# Patient Record
Sex: MALE | Race: WHITE | NOT HISPANIC OR LATINO | Employment: OTHER | URBAN - METROPOLITAN AREA
[De-identification: names, ages, dates, MRNs, and addresses within clinical notes are randomized per-mention and may not be internally consistent; named-entity substitution may affect disease eponyms.]

---

## 2020-03-13 ENCOUNTER — HOSPITAL ENCOUNTER (OUTPATIENT)
Dept: CARDIOLOGY | Facility: CLINIC | Age: 75
Discharge: HOME | End: 2020-03-13
Payer: MEDICARE

## 2020-03-13 ENCOUNTER — OFFICE VISIT (OUTPATIENT)
Dept: CARDIOLOGY | Facility: CLINIC | Age: 75
End: 2020-03-13
Payer: MEDICARE

## 2020-03-13 VITALS
DIASTOLIC BLOOD PRESSURE: 73 MMHG | HEART RATE: 95 BPM | WEIGHT: 188.25 LBS | HEIGHT: 69 IN | SYSTOLIC BLOOD PRESSURE: 168 MMHG | BODY MASS INDEX: 27.88 KG/M2

## 2020-03-13 DIAGNOSIS — I35.1 NONRHEUMATIC AORTIC VALVE INSUFFICIENCY: ICD-10-CM

## 2020-03-13 DIAGNOSIS — I10 ESSENTIAL HYPERTENSION: ICD-10-CM

## 2020-03-13 PROCEDURE — 99204 OFFICE O/P NEW MOD 45 MIN: CPT | Mod: S$GLB,,, | Performed by: INTERNAL MEDICINE

## 2020-03-13 PROCEDURE — 1126F PR PAIN SEVERITY QUANTIFIED, NO PAIN PRESENT: ICD-10-PCS | Mod: S$GLB,,, | Performed by: INTERNAL MEDICINE

## 2020-03-13 PROCEDURE — 3077F SYST BP >= 140 MM HG: CPT | Mod: CPTII,S$GLB,, | Performed by: INTERNAL MEDICINE

## 2020-03-13 PROCEDURE — 99999 PR PBB SHADOW E&M-NEW PATIENT-LVL III: CPT | Mod: PBBFAC,,, | Performed by: INTERNAL MEDICINE

## 2020-03-13 PROCEDURE — 99204 PR OFFICE/OUTPT VISIT, NEW, LEVL IV, 45-59 MIN: ICD-10-PCS | Mod: S$GLB,,, | Performed by: INTERNAL MEDICINE

## 2020-03-13 PROCEDURE — 1126F AMNT PAIN NOTED NONE PRSNT: CPT | Mod: S$GLB,,, | Performed by: INTERNAL MEDICINE

## 2020-03-13 PROCEDURE — 1159F MED LIST DOCD IN RCRD: CPT | Mod: S$GLB,,, | Performed by: INTERNAL MEDICINE

## 2020-03-13 PROCEDURE — 99999 PR PBB SHADOW E&M-NEW PATIENT-LVL III: ICD-10-PCS | Mod: PBBFAC,,, | Performed by: INTERNAL MEDICINE

## 2020-03-13 PROCEDURE — 3077F PR MOST RECENT SYSTOLIC BLOOD PRESSURE >= 140 MM HG: ICD-10-PCS | Mod: CPTII,S$GLB,, | Performed by: INTERNAL MEDICINE

## 2020-03-13 PROCEDURE — 1159F PR MEDICATION LIST DOCUMENTED IN MEDICAL RECORD: ICD-10-PCS | Mod: S$GLB,,, | Performed by: INTERNAL MEDICINE

## 2020-03-13 PROCEDURE — 93000 ELECTROCARDIOGRAM COMPLETE: CPT | Mod: S$GLB,,, | Performed by: INTERNAL MEDICINE

## 2020-03-13 PROCEDURE — 3078F DIAST BP <80 MM HG: CPT | Mod: CPTII,S$GLB,, | Performed by: INTERNAL MEDICINE

## 2020-03-13 PROCEDURE — 3078F PR MOST RECENT DIASTOLIC BLOOD PRESSURE < 80 MM HG: ICD-10-PCS | Mod: CPTII,S$GLB,, | Performed by: INTERNAL MEDICINE

## 2020-03-13 PROCEDURE — 93000 EKG 12-LEAD: ICD-10-PCS | Mod: S$GLB,,, | Performed by: INTERNAL MEDICINE

## 2020-03-13 RX ORDER — ESOMEPRAZOLE MAGNESIUM 40 MG/1
40 CAPSULE, DELAYED RELEASE ORAL
Status: ON HOLD | COMMUNITY
Start: 2020-02-15 | End: 2020-05-26 | Stop reason: HOSPADM

## 2020-03-13 NOTE — PATIENT INSTRUCTIONS
Eating Heart-Healthy Food: Using the DASH Plan    Eating for your heart doesnt have to be hard or boring. You just need to know how to make healthier choices. The DASH eating plan has been developed to help you do just that. DASH stands for Dietary Approaches to Stop Hypertension. It is a plan that has been proven to be healthier for your heart and to lower your risk for high blood pressure. It can also help lower your risk for cancer, heart disease, osteoporosis, and diabetes.  Choosing from each food group  Choose foods from each of the food groups below each day. Try to get the recommended number of servings for each food group. The serving numbers are based on a diet of 2,000 calories a day. Talk to your doctor if youre unsure about your calorie needs. Along with getting the correct servings, the DASH plan also recommends a sodium intake less than 2,300 mg per day.        Grains  Servings: 6 to 8 a day  A serving is:  · 1 slice bread  · 1 ounce dry cereal  · Half a cup cooked rice, pasta or cereal  Best choices: Whole grains and any grains high in fiber. Vegetables  Servings: 4 to 5 a day  A serving is:  · 1 cup raw leafy vegetable  · Half a cup cut-up raw or cooked vegetable  · Half a cup vegetable juice  Best choices: Fresh or frozen vegetables prepared without added salt or fat.   Fruits  Servings: 4 to 5 a day  A serving is:  · 1 medium fruit  · One-quarter cup dried fruit  · Half a cup fresh, frozen, or canned fruit  · Half a cup of 100% fruit juices  Best choices: A variety of fresh fruits of different colors. Whole fruits are a better choice than fruit juices. Low-fat or fat-free dairy  Servings: 2 to 3 a day  A serving is:  · 1 cup milk  · 1 cup yogurt  · One and a half ounces cheese  Best choices: Skim or 1% milk, low-fat or fat-free yogurt or buttermilk, and low-fat cheeses.         Lean meats, poultry, fish  Servings: 6 or fewer a day  A serving is:  · 1 ounce cooked meats, poultry, or fish  · 1  egg  Best choices: Lean poultry and fish. Trim away visible fat. Broil, grill, roast, or boil instead of frying. Remove skin from poultry before eating. Limit how much red meat you eat.  Nuts, seeds, beans  Servings: 4 to 5 a week  A serving is:  · One-third cup nuts (one and a half ounces)  · 2 tablespoons nut butter or seeds  · Half a cup cooked dry beans or legumes  Best choices: Dry roasted nuts with no salt added, lentils, kidney beans, garbanzo beans, and whole julien beans.   Fats and oils  Servings: 2 to 3 a day  A serving is:  · 1 teaspoon vegetable oil  · 1 teaspoon soft margarine  · 1 tablespoon mayonnaise  · 2 tablespoons salad dressing  Best choices: Nut and vegetable oils (nontropical vegetable oils), such as olive and canola oil. Sweets  Servings: 5 a week or fewer  A serving is:  · 1 tablespoon sugar, maple syrup, or honey  · 1 tablespoon jam or jelly  · 1 half-ounce jelly beans (about 15)  · 1 cup lemonade  Best choices: Dried fruit can be a satisfying sweet. Choose low-fat sweets. And watch your serving sizes!      For more on the DASH eating plan, visit:  www.nhlbi.nih.gov/health/health-topics/topics/dash   Date Last Reviewed: 6/1/2016  © 6032-9955 [employer]. [address] [place] [address], [place] [address]. All rights reserved. This information is not intended as a substitute for professional medical care. Always follow your healthcare professional's instructions.      Following a heart health diet such as the Mediterranean Diet is recommended in addition to 150 minutes a week of moderate intensity exercise to lower cholesterol, maintain a healthy body weight, and improve overall cardiovascular health.    We discussed keeping a log of home blood pressures and notifying the office if it is consistently running above 130/80 mmHg. I have asked him to send me his readings via [facility] in 1-2 weeks.

## 2020-03-13 NOTE — PROGRESS NOTES
Subjective:   Patient ID:  Brad Kearns is a 74 y.o. male who presents for evaluation of Establish Care and Hypertension      HPI: He presents today to [facility]. He has no cardiac history other than treated HTN. He works as a  and is active all day. Brad Kearns denies any chest pain, shortness of breath, PND, orthopnea, palpitations, leg edema, or syncope. His exercise tolerance has been good. He takes Alleve daily.    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    Right knee arthroscopy         Social History     Socioeconomic History    Marital status:      Spouse name: Not on file    Number of children: Not on file    Years of education: Not on file    Highest education level: Not on file   Occupational History    Not on file   Social Needs    Financial resource strain: Not on file    Food insecurity:     Worry: Not on file     Inability: Not on file    Transportation needs:     Medical: Not on file     Non-medical: Not on file   Tobacco Use    Smoking status: Never Smoker    Smokeless tobacco: Never Used   Substance and Sexual Activity    Alcohol use: Yes     Comment: Rarely    Drug use: Not on file    Sexual activity: Not on file   Lifestyle    Physical activity:     Days per week: Not on file     Minutes per session: Not on file    Stress: Not on file   Relationships    Social connections:     Talks on phone: Not on file     Gets together: Not on file     Attends [employer] service: Not on file     Active member of club or organization: Not on file     Attends meetings of clubs or organizations: Not on file     Relationship status: Not on file   Other Topics Concern    Not on file   Social  History Narrative    Not on file       Family History   Problem Relation Age of Onset    Stroke Mother     Heart attack Father     Heart disease Brother     Heart attack Son     Heart disease Son     Heart disease Brother        Patient's Medications   New Prescriptions    No medications on file   Previous Medications    AMLODIPINE (NORVASC) 5 MG TABLET    Take 5 mg by mouth nightly.    ASCORBIC ACID (VITAMIN C) 1000 MG TABLET    Take 1,000 mg by mouth every morning.    ASPIRIN (ECOTRIN) 81 MG EC TABLET    Take 81 mg by mouth every morning.    ATENOLOL (TENORMIN) 50 MG TABLET    Take 50 mg by mouth nightly.    ESOMEPRAZOLE (NEXIUM) 40 MG CAPSULE    once daily.    FISH OIL-OMEGA-3 FATTY ACIDS 300-1,000 MG CAPSULE    Take 1 g by mouth every morning.    MULTIVITAMIN CAPSULE    Take 1 capsule by mouth once daily.    THIAMINE (VITAMIN B-1) 50 MG TABLET    Take 50 mg by mouth once daily.   Modified Medications    No medications on file   Discontinued Medications    GLUCOSAMINE HCL/CHONDR TAVERA A NA (OSTEO BI-FLEX ORAL)    Take by mouth every morning.    OMEPRAZOLE (PRILOSEC) 20 MG CAPSULE    Take 20 mg by mouth every morning.       Review of Systems   Constitution: Negative for malaise/fatigue and weight gain.   HENT: Negative for hearing loss.    Eyes: Negative for visual disturbance.   Cardiovascular: Negative for chest pain, claudication, dyspnea on exertion, leg swelling, near-syncope, orthopnea, palpitations, paroxysmal nocturnal dyspnea and syncope.   Respiratory: Negative for cough, shortness of breath, sleep disturbances due to breathing, snoring and wheezing.    Endocrine: Negative for cold intolerance, heat intolerance, polydipsia, polyphagia and polyuria.   Hematologic/Lymphatic: Negative for bleeding problem. Does not bruise/bleed easily.   Skin: Negative for rash and suspicious lesions.   Musculoskeletal: Negative for arthritis, falls, joint pain, muscle weakness and myalgias.   Gastrointestinal: Negative  "for abdominal pain, change in bowel habit, constipation, diarrhea, heartburn, hematochezia, melena and nausea.   Genitourinary: Negative for hematuria and nocturia.   Neurological: Negative for excessive daytime sleepiness, dizziness, headaches, light-headedness, loss of balance and weakness.   Psychiatric/Behavioral: Negative for depression. The patient is not nervous/anxious.    Allergic/Immunologic: Negative for environmental allergies.       BP (!) 168/73 (BP Location: Left arm, Patient Position: Sitting, BP Method: Large (Automatic))   Pulse 95   Ht 5' 9" (1.753 m)   Wt 85.4 kg (188 lb 4.4 oz)   BMI 27.80 kg/m²     Objective:   Physical Exam   Constitutional: He is oriented to person, place, and time. He appears well-developed and well-nourished.        HENT:   Head: Normocephalic and atraumatic.   Mouth/Throat: Oropharynx is clear and moist.   Eyes: Pupils are equal, round, and reactive to light. Conjunctivae and EOM are normal. No scleral icterus.   Neck: Normal range of motion. Neck supple. No hepatojugular reflux and no JVD present. No tracheal deviation present. No thyromegaly present.   Cardiovascular: Normal rate, regular rhythm and intact distal pulses. PMI is not displaced.   Murmur heard.  High-pitched blowing decrescendo early diastolic murmur is present with a grade of 3/6 at the upper right sternal border radiating to the apex. Quinke's pulsations present  Pulses:       Carotid pulses are 2+ on the right side, and 2+ on the left side.       Radial pulses are 2+ on the right side, and 2+ on the left side.        Dorsalis pedis pulses are 2+ on the right side, and 2+ on the left side.        Posterior tibial pulses are 2+ on the right side, and 2+ on the left side.   Pulmonary/Chest: Effort normal and breath sounds normal.   Abdominal: Soft. Bowel sounds are normal. He exhibits no distension and no mass. There is no hepatosplenomegaly. There is no tenderness.   Musculoskeletal: He exhibits no " edema or tenderness.   Lymphadenopathy:     He has no cervical adenopathy.   Neurological: He is alert and oriented to person, place, and time.   Skin: Skin is warm and dry. No rash noted. No cyanosis or erythema. Nails show no clubbing.   Psychiatric: He has a normal mood and affect. His speech is normal and behavior is normal.       No results found for: NA, K, CL, CO2, BUN, CREATININE, GLU, HGBA1C, MG, AST, ALT, ALBUMIN, PROT, BILITOT, WBC, HGB, HCT, MCV, PLT, INR, TSH, CHOL, HDL, LDLCALC, TRIG, BNP    Assessment:     1. Essential hypertension : His blood pressure was elevated today. He thinks he is taking one more BP med and will let me know when he gets home. We discussed keeping a log of home blood pressures and notifying the office if it is consistently running above 130/80 mmHg. I have asked him to send me his readings via [facility] in 1-2 weeks. Limit sodium intake to < 1500mg daily and follow the DASH diet plan. We discussed that 150 minutes a week of moderate intensity exercise is recommended to improve cardiovascular health.     2. Nonrheumatic aortic valve insufficiency : He has a significant sounding AI murmur on exam. No history of rheumatic fever as a child. No recent illness. Echo for further evaluation.       Plan:     Brad was seen today for establish care and hypertension.    Diagnoses and all orders for this visit:    Essential hypertension  -     Comprehensive metabolic panel; Future  -     Lipid panel; Future  -     CBC auto differential; Future  -     TSH; Future  -     EKG 12-lead  -     Echo Color Flow Doppler? Yes    Nonrheumatic aortic valve insufficiency  -     Echo Color Flow Doppler? Yes        Thank you for allowing me to participate in this patient's care. Please do not hesitate to contact me with any questions or concerns.

## 2020-03-20 ENCOUNTER — LAB VISIT (OUTPATIENT)
Dept: LAB | Facility: HOSPITAL | Age: 75
End: 2020-03-20
Attending: INTERNAL MEDICINE
Payer: MEDICARE

## 2020-03-20 ENCOUNTER — CLINICAL SUPPORT (OUTPATIENT)
Dept: CARDIOLOGY | Facility: CLINIC | Age: 75
End: 2020-03-20
Attending: INTERNAL MEDICINE
Payer: MEDICARE

## 2020-03-20 ENCOUNTER — TELEPHONE (OUTPATIENT)
Dept: CARDIOLOGY | Facility: CLINIC | Age: 75
End: 2020-03-20

## 2020-03-20 VITALS
HEIGHT: 69 IN | DIASTOLIC BLOOD PRESSURE: 54 MMHG | SYSTOLIC BLOOD PRESSURE: 150 MMHG | BODY MASS INDEX: 27.85 KG/M2 | WEIGHT: 188 LBS | HEART RATE: 73 BPM

## 2020-03-20 DIAGNOSIS — I35.1 NONRHEUMATIC AORTIC VALVE INSUFFICIENCY: Primary | ICD-10-CM

## 2020-03-20 DIAGNOSIS — I10 ESSENTIAL HYPERTENSION: ICD-10-CM

## 2020-03-20 LAB
ALBUMIN SERPL BCP-MCNC: 4.3 G/DL (ref 3.5–5.2)
ALP SERPL-CCNC: 58 U/L (ref 55–135)
ALT SERPL W/O P-5'-P-CCNC: 23 U/L (ref 10–44)
ANION GAP SERPL CALC-SCNC: 9 MMOL/L (ref 8–16)
ASCENDING AORTA: 3.85 CM
AST SERPL-CCNC: 24 U/L (ref 10–40)
AV INDEX (PROSTH): 0.83
AV MEAN GRADIENT: 12 MMHG
AV PEAK GRADIENT: 22 MMHG
AV VALVE AREA: 3.84 CM2
AV VELOCITY RATIO: 0.76
BASOPHILS # BLD AUTO: 0.08 K/UL (ref 0–0.2)
BASOPHILS NFR BLD: 1.1 % (ref 0–1.9)
BILIRUB SERPL-MCNC: 0.8 MG/DL (ref 0.1–1)
BSA FOR ECHO PROCEDURE: 2.04 M2
BUN SERPL-MCNC: 24 MG/DL (ref 8–23)
CALCIUM SERPL-MCNC: 10.1 MG/DL (ref 8.7–10.5)
CHLORIDE SERPL-SCNC: 106 MMOL/L (ref 95–110)
CHOLEST SERPL-MCNC: 194 MG/DL (ref 120–199)
CHOLEST/HDLC SERPL: 4.9 {RATIO} (ref 2–5)
CO2 SERPL-SCNC: 24 MMOL/L (ref 23–29)
CREAT SERPL-MCNC: 1.3 MG/DL (ref 0.5–1.4)
CV ECHO LV RWT: 0.27 CM
DIFFERENTIAL METHOD: ABNORMAL
DOP CALC AO PEAK VEL: 2.34 M/S
DOP CALC AO VTI: 49.3 CM
DOP CALC LVOT AREA: 4.6 CM2
DOP CALC LVOT DIAMETER: 2.43 CM
DOP CALC LVOT PEAK VEL: 1.78 M/S
DOP CALC LVOT STROKE VOLUME: 189.26 CM3
DOP CALCLVOT PEAK VEL VTI: 40.83 CM
E WAVE DECELERATION TIME: 295.37 MSEC
E/A RATIO: 0.59
E/E' RATIO: 12.83 M/S
ECHO LV POSTERIOR WALL: 0.81 CM (ref 0.6–1.1)
EOSINOPHIL # BLD AUTO: 0.2 K/UL (ref 0–0.5)
EOSINOPHIL NFR BLD: 2.8 % (ref 0–8)
ERYTHROCYTE [DISTWIDTH] IN BLOOD BY AUTOMATED COUNT: 14.1 % (ref 11.5–14.5)
EST. GFR  (AFRICAN AMERICAN): >60 ML/MIN/1.73 M^2
EST. GFR  (NON AFRICAN AMERICAN): 54.1 ML/MIN/1.73 M^2
FRACTIONAL SHORTENING: 40 % (ref 28–44)
GLUCOSE SERPL-MCNC: 104 MG/DL (ref 70–110)
HCT VFR BLD AUTO: 46 % (ref 40–54)
HDLC SERPL-MCNC: 40 MG/DL (ref 40–75)
HDLC SERPL: 20.6 % (ref 20–50)
HGB BLD-MCNC: 14.4 G/DL (ref 14–18)
IMM GRANULOCYTES # BLD AUTO: 0.02 K/UL (ref 0–0.04)
IMM GRANULOCYTES NFR BLD AUTO: 0.3 % (ref 0–0.5)
INTERVENTRICULAR SEPTUM: 0.99 CM (ref 0.6–1.1)
LA MAJOR: 5.35 CM
LA MINOR: 5.48 CM
LA WIDTH: 4.43 CM
LDLC SERPL CALC-MCNC: 138.2 MG/DL (ref 63–159)
LEFT ATRIUM SIZE: 3.57 CM
LEFT ATRIUM VOLUME INDEX: 36.2 ML/M2
LEFT ATRIUM VOLUME: 72.78 CM3
LEFT INTERNAL DIMENSION IN SYSTOLE: 3.6 CM (ref 2.1–4)
LEFT VENTRICLE DIASTOLIC VOLUME INDEX: 72.96 ML/M2
LEFT VENTRICLE DIASTOLIC VOLUME: 146.79 ML
LEFT VENTRICLE MASS INDEX: 107 G/M2
LEFT VENTRICLE SYSTOLIC VOLUME INDEX: 27.1 ML/M2
LEFT VENTRICLE SYSTOLIC VOLUME: 54.52 ML
LEFT VENTRICULAR INTERNAL DIMENSION IN DIASTOLE: 6 CM (ref 3.5–6)
LEFT VENTRICULAR MASS: 215.72 G
LV LATERAL E/E' RATIO: 15.4 M/S
LV SEPTAL E/E' RATIO: 11 M/S
LYMPHOCYTES # BLD AUTO: 2.9 K/UL (ref 1–4.8)
LYMPHOCYTES NFR BLD: 39.2 % (ref 18–48)
MCH RBC QN AUTO: 29.9 PG (ref 27–31)
MCHC RBC AUTO-ENTMCNC: 31.3 G/DL (ref 32–36)
MCV RBC AUTO: 95 FL (ref 82–98)
MONOCYTES # BLD AUTO: 0.6 K/UL (ref 0.3–1)
MONOCYTES NFR BLD: 8.3 % (ref 4–15)
MV PEAK A VEL: 1.31 M/S
MV PEAK E VEL: 0.77 M/S
NEUTROPHILS # BLD AUTO: 3.6 K/UL (ref 1.8–7.7)
NEUTROPHILS NFR BLD: 48.3 % (ref 38–73)
NONHDLC SERPL-MCNC: 154 MG/DL
NRBC BLD-RTO: 0 /100 WBC
PLATELET # BLD AUTO: 315 K/UL (ref 150–350)
PMV BLD AUTO: 11.3 FL (ref 9.2–12.9)
POTASSIUM SERPL-SCNC: 5 MMOL/L (ref 3.5–5.1)
PROT SERPL-MCNC: 7.7 G/DL (ref 6–8.4)
PULM VEIN S/D RATIO: 1.21
PV PEAK D VEL: 0.56 M/S
PV PEAK S VEL: 0.68 M/S
RA MAJOR: 5.23 CM
RA PRESSURE: 3 MMHG
RA WIDTH: 3.6 CM
RBC # BLD AUTO: 4.82 M/UL (ref 4.6–6.2)
RIGHT VENTRICULAR END-DIASTOLIC DIMENSION: 3.12 CM
SINUS: 4.75 CM
SODIUM SERPL-SCNC: 139 MMOL/L (ref 136–145)
STJ: 3.94 CM
TDI LATERAL: 0.05 M/S
TDI SEPTAL: 0.07 M/S
TDI: 0.06 M/S
TRICUSPID ANNULAR PLANE SYSTOLIC EXCURSION: 3.15 CM
TRIGL SERPL-MCNC: 79 MG/DL (ref 30–150)
TSH SERPL DL<=0.005 MIU/L-ACNC: 1.05 UIU/ML (ref 0.4–4)
WBC # BLD AUTO: 7.45 K/UL (ref 3.9–12.7)

## 2020-03-20 PROCEDURE — 80053 COMPREHEN METABOLIC PANEL: CPT

## 2020-03-20 PROCEDURE — 84443 ASSAY THYROID STIM HORMONE: CPT

## 2020-03-20 PROCEDURE — 36415 COLL VENOUS BLD VENIPUNCTURE: CPT | Mod: PO

## 2020-03-20 PROCEDURE — 99999 PR PBB SHADOW E&M-EST. PATIENT-LVL II: ICD-10-PCS | Mod: PBBFAC,,,

## 2020-03-20 PROCEDURE — 85025 COMPLETE CBC W/AUTO DIFF WBC: CPT

## 2020-03-20 PROCEDURE — 80061 LIPID PANEL: CPT

## 2020-03-20 PROCEDURE — 99999 PR PBB SHADOW E&M-EST. PATIENT-LVL II: CPT | Mod: PBBFAC,,,

## 2020-03-24 ENCOUNTER — PATIENT MESSAGE (OUTPATIENT)
Dept: CARDIOLOGY | Facility: CLINIC | Age: 75
End: 2020-03-24

## 2020-03-25 RX ORDER — ATORVASTATIN CALCIUM 40 MG/1
40 TABLET, FILM COATED ORAL DAILY
Qty: 90 TABLET | Refills: 3 | Status: SHIPPED | OUTPATIENT
Start: 2020-03-25 | End: 2020-04-21

## 2020-03-25 RX ORDER — ATORVASTATIN CALCIUM 40 MG/1
40 TABLET, FILM COATED ORAL DAILY
COMMUNITY
Start: 2020-03-24 | End: 2020-03-25 | Stop reason: SDUPTHER

## 2020-04-14 ENCOUNTER — TELEPHONE (OUTPATIENT)
Dept: CARDIOLOGY | Facility: CLINIC | Age: 75
End: 2020-04-14

## 2020-04-14 ENCOUNTER — PATIENT MESSAGE (OUTPATIENT)
Dept: CARDIOLOGY | Facility: CLINIC | Age: 75
End: 2020-04-14

## 2020-04-14 RX ORDER — VALSARTAN 160 MG/1
160 TABLET ORAL 2 TIMES DAILY
Qty: 180 TABLET | Refills: 3 | Status: ON HOLD | OUTPATIENT
Start: 2020-04-14 | End: 2020-05-26 | Stop reason: HOSPADM

## 2020-04-14 NOTE — TELEPHONE ENCOUNTER
Reports he is taking nifedipine 60 mg bid and lisinopril 20 mg bid. Reports a cough with lisinopril. Will change to valsartan 160 mg bid.

## 2020-04-20 ENCOUNTER — PATIENT MESSAGE (OUTPATIENT)
Dept: CARDIOLOGY | Facility: CLINIC | Age: 75
End: 2020-04-20

## 2020-04-21 ENCOUNTER — TELEPHONE (OUTPATIENT)
Dept: CARDIOLOGY | Facility: CLINIC | Age: 75
End: 2020-04-21

## 2020-04-21 RX ORDER — AMLODIPINE BESYLATE 5 MG/1
5 TABLET ORAL DAILY
Qty: 90 TABLET | Refills: 3 | Status: SHIPPED | OUTPATIENT
Start: 2020-04-21 | End: 2020-05-14

## 2020-04-21 RX ORDER — PRAVASTATIN SODIUM 40 MG/1
40 TABLET ORAL DAILY
Qty: 90 TABLET | Refills: 3 | Status: SHIPPED | OUTPATIENT
Start: 2020-04-21 | End: 2020-11-11

## 2020-04-21 NOTE — TELEPHONE ENCOUNTER
He dose not need to take the atenolol and valsartan at the same time. Actually I would like to get him off of the atenolol and use something else in hits place given his leaky valve. Lets have him cut the atenolol in half for a few days and then stop. In its place Im going to call in amlodipine 5 mg daily that he can take in at noon in between the valsartan doses.

## 2020-04-21 NOTE — TELEPHONE ENCOUNTER
Feeling off on atorvastatin. Requested to change to another medication. Will change to pravastatin 40 mg daily.

## 2020-05-14 ENCOUNTER — HOSPITAL ENCOUNTER (INPATIENT)
Facility: HOSPITAL | Age: 75
LOS: 12 days | Discharge: HOME HEALTH | DRG: 216 | End: 2020-05-26
Attending: EMERGENCY MEDICINE | Admitting: INTERNAL MEDICINE
Payer: MEDICARE

## 2020-05-14 ENCOUNTER — OFFICE VISIT (OUTPATIENT)
Dept: INTERNAL MEDICINE | Facility: CLINIC | Age: 75
End: 2020-05-14
Payer: MEDICARE

## 2020-05-14 VITALS
OXYGEN SATURATION: 96 % | DIASTOLIC BLOOD PRESSURE: 60 MMHG | HEART RATE: 68 BPM | WEIGHT: 181 LBS | BODY MASS INDEX: 26.81 KG/M2 | HEIGHT: 69 IN | SYSTOLIC BLOOD PRESSURE: 160 MMHG

## 2020-05-14 DIAGNOSIS — Z01.812 PRE-PROCEDURE LAB EXAM: ICD-10-CM

## 2020-05-14 DIAGNOSIS — R73.9 ACUTE HYPERGLYCEMIA: ICD-10-CM

## 2020-05-14 DIAGNOSIS — I21.4 NSTEMI (NON-ST ELEVATED MYOCARDIAL INFARCTION): ICD-10-CM

## 2020-05-14 DIAGNOSIS — I25.10 CORONARY ARTERY DISEASE, ANGINA PRESENCE UNSPECIFIED, UNSPECIFIED VESSEL OR LESION TYPE, UNSPECIFIED WHETHER NATIVE OR TRANSPLANTED HEART: ICD-10-CM

## 2020-05-14 DIAGNOSIS — J81.0 ACUTE PULMONARY EDEMA: ICD-10-CM

## 2020-05-14 DIAGNOSIS — R94.31 ABNORMAL EKG: Primary | ICD-10-CM

## 2020-05-14 DIAGNOSIS — I24.9 ACS (ACUTE CORONARY SYNDROME): ICD-10-CM

## 2020-05-14 DIAGNOSIS — D62 ACUTE BLOOD LOSS ANEMIA: ICD-10-CM

## 2020-05-14 DIAGNOSIS — Z99.11 ENCOUNTER FOR WEANING FROM VENTILATOR: ICD-10-CM

## 2020-05-14 DIAGNOSIS — Z95.2 STATUS POST AORTIC VALVE REPLACEMENT: ICD-10-CM

## 2020-05-14 DIAGNOSIS — I35.1 NONRHEUMATIC AORTIC VALVE INSUFFICIENCY: Primary | ICD-10-CM

## 2020-05-14 DIAGNOSIS — Z95.1 S/P CABG (CORONARY ARTERY BYPASS GRAFT): ICD-10-CM

## 2020-05-14 DIAGNOSIS — I34.0 MITRAL REGURGITATION: ICD-10-CM

## 2020-05-14 DIAGNOSIS — R07.9 CHEST PAIN: ICD-10-CM

## 2020-05-14 LAB
ALBUMIN SERPL BCP-MCNC: 4.6 G/DL (ref 3.5–5.2)
ALP SERPL-CCNC: 51 U/L (ref 55–135)
ALT SERPL W/O P-5'-P-CCNC: 29 U/L (ref 10–44)
ANION GAP SERPL CALC-SCNC: 11 MMOL/L (ref 8–16)
APTT BLDCRRT: 23.5 SEC (ref 21–32)
AST SERPL-CCNC: 40 U/L (ref 10–40)
BASOPHILS # BLD AUTO: 0.07 K/UL (ref 0–0.2)
BASOPHILS # BLD AUTO: 0.08 K/UL (ref 0–0.2)
BASOPHILS NFR BLD: 0.6 % (ref 0–1.9)
BASOPHILS NFR BLD: 0.9 % (ref 0–1.9)
BILIRUB SERPL-MCNC: 0.7 MG/DL (ref 0.1–1)
BNP SERPL-MCNC: 147 PG/ML (ref 0–99)
BUN SERPL-MCNC: 18 MG/DL (ref 8–23)
CALCIUM SERPL-MCNC: 9.7 MG/DL (ref 8.7–10.5)
CHLORIDE SERPL-SCNC: 109 MMOL/L (ref 95–110)
CO2 SERPL-SCNC: 22 MMOL/L (ref 23–29)
CREAT SERPL-MCNC: 1.3 MG/DL (ref 0.5–1.4)
DIFFERENTIAL METHOD: ABNORMAL
DIFFERENTIAL METHOD: ABNORMAL
EOSINOPHIL # BLD AUTO: 0.1 K/UL (ref 0–0.5)
EOSINOPHIL # BLD AUTO: 0.2 K/UL (ref 0–0.5)
EOSINOPHIL NFR BLD: 1.1 % (ref 0–8)
EOSINOPHIL NFR BLD: 1.8 % (ref 0–8)
ERYTHROCYTE [DISTWIDTH] IN BLOOD BY AUTOMATED COUNT: 14.5 % (ref 11.5–14.5)
ERYTHROCYTE [DISTWIDTH] IN BLOOD BY AUTOMATED COUNT: 14.6 % (ref 11.5–14.5)
EST. GFR  (AFRICAN AMERICAN): >60 ML/MIN/1.73 M^2
EST. GFR  (NON AFRICAN AMERICAN): 54.1 ML/MIN/1.73 M^2
ESTIMATED AVG GLUCOSE: 100 MG/DL (ref 68–131)
GLUCOSE SERPL-MCNC: 93 MG/DL (ref 70–110)
HBA1C MFR BLD HPLC: 5.1 % (ref 4–5.6)
HCT VFR BLD AUTO: 41.3 % (ref 40–54)
HCT VFR BLD AUTO: 45 % (ref 40–54)
HGB BLD-MCNC: 12.9 G/DL (ref 14–18)
HGB BLD-MCNC: 14.2 G/DL (ref 14–18)
IMM GRANULOCYTES # BLD AUTO: 0.04 K/UL (ref 0–0.04)
IMM GRANULOCYTES # BLD AUTO: 0.04 K/UL (ref 0–0.04)
IMM GRANULOCYTES NFR BLD AUTO: 0.4 % (ref 0–0.5)
IMM GRANULOCYTES NFR BLD AUTO: 0.4 % (ref 0–0.5)
INR PPP: 1 (ref 0.8–1.2)
LYMPHOCYTES # BLD AUTO: 2 K/UL (ref 1–4.8)
LYMPHOCYTES # BLD AUTO: 4.2 K/UL (ref 1–4.8)
LYMPHOCYTES NFR BLD: 21.7 % (ref 18–48)
LYMPHOCYTES NFR BLD: 38 % (ref 18–48)
MCH RBC QN AUTO: 30.1 PG (ref 27–31)
MCH RBC QN AUTO: 30.3 PG (ref 27–31)
MCHC RBC AUTO-ENTMCNC: 31.2 G/DL (ref 32–36)
MCHC RBC AUTO-ENTMCNC: 31.6 G/DL (ref 32–36)
MCV RBC AUTO: 96 FL (ref 82–98)
MCV RBC AUTO: 97 FL (ref 82–98)
MONOCYTES # BLD AUTO: 0.7 K/UL (ref 0.3–1)
MONOCYTES # BLD AUTO: 0.8 K/UL (ref 0.3–1)
MONOCYTES NFR BLD: 6.9 % (ref 4–15)
MONOCYTES NFR BLD: 7.2 % (ref 4–15)
NEUTROPHILS # BLD AUTO: 5.7 K/UL (ref 1.8–7.7)
NEUTROPHILS # BLD AUTO: 6.4 K/UL (ref 1.8–7.7)
NEUTROPHILS NFR BLD: 52.3 % (ref 38–73)
NEUTROPHILS NFR BLD: 68.7 % (ref 38–73)
NRBC BLD-RTO: 0 /100 WBC
NRBC BLD-RTO: 0 /100 WBC
PLATELET # BLD AUTO: 246 K/UL (ref 150–350)
PLATELET # BLD AUTO: 269 K/UL (ref 150–350)
PMV BLD AUTO: 10.1 FL (ref 9.2–12.9)
PMV BLD AUTO: 10.1 FL (ref 9.2–12.9)
POTASSIUM SERPL-SCNC: 5.3 MMOL/L (ref 3.5–5.1)
PROT SERPL-MCNC: 8 G/DL (ref 6–8.4)
PROTHROMBIN TIME: 10.6 SEC (ref 9–12.5)
RBC # BLD AUTO: 4.28 M/UL (ref 4.6–6.2)
RBC # BLD AUTO: 4.69 M/UL (ref 4.6–6.2)
SARS-COV-2 RDRP RESP QL NAA+PROBE: NEGATIVE
SODIUM SERPL-SCNC: 142 MMOL/L (ref 136–145)
TROPONIN I SERPL DL<=0.01 NG/ML-MCNC: 2.14 NG/ML (ref 0–0.03)
WBC # BLD AUTO: 10.96 K/UL (ref 3.9–12.7)
WBC # BLD AUTO: 9.36 K/UL (ref 3.9–12.7)

## 2020-05-14 PROCEDURE — 93010 ELECTROCARDIOGRAM REPORT: CPT | Mod: 76,S$GLB,, | Performed by: INTERNAL MEDICINE

## 2020-05-14 PROCEDURE — 99223 PR INITIAL HOSPITAL CARE,LEVL III: ICD-10-PCS | Mod: AI,GC,, | Performed by: INTERNAL MEDICINE

## 2020-05-14 PROCEDURE — 84484 ASSAY OF TROPONIN QUANT: CPT

## 2020-05-14 PROCEDURE — 99999 PR PBB SHADOW E&M-EST. PATIENT-LVL IV: CPT | Mod: PBBFAC,,, | Performed by: INTERNAL MEDICINE

## 2020-05-14 PROCEDURE — 93005 ELECTROCARDIOGRAM TRACING: CPT

## 2020-05-14 PROCEDURE — 25000003 PHARM REV CODE 250: Performed by: INTERNAL MEDICINE

## 2020-05-14 PROCEDURE — 83880 ASSAY OF NATRIURETIC PEPTIDE: CPT

## 2020-05-14 PROCEDURE — 93010 ELECTROCARDIOGRAM REPORT: CPT | Mod: ,,, | Performed by: INTERNAL MEDICINE

## 2020-05-14 PROCEDURE — 99291 PR CRITICAL CARE, E/M 30-74 MINUTES: ICD-10-PCS | Mod: ,,, | Performed by: EMERGENCY MEDICINE

## 2020-05-14 PROCEDURE — 85730 THROMBOPLASTIN TIME PARTIAL: CPT

## 2020-05-14 PROCEDURE — 85610 PROTHROMBIN TIME: CPT

## 2020-05-14 PROCEDURE — 3078F PR MOST RECENT DIASTOLIC BLOOD PRESSURE < 80 MM HG: ICD-10-PCS | Mod: CPTII,S$GLB,, | Performed by: INTERNAL MEDICINE

## 2020-05-14 PROCEDURE — 93010 ELECTROCARDIOGRAM REPORT: CPT | Mod: 76,,, | Performed by: INTERNAL MEDICINE

## 2020-05-14 PROCEDURE — 3077F SYST BP >= 140 MM HG: CPT | Mod: CPTII,S$GLB,, | Performed by: INTERNAL MEDICINE

## 2020-05-14 PROCEDURE — 93010 EKG 12-LEAD: ICD-10-PCS | Mod: 76,S$GLB,, | Performed by: INTERNAL MEDICINE

## 2020-05-14 PROCEDURE — 99203 PR OFFICE/OUTPT VISIT, NEW, LEVL III, 30-44 MIN: ICD-10-PCS | Mod: S$GLB,,, | Performed by: INTERNAL MEDICINE

## 2020-05-14 PROCEDURE — 93005 ELECTROCARDIOGRAM TRACING: CPT | Mod: S$GLB,,, | Performed by: INTERNAL MEDICINE

## 2020-05-14 PROCEDURE — 3078F DIAST BP <80 MM HG: CPT | Mod: CPTII,S$GLB,, | Performed by: INTERNAL MEDICINE

## 2020-05-14 PROCEDURE — 93010 EKG 12-LEAD: ICD-10-PCS | Mod: 76,,, | Performed by: INTERNAL MEDICINE

## 2020-05-14 PROCEDURE — 1101F PR PT FALLS ASSESS DOC 0-1 FALLS W/OUT INJ PAST YR: ICD-10-PCS | Mod: CPTII,S$GLB,, | Performed by: INTERNAL MEDICINE

## 2020-05-14 PROCEDURE — 20000000 HC ICU ROOM

## 2020-05-14 PROCEDURE — 93005 EKG 12-LEAD: ICD-10-PCS | Mod: S$GLB,,, | Performed by: INTERNAL MEDICINE

## 2020-05-14 PROCEDURE — 1159F PR MEDICATION LIST DOCUMENTED IN MEDICAL RECORD: ICD-10-PCS | Mod: S$GLB,,, | Performed by: INTERNAL MEDICINE

## 2020-05-14 PROCEDURE — 63600175 PHARM REV CODE 636 W HCPCS: Performed by: INTERNAL MEDICINE

## 2020-05-14 PROCEDURE — 80053 COMPREHEN METABOLIC PANEL: CPT

## 2020-05-14 PROCEDURE — 1126F AMNT PAIN NOTED NONE PRSNT: CPT | Mod: S$GLB,,, | Performed by: INTERNAL MEDICINE

## 2020-05-14 PROCEDURE — 1159F MED LIST DOCD IN RCRD: CPT | Mod: S$GLB,,, | Performed by: INTERNAL MEDICINE

## 2020-05-14 PROCEDURE — U0002 COVID-19 LAB TEST NON-CDC: HCPCS

## 2020-05-14 PROCEDURE — 25000003 PHARM REV CODE 250: Performed by: EMERGENCY MEDICINE

## 2020-05-14 PROCEDURE — 99285 EMERGENCY DEPT VISIT HI MDM: CPT | Mod: 25

## 2020-05-14 PROCEDURE — 83036 HEMOGLOBIN GLYCOSYLATED A1C: CPT

## 2020-05-14 PROCEDURE — 99291 CRITICAL CARE FIRST HOUR: CPT | Mod: ,,, | Performed by: EMERGENCY MEDICINE

## 2020-05-14 PROCEDURE — 99999 PR PBB SHADOW E&M-EST. PATIENT-LVL IV: ICD-10-PCS | Mod: PBBFAC,,, | Performed by: INTERNAL MEDICINE

## 2020-05-14 PROCEDURE — 99203 OFFICE O/P NEW LOW 30 MIN: CPT | Mod: S$GLB,,, | Performed by: INTERNAL MEDICINE

## 2020-05-14 PROCEDURE — 85025 COMPLETE CBC W/AUTO DIFF WBC: CPT | Mod: 91

## 2020-05-14 PROCEDURE — 3077F PR MOST RECENT SYSTOLIC BLOOD PRESSURE >= 140 MM HG: ICD-10-PCS | Mod: CPTII,S$GLB,, | Performed by: INTERNAL MEDICINE

## 2020-05-14 PROCEDURE — 99223 1ST HOSP IP/OBS HIGH 75: CPT | Mod: AI,GC,, | Performed by: INTERNAL MEDICINE

## 2020-05-14 PROCEDURE — 1101F PT FALLS ASSESS-DOCD LE1/YR: CPT | Mod: CPTII,S$GLB,, | Performed by: INTERNAL MEDICINE

## 2020-05-14 PROCEDURE — 1126F PR PAIN SEVERITY QUANTIFIED, NO PAIN PRESENT: ICD-10-PCS | Mod: S$GLB,,, | Performed by: INTERNAL MEDICINE

## 2020-05-14 RX ORDER — CLOPIDOGREL BISULFATE 75 MG/1
75 TABLET ORAL DAILY
Status: DISCONTINUED | OUTPATIENT
Start: 2020-05-15 | End: 2020-05-15

## 2020-05-14 RX ORDER — HEPARIN SODIUM,PORCINE/D5W 25000/250
12 INTRAVENOUS SOLUTION INTRAVENOUS CONTINUOUS
Status: DISCONTINUED | OUTPATIENT
Start: 2020-05-14 | End: 2020-05-19

## 2020-05-14 RX ORDER — METOPROLOL TARTRATE 25 MG/1
25 TABLET, FILM COATED ORAL 2 TIMES DAILY
Status: DISCONTINUED | OUTPATIENT
Start: 2020-05-14 | End: 2020-05-19

## 2020-05-14 RX ORDER — ASPIRIN 325 MG
325 TABLET ORAL
Status: COMPLETED | OUTPATIENT
Start: 2020-05-14 | End: 2020-05-14

## 2020-05-14 RX ORDER — VALSARTAN 80 MG/1
160 TABLET ORAL 2 TIMES DAILY
Status: DISCONTINUED | OUTPATIENT
Start: 2020-05-15 | End: 2020-05-18

## 2020-05-14 RX ORDER — SODIUM CHLORIDE 0.9 % (FLUSH) 0.9 %
10 SYRINGE (ML) INJECTION
Status: DISCONTINUED | OUTPATIENT
Start: 2020-05-14 | End: 2020-05-19

## 2020-05-14 RX ORDER — HYDROCHLOROTHIAZIDE 12.5 MG/1
12.5 CAPSULE ORAL DAILY
Qty: 30 CAPSULE | Refills: 6 | Status: CANCELLED | OUTPATIENT
Start: 2020-05-14 | End: 2020-06-13

## 2020-05-14 RX ORDER — ASPIRIN 81 MG/1
81 TABLET ORAL DAILY
Status: DISCONTINUED | OUTPATIENT
Start: 2020-05-15 | End: 2020-05-19

## 2020-05-14 RX ORDER — NITROGLYCERIN 20 MG/100ML
5 INJECTION INTRAVENOUS CONTINUOUS
Status: DISCONTINUED | OUTPATIENT
Start: 2020-05-14 | End: 2020-05-15

## 2020-05-14 RX ORDER — CLOPIDOGREL 300 MG/1
600 TABLET, FILM COATED ORAL ONCE
Status: COMPLETED | OUTPATIENT
Start: 2020-05-14 | End: 2020-05-14

## 2020-05-14 RX ORDER — ATORVASTATIN CALCIUM 20 MG/1
40 TABLET, FILM COATED ORAL NIGHTLY
Status: DISCONTINUED | OUTPATIENT
Start: 2020-05-14 | End: 2020-05-19

## 2020-05-14 RX ADMIN — METOPROLOL TARTRATE 25 MG: 25 TABLET, FILM COATED ORAL at 08:05

## 2020-05-14 RX ADMIN — NITROGLYCERIN 5 MCG/MIN: 20 INJECTION INTRAVENOUS at 08:05

## 2020-05-14 RX ADMIN — ATORVASTATIN CALCIUM 40 MG: 20 TABLET, FILM COATED ORAL at 08:05

## 2020-05-14 RX ADMIN — HEPARIN SODIUM 12 UNITS/KG/HR: 10000 INJECTION, SOLUTION INTRAVENOUS at 09:05

## 2020-05-14 RX ADMIN — CLOPIDOGREL BISULFATE 600 MG: 300 TABLET, FILM COATED ORAL at 09:05

## 2020-05-14 RX ADMIN — ASPIRIN 325 MG ORAL TABLET 325 MG: 325 PILL ORAL at 06:05

## 2020-05-14 NOTE — Clinical Note
300 ml injected throughout the case. 100 mL total wasted during the case. 400 mL total used in the case.

## 2020-05-14 NOTE — Clinical Note
aorta. Angiography performed of the aorta. Angiography performed via power injection with 30 mL contrast at 20 mL/sPower injection PSI was 1000..

## 2020-05-14 NOTE — ED PROVIDER NOTES
Seven Encounter Date: 5/14/2020       History     Chief Complaint   Patient presents with    Chest Pain     sent from clinic with ekg changes, chest pain yest and now having slight pain, denies cardiac hx     74-year-old male presents from clinic for a concerning EKG.  The EKG changes at the clinic revealed deep T-wave inversions in the anterior leads with associated 0.5 mm ST elevation in V1.  These are significant changes compared to an EKG done March 13th.  He is asymptomatic.  He he mows lawns for a living and does not get chest discomfort with exertion.  He currently has no chest discomfort, back discomfort, arm discomfort, shortness of breath, diaphoresis.  He has a strong family history of coronary disease including his son who had a stent at age 40.  He has a history of hypertension.  He never smoked.        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    Right knee arthroscopy       Family History   Problem Relation Age of Onset    Stroke Mother     Heart attack Father     Heart disease Brother     Rheum arthritis Brother     Heart attack Son     Heart disease Son     Heart disease Brother     Cancer Sister 80        colon cancer    Hyperlipidemia Sister     Cancer Brother 74        colon    Hyperlipidemia Sister     Cancer Brother      Social History     Tobacco Use    Smoking status: Never Smoker    Smokeless tobacco: Never Used   Substance Use Topics    Alcohol use: Yes     Frequency: Monthly or less     Drinks per session: Patient refused     Binge frequency: Never     Comment:  Rarely    Drug use: Not on file     Review of Systems   Constitutional: Negative for chills and fever.   HENT: Negative for nosebleeds.    Eyes: Negative for visual disturbance.   Respiratory: Negative for shortness of breath.    Cardiovascular: Negative for chest pain.   Gastrointestinal: Negative for vomiting.   Genitourinary: Negative for frequency.   Musculoskeletal: Negative for joint swelling.   Skin: Negative for rash.   Neurological: Negative for numbness.   Hematological: Does not bruise/bleed easily.   Psychiatric/Behavioral: Negative for confusion.       Physical Exam     Initial Vitals [05/14/20 1741]   BP Pulse Resp Temp SpO2   (!) 209/85 93 18 97.8 °F (36.6 °C) 98 %      MAP       --         Physical Exam    Constitutional: He appears well-developed and well-nourished. No distress.   HENT:   Head: Normocephalic and atraumatic.   Mouth/Throat: Oropharynx is clear and moist.   Eyes: Conjunctivae and EOM are normal. Pupils are equal, round, and reactive to light. Right eye exhibits no discharge. Left eye exhibits no discharge. No scleral icterus.   Neck: Normal range of motion. Neck supple. No JVD present.   Cardiovascular: Normal rate, regular rhythm, normal heart sounds and intact distal pulses. Exam reveals no friction rub.    No murmur heard.  Pulses:       Radial pulses are 2+ on the right side, and 2+ on the left side.   Pulmonary/Chest: Breath sounds normal. No stridor. No respiratory distress. He has no wheezes. He has no rhonchi. He has no rales.   Abdominal: Soft. Bowel sounds are normal. He exhibits no distension and no mass. There is no tenderness. There is no rebound and no guarding.   Musculoskeletal: Normal range of motion. He exhibits no edema or tenderness.   Lymphadenopathy:     He has no cervical adenopathy.   Neurological: He is alert. He has normal strength. No cranial nerve deficit or sensory deficit. GCS score is 15. GCS eye subscore is 4. GCS verbal subscore is 5. GCS motor  subscore is 6.   Skin: Skin is warm. Capillary refill takes less than 2 seconds. No rash noted.   Psychiatric: He has a normal mood and affect.         ED Course   Procedures  Labs Reviewed   CBC W/ AUTO DIFFERENTIAL - Abnormal; Notable for the following components:       Result Value    Mean Corpuscular Hemoglobin Conc 31.6 (*)     RDW 14.6 (*)     All other components within normal limits   COMPREHENSIVE METABOLIC PANEL - Abnormal; Notable for the following components:    Potassium 5.3 (*)     CO2 22 (*)     Alkaline Phosphatase 51 (*)     eGFR if non  54.1 (*)     All other components within normal limits   TROPONIN I - Abnormal; Notable for the following components:    Troponin I 2.136 (*)     All other components within normal limits   B-TYPE NATRIURETIC PEPTIDE - Abnormal; Notable for the following components:     (*)     All other components within normal limits   SARS-COV-2 RNA AMPLIFICATION, QUAL   TROPONIN I   APTT   PROTIME-INR   CBC W/ AUTO DIFFERENTIAL     EKG Readings: (Independently Interpreted)   EKG 1, 1708:  Normal sinus rhythm at 71 with 0.5 mm ST-elevation in V1 with deep T-wave inversions in anterior leads.    EKG 2.  1748:  Normal sinus rhythm at 76 with 0.5 mm ST-elevation in V1 through 3    EKG 3 1800:  Normal sinus rhythm 76 0.5 mm ST-elevation in V1 through V3 with biphasic T-waves in V2 through V6       Imaging Results          X-Ray Chest AP Portable (Final result)  Result time 05/14/20 18:58:47    Final result by Umair Mcbride MD (05/14/20 18:58:47)                 Impression:      1. No acute cardiopulmonary process.      Electronically signed by: Umair Mcbride MD  Date:    05/14/2020  Time:    18:58             Narrative:    EXAMINATION:  XR CHEST AP PORTABLE    CLINICAL HISTORY:  Chest Pain;    TECHNIQUE:  Single frontal view of the chest was performed.    COMPARISON:  None    FINDINGS:  The cardiomediastinal silhouette is not enlarged.  There is no  pleural effusion.  The trachea is midline.  The lungs are symmetrically expanded bilaterally with minimally coarse interstitial attenuation.  No large focal consolidation seen.  There is mild left basilar subsegmental atelectasis.  There is no pneumothorax.  The osseous structures are remarkable for degenerative change..                                 Medical Decision Making:   Initial Assessment:   74-year-old male with risk factors for coronary disease but fortunately is asymptomatic presenting with a concerning EKG.  First EKG looks like Wellens warning sign.  I consult discuss with Cardiology at 1807.  Aspirin and labs ordered.  Fortunately he is asymptomatic but I a.m. concerned about this EKG.  Clinical Tests:   Lab Tests: Ordered and Reviewed  Radiological Study: Ordered and Reviewed  Medical Tests: Ordered and Reviewed  ED Management:  Patient has elevated troponin.  Will place on ACS protocol and admit to the hospital.  Cardiology plans to do angiogram in the morning.  I spoke with the wife and she states that he has been having indigestion like symptoms on and off              Attending Attestation:         Attending Critical Care:   Critical Care Times:   Direct Patient Care (initial evaluation, reassessments, and time considering the case)................................................................22 minutes.   Additional History from reviewing old medical records or taking additional history from the family, EMS, PCP, etc.......................3 minutes.   Ordering, Reviewing, and Interpreting Diagnostic Studies...............................................................................................................3 minutes.   Documentation..................................................................................................................................................................................3 minutes.   Consultation with other Physicians.  .................................................................................................................................................3 minutes.   ==============================================================  · Total Critical Care Time - exclusive of procedural time: 34 minutes.  ==============================================================                            Clinical Impression:       ICD-10-CM ICD-9-CM   1. Chest pain R07.9 786.50   2. ACS (acute coronary syndrome) I24.9 411.1         Disposition:   Disposition: Admitted  Condition: Serious                        Gareth Choudhury MD  05/14/20 1912

## 2020-05-14 NOTE — Clinical Note
Catheter insertion attempt made into the ostium   left main. Angiography performed of the left coronary arteries.

## 2020-05-14 NOTE — PROGRESS NOTES
Subjective:       Patient ID: Brad Kearns is a 74 y.o. male.    Chief Complaint: Annual Exam    HPIMrNicole Kearns is here for a physical but he reports increased epigastric pain going into his chest off and on this week with the worst episode last night that lasted about 15 minutes.  No assoc symptoms occurred after he cut lawns yesterday - cut lawns today with no issues.  Had a piece of chicken stuck in his throat that kept him from swallowing a few months ago and then needed his esophagus dilated after that.  He denies chest pain now - no SOB, no nausea, diaphoresis or arm pain.  Review of Systems   Constitutional: Negative for activity change and unexpected weight change.   HENT: Negative for hearing loss, rhinorrhea and trouble swallowing.    Eyes: Negative for discharge and visual disturbance.   Respiratory: Negative for shortness of breath and wheezing.    Cardiovascular: Positive for chest pain. Negative for palpitations.   Gastrointestinal: Negative for abdominal pain, blood in stool, constipation, diarrhea, nausea and vomiting.   Endocrine: Negative for polydipsia and polyuria.   Genitourinary: Negative for difficulty urinating, dysuria, hematuria and urgency.   Musculoskeletal: Negative for arthralgias, joint swelling and neck pain.   Neurological: Negative for seizures, syncope, weakness and headaches.   Psychiatric/Behavioral: Negative for confusion and dysphoric mood.       Objective:      Physical Exam   Constitutional: He is oriented to person, place, and time. He appears well-developed and well-nourished. No distress.   HENT:   Mouth/Throat: Oropharynx is clear and moist.   Neck: Neck supple. No JVD present. No thyromegaly present.   Cardiovascular: Normal rate, regular rhythm, normal heart sounds and intact distal pulses. Exam reveals no gallop and no friction rub.   No murmur heard.  Pulmonary/Chest: Effort normal and breath sounds normal. He has no wheezes. He has no rales.   Abdominal: Soft.  Bowel sounds are normal. He exhibits no distension (mild epigastric) and no mass. There is tenderness. There is no rebound and no guarding.   Musculoskeletal: He exhibits no edema.   Lymphadenopathy:     He has no cervical adenopathy.   Neurological: He is alert and oriented to person, place, and time.   Skin: Skin is warm and dry.   Psychiatric: He has a normal mood and affect. His behavior is normal. Judgment and thought content normal.       Assessment:       No diagnosis found.    Plan:   Chest pain with new ischemic EKG changes  Pt with new EKG changes with some ST depression and flipped Ts V2-V6 that are new compared to EKG with Dr. Cuellar March 2020 - To ED for evaluation now

## 2020-05-14 NOTE — ED NOTES
Patient moved to ED room 11 via triage nurse, patient assisted onto stretcher and changed into a gown. Patient placed on cardiac monitor, continuous pulse oximetry and automatic blood pressure cuff. Bed placed in low locked position, side rails up x 2, call light is within reach of patient or family, orientation to room and explanation of wait provided to family and patient, alarms set and turned on for monitor and pulse ox, awaiting MD evaluation and orders, will continue to monitor.

## 2020-05-14 NOTE — PROVIDER PROGRESS NOTES - EMERGENCY DEPT.
Encounter Date: 5/14/2020    ED Physician Progress Notes         EKG - STEMI Decision  STEMI: possible STEMI.  Response: patient moved to monitored bed.    EKG brought to me in intake by tech reveals 0.5mm ST elevations in V2-V4 that appear new from previous. EKG in patient's hand that was obtained in clinic reveals deep T-wave inversions in the precordial leads.  EKG is highly suspicious for ischemia.  I immediately called the Cardiology fellow and discussed what I found but he does not feel this to be STEMI activation criteria.  Patient was immediately moved to a monitored bed and signed out to B pod physician.

## 2020-05-14 NOTE — Clinical Note
left ventricle. Angiography performed of the LV. Angiography performed via power injection with 30 mL contrast at 10 mL/sPower injection PSI was 1000..

## 2020-05-14 NOTE — Clinical Note
Catheter insertion attempt made into the ostium   left main. Angiography performed of the left coronary arteries in multiple views.

## 2020-05-15 ENCOUNTER — TELEPHONE (OUTPATIENT)
Dept: INTERNAL MEDICINE | Facility: CLINIC | Age: 75
End: 2020-05-15

## 2020-05-15 LAB
ABO + RH BLD: NORMAL
ANION GAP SERPL CALC-SCNC: 11 MMOL/L (ref 8–16)
APTT BLDCRRT: 29.3 SEC (ref 21–32)
APTT BLDCRRT: 38.9 SEC (ref 21–32)
APTT BLDCRRT: 63.9 SEC (ref 21–32)
APTT BLDCRRT: >150 SEC (ref 21–32)
ASCENDING AORTA: 3.5 CM
AV INDEX (PROSTH): 0.95
AV MEAN GRADIENT: 5 MMHG
AV PEAK GRADIENT: 8 MMHG
AV VALVE AREA: 3.64 CM2
AV VELOCITY RATIO: 0.94
BASOPHILS # BLD AUTO: 0.07 K/UL (ref 0–0.2)
BASOPHILS NFR BLD: 0.7 % (ref 0–1.9)
BLD GP AB SCN CELLS X3 SERPL QL: NORMAL
BSA FOR ECHO PROCEDURE: 1.99 M2
BUN SERPL-MCNC: 20 MG/DL (ref 8–23)
CALCIUM SERPL-MCNC: 8.7 MG/DL (ref 8.7–10.5)
CHLORIDE SERPL-SCNC: 104 MMOL/L (ref 95–110)
CO2 SERPL-SCNC: 22 MMOL/L (ref 23–29)
CREAT SERPL-MCNC: 1.3 MG/DL (ref 0.5–1.4)
CV ECHO LV RWT: 0.31 CM
DIFFERENTIAL METHOD: ABNORMAL
DOP CALC AO PEAK VEL: 1.45 M/S
DOP CALC AO VTI: 33.04 CM
DOP CALC LVOT AREA: 3.8 CM2
DOP CALC LVOT DIAMETER: 2.21 CM
DOP CALC LVOT PEAK VEL: 1.36 M/S
DOP CALC LVOT STROKE VOLUME: 120.27 CM3
DOP CALCLVOT PEAK VEL VTI: 31.37 CM
E WAVE DECELERATION TIME: 222.19 MSEC
E/A RATIO: 1.02
ECHO LV POSTERIOR WALL: 1 CM (ref 0.6–1.1)
EOSINOPHIL # BLD AUTO: 0.2 K/UL (ref 0–0.5)
EOSINOPHIL NFR BLD: 2.2 % (ref 0–8)
ERYTHROCYTE [DISTWIDTH] IN BLOOD BY AUTOMATED COUNT: 14.6 % (ref 11.5–14.5)
EST. GFR  (AFRICAN AMERICAN): >60 ML/MIN/1.73 M^2
EST. GFR  (NON AFRICAN AMERICAN): 54.1 ML/MIN/1.73 M^2
FRACTIONAL SHORTENING: 35 % (ref 28–44)
GLUCOSE SERPL-MCNC: 266 MG/DL (ref 70–110)
HCT VFR BLD AUTO: 39.8 % (ref 40–54)
HGB BLD-MCNC: 12.7 G/DL (ref 14–18)
IMM GRANULOCYTES # BLD AUTO: 0.06 K/UL (ref 0–0.04)
IMM GRANULOCYTES NFR BLD AUTO: 0.6 % (ref 0–0.5)
INTERVENTRICULAR SEPTUM: 1.07 CM (ref 0.6–1.1)
LA MAJOR: 5.43 CM
LA MINOR: 4.76 CM
LA WIDTH: 4.18 CM
LEFT ATRIUM SIZE: 3.4 CM
LEFT ATRIUM VOLUME INDEX: 31 ML/M2
LEFT ATRIUM VOLUME: 61.28 CM3
LEFT INTERNAL DIMENSION IN SYSTOLE: 4.16 CM (ref 2.1–4)
LEFT VENTRICLE DIASTOLIC VOLUME INDEX: 104.39 ML/M2
LEFT VENTRICLE DIASTOLIC VOLUME: 206.19 ML
LEFT VENTRICLE MASS INDEX: 145 G/M2
LEFT VENTRICLE SYSTOLIC VOLUME INDEX: 38.9 ML/M2
LEFT VENTRICLE SYSTOLIC VOLUME: 76.76 ML
LEFT VENTRICULAR INTERNAL DIMENSION IN DIASTOLE: 6.37 CM (ref 3.5–6)
LEFT VENTRICULAR MASS: 285.76 G
LYMPHOCYTES # BLD AUTO: 3.5 K/UL (ref 1–4.8)
LYMPHOCYTES NFR BLD: 36.7 % (ref 18–48)
MAGNESIUM SERPL-MCNC: 1.9 MG/DL (ref 1.6–2.6)
MCH RBC QN AUTO: 30.5 PG (ref 27–31)
MCHC RBC AUTO-ENTMCNC: 31.9 G/DL (ref 32–36)
MCV RBC AUTO: 96 FL (ref 82–98)
MONOCYTES # BLD AUTO: 0.7 K/UL (ref 0.3–1)
MONOCYTES NFR BLD: 7.5 % (ref 4–15)
MV PEAK A VEL: 0.92 M/S
MV PEAK E VEL: 0.94 M/S
NEUTROPHILS # BLD AUTO: 4.9 K/UL (ref 1.8–7.7)
NEUTROPHILS NFR BLD: 52.3 % (ref 38–73)
NRBC BLD-RTO: 0 /100 WBC
PLATELET # BLD AUTO: 241 K/UL (ref 150–350)
PMV BLD AUTO: 9.8 FL (ref 9.2–12.9)
POTASSIUM SERPL-SCNC: 3.9 MMOL/L (ref 3.5–5.1)
RA MAJOR: 4.22 CM
RA PRESSURE: 8 MMHG
RA WIDTH: 3.72 CM
RBC # BLD AUTO: 4.16 M/UL (ref 4.6–6.2)
RIGHT VENTRICULAR END-DIASTOLIC DIMENSION: 3.87 CM
SINUS: 3.51 CM
SODIUM SERPL-SCNC: 137 MMOL/L (ref 136–145)
STJ: 3.6 CM
TRICUSPID ANNULAR PLANE SYSTOLIC EXCURSION: 2.46 CM
TROPONIN I SERPL DL<=0.01 NG/ML-MCNC: 1.66 NG/ML (ref 0–0.03)
WBC # BLD AUTO: 9.43 K/UL (ref 3.9–12.7)

## 2020-05-15 PROCEDURE — 99153 MOD SED SAME PHYS/QHP EA: CPT | Performed by: INTERNAL MEDICINE

## 2020-05-15 PROCEDURE — 86901 BLOOD TYPING SEROLOGIC RH(D): CPT

## 2020-05-15 PROCEDURE — 85025 COMPLETE CBC W/AUTO DIFF WBC: CPT

## 2020-05-15 PROCEDURE — 99223 1ST HOSP IP/OBS HIGH 75: CPT | Mod: GC,,, | Performed by: INTERNAL MEDICINE

## 2020-05-15 PROCEDURE — 93458 L HRT ARTERY/VENTRICLE ANGIO: CPT | Performed by: INTERNAL MEDICINE

## 2020-05-15 PROCEDURE — 25000003 PHARM REV CODE 250: Performed by: STUDENT IN AN ORGANIZED HEALTH CARE EDUCATION/TRAINING PROGRAM

## 2020-05-15 PROCEDURE — 25000003 PHARM REV CODE 250: Performed by: INTERNAL MEDICINE

## 2020-05-15 PROCEDURE — 99152 PR MOD CONSCIOUS SEDATION, SAME PHYS, 5+ YRS, FIRST 15 MIN: ICD-10-PCS | Mod: ,,, | Performed by: INTERNAL MEDICINE

## 2020-05-15 PROCEDURE — 99223 PR INITIAL HOSPITAL CARE,LEVL III: ICD-10-PCS | Mod: GC,,, | Performed by: INTERNAL MEDICINE

## 2020-05-15 PROCEDURE — C1887 CATHETER, GUIDING: HCPCS | Performed by: INTERNAL MEDICINE

## 2020-05-15 PROCEDURE — C1769 GUIDE WIRE: HCPCS | Performed by: INTERNAL MEDICINE

## 2020-05-15 PROCEDURE — 80048 BASIC METABOLIC PNL TOTAL CA: CPT

## 2020-05-15 PROCEDURE — 93458 L HRT ARTERY/VENTRICLE ANGIO: CPT | Mod: 26,,, | Performed by: INTERNAL MEDICINE

## 2020-05-15 PROCEDURE — C1894 INTRO/SHEATH, NON-LASER: HCPCS | Performed by: INTERNAL MEDICINE

## 2020-05-15 PROCEDURE — 94761 N-INVAS EAR/PLS OXIMETRY MLT: CPT

## 2020-05-15 PROCEDURE — 63600175 PHARM REV CODE 636 W HCPCS: Performed by: INTERNAL MEDICINE

## 2020-05-15 PROCEDURE — 83735 ASSAY OF MAGNESIUM: CPT

## 2020-05-15 PROCEDURE — 85730 THROMBOPLASTIN TIME PARTIAL: CPT

## 2020-05-15 PROCEDURE — 93458 PR CATH PLACE/CORON ANGIO, IMG SUPER/INTERP,W LEFT HEART VENTRICULOGRAPHY: ICD-10-PCS | Mod: 26,,, | Performed by: INTERNAL MEDICINE

## 2020-05-15 PROCEDURE — 99152 MOD SED SAME PHYS/QHP 5/>YRS: CPT | Mod: ,,, | Performed by: INTERNAL MEDICINE

## 2020-05-15 PROCEDURE — 25500020 PHARM REV CODE 255: Performed by: INTERNAL MEDICINE

## 2020-05-15 PROCEDURE — 85730 THROMBOPLASTIN TIME PARTIAL: CPT | Mod: 91

## 2020-05-15 PROCEDURE — 20000000 HC ICU ROOM

## 2020-05-15 PROCEDURE — 84484 ASSAY OF TROPONIN QUANT: CPT

## 2020-05-15 PROCEDURE — 99152 MOD SED SAME PHYS/QHP 5/>YRS: CPT | Performed by: INTERNAL MEDICINE

## 2020-05-15 RX ORDER — POTASSIUM CHLORIDE 20 MEQ/15ML
40 SOLUTION ORAL
Status: DISCONTINUED | OUTPATIENT
Start: 2020-05-15 | End: 2020-05-18

## 2020-05-15 RX ORDER — SODIUM CHLORIDE 9 MG/ML
INJECTION, SOLUTION INTRAVENOUS ONCE
Status: DISCONTINUED | OUTPATIENT
Start: 2020-05-15 | End: 2020-05-15 | Stop reason: HOSPADM

## 2020-05-15 RX ORDER — MIDAZOLAM HYDROCHLORIDE 1 MG/ML
INJECTION, SOLUTION INTRAMUSCULAR; INTRAVENOUS
Status: DISCONTINUED | OUTPATIENT
Start: 2020-05-15 | End: 2020-05-15 | Stop reason: HOSPADM

## 2020-05-15 RX ORDER — HEPARIN SODIUM 200 [USP'U]/100ML
INJECTION, SOLUTION INTRAVENOUS
Status: DISCONTINUED | OUTPATIENT
Start: 2020-05-15 | End: 2020-05-19

## 2020-05-15 RX ORDER — FENTANYL CITRATE 50 UG/ML
INJECTION, SOLUTION INTRAMUSCULAR; INTRAVENOUS
Status: DISCONTINUED | OUTPATIENT
Start: 2020-05-15 | End: 2020-05-15 | Stop reason: HOSPADM

## 2020-05-15 RX ORDER — DIPHENHYDRAMINE HCL 50 MG
50 CAPSULE ORAL
Status: DISCONTINUED | OUTPATIENT
Start: 2020-05-15 | End: 2020-05-15 | Stop reason: HOSPADM

## 2020-05-15 RX ORDER — SODIUM,POTASSIUM PHOSPHATES 280-250MG
2 POWDER IN PACKET (EA) ORAL
Status: DISCONTINUED | OUTPATIENT
Start: 2020-05-15 | End: 2020-05-18

## 2020-05-15 RX ORDER — SODIUM CHLORIDE 0.9 % (FLUSH) 0.9 %
10 SYRINGE (ML) INJECTION
Status: DISCONTINUED | OUTPATIENT
Start: 2020-05-15 | End: 2020-05-19

## 2020-05-15 RX ORDER — HEPARIN SODIUM 1000 [USP'U]/ML
INJECTION, SOLUTION INTRAVENOUS; SUBCUTANEOUS
Status: DISCONTINUED | OUTPATIENT
Start: 2020-05-15 | End: 2020-05-15 | Stop reason: HOSPADM

## 2020-05-15 RX ORDER — LANOLIN ALCOHOL/MO/W.PET/CERES
800 CREAM (GRAM) TOPICAL
Status: DISCONTINUED | OUTPATIENT
Start: 2020-05-15 | End: 2020-05-18

## 2020-05-15 RX ORDER — ISOSORBIDE MONONITRATE 60 MG/1
60 TABLET, EXTENDED RELEASE ORAL DAILY
Status: DISCONTINUED | OUTPATIENT
Start: 2020-05-15 | End: 2020-05-15

## 2020-05-15 RX ORDER — NITROGLYCERIN 5 MG/ML
INJECTION, SOLUTION INTRAVENOUS
Status: DISCONTINUED | OUTPATIENT
Start: 2020-05-15 | End: 2020-05-15 | Stop reason: HOSPADM

## 2020-05-15 RX ORDER — ISOSORBIDE MONONITRATE 60 MG/1
120 TABLET, EXTENDED RELEASE ORAL DAILY
Status: DISCONTINUED | OUTPATIENT
Start: 2020-05-16 | End: 2020-05-18

## 2020-05-15 RX ORDER — POTASSIUM CHLORIDE 20 MEQ/15ML
60 SOLUTION ORAL
Status: DISCONTINUED | OUTPATIENT
Start: 2020-05-15 | End: 2020-05-18

## 2020-05-15 RX ORDER — ACETAMINOPHEN 325 MG/1
650 TABLET ORAL EVERY 6 HOURS PRN
Status: DISCONTINUED | OUTPATIENT
Start: 2020-05-15 | End: 2020-05-19

## 2020-05-15 RX ORDER — ISOSORBIDE MONONITRATE 60 MG/1
60 TABLET, EXTENDED RELEASE ORAL ONCE
Status: COMPLETED | OUTPATIENT
Start: 2020-05-15 | End: 2020-05-15

## 2020-05-15 RX ORDER — DIPHENHYDRAMINE HYDROCHLORIDE 50 MG/ML
INJECTION INTRAMUSCULAR; INTRAVENOUS
Status: DISCONTINUED | OUTPATIENT
Start: 2020-05-15 | End: 2020-05-15 | Stop reason: HOSPADM

## 2020-05-15 RX ORDER — ONDANSETRON 8 MG/1
8 TABLET, ORALLY DISINTEGRATING ORAL EVERY 8 HOURS PRN
Status: DISCONTINUED | OUTPATIENT
Start: 2020-05-15 | End: 2020-05-19

## 2020-05-15 RX ORDER — AMOXICILLIN 250 MG
1 CAPSULE ORAL DAILY PRN
Status: DISCONTINUED | OUTPATIENT
Start: 2020-05-15 | End: 2020-05-19

## 2020-05-15 RX ORDER — SODIUM CHLORIDE 9 MG/ML
INJECTION, SOLUTION INTRAVENOUS
Status: DISCONTINUED | OUTPATIENT
Start: 2020-05-15 | End: 2020-05-19

## 2020-05-15 RX ADMIN — ASPIRIN 81 MG: 81 TABLET, COATED ORAL at 08:05

## 2020-05-15 RX ADMIN — ISOSORBIDE MONONITRATE 60 MG: 60 TABLET, EXTENDED RELEASE ORAL at 01:05

## 2020-05-15 RX ADMIN — HEPARIN SODIUM 13 UNITS/KG/HR: 10000 INJECTION, SOLUTION INTRAVENOUS at 10:05

## 2020-05-15 RX ADMIN — CLOPIDOGREL BISULFATE 75 MG: 75 TABLET ORAL at 08:05

## 2020-05-15 RX ADMIN — VALSARTAN 160 MG: 80 TABLET, FILM COATED ORAL at 08:05

## 2020-05-15 RX ADMIN — VALSARTAN 160 MG: 80 TABLET, FILM COATED ORAL at 09:05

## 2020-05-15 RX ADMIN — ISOSORBIDE MONONITRATE 60 MG: 60 TABLET, EXTENDED RELEASE ORAL at 06:05

## 2020-05-15 RX ADMIN — ATORVASTATIN CALCIUM 40 MG: 20 TABLET, FILM COATED ORAL at 09:05

## 2020-05-15 RX ADMIN — SODIUM CHLORIDE 3 ML/KG/HR: 0.9 INJECTION, SOLUTION INTRAVENOUS at 10:05

## 2020-05-15 RX ADMIN — METOPROLOL TARTRATE 25 MG: 25 TABLET, FILM COATED ORAL at 08:05

## 2020-05-15 RX ADMIN — METOPROLOL TARTRATE 25 MG: 25 TABLET, FILM COATED ORAL at 09:05

## 2020-05-15 RX ADMIN — HEPARIN SODIUM 10 UNITS/KG/HR: 10000 INJECTION, SOLUTION INTRAVENOUS at 05:05

## 2020-05-15 NOTE — PLAN OF CARE
CMICU DAILY GOALS       A: Awake    RASS: Goal - RASS Goal: 0-->alert and calm  Actual - RASS (Bedoya Agitation-Sedation Scale): 0-->alert and calm   Restraint necessity:    B: Breath   SBT: Not intubated   C: Coordinate A & B, analgesics/sedatives   Pain: managed    SAT: Not intubated  D: Delirium   CAM-ICU: Overall CAM-ICU: Negative  E: Early Mobility   MOVE Screen: Pass   Activity: Activity Management: activity adjusted per tolerance, activity clustered for rest period  FAS: Feeding/Nutrition   Diet order: Diet/Nutrition Received: NPO,   Fluid restriction:    T: Thrombus   DVT prophylaxis: VTE Required Core Measure: Pharmacological prophylaxis initiated/maintained  H: HOB Elevation   Head of Bed (HOB): HOB at 30 degrees  U: Ulcer Prophylaxis   GI: no  G: Glucose control   managed    S: Skin   Bundle compliance: yes   Bathing/Skin Care: back care, bath, complete, bath, chlorhexidine, bedtime care, dressed/undressed, foot care Date: 05/15/15 am   B: Bowel Function   no issues   I: Indwelling Catheters   Thomas necessity:     CVC necessity: No   IPAD offered: No  D: De-escalation Antibx   N/a   Plan for the day   CTS consult.  Family/Goals of care/Code Status   Code Status: Full Code     No acute events throughout day, VS and assessment per flow sheet, patient progressing towards goals as tolerated, plan of care reviewed with Brad Kearns and family, all concerns addressed, will continue to monitor.

## 2020-05-15 NOTE — PROGRESS NOTES
[facility]  Cardiology  Progress Note    Patient Name: Brad Kearns  MRN: [id number]  Admission Date: 5/14/2020  Hospital Length of Stay: 1 days  Code Status: Full Code   Attending Physician: Scott Munoz MD   Primary Care Physician: Duke Bowden MD  Expected Discharge Date:   Principal Problem:NSTEMI (non-ST elevated myocardial infarction)    Subjective:     Hospital Course:   No notes on file    Interval History: left heart cath this AM showing triple vessel disease and severe Aortic insufficiency    Review of Systems   Constitution: Negative for chills, decreased appetite, diaphoresis, fever, malaise/fatigue, night sweats, weight gain and weight loss.   Eyes: Negative.    Cardiovascular: Negative for chest pain, irregular heartbeat, leg swelling, near-syncope, orthopnea, palpitations, paroxysmal nocturnal dyspnea and syncope.   Respiratory: Negative for cough, shortness of breath, sputum production and wheezing.    Endocrine: Negative.    Hematologic/Lymphatic: Negative for bleeding problem.   Skin: Negative for color change, flushing, rash and suspicious lesions.   Musculoskeletal: Negative.    Gastrointestinal: Negative for bloating, abdominal pain, change in bowel habit, constipation, diarrhea, heartburn, melena, nausea and vomiting.   Genitourinary: Negative for flank pain, frequency, hematuria, incomplete emptying and urgency.   Neurological: Negative for dizziness, focal weakness, headaches, light-headedness, numbness, paresthesias, seizures, sensory change and weakness.   Psychiatric/Behavioral: Negative for altered mental status. The patient is not nervous/anxious.      Objective:     Vital Signs (Most Recent):  Temp: 97.8 °F (36.6 °C) (05/15/20 0701)  Pulse: (!) 59 (05/15/20 1100)  Resp: 18 (05/15/20 1100)  BP: (!) 149/63 (05/15/20 1100)  SpO2: 100 % (05/15/20 1100) Vital Signs (24h Range):  Temp:  [97.7 °F (36.5 °C)-98.1 °F (36.7 °C)] 97.8 °F (36.6 °C)  Pulse:  [53-93]  59  Resp:  [11-33] 18  SpO2:  [95 %-100 %] 100 %  BP: (120-216)/() 149/63     Weight: 81.6 kg (180 lb)  Body mass index is 26.58 kg/m².     SpO2: 100 %  O2 Device (Oxygen Therapy): room air      Intake/Output Summary (Last 24 hours) at 5/15/2020 1343  Last data filed at 5/15/2020 1330  Gross per 24 hour   Intake 1206.21 ml   Output 150 ml   Net 1056.21 ml       Lines/Drains/Airways     Peripheral Intravenous Line                 Peripheral IV - Single Lumen 05/14/20 1755 18 G Left Antecubital less than 1 day         Peripheral IV - Single Lumen 05/14/20 1758 18 G Left Hand less than 1 day                Physical Exam   Constitutional: He is oriented to person, place, and time. He appears well-developed and well-nourished. No distress.   HENT:   Head: Normocephalic and atraumatic.   Mouth/Throat: Oropharynx is clear and moist.   Eyes: Pupils are equal, round, and reactive to light. EOM are normal.   Neck: Normal range of motion. Neck supple. No JVD present.   Cardiovascular: Normal rate and regular rhythm. Exam reveals no gallop and no friction rub.   No murmur heard.  Pulses:       Radial pulses are 2+ on the right side, and 2+ on the left side.        Femoral pulses are 2+ on the right side, and 2+ on the left side.       Dorsalis pedis pulses are 2+ on the right side, and 2+ on the left side.        Posterior tibial pulses are 2+ on the right side, and 2+ on the left side.   Pulmonary/Chest: Effort normal and breath sounds normal. No respiratory distress. He has no wheezes. He has no rales. He exhibits no tenderness.   Abdominal: Soft. Bowel sounds are normal. He exhibits no distension. There is no tenderness.   Musculoskeletal: Normal range of motion. He exhibits no edema.   Lymphadenopathy:     He has no cervical adenopathy.   Neurological: He is alert and oriented to person, place, and time.   Skin: Skin is warm and dry. No erythema.   Psychiatric: His behavior is normal. Judgment and thought content  normal. His mood appears anxious.       Significant Labs:   ABG: No results for input(s): PH, PCO2, HCO3, POCSATURATED, BE in the last 48 hours., Blood Culture: No results for input(s): LABBLOO in the last 48 hours., CMP   Recent Labs   Lab 05/14/20  1755 05/15/20  0800    137   K 5.3* 3.9    104   CO2 22* 22*   GLU 93 266*   BUN 18 20   CREATININE 1.3 1.3   CALCIUM 9.7 8.7   PROT 8.0  --    ALBUMIN 4.6  --    BILITOT 0.7  --    ALKPHOS 51*  --    AST 40  --    ALT 29  --    ANIONGAP 11 11   ESTGFRAFRICA >60.0 >60.0   EGFRNONAA 54.1* 54.1*   , CBC   Recent Labs   Lab 05/14/20  1755 05/14/20  1958 05/15/20  0358   WBC 10.96 9.36 9.43   HGB 14.2 12.9* 12.7*   HCT 45.0 41.3 39.8*    246 241   , Troponin   Recent Labs   Lab 05/14/20  1755 05/15/20  0800   TROPONINI 2.136* 1.657*    and All pertinent lab results from the last 24 hours have been reviewed.    Significant Imaging: Echo Color Flow Doppler? Yes  · Mild left ventricular enlargement (LVEDD 63mm)  · Preserved left ventricular systolic function with mild hypokinesis of   the LV apex. The estimated ejection fraction is 55%.  · Normal right ventricular systolic function.  · Normal LV diastolic function.  · Moderate-to-severe aortic regurgitation. The aortic valve's left and   right cusp leaflets may be partially fused as previously described.  · Intermediate central venous pressure (8 mmHg).     Cardiac catheterization  Preoperative Diagnosis: NSTEMI (non-ST elevated myocardial infarction)   [I21.4]      Indication     Brad Kearns is a 74 y.o. male  referred by Dr Munoz  for NSTEMI.     Patient understands the risks, benefits, and alternatives of angiography   and intervention and gives informed consent.    Operators  Surgeon(s) and Role:     * Ben Butler MD - Primary     * Tyrone Mac MD - Fellow     * Adelaida Roberts MD - Fellow      Description of procedure     The patient was brought to the cath lab in  the fasting state, he was   prepped in the usual sterile fashion. A timeout was performed. Access was   obtained using the Seldinger technique in the right radial artery with a 6   Fr sheath.     Diagnostic catheterization  The left coronary artery was cannulated with a 6 Fr JL4 diagnostic   catheter. Angiography was performed in multiple views. The right coronary   artery was cannulated with a 5 Fr Edwin diagnostic catheter. Angiography   was performed in multiple views. A pigtail catheter was used to cross the   aortic valve into the ventricle and measure hemodynamics.    A ventriculogram and an aortogram were performed via the pigtail catheter   using the power injector.    Complications: None  Estimated blood loss: <50 mL  Contrast used: 150 mL  Specimen removed: No     Findings:     1. Coronary dominance: Right  2. The left main coronary artery (LMCA) has a 60% distal stenosis. It   gives origin to the left anterior descending (LAD) and left circumflex   (LCx) arteries. There is no ramus intermedius. There is LORENA 3 flow.  3. The LAD is diffusely diseased, it has a 90% stenosis in its proximal   segment and a 90% stenosis in its mid segment. It gives origin to one   large diagonal branch(es). There is LORENA 3 flow.  4. The LCx has a proximal 90% stenosis. It gives origin to one large   obtuse marginal branch(es). There is LORENA 3 flow.  5. The RCA is dominant. It as a distal 50% stenosis. It gives origin to   the posterior descending artery and the posterolateral branch.    6. LVEDP 15 mmHg  7. LVEF 40%  8. Severe aortic insufficiency in bicuspid aortic valve (4+)       Summary:     1. Severe three vessel coronary artery disease  2. Severe aortic insufficiency  3. Ischemic cardiomyopathy with LVEF 40%     Recommendations:     1. Cardiothoracic surgery consultation    Ben Higuera MD [facility]  Interventional Cardiology  Structural/Valvular heart disease  [phone]    I certify that I was present for catheter  insertion, catheter   manipulation, angiography, and angiographic interpretation of this   patient.    Procedure Log documented by Documenter: RT Patrizia and verified   by Ben Higuera MD.    Date: 5/15/2020  Time: 11:17 AM       Assessment and Plan:       * NSTEMI (non-ST elevated myocardial infarction)  MICHAEL 108  LORENA 4    Currently HD stable but hypertensive with ECG changes. Trop 2.1.   - DAPT load (ASA and Plavix)  - Heparin gtt  - HI statin  - Beta blocker (no evidence of cardiac shock)  - Home ARB  - Nitro gtt -- titrate to CP free  - NPO at [facility] 5/15 showing three vessel disease with severe aortic insufficiency.   -CTS consulted for possible bypass and surgical valve repair. Tentatively plan for CABG x 2-3 and possible bio bentall on 5/19/2020    Nonrheumatic aortic valve insufficiency  ?bicuspid aortic valve with mild dilation of ascending aorta  - Euvolemic on exam. No symptoms of CHF  - Continue to monitor    Hypertension  Home valsartan 160 mg BID  - Starting nitro gtt  - Titrate as needed        VTE Risk Mitigation (From admission, onward)         Ordered     heparin infusion 1,000 units/500 ml in 0.9% NaCl (pressure line flush)  Intra-op continuous PRN      05/15/20 0906     heparin 25,000 units in dextrose 5% (100 units/ml) IV bolus from bag - ADDITIONAL PRN BOLUS - 60 units/kg (max bolus 4000 units)  As needed (PRN)     Question:  Heparin Infusion Adjustment (DO NOT MODIFY ANSWER)  Answer:  [email or web address]\epic\Images\Pharmacy\HeparinInfusions\heparin LOW INTENSITY nomogram for [facility] [id number].pdf    05/14/20 1903     heparin 25,000 units in dextrose 5% (100 units/ml) IV bolus from bag - ADDITIONAL PRN BOLUS - 30 units/kg (max bolus 4000 units)  As needed (PRN)     Question:  Heparin Infusion Adjustment (DO NOT MODIFY ANSWER)  Answer:  \\[email or web address]\epic\Images\Pharmacy\HeparinInfusions\heparin LOW INTENSITY nomogram for [facility] [id number].pdf    05/14/20 1903     heparin 25,000 units in dextrose 5%  250 mL (100 units/mL) infusion LOW INTENSITY nomogram - [facility]  Continuous     Question:  Heparin Infusion Adjustment (DO NOT MODIFY ANSWER)  Answer:  [email or web address]\epic\Images\Pharmacy\HeparinInfusions\heparin LOW INTENSITY nomogram for [facility] [id number].pdf    05/14/20 1903     IP VTE HIGH RISK PATIENT  Once      05/1946     Place sequential compression device  Until discontinued      05/1946                Krzysztof Pretty DO  Cardiology  [facility]

## 2020-05-15 NOTE — ASSESSMENT & PLAN NOTE
Brad Kearns is a 73 yo M with medical hx hypertension and mod-severe AI with mild aortic root dilation and possible bicuspid AV. Presented to the ED with chest pain and SOB. Went for LHC today which showed multivessel disease and severe AI (possible bicuspid on TTE). Was loaded with plavix 5/14/2020. CTA chest, carotid US, and PFTs ordered. Tentatively plan for CABG x 2-3 and possible bio bentall on 5/19/2020. Dr. Quezada to review and staff.

## 2020-05-15 NOTE — HPI
"Mr. Kearns is a 74y gentleman with pmhx of TN and mod-severe AI with mild aortic root dilation (normal LVESD and STJ 4 cm on TTE in March 2020) who presented to the ED from appointment where he was establishing care with PCP. Patient reports 2-3 days of subxiphoid "soreness" and "burning" that radiates down his stomach. Episodes last 5-10 minutes with resolution. No association with exertion. ECG in PCP office with deep anterior precordial TWI. Denies orthonpnea, PND, or lower extremity edema. Denies syncope or presyncope. Works as a  and sometimes uses a push mower without any issues. Risk factors for CAD: age, male, HTN, fhx (son with MI in mid 40s). Chol     On arrival, he was hypertensive with systolics >200 and pulse in 70s and sating well on RA. Labs sign for , normal CBC, cr 1.3, and trop 2.1. ECG on arrival with wellens pattern. Patient currently reporting some "soreness". Started on ACS protocol with DAPT loading.   "

## 2020-05-15 NOTE — SUBJECTIVE & OBJECTIVE
No current facility-administered medications on file prior to encounter.      Current Outpatient Medications on File Prior to Encounter   Medication Sig    ascorbic acid (VITAMIN C) 1000 MG tablet Take 1,000 mg by mouth every morning.    aspirin (ECOTRIN) 81 MG EC tablet Take 81 mg by mouth every morning.    esomeprazole (NEXIUM) 40 MG capsule once daily.    fish oil-omega-3 fatty acids 300-1,000 mg capsule Take 1 g by mouth every morning.    multivitamin capsule Take 1 capsule by mouth once daily.    pravastatin (PRAVACHOL) 40 MG tablet Take 1 tablet (40 mg total) by mouth once daily.    thiamine (VITAMIN B-1) 50 MG tablet Take 50 mg by mouth once daily.    valsartan (DIOVAN) 160 MG tablet Take 1 tablet (160 mg total) by mouth 2 (two) times daily.       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    Right knee arthroscopy       Family History     Problem Relation (Age of Onset)    Cancer Sister (80), Brother (74), Brother    Heart attack Father, Son    Heart disease Brother, Son, Brother    Hyperlipidemia Sister, Sister    Rheum arthritis Brother    Stroke Mother        Tobacco Use    Smoking status: Never Smoker    Smokeless tobacco: Never Used   Substance and Sexual Activity    Alcohol use: Yes     Frequency: Monthly or less     Drinks per session: Patient refused     Binge frequency: Never     Comment: Rarely    Drug use: Not on file    Sexual activity: Not on file     Review of Systems   Constitutional: Negative for fatigue.   HENT: Negative for nosebleeds.    Eyes: Negative for visual  disturbance.   Respiratory: Positive for shortness of breath.    Cardiovascular: Positive for chest pain.   Gastrointestinal: Negative for nausea.   Musculoskeletal: Negative for gait problem.   Skin: Negative for color change.   Neurological: Negative for seizures.   Hematological: Does not bruise/bleed easily.   Psychiatric/Behavioral: Negative for sleep disturbance.     Objective:     Vital Signs (Most Recent):  Temp: 97.8 °F (36.6 °C) (05/15/20 0701)  Pulse: 67 (05/15/20 0805)  Resp: 17 (05/15/20 0805)  BP: (!) 166/109 (05/15/20 0800)  SpO2: 97 % (05/15/20 0805) Vital Signs (24h Range):  Temp:  [97.7 °F (36.5 °C)-98.1 °F (36.7 °C)] 97.8 °F (36.6 °C)  Pulse:  [53-93] 67  Resp:  [11-33] 17  SpO2:  [95 %-100 %] 97 %  BP: (120-216)/() 166/109     Weight: 82 kg (180 lb 12.4 oz)  Body mass index is 26.7 kg/m².    SpO2: 97 %  O2 Device (Oxygen Therapy): nasal cannula     Intake/Output - Last 3 Shifts       05/13 0700 - 05/14 0659 05/14 0700 - 05/15 0659 05/15 0700 - 05/16 0659    I.V. (mL/kg)  91.9 (1.1) 24.9 (0.3)    IV Piggyback  62.6     Total Intake(mL/kg)  154.5 (1.9) 24.9 (0.3)    Urine (mL/kg/hr)  150     Total Output  150     Net  +4.5 +24.9           Stool Occurrence   1 x           Lines/Drains/Airways     Peripheral Intravenous Line                 Peripheral IV - Single Lumen 05/14/20 1755 18 G Left Antecubital less than 1 day         Peripheral IV - Single Lumen 05/14/20 1758 18 G Left Hand less than 1 day                 STS Risk Score: 1.8% (done without cath report)    Physical Exam   Constitutional: He is oriented to person, place, and time. He appears well-developed and well-nourished.   HENT:   Head: Normocephalic and atraumatic.   Eyes: Pupils are equal, round, and reactive to light. EOM are normal.   Neck: Normal range of motion.   Cardiovascular: Normal pulses.   No murmur heard.  Pulmonary/Chest: Effort normal and breath sounds normal. No respiratory distress.   Abdominal: Soft.    Musculoskeletal: He exhibits no edema.   Neurological: He is alert and oriented to person, place, and time.   Skin: Skin is warm, dry and intact. Capillary refill takes less than 2 seconds.   Psychiatric: He has a normal mood and affect. His speech is normal and behavior is normal. Judgment and thought content normal.   Vitals reviewed.      Significant Labs:  ABGs: No results for input(s): PH, PCO2, PO2, HCO3, POCSATURATED, BE in the last 48 hours.  Amylase: No results for input(s): AMYLASE in the last 48 hours.  BMP:   Recent Labs   Lab 05/15/20  0800   *      K 3.9      CO2 22*   BUN 20   CREATININE 1.3   CALCIUM 8.7   MG 1.9     Cardiac markers:   Recent Labs   Lab 05/15/20  0800   TROPONINI 1.657*     CBC:   Recent Labs   Lab 05/15/20  0358   WBC 9.43   RBC 4.16*   HGB 12.7*   HCT 39.8*      MCV 96   MCH 30.5   MCHC 31.9*     CMP:   Recent Labs   Lab 05/14/20  1755 05/15/20  0800   GLU 93 266*   CALCIUM 9.7 8.7   ALBUMIN 4.6  --    PROT 8.0  --     137   K 5.3* 3.9   CO2 22* 22*    104   BUN 18 20   CREATININE 1.3 1.3   ALKPHOS 51*  --    ALT 29  --    AST 40  --    BILITOT 0.7  --      Coagulation:   Recent Labs   Lab 05/14/20  1958 05/15/20  0358   INR 1.0  --    APTT 23.5 38.9*     Lactic Acid: No results for input(s): LACTATE in the last 48 hours.  LFTs:   Recent Labs   Lab 05/14/20  1755   ALT 29   AST 40   ALKPHOS 51*   BILITOT 0.7   PROT 8.0   ALBUMIN 4.6     Lipase: No results for input(s): LIPASE in the last 48 hours.    Significant Diagnostics: reviewed   TTE 3/20/2020  · Mild left ventricular enlargement.  · Normal left ventricular systolic function. The estimated ejection fraction is 60%.  · Normal right ventricular systolic function.  · Indeterminate left ventricular diastolic function.  · Mild left atrial enlargement.  · The ascending aorta is mildly dilated.  · Moderate-to-severe aortic regurgitation. The aortic valve may be bileaflet with possible partial  fusion of the left and right coronary cusp leaflets.  · Normal central venous pressure (3 mmHg).    [facility] 5/15/2020  Multivessel disease  Severe AI   LVEDP 15 mmHg, LVEF preserved   Full report pending     Repeat Echo ordered for today

## 2020-05-15 NOTE — HPI
Mr. Kearns is a pleasant 74 year old male nonsmoker with known aortic regurgitation with bicuspid aortic valve, heart failure preserved ejection fraction, and hypertension who presented with NSTEMI (peak troponin 2.1) and heart failure symptoms, underwent coronary angiogram showing left main disease.  Angiogram details include 60% distal left main disease, 90% early mid LAD + 90% early distal LAD with moderate mid and distal LAD targets, moderate sized first diagonal artery with 90% distal lesion, 90% mid LCx lesion with moderate sized left posterolateral ventricular artery, 60% mid RCA and 50% distal RCA lesions.  The transthoracic echo from May 2020 shows 55% ejection fraction, LVEDD 6.4cm, severe aortic regurgitation with bicuspid aortic valve, Sinus of Valsalva 3.5cm, ST junction 3.6cm, Ascending Aorta 3.5cm.  The transthoracic echo from March 2020 shows Sinus of Valsalva 4.8cm, ST junction 3.9cm, Ascending Aorta 3.9cm.   CTA chest showed 4.1cm Sinus of Valsalva, 3.5cm ST junction, and 3.5cm Ascending Aorta. On May 19, patient underwent an aortic valve replacement with 29mm Medtronic Mosaic porcine bioprosthesis  Double vessel coronary artery bypass grafting with left internal mammary artery (skeletonized) to the left anterior descending artery, saphenous vein graft to the first obtuse marginal artery (left posterolateral ventricular artery) and endoscopic saphenous vein harvest from the left lower extremity     Given the severity of disease and the symptoms, I recommend coronary artery bypass surgery x 2 ( skeletonized LIMA-mid or distal LAD, SVG-LPL), aortic valve replacement (bioprosthetic), possible aortic root replacement, possible ascending aorta replacement, possible karon arch replacement under hypothermic circulatory arrest. The risks and benefits were explained and informed consent was obtained.     On May 20, 2020, the patient is a 74 year old male who underwent aortic valve replacement, double vessel  coronary artery bypass surgery yesterday evening,  Overnight, chest tube output was low until around 5 hours postoperatively, the patient awoke and coughed vigorously.  Afterwards, chest tube output increased moderately.  An initial chest X ray showed minimal pleural effusion and blood products were given which he appeared to respond to well as the chest tube output decreased.  Repeat chest X ray this morning showed a hemothorax and a chest tube was placed.  Chest tube output afterwards had low to moderate output and a repeat chest X ray after chest tube insertion showed residual left hemothorax.  The decision was made to take him to the operating room for mediastinal exploration and drainage of left hemothorax.  The risks and benefits were explained to the patient's family and informed consent was obtained.

## 2020-05-15 NOTE — PLAN OF CARE
[facility] DAILY GOALS       A: Awake    RASS: Goal -  0 - calm and alert  Actual -  0 - calm and alert   Restraint necessity:  not required  B: Breath   SBT: NA   C: Coordinate A & B, analgesics/sedatives   Pain: managed    SAT: NA  D: Delirium   CAM-ICU: Overall CAM-ICU: Negative  E: Early Mobility   MOVE Screen: Fail   Activity: Activity Management: activity clustered for rest period, activity adjusted per tolerance  FAS: Feeding/Nutrition   Diet order: Diet/Nutrition Received: NPO,   Fluid restriction:    T: Thrombus   DVT prophylaxis: VTE Required Core Measure: Pharmacological prophylaxis initiated/maintained  H: HOB Elevation   Head of Bed (HOB): HOB at 30-45 degrees  U: Ulcer Prophylaxis   GI: no  G: Glucose control   No Blood glucose monitoring required.  S: Skin   Bundle compliance: yes   Bathing/Skin Care: back care, bath, complete, bath, chlorhexidine, bedtime care, dressed/undressed, foot care Date: 05/14/20 @ 2200  B: Bowel Function   Bowel movement x1  I: Indwelling Catheters   Thomas necessity:  no   CVC necessity: No   IPAD offered: No  D: De-escalation Antibx   No  Plan for the day   Possible cardiac investigation  Family/Goals of care/Code Status   Code Status: Full Code  VS and assessment per flow sheet, patient progressing towards goals as tolerated, plan of care reviewed with Brad Kearns, all concerns addressed, will continue to monitor.

## 2020-05-15 NOTE — SUBJECTIVE & OBJECTIVE
Interval History: left heart cath this AM showing triple vessel disease and severe Aortic insufficiency    Review of Systems   Constitution: Negative for chills, decreased appetite, diaphoresis, fever, malaise/fatigue, night sweats, weight gain and weight loss.   Eyes: Negative.    Cardiovascular: Negative for chest pain, irregular heartbeat, leg swelling, near-syncope, orthopnea, palpitations, paroxysmal nocturnal dyspnea and syncope.   Respiratory: Negative for cough, shortness of breath, sputum production and wheezing.    Endocrine: Negative.    Hematologic/Lymphatic: Negative for bleeding problem.   Skin: Negative for color change, flushing, rash and suspicious lesions.   Musculoskeletal: Negative.    Gastrointestinal: Negative for bloating, abdominal pain, change in bowel habit, constipation, diarrhea, heartburn, melena, nausea and vomiting.   Genitourinary: Negative for flank pain, frequency, hematuria, incomplete emptying and urgency.   Neurological: Negative for dizziness, focal weakness, headaches, light-headedness, numbness, paresthesias, seizures, sensory change and weakness.   Psychiatric/Behavioral: Negative for altered mental status. The patient is not nervous/anxious.      Objective:     Vital Signs (Most Recent):  Temp: 97.8 °F (36.6 °C) (05/15/20 0701)  Pulse: (!) 59 (05/15/20 1100)  Resp: 18 (05/15/20 1100)  BP: (!) 149/63 (05/15/20 1100)  SpO2: 100 % (05/15/20 1100) Vital Signs (24h Range):  Temp:  [97.7 °F (36.5 °C)-98.1 °F (36.7 °C)] 97.8 °F (36.6 °C)  Pulse:  [53-93] 59  Resp:  [11-33] 18  SpO2:  [95 %-100 %] 100 %  BP: (120-216)/() 149/63     Weight: 81.6 kg (180 lb)  Body mass index is 26.58 kg/m².     SpO2: 100 %  O2 Device (Oxygen Therapy): room air      Intake/Output Summary (Last 24 hours) at 5/15/2020 1343  Last data filed at 5/15/2020 1330  Gross per 24 hour   Intake 1206.21 ml   Output 150 ml   Net 1056.21 ml       Lines/Drains/Airways     Peripheral Intravenous Line                  Peripheral IV - Single Lumen 05/14/20 1755 18 G Left Antecubital less than 1 day         Peripheral IV - Single Lumen 05/14/20 1758 18 G Left Hand less than 1 day                Physical Exam   Constitutional: He is oriented to person, place, and time. He appears well-developed and well-nourished. No distress.   HENT:   Head: Normocephalic and atraumatic.   Mouth/Throat: Oropharynx is clear and moist.   Eyes: Pupils are equal, round, and reactive to light. EOM are normal.   Neck: Normal range of motion. Neck supple. No JVD present.   Cardiovascular: Normal rate and regular rhythm. Exam reveals no gallop and no friction rub.   No murmur heard.  Pulses:       Radial pulses are 2+ on the right side, and 2+ on the left side.        Femoral pulses are 2+ on the right side, and 2+ on the left side.       Dorsalis pedis pulses are 2+ on the right side, and 2+ on the left side.        Posterior tibial pulses are 2+ on the right side, and 2+ on the left side.   Pulmonary/Chest: Effort normal and breath sounds normal. No respiratory distress. He has no wheezes. He has no rales. He exhibits no tenderness.   Abdominal: Soft. Bowel sounds are normal. He exhibits no distension. There is no tenderness.   Musculoskeletal: Normal range of motion. He exhibits no edema.   Lymphadenopathy:     He has no cervical adenopathy.   Neurological: He is alert and oriented to person, place, and time.   Skin: Skin is warm and dry. No erythema.   Psychiatric: His behavior is normal. Judgment and thought content normal. His mood appears anxious.       Significant Labs:   ABG: No results for input(s): PH, PCO2, HCO3, POCSATURATED, BE in the last 48 hours., Blood Culture: No results for input(s): LABBLOO in the last 48 hours., CMP   Recent Labs   Lab 05/14/20  1755 05/15/20  0800    137   K 5.3* 3.9    104   CO2 22* 22*   GLU 93 266*   BUN 18 20   CREATININE 1.3 1.3   CALCIUM 9.7 8.7   PROT 8.0  --    ALBUMIN 4.6  --    BILITOT 0.7   --    ALKPHOS 51*  --    AST 40  --    ALT 29  --    ANIONGAP 11 11   ESTGFRAFRICA >60.0 >60.0   EGFRNONAA 54.1* 54.1*   , CBC   Recent Labs   Lab 05/14/20  1755 05/14/20  1958 05/15/20  0358   WBC 10.96 9.36 9.43   HGB 14.2 12.9* 12.7*   HCT 45.0 41.3 39.8*    246 241   , Troponin   Recent Labs   Lab 05/14/20  1755 05/15/20  0800   TROPONINI 2.136* 1.657*    and All pertinent lab results from the last 24 hours have been reviewed.    Significant Imaging: Echo Color Flow Doppler? Yes  · Mild left ventricular enlargement (LVEDD 63mm)  · Preserved left ventricular systolic function with mild hypokinesis of   the LV apex. The estimated ejection fraction is 55%.  · Normal right ventricular systolic function.  · Normal LV diastolic function.  · Moderate-to-severe aortic regurgitation. The aortic valve's left and   right cusp leaflets may be partially fused as previously described.  · Intermediate central venous pressure (8 mmHg).     Cardiac catheterization  Preoperative Diagnosis: NSTEMI (non-ST elevated myocardial infarction)   [I21.4]      Indication     Brad Kearns is a 74 y.o. male  referred by Dr Munoz  for NSTEMI.     Patient understands the risks, benefits, and alternatives of angiography   and intervention and gives informed consent.    Operators  Surgeon(s) and Role:     * Ben Butler MD - Primary     * Tyrone Mac MD - Fellow     * Adelaida Roberts MD - Fellow      Description of procedure     The patient was brought to the cath lab in the fasting state, he was   prepped in the usual sterile fashion. A timeout was performed. Access was   obtained using the Seldinger technique in the right radial artery with a 6   Fr sheath.     Diagnostic catheterization  The left coronary artery was cannulated with a 6 Fr JL4 diagnostic   catheter. Angiography was performed in multiple views. The right coronary   artery was cannulated with a 5 Fr Edwin diagnostic catheter. Angiography    was performed in multiple views. A pigtail catheter was used to cross the   aortic valve into the ventricle and measure hemodynamics.    A ventriculogram and an aortogram were performed via the pigtail catheter   using the power injector.    Complications: None  Estimated blood loss: <50 mL  Contrast used: 150 mL  Specimen removed: No     Findings:     1. Coronary dominance: Right  2. The left main coronary artery (LMCA) has a 60% distal stenosis. It   gives origin to the left anterior descending (LAD) and left circumflex   (LCx) arteries. There is no ramus intermedius. There is LORENA 3 flow.  3. The LAD is diffusely diseased, it has a 90% stenosis in its proximal   segment and a 90% stenosis in its mid segment. It gives origin to one   large diagonal branch(es). There is LORENA 3 flow.  4. The LCx has a proximal 90% stenosis. It gives origin to one large   obtuse marginal branch(es). There is LORENA 3 flow.  5. The RCA is dominant. It as a distal 50% stenosis. It gives origin to   the posterior descending artery and the posterolateral branch.    6. LVEDP 15 mmHg  7. LVEF 40%  8. Severe aortic insufficiency in bicuspid aortic valve (4+)       Summary:     1. Severe three vessel coronary artery disease  2. Severe aortic insufficiency  3. Ischemic cardiomyopathy with LVEF 40%     Recommendations:     1. Cardiothoracic surgery consultation    Ben Higuera MD [facility]  Interventional Cardiology  Structural/Valvular heart disease  [phone]    I certify that I was present for catheter insertion, catheter   manipulation, angiography, and angiographic interpretation of this   patient.    Procedure Log documented by Documenter: RT Patrizia and verified   by Ben Higuera MD.    Date: 5/15/2020  Time: 11:17 AM

## 2020-05-15 NOTE — ED NOTES
Pt resting in stretcher . On cardiac ,O2, and blood pressure monitor. Denies needs . Updated on plan of care and verbalizes understanding.

## 2020-05-15 NOTE — CONSULTS
"[facility]  Interventional Cardiology  Consult Note    Patient Name: Brad Kearns  MRN: [id number]  Admission Date: 5/14/2020  Hospital Length of Stay: 1 days  Code Status: Full Code   Attending Provider: Scott Munoz MD  Consulting Provider: Adelaida Roberts MD  Primary Care Physician: Duke Bowden MD  Principal Problem:NSTEMI (non-ST elevated myocardial infarction)    Patient information was obtained from patient and ER records.     Inpatient consult to Interventional Cardiology  Consult performed by: Adelaida Roberts MD  Consult ordered by: Gordon Lofton MD        Subjective:     Chief Complaint:  Chest pain, NSTEMI    HPI:  Brad Kearns is a 75 yo M with medical hx hypertension and mod-severe AI with mild aortic root dilation and possible bicuspid AV who presented to ED overnight from PCPs office with chest discomfort and hypertensive crisis. Patient describes 2-3 days of intermittent subxiphoid "soreness" and "burning" sensation with radiation to his stomach. Episodes lasting between 5-10 minutes, and not associated with exertion. During PCP visit an EKG was obtained that showed deep TWI in the anterior leads. He was sent to the ED where he was hypertensive on arrival with SBPs >200s and pulses in 70s. Labs notable for trop 2.1, . He was started on nitro gtt + ACS protocol (ASA/Plavix loaded) and admitted to CCU. EKG repeated overnight with inferior and anterolateral TWIs. Patient is a never smoker. FHx positive for CAD in his son who received stent at age 40.    VS this AM: -140s/60, HR 60s, 96% RA    Labs:  Hgb 12.7  Plt 241  INR 1.0    Cr 1.3    Trop 2.1    A1c 5.0        TTE 3/20/20:  · Mild left ventricular enlargement.  · Normal left ventricular systolic function. The estimated ejection fraction is 60%.  · Normal right ventricular systolic function.  · Indeterminate left ventricular diastolic function.  · Mild left atrial " enlargement.  · The ascending aorta is mildly dilated.  · Moderate-to-severe aortic regurgitation. The aortic valve may be bileaflet with possible partial fusion of the left and right coronary cusp leaflets.  · Normal central venous pressure (3 mmHg).           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    aspirin (ECOTRIN) 81 MG EC tablet Take 81 mg by mouth every morning.    esomeprazole (NEXIUM) 40 MG capsule once daily.    fish oil-omega-3 fatty acids 300-1,000 mg capsule Take 1 g by mouth every morning.    multivitamin capsule Take 1 capsule by mouth once daily.    pravastatin (PRAVACHOL) 40 MG tablet Take 1 tablet (40 mg total) by mouth once daily.    thiamine (VITAMIN B-1) 50 MG tablet Take 50 mg by mouth once daily.    valsartan (DIOVAN) 160 MG tablet Take 1 tablet (160 mg total) by mouth 2 (two) times daily.     Family History     Problem Relation (Age of Onset)    Cancer Sister (80), Brother (74), Brother    Heart attack Father, Son    Heart disease Brother, Son, Brother    Hyperlipidemia Sister, Sister    Rheum arthritis Brother    Stroke Mother        Tobacco Use    Smoking status: Never Smoker    Smokeless tobacco: Never Used   Substance and Sexual Activity    Alcohol use: Yes     Frequency: Monthly or less     Drinks per session: Patient refused     Binge frequency: Never     Comment: Rarely    Drug use: Not on file    Sexual  activity: Not on file     Review of Systems   Constitution: Negative for chills, decreased appetite, diaphoresis, fever, malaise/fatigue, night sweats, weight gain and weight loss.   Eyes: Negative.    Cardiovascular: Negative for chest pain, irregular heartbeat, leg swelling, near-syncope, orthopnea, palpitations, paroxysmal nocturnal dyspnea and syncope.   Respiratory: Negative for cough, shortness of breath, sputum production and wheezing.    Endocrine: Negative.    Hematologic/Lymphatic: Negative for bleeding problem.   Skin: Negative for color change, flushing, rash and suspicious lesions.   Musculoskeletal: Negative.    Gastrointestinal: Negative for bloating, abdominal pain, change in bowel habit, constipation, diarrhea, heartburn, melena, nausea and vomiting.   Genitourinary: Negative for flank pain, frequency, hematuria, incomplete emptying and urgency.   Neurological: Negative for dizziness, focal weakness, headaches, light-headedness, numbness, paresthesias, seizures, sensory change and weakness.   Psychiatric/Behavioral: Negative for altered mental status. The patient is not nervous/anxious.      Objective:     Vital Signs (Most Recent):  Temp: 97.7 °F (36.5 °C) (05/15/20 0300)  Pulse: 60 (05/15/20 0701)  Resp: 16 (05/15/20 0701)  BP: (!) 148/63 (05/15/20 0701)  SpO2: 96 % (05/15/20 0701) Vital Signs (24h Range):  Temp:  [97.7 °F (36.5 °C)-98.1 °F (36.7 °C)] 97.7 °F (36.5 °C)  Pulse:  [53-93] 60  Resp:  [11-33] 16  SpO2:  [95 %-100 %] 96 %  BP: (120-216)/(58-91) 148/63     Weight: 82 kg (180 lb 12.4 oz)  Body mass index is 26.7 kg/m².    SpO2: 96 %  O2 Device (Oxygen Therapy): room air      Intake/Output Summary (Last 24 hours) at 5/15/2020 0733  Last data filed at 5/15/2020 0701  Gross per 24 hour   Intake 166.69 ml   Output 150 ml   Net 16.69 ml       Lines/Drains/Airways     Peripheral Intravenous Line                 Peripheral IV - Single Lumen 05/14/20 1755 18 G Left Antecubital less than 1 day          Peripheral IV - Single Lumen 05/14/20 1758 18 G Left Hand less than 1 day                Physical Exam   Constitutional: He is oriented to person, place, and time. He appears well-developed and well-nourished. No distress.   HENT:   Head: Normocephalic and atraumatic.   Mouth/Throat: Oropharynx is clear and moist.   Eyes: Pupils are equal, round, and reactive to light. EOM are normal.   Neck: Normal range of motion. Neck supple. No JVD present.   Cardiovascular: Normal rate and regular rhythm. Exam reveals no gallop and no friction rub.   No murmur heard.  Pulses:       Radial pulses are 2+ on the right side, and 2+ on the left side.        Femoral pulses are 2+ on the right side, and 2+ on the left side.       Dorsalis pedis pulses are 2+ on the right side, and 2+ on the left side.        Posterior tibial pulses are 2+ on the right side, and 2+ on the left side.   Pulmonary/Chest: Effort normal and breath sounds normal. No respiratory distress. He has no wheezes. He has no rales. He exhibits no tenderness.   Abdominal: Soft. Bowel sounds are normal. He exhibits no distension. There is no tenderness.   Musculoskeletal: Normal range of motion. He exhibits no edema.   Lymphadenopathy:     He has no cervical adenopathy.   Neurological: He is alert and oriented to person, place, and time.   Skin: Skin is warm and dry. No erythema.   Psychiatric: He has a normal mood and affect. His behavior is normal. Judgment and thought content normal.       Significant Labs: All pertinent lab results from the last 24 hours have been reviewed.    Significant Imaging: Reviewed in EPIC.    Assessment and Plan:     * NSTEMI (non-ST elevated myocardial infarction)  - Proceed with [facility] +/- PCI  - Anti-platelet Therapy: ASA 81 mg daily and Clopidogrel 75 mg daily  - Access: R radial  - Creatinine/CrCl: 1.3  on 5/14/20  - Allergies: No shellfish/Iodine allergy  - Pre-Hydration: 3 cc/kg/hr IV, continuous, for 1 hour, Pre-Procedure  -  Pre-Op Med: Diphenhydramine (Benadryl) 50 mg, Oral, Once, Pre-Procedure   - All patient's questions were answered.  - The risks, benefits & alternatives of the procedure were explained to the patient.   - The risks of coronary angiography include but are not limited to: Bleeding, infection, heart rhythm abnormalities, allergic reactions, kidney injury requiring dilaysis, limb loss, stroke and death   - Should stenting be indicated, the patient has agreed to dual anti-platelet therapy for 1-consecutive year with a drug-eluting stent and a minimum of 1-month with the use of a bare metal stent  - Additionally, pt is aware that non-compliance is likely to result in stent clotting with heart attack, heart failure, and/or death  - The risks of moderate sedation include hypotension, respiratory depression, arrhythmias, bronchospasm, & death  - Informed consent was obtained & the patient is agreeable to proceed with the procedure              Thank you for your consult.    Adelaida Roberts MD  Interventional Cardiology   [facility]

## 2020-05-15 NOTE — ASSESSMENT & PLAN NOTE
MICAHEL 89  LORENA 4    Currently HD stable but hypertensive with ECG changes. Trop 2.1.   - DAPT load (ASA and Plavix); holding plavix for possible surgery.  - Heparin gtt  - HI statin  - Beta blocker (no evidence of cardiac shock)  - Home ARB  - NPO at [facility] 5/15 showing three vessel disease with severe aortic insufficiency.   -CTS consulted for possible bypass and surgical valve repair. Tentatively plan for CABG x 2-3 and possible bio bentall on 5/19/2020

## 2020-05-15 NOTE — PROCEDURES
"    Post Cath Note  Referring Physician: Scott Munoz MD  Procedure: Left heart cath (Left), Aortogram (N/A), Ventriculogram, Left    Findings:   Access: Right radial    Multivessel disease    Severe AI     LVEDP 15 mmHg, LVEF preserved       See full report for further details    Intervention:     Closure device: Radial band    Post Cath Exam:   BP (!) 166/109 (BP Location: Right arm, Patient Position: Lying)   Pulse 67   Temp 97.8 °F (36.6 °C) (Oral)   Resp 17   Ht 5' 9" (1.753 m)   Wt 82 kg (180 lb 12.4 oz)   SpO2 97%   BMI 26.70 kg/m²   No unusual pain, hematoma, thrill or bruit at vascular access site.  Distal pulse present without signs of ischemia.    Recommendations:   - Routine post-cath care  - IVF at 3 cc/kg/h for 4 hrs  - Continue medical management  - Risk factor reduction  - CTS consult for CABG + AVR  - Hold Plavix pending CTS recs  - Continue heparin gtt for min 48 hours per ACS protocol   - Primary team and patient's wife updated on findings/plan of care    Signed:  Adelaida Roberts MD  Interventional Cardiology   5/15/2020 9:18 AM      "

## 2020-05-15 NOTE — HPI
"Brad Kearns is a 75 yo M with medical hx hypertension and mod-severe AI with mild aortic root dilation and possible bicuspid AV who presented to ED overnight from PCPs office with chest discomfort and hypertensive crisis. Patient describes 2-3 days of intermittent subxiphoid "soreness" and "burning" sensation with radiation to his stomach. Episodes lasting between 5-10 minutes, and not associated with exertion. During PCP visit an EKG was obtained that showed deep TWI in the anterior leads. He was sent to the ED where he was hypertensive on arrival with SBPs >200s and pulses in 70s. Labs notable for trop 2.1, . He was started on nitro gtt + ACS protocol (ASA/Plavix loaded) and admitted to CCU. EKG repeated overnight with inferior and anterolateral TWIs. Patient is a never smoker. FHx positive for CAD in his son who received stent at age 40.    VS this AM: -140s/60, HR 60s, 96% RA    Labs:  Hgb 12.7  Plt 241  INR 1.0    Cr 1.3    Trop 2.1    A1c 5.0        TTE 3/20/20:  · Mild left ventricular enlargement.  · Normal left ventricular systolic function. The estimated ejection fraction is 60%.  · Normal right ventricular systolic function.  · Indeterminate left ventricular diastolic function.  · Mild left atrial enlargement.  · The ascending aorta is mildly dilated.  · Moderate-to-severe aortic regurgitation. The aortic valve may be bileaflet with possible partial fusion of the left and right coronary cusp leaflets.  · Normal central venous pressure (3 mmHg).         "

## 2020-05-15 NOTE — ASSESSMENT & PLAN NOTE
MICHAEL 89  LORENA 4    Currently HD stable but hypertensive with ECG changes. Trop 2.1.   - DAPT load (ASA and Plavix)  - Heparin gtt  - HI statin  - Beta blocker (no evidence of cardiac shock)  - Home ARB  - Nitro gtt -- titrate to CP free  - NPO at [facility]  - Interventional cardiology consult in AM  - TTE in AM

## 2020-05-15 NOTE — PLAN OF CARE
Duke Bowden MD  [address]  [address] / [place] [address]      [id number] [place] - [address] [place] [address]  Phone: [phone] Fax: [phone]      Extended Emergency Contact Information  Primary Emergency Contact: Loida Kearns   [place]  Mobile Phone: [phone]  Relation: Spouse    Payor: [employer] MANAGED MEDICARE / Plan: [employer] MEDICARE HMO / Product Type: Capitation /          05/15/20 1438   Discharge Assessment   Assessment Type Discharge Planning Assessment   Confirmed/corrected address and phone number on facesheet? Yes   Assessment information obtained from? Caregiver   Prior to hospitilization cognitive status: Alert/Oriented   Prior to hospitalization functional status: Independent   Current cognitive status: Alert/Oriented   Current Functional Status: Independent   Facility Arrived From: ED   Lives With spouse   Able to Return to Prior Arrangements other (see comments)  (TBD)   Is patient able to care for self after discharge? Unable to determine at this time (comments)   Who are your caregiver(s) and their phone number(s)? Loida Kearns (wife)   Patient currently being followed by outpatient case management? No   Patient currently receives any other outside agency services? No   Equipment Currently Used at Home none   Do you have any problems affording any of your prescribed medications? No   Is the patient taking medications as prescribed? yes   Does the patient have transportation home? Yes   Transportation Anticipated family or friend will provide   Discharge Plan A Home   Discharge Plan B Home with family   DME Needed Upon Discharge  other (see comments)  (TBD)       Unable to reach patient on bedside phone, called cp.  Wife Loida answered and completed admission assessment.    Prasanna Mraley RN MSN  Critical Care-   Ext. [phone]

## 2020-05-15 NOTE — ASSESSMENT & PLAN NOTE
?bicuspid aortic valve with mild dilation of ascending aorta  - Euvolemic on exam. No symptoms of CHF  - Continue to monitor

## 2020-05-15 NOTE — SUBJECTIVE & OBJECTIVE
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      Current Outpatient Medications on File Prior to Encounter   Medication Sig    ascorbic acid (VITAMIN C) 1000 MG tablet Take 1,000 mg by mouth every morning.    aspirin (ECOTRIN) 81 MG EC tablet Take 81 mg by mouth every morning.    esomeprazole (NEXIUM) 40 MG capsule once daily.    fish oil-omega-3 fatty acids 300-1,000 mg capsule Take 1 g by mouth every morning.    multivitamin capsule Take 1 capsule by mouth once daily.    pravastatin (PRAVACHOL) 40 MG tablet Take 1 tablet (40 mg total) by mouth once daily.    thiamine (VITAMIN B-1) 50 MG tablet Take 50 mg by mouth once daily.    valsartan (DIOVAN) 160 MG tablet Take 1 tablet (160 mg total) by mouth 2 (two) times daily.    [DISCONTINUED] amLODIPine (NORVASC) 5 MG tablet Take 1 tablet (5 mg total) by mouth once daily.     Family History     Problem Relation (Age of Onset)    Cancer Sister (80), Brother (74), Brother    Heart attack Father, Son    Heart disease Brother, Son, Brother    Hyperlipidemia Sister, Sister    Rheum arthritis Brother    Stroke Mother        Tobacco Use    Smoking status: Never Smoker    Smokeless tobacco: Never Used   Substance and Sexual Activity    Alcohol use: Yes     Frequency: Monthly or less     Drinks per session: Patient refused     Binge frequency: Never     Comment: Rarely    Drug use: Not on file    Sexual activity: Not on file     Review of Systems    Constitution: Negative for chills and fever.   Cardiovascular:        See HPI   Respiratory:        See HPI   Endocrine: Negative for cold intolerance and heat intolerance.   Musculoskeletal: Negative for joint pain and joint swelling.   Gastrointestinal: Negative for abdominal pain, nausea and vomiting.   Neurological: Negative for focal weakness and headaches.   Psychiatric/Behavioral: Negative for altered mental status.   All other systems reviewed and are negative.    Objective:     Vital Signs (Most Recent):  Temp: 97.8 °F (36.6 °C) (05/14/20 1741)  Pulse: 72 (05/14/20 1900)  Resp: 12 (05/14/20 1900)  BP: (!) 184/77 (05/14/20 1900)  SpO2: 98 % (05/14/20 1900) Vital Signs (24h Range):  Temp:  [97.8 °F (36.6 °C)] 97.8 °F (36.6 °C)  Pulse:  [68-93] 72  Resp:  [12-33] 12  SpO2:  [96 %-100 %] 98 %  BP: (160-216)/(60-91) 184/77     Weight: 82.1 kg (181 lb)  Body mass index is 26.73 kg/m².    SpO2: 98 %       No intake or output data in the 24 hours ending 05/14/20 1952    Lines/Drains/Airways     Peripheral Intravenous Line                 Peripheral IV - Single Lumen 05/14/20 1755 18 G Left Antecubital less than 1 day         Peripheral IV - Single Lumen 05/14/20 1758 18 G Left Hand less than 1 day                Physical Exam   Constitutional: He appears well-developed and well-nourished. No distress.   HENT:   Head: Normocephalic and atraumatic.   Eyes: EOM are normal. Right eye exhibits no discharge. Left eye exhibits no discharge. No scleral icterus.   Neck: Normal range of motion. Neck supple.   Cardiovascular: Normal rate, regular rhythm, S1 normal, S2 normal, intact distal pulses and normal pulses.  No extrasystoles are present. Exam reveals no gallop, no S3, no S4, no distant heart sounds, no friction rub and no opening snap.   Murmur heard.  Pulses:       Radial pulses are 2+ on the right side, and 2+ on the left side.        Dorsalis pedis pulses are 2+ on the right side, and 2+ on the left side.         Posterior tibial pulses are 2+ on the right side, and 2+ on the left side.   Grade III/VI diastolic murmur heard best at LSB   Pulmonary/Chest: Effort normal. No respiratory distress. He has no wheezes. He has no rales.   Abdominal: Soft. Bowel sounds are normal. He exhibits no distension. There is no tenderness.   Musculoskeletal: Normal range of motion. He exhibits no edema or deformity.   Neurological: He is alert.   Skin: Skin is warm and dry. No rash noted. He is not diaphoretic. No erythema.   Nursing note and vitals reviewed.      Significant Labs: All pertinent lab results from the last 24 hours have been reviewed.    Significant Imaging: Reviewed

## 2020-05-15 NOTE — H&P
"[facility]  Cardiology  History and Physical     Patient Name: Brad Kearns  MRN: [id number]  Admission Date: 5/14/2020  Code Status: Full Code   Attending Provider: Gareth Choudhury MD   Primary Care Physician: Duke Bowden MD  Principal Problem:NSTEMI (non-ST elevated myocardial infarction)    Patient information was obtained from patient and ER records.     Subjective:     Chief Complaint:  Chest pain     HPI:  Mr. eKarns is a 74y gentleman with pmhx of TN and mod-severe AI with mild aortic root dilation (normal LVESD and STJ 4 cm on TTE in March 2020) who presented to the ED from appointment where he was establishing care with PCP. Patient reports 2-3 days of subxiphoid "soreness" and "burning" that radiates down his stomach. Episodes last 5-10 minutes with resolution. No association with exertion. ECG in PCP office with deep anterior precordial TWI. Denies orthonpnea, PND, or lower extremity edema. Denies syncope or presyncope. Works as a  and sometimes uses a push mower without any issues. Risk factors for CAD: age, male, HTN, fhx (son with MI in mid 40s).     On arrival, he was hypertensive with systolics >200 and pulse in 70s and sating well on RA. Labs sign for , normal CBC, cr 1.3, and trop 2.1. ECG on arrival with wellens pattern. Patient currently reporting some "soreness". Started on ACS protocol with DAPT loading.     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      Current Outpatient Medications on File Prior to Encounter   Medication Sig    ascorbic acid (VITAMIN C) 1000 MG tablet Take 1,000 mg by mouth every morning.    aspirin (ECOTRIN) 81 MG EC tablet Take 81 mg by mouth every morning.    esomeprazole (NEXIUM) 40 MG capsule once daily.    fish oil-omega-3 fatty acids 300-1,000 mg capsule Take 1 g by mouth every morning.    multivitamin capsule Take 1 capsule by mouth once daily.    pravastatin (PRAVACHOL) 40 MG tablet Take 1 tablet (40 mg total) by mouth once daily.    thiamine (VITAMIN B-1) 50 MG tablet Take 50 mg by mouth once daily.    valsartan (DIOVAN) 160 MG tablet Take 1 tablet (160 mg total) by mouth 2 (two) times daily.    [DISCONTINUED] amLODIPine (NORVASC) 5 MG tablet Take 1 tablet (5 mg total) by mouth once daily.     Family History     Problem Relation (Age of Onset)    Cancer Sister (80), Brother (74), Brother    Heart attack Father, Son    Heart disease Brother, Son, Brother    Hyperlipidemia Sister, Sister    Rheum arthritis Brother    Stroke Mother        Tobacco Use    Smoking status: Never Smoker    Smokeless tobacco: Never Used   Substance and Sexual Activity    Alcohol use: Yes     Frequency: Monthly or less     Drinks per session: Patient refused     Binge frequency: Never     Comment: Rarely    Drug use: Not on file    Sexual activity: Not on file     Review of Systems   Constitution: Negative for chills and fever.   Cardiovascular:        See HPI   Respiratory:        See HPI   Endocrine: Negative for cold intolerance and heat intolerance.   Musculoskeletal: Negative for joint pain and joint swelling.   Gastrointestinal: Negative for abdominal pain, nausea and vomiting.   Neurological: Negative for focal weakness and headaches.   Psychiatric/Behavioral: Negative for altered mental status.   All other systems reviewed and are negative.    Objective:     Vital Signs (Most Recent):  Temp: 97.8 °F (36.6 °C)  (05/14/20 1741)  Pulse: 72 (05/14/20 1900)  Resp: 12 (05/14/20 1900)  BP: (!) 184/77 (05/14/20 1900)  SpO2: 98 % (05/14/20 1900) Vital Signs (24h Range):  Temp:  [97.8 °F (36.6 °C)] 97.8 °F (36.6 °C)  Pulse:  [68-93] 72  Resp:  [12-33] 12  SpO2:  [96 %-100 %] 98 %  BP: (160-216)/(60-91) 184/77     Weight: 82.1 kg (181 lb)  Body mass index is 26.73 kg/m².    SpO2: 98 %       No intake or output data in the 24 hours ending 05/14/20 1952    Lines/Drains/Airways     Peripheral Intravenous Line                 Peripheral IV - Single Lumen 05/14/20 1755 18 G Left Antecubital less than 1 day         Peripheral IV - Single Lumen 05/14/20 1758 18 G Left Hand less than 1 day                Physical Exam   Constitutional: He appears well-developed and well-nourished. No distress.   HENT:   Head: Normocephalic and atraumatic.   Eyes: EOM are normal. Right eye exhibits no discharge. Left eye exhibits no discharge. No scleral icterus.   Neck: Normal range of motion. Neck supple.   Cardiovascular: Normal rate, regular rhythm, S1 normal, S2 normal, intact distal pulses and normal pulses.  No extrasystoles are present. Exam reveals no gallop, no S3, no S4, no distant heart sounds, no friction rub and no opening snap.   Murmur heard.  Pulses:       Radial pulses are 2+ on the right side, and 2+ on the left side.        Dorsalis pedis pulses are 2+ on the right side, and 2+ on the left side.        Posterior tibial pulses are 2+ on the right side, and 2+ on the left side.   Grade III/VI diastolic murmur heard best at LSB   Pulmonary/Chest: Effort normal. No respiratory distress. He has no wheezes. He has no rales.   Abdominal: Soft. Bowel sounds are normal. He exhibits no distension. There is no tenderness.   Musculoskeletal: Normal range of motion. He exhibits no edema or deformity.   Neurological: He is alert.   Skin: Skin is warm and dry. No rash noted. He is not diaphoretic. No erythema.   Nursing note and vitals  reviewed.      Significant Labs: All pertinent lab results from the last 24 hours have been reviewed.    Significant Imaging: Reviewed    Assessment and Plan:     * NSTEMI (non-ST elevated myocardial infarction)  MICHAEL 108  LORENA 4    Currently HD stable but hypertensive with ECG changes. Trop 2.1.   - DAPT load (ASA and Plavix)  - Heparin gtt  - HI statin  - Beta blocker (no evidence of cardiac shock)  - Home ARB  - Nitro gtt -- titrate to CP free  - NPO at MN  - Interventional cardiology consult in AM  - TTE in AM    Nonrheumatic aortic valve insufficiency  ?bicuspid aortic valve with mild dilation of ascending aorta  - Euvolemic on exam. No symptoms of CHF  - Continue to monitor    Hypertension  Home valsartan 160 mg BID  - Starting nitro gtt  - Titrate as needed        VTE Risk Mitigation (From admission, onward)         Ordered     heparin 25,000 units in dextrose 5% (100 units/ml) IV bolus from bag - ADDITIONAL PRN BOLUS - 60 units/kg (max bolus 4000 units)  As needed (PRN)     Question:  Heparin Infusion Adjustment (DO NOT MODIFY ANSWER)  Answer:  \[email or web address]\epic\Images\Pharmacy\HeparinInfusions\heparin LOW INTENSITY nomogram for [facility] [id number].pdf    05/14/20 1903     heparin 25,000 units in dextrose 5% (100 units/ml) IV bolus from bag - ADDITIONAL PRN BOLUS - 30 units/kg (max bolus 4000 units)  As needed (PRN)     Question:  Heparin Infusion Adjustment (DO NOT MODIFY ANSWER)  Answer:  \[email or web address]\epic\Images\Pharmacy\HeparinInfusions\heparin LOW INTENSITY nomogram for [facility] [id number].pdf    05/14/20 1903     heparin 25,000 units in dextrose 5% 250 mL (100 units/mL) infusion LOW INTENSITY nomogram - [facility]  Continuous     Question:  Heparin Infusion Adjustment (DO NOT MODIFY ANSWER)  Answer:  \[email or web address]\epic\Images\Pharmacy\HeparinInfusions\heparin LOW INTENSITY nomogram for [facility] [id number].pdf    05/14/20 1903     heparin 25,000 units in dextrose 5% (100 units/ml) IV bolus from bag INITIAL BOLUS (max bolus 4000  units)  Once     Question:  Heparin Infusion Adjustment (DO NOT MODIFY ANSWER)  Answer:  \\[email or web address]\epic\Images\Pharmacy\HeparinInfusions\heparin LOW INTENSITY nomogram for [facility] [id number].pdf    05/14/20 1903     IP VTE HIGH RISK PATIENT  Once      05/1946     Place sequential compression device  Until discontinued      05/1946                Gordon Lofton MD  Cardiology   [facility]

## 2020-05-15 NOTE — TELEPHONE ENCOUNTER
----- Message from RT Teresa sent at 5/15/2020 10:35 AM CDT -----  Regarding: need EKG order placed  Please put the EKG order in for the EKG performed on  5/14        ,  then attach the order to the EKG appointment or the MD appointment for the date performed.        Thank you

## 2020-05-15 NOTE — CONSULTS
"[facility]  Cardiothoracic Surgery  Consult Note    Patient Name: Brad Kearns  MRN: [id number]  Admission Date: 5/14/2020  Attending Physician: Scott Munoz MD  Referring Provider: Self, Aaareferral    Patient information was obtained from past medical records and ER records.     Inpatient consult to Cardiothoracic Surgery  Consult performed by: Aleena Cobb PA-C  Consult ordered by: Krzysztof Pretty DO        Subjective:     Principal Problem: NSTEMI (non-ST elevated myocardial infarction)    History of Present Illness: Brad Kearns is a 75 yo M with medical hx hypertension and mod-severe AI with mild aortic root dilation and possible bicuspid AV who presented to ED overnight from PCPs office with chest discomfort and hypertensive crisis. Patient describes 2-3 days of intermittent subxiphoid "soreness" and "burning" sensation with radiation to his stomach. Episodes lasting between 5-10 minutes, and not associated with exertion. During PCP visit an EKG was obtained that showed deep TWI in the anterior leads. He was sent to the ED where he was hypertensive on arrival with SBPs >200s and pulses in 70s. Labs notable for trop 2.1, . He was started on nitro gtt + ACS protocol (ASA/Plavix loaded) and admitted to CCU. EKG repeated overnight with inferior and anterolateral TWIs. Patient is a never smoker. FHx positive for CAD in his son who received stent at age 40.    No current facility-administered medications on file prior to encounter.      Current Outpatient Medications on File Prior to Encounter   Medication Sig    ascorbic acid (VITAMIN C) 1000 MG tablet Take 1,000 mg by mouth every morning.    aspirin (ECOTRIN) 81 MG EC tablet Take 81 mg by mouth every morning.    esomeprazole (NEXIUM) 40 MG capsule once daily.    fish oil-omega-3 fatty acids 300-1,000 mg capsule Take 1 g by mouth every morning.    multivitamin capsule Take 1 capsule by mouth once daily.    " pravastatin (PRAVACHOL) 40 MG tablet Take 1 tablet (40 mg total) by mouth once daily.    thiamine (VITAMIN B-1) 50 MG tablet Take 50 mg by mouth once daily.    valsartan (DIOVAN) 160 MG tablet Take 1 tablet (160 mg total) by mouth 2 (two) times daily.       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    Right knee arthroscopy       Family History     Problem Relation (Age of Onset)    Cancer Sister (80), Brother (74), Brother    Heart attack Father, Son    Heart disease Brother, Son, Brother    Hyperlipidemia Sister, Sister    Rheum arthritis Brother    Stroke Mother        Tobacco Use    Smoking status: Never Smoker    Smokeless tobacco: Never Used   Substance and Sexual Activity    Alcohol use: Yes     Frequency: Monthly or less     Drinks per session: Patient refused     Binge frequency: Never     Comment: Rarely    Drug use: Not on file    Sexual activity: Not on file     Review of Systems   Constitutional: Negative for fatigue.   HENT: Negative for nosebleeds.    Eyes: Negative for visual disturbance.   Respiratory: Positive for shortness of breath.    Cardiovascular: Positive for chest pain.   Gastrointestinal: Negative for nausea.   Musculoskeletal: Negative for gait problem.   Skin: Negative for color change.   Neurological: Negative for seizures.   Hematological: Does not bruise/bleed easily.   Psychiatric/Behavioral: Negative for sleep disturbance.     Objective:     Vital Signs (Most Recent):  Temp: 97.8 °F (36.6 °C) (05/15/20 0701)  Pulse: 67 (05/15/20 0805)  Resp: 17 (05/15/20 0805)  BP:  (!) 166/109 (05/15/20 0800)  SpO2: 97 % (05/15/20 0805) Vital Signs (24h Range):  Temp:  [97.7 °F (36.5 °C)-98.1 °F (36.7 °C)] 97.8 °F (36.6 °C)  Pulse:  [53-93] 67  Resp:  [11-33] 17  SpO2:  [95 %-100 %] 97 %  BP: (120-216)/() 166/109     Weight: 82 kg (180 lb 12.4 oz)  Body mass index is 26.7 kg/m².    SpO2: 97 %  O2 Device (Oxygen Therapy): nasal cannula     Intake/Output - Last 3 Shifts       05/13 0700 - 05/14 0659 05/14 0700 - 05/15 0659 05/15 0700 - 05/16 0659    I.V. (mL/kg)  91.9 (1.1) 24.9 (0.3)    IV Piggyback  62.6     Total Intake(mL/kg)  154.5 (1.9) 24.9 (0.3)    Urine (mL/kg/hr)  150     Total Output  150     Net  +4.5 +24.9           Stool Occurrence   1 x           Lines/Drains/Airways     Peripheral Intravenous Line                 Peripheral IV - Single Lumen 05/14/20 1755 18 G Left Antecubital less than 1 day         Peripheral IV - Single Lumen 05/14/20 1758 18 G Left Hand less than 1 day                 STS Risk Score: 1.8% (done without cath report)    Physical Exam   Constitutional: He is oriented to person, place, and time. He appears well-developed and well-nourished.   HENT:   Head: Normocephalic and atraumatic.   Eyes: Pupils are equal, round, and reactive to light. EOM are normal.   Neck: Normal range of motion.   Cardiovascular: Normal pulses.   No murmur heard.  Pulmonary/Chest: Effort normal and breath sounds normal. No respiratory distress.   Abdominal: Soft.   Musculoskeletal: He exhibits no edema.   Neurological: He is alert and oriented to person, place, and time.   Skin: Skin is warm, dry and intact. Capillary refill takes less than 2 seconds.   Psychiatric: He has a normal mood and affect. His speech is normal and behavior is normal. Judgment and thought content normal.   Vitals reviewed.      Significant Labs:  ABGs: No results for input(s): PH, PCO2, PO2, HCO3, POCSATURATED, BE in the last 48 hours.  Amylase: No results for input(s): AMYLASE in the last 48 hours.  BMP:    Recent Labs   Lab 05/15/20  0800   *      K 3.9      CO2 22*   BUN 20   CREATININE 1.3   CALCIUM 8.7   MG 1.9     Cardiac markers:   Recent Labs   Lab 05/15/20  0800   TROPONINI 1.657*     CBC:   Recent Labs   Lab 05/15/20  0358   WBC 9.43   RBC 4.16*   HGB 12.7*   HCT 39.8*      MCV 96   MCH 30.5   MCHC 31.9*     CMP:   Recent Labs   Lab 05/14/20  1755 05/15/20  0800   GLU 93 266*   CALCIUM 9.7 8.7   ALBUMIN 4.6  --    PROT 8.0  --     137   K 5.3* 3.9   CO2 22* 22*    104   BUN 18 20   CREATININE 1.3 1.3   ALKPHOS 51*  --    ALT 29  --    AST 40  --    BILITOT 0.7  --      Coagulation:   Recent Labs   Lab 05/14/20  1958 05/15/20  0358   INR 1.0  --    APTT 23.5 38.9*     Lactic Acid: No results for input(s): LACTATE in the last 48 hours.  LFTs:   Recent Labs   Lab 05/14/20  1755   ALT 29   AST 40   ALKPHOS 51*   BILITOT 0.7   PROT 8.0   ALBUMIN 4.6     Lipase: No results for input(s): LIPASE in the last 48 hours.    Significant Diagnostics: reviewed   TTE 3/20/2020  · Mild left ventricular enlargement.  · Normal left ventricular systolic function. The estimated ejection fraction is 60%.  · Normal right ventricular systolic function.  · Indeterminate left ventricular diastolic function.  · Mild left atrial enlargement.  · The ascending aorta is mildly dilated.  · Moderate-to-severe aortic regurgitation. The aortic valve may be bileaflet with possible partial fusion of the left and right coronary cusp leaflets.  · Normal central venous pressure (3 mmHg).    [facility] 5/15/2020  Multivessel disease  Severe AI   LVEDP 15 mmHg, LVEF preserved   Full report pending     Repeat Echo ordered for today     Assessment/Plan:       * NSTEMI (non-ST elevated myocardial infarction)  Brad Kearns is a 75 yo M with medical hx hypertension and mod-severe AI with mild aortic root dilation and possible bicuspid AV. Presented to the ED with chest pain and SOB. Went for [facility] today which showed  multivessel disease and severe AI (possible bicuspid on TTE). Was loaded with plavix 5/14/2020. CTA chest, carotid US, and PFTs ordered. Tentatively plan for CABG x 2-3 and possible bio bentall on 5/19/2020. Dr. Quezada to review and staff.         Thank you for your consult. I will follow-up with patient. Please contact us if you have any additional questions.    Aleena Cobb PA-C  Cardiothoracic Surgery  [facility]    I have seen the patient and reviewed the physician assistant's note above. I have personally interviewed and examined the patient at bedside and agree with the findings.     Mr. Kearns is a pleasant 74 year old male nonsmoker with known aortic regurgitation with bicuspid aortic valve and hypertension who presented with NSTEMI (peak troponin 2.1), underwent coronary angiogram showing left main disease.  Angiogram details include 60% distal left main disease, 90% early mid LAD + 90% early distal LAD with moderate mid and distal LAD targets, moderate sized first diagonal artery with 90% distal lesion, 90% mid LCx lesion with moderate sized left posterolateral ventricular artery, 60% mid RCA and 50% distal RCA lesions.  The transthoracic echo from May 2020 shows 55% ejection fraction, LVEDD 6.4cm, severe aortic regurgitation with bicuspid aortic valve, Sinus of Valsalva 3.5cm, ST junction 3.6cm, Ascending Aorta 3.5cm.  The transthoracic echo from March 2020 shows Sinus of Valsalva 4.8cm, ST junction 3.9cm, Ascending Aorta 3.9cm.      Given the severity of disease and the symptoms, I recommend coronary artery bypass surgery x 2 ( skeletonized LIMA-mid or distal LAD, SVG-LPL), aortic valve replacement (bioprosthetic), possible aortic root replacement, possible ascending aorta replacement, possible karon arch replacement under hypothermic circulatory arrest.  I had a lengthy discussion with him about the risks vs. benefits of the surgery.  We discussed the risks including the  predicted chance of mortality as well as morbidity such as stroke, kidney injury, respiratory failure, limb ischemia, myocardial infarction, sternal wound infection, and bleeding.  Additionally, we discussed the likely length of stay in the ICU and in the hospital, as well as the overall recovery period.  Mr. Kearns is in agreement and we will proceed with surgery on Tuesday, May 19, 2020.    We will also obtain a CTA chest to assess his aortic aneurysm, carotid ultrasound, and pulmonary function tests prior to surgery.  Please hold plavix.      Guicho Quezada MD  Cardiothoracic Surgery  [facility]

## 2020-05-15 NOTE — ASSESSMENT & PLAN NOTE
- Proceed with LHC +/- PCI  - Anti-platelet Therapy: ASA 81 mg daily and Clopidogrel 75 mg daily  - Access: R radial  - Creatinine/CrCl: 1.3 on 5/14/20  - Allergies: No shellfish/Iodine allergy  - Pre-Hydration: 3 cc/kg/hr IV, continuous, for 1 hour, Pre-Procedure  - Pre-Op Med: Diphenhydramine (Benadryl) 50 mg, Oral, Once, Pre-Procedure   - All patient's questions were answered.  - The risks, benefits & alternatives of the procedure were explained to the patient.   - The risks of coronary angiography include but are not limited to: Bleeding, infection, heart rhythm abnormalities, allergic reactions, kidney injury requiring dilaysis, limb loss, stroke and death   - Should stenting be indicated, the patient has agreed to dual anti-platelet therapy for 1-consecutive year with a drug-eluting stent and a minimum of 1-month with the use of a bare metal stent  - Additionally, pt is aware that non-compliance is likely to result in stent clotting with heart attack, heart failure, and/or death  - The risks of moderate sedation include hypotension, respiratory depression, arrhythmias, bronchospasm, & death  - Informed consent was obtained & the patient is agreeable to proceed with the procedure

## 2020-05-16 PROBLEM — I35.1 AORTIC INSUFFICIENCY: Status: ACTIVE | Noted: 2020-05-16

## 2020-05-16 LAB
ANION GAP SERPL CALC-SCNC: 7 MMOL/L (ref 8–16)
APTT BLDCRRT: 52.7 SEC (ref 21–32)
APTT BLDCRRT: 60 SEC (ref 21–32)
BASOPHILS # BLD AUTO: 0.1 K/UL (ref 0–0.2)
BASOPHILS NFR BLD: 0.9 % (ref 0–1.9)
BUN SERPL-MCNC: 24 MG/DL (ref 8–23)
CALCIUM SERPL-MCNC: 8.8 MG/DL (ref 8.7–10.5)
CHLORIDE SERPL-SCNC: 107 MMOL/L (ref 95–110)
CO2 SERPL-SCNC: 25 MMOL/L (ref 23–29)
CREAT SERPL-MCNC: 1.3 MG/DL (ref 0.5–1.4)
DIFFERENTIAL METHOD: ABNORMAL
EOSINOPHIL # BLD AUTO: 0.3 K/UL (ref 0–0.5)
EOSINOPHIL NFR BLD: 2.4 % (ref 0–8)
ERYTHROCYTE [DISTWIDTH] IN BLOOD BY AUTOMATED COUNT: 14.8 % (ref 11.5–14.5)
EST. GFR  (AFRICAN AMERICAN): >60 ML/MIN/1.73 M^2
EST. GFR  (NON AFRICAN AMERICAN): 54.1 ML/MIN/1.73 M^2
GLUCOSE SERPL-MCNC: 85 MG/DL (ref 70–110)
HCT VFR BLD AUTO: 38.9 % (ref 40–54)
HGB BLD-MCNC: 12.3 G/DL (ref 14–18)
IMM GRANULOCYTES # BLD AUTO: 0.07 K/UL (ref 0–0.04)
IMM GRANULOCYTES NFR BLD AUTO: 0.6 % (ref 0–0.5)
LYMPHOCYTES # BLD AUTO: 3.1 K/UL (ref 1–4.8)
LYMPHOCYTES NFR BLD: 27.7 % (ref 18–48)
MCH RBC QN AUTO: 30.7 PG (ref 27–31)
MCHC RBC AUTO-ENTMCNC: 31.6 G/DL (ref 32–36)
MCV RBC AUTO: 97 FL (ref 82–98)
MONOCYTES # BLD AUTO: 1 K/UL (ref 0.3–1)
MONOCYTES NFR BLD: 8.8 % (ref 4–15)
NEUTROPHILS # BLD AUTO: 6.6 K/UL (ref 1.8–7.7)
NEUTROPHILS NFR BLD: 59.6 % (ref 38–73)
NRBC BLD-RTO: 0 /100 WBC
PLATELET # BLD AUTO: 221 K/UL (ref 150–350)
PMV BLD AUTO: 10.2 FL (ref 9.2–12.9)
POTASSIUM SERPL-SCNC: 4.2 MMOL/L (ref 3.5–5.1)
RBC # BLD AUTO: 4.01 M/UL (ref 4.6–6.2)
SODIUM SERPL-SCNC: 139 MMOL/L (ref 136–145)
WBC # BLD AUTO: 11.11 K/UL (ref 3.9–12.7)

## 2020-05-16 PROCEDURE — 85025 COMPLETE CBC W/AUTO DIFF WBC: CPT

## 2020-05-16 PROCEDURE — 80048 BASIC METABOLIC PNL TOTAL CA: CPT

## 2020-05-16 PROCEDURE — 85730 THROMBOPLASTIN TIME PARTIAL: CPT

## 2020-05-16 PROCEDURE — 94761 N-INVAS EAR/PLS OXIMETRY MLT: CPT

## 2020-05-16 PROCEDURE — 99232 PR SUBSEQUENT HOSPITAL CARE,LEVL II: ICD-10-PCS | Mod: GC,,, | Performed by: INTERNAL MEDICINE

## 2020-05-16 PROCEDURE — 25000003 PHARM REV CODE 250: Performed by: INTERNAL MEDICINE

## 2020-05-16 PROCEDURE — 63600175 PHARM REV CODE 636 W HCPCS: Performed by: INTERNAL MEDICINE

## 2020-05-16 PROCEDURE — 25000003 PHARM REV CODE 250: Performed by: STUDENT IN AN ORGANIZED HEALTH CARE EDUCATION/TRAINING PROGRAM

## 2020-05-16 PROCEDURE — 85730 THROMBOPLASTIN TIME PARTIAL: CPT | Mod: 91

## 2020-05-16 PROCEDURE — 25500020 PHARM REV CODE 255: Performed by: INTERNAL MEDICINE

## 2020-05-16 PROCEDURE — 20600001 HC STEP DOWN PRIVATE ROOM

## 2020-05-16 PROCEDURE — 99232 SBSQ HOSP IP/OBS MODERATE 35: CPT | Mod: GC,,, | Performed by: INTERNAL MEDICINE

## 2020-05-16 RX ORDER — AMLODIPINE BESYLATE 5 MG/1
5 TABLET ORAL DAILY
Status: DISCONTINUED | OUTPATIENT
Start: 2020-05-16 | End: 2020-05-19

## 2020-05-16 RX ADMIN — ASPIRIN 81 MG: 81 TABLET, COATED ORAL at 08:05

## 2020-05-16 RX ADMIN — IOHEXOL 100 ML: 350 INJECTION, SOLUTION INTRAVENOUS at 09:05

## 2020-05-16 RX ADMIN — VALSARTAN 160 MG: 80 TABLET, FILM COATED ORAL at 09:05

## 2020-05-16 RX ADMIN — VALSARTAN 160 MG: 80 TABLET, FILM COATED ORAL at 08:05

## 2020-05-16 RX ADMIN — METOPROLOL TARTRATE 25 MG: 25 TABLET, FILM COATED ORAL at 08:05

## 2020-05-16 RX ADMIN — AMLODIPINE BESYLATE 5 MG: 5 TABLET ORAL at 02:05

## 2020-05-16 RX ADMIN — HEPARIN SODIUM 13 UNITS/KG/HR: 10000 INJECTION, SOLUTION INTRAVENOUS at 07:05

## 2020-05-16 RX ADMIN — ATORVASTATIN CALCIUM 40 MG: 20 TABLET, FILM COATED ORAL at 09:05

## 2020-05-16 RX ADMIN — METOPROLOL TARTRATE 25 MG: 25 TABLET, FILM COATED ORAL at 09:05

## 2020-05-16 RX ADMIN — ISOSORBIDE MONONITRATE 120 MG: 60 TABLET, EXTENDED RELEASE ORAL at 08:05

## 2020-05-16 NOTE — NURSING
Spoke with CT r/t scheduled CTA. Patient NPO > 4 hours as requested. CT to call back when ready for patient.

## 2020-05-16 NOTE — NURSING
[facility] DAILY GOALS       A: Awake    RASS: Goal - RASS Goal: 0-->alert and calm  Actual - RASS (Bedoya Agitation-Sedation Scale): 0-->alert and calm   Restraint necessity:    B: Breath   SBT: NA   C: Coordinate A & B, analgesics/sedatives   Pain: managed    SAT: NA  D: Delirium   CAM-ICU: Overall CAM-ICU: Negative  E: Early Mobility   MOVE Screen: Pass   Activity: Activity Management: activity adjusted per tolerance  FAS: Feeding/Nutrition   Diet order: Diet/Nutrition Received: 2 gram sodium,   Fluid restriction:    T: Thrombus   DVT prophylaxis: VTE Required Core Measure: Pharmacological prophylaxis initiated/maintained  H: HOB Elevation   Head of Bed (HOB): HOB at 30-45 degrees  U: Ulcer Prophylaxis   GI: yes  G: Glucose control   managed    S: Skin   Bundle compliance: yes   Bathing/Skin Care: bath, chlorhexidine, bath, complete, dressed/undressed, incontinence care, linen changed Date: <No height available>-1  B: Bowel Function   no issues   I: Indwelling Catheters   Thomas necessity:     CVC necessity: Yes   IPAD offered: No  D: De-escalation Antibx   No Abx  Plan for the day   Patient remains stable in ICU without complaint of chest pain or sob pending CABG on Tuesday. Refused late night CTA and plan to obtain CTA today for planning.   Family/Goals of care/Code Status   Code Status: Full Code     No acute events throughout day, VS and assessment per flow sheet, patient progressing towards goals as tolerated, plan of care reviewed with Brad Kearns and family, all concerns addressed, will continue to monitor.

## 2020-05-16 NOTE — PLAN OF CARE
Plan of care discussed with patient. Patient is free of fall/trauma/injury, fall precautions in place. Heparin continuous infusion running. Denies CP, SOB, or pain/discomfort. All questions addressed. Will continue to monitor.

## 2020-05-16 NOTE — NURSING
Spoke with CTS  MD. Multiple Stat CT in queue ahead of patient. Patient does not wish to go to CT this late and refuses after midnight. ok to get CT in AM.

## 2020-05-16 NOTE — ASSESSMENT & PLAN NOTE
MICHAEL 89  LORENA 4    Currently HD stable but hypertensive with ECG changes. Trop 2.1.   - DAPT load (ASA and Plavix); holding plavix for possible surgery.  - Heparin gtt  - HI statin  - Beta blocker (no evidence of cardiac shock)  - Home ARB  - NPO at [facility] 5/15 showing three vessel disease with severe aortic insufficiency.   -CTS consulted for possible bypass and surgical valve repair. Tentatively plan for CABG x 2-3 and possible bio bentall on 5/19/2020  - Will discuss with CTS timing for transferring pt to their service

## 2020-05-16 NOTE — PLAN OF CARE
Discussed pt's case with Cardiothoracic surgery(CTS) team. CTS to take over as primary team tomorrow AM. CCU will remain primary overnight and alert CTS if pt's clinical status changes.

## 2020-05-16 NOTE — PROGRESS NOTES
[facility]  Cardiology  Progress Note    Patient Name: Brad Kearns  MRN: [id number]  Admission Date: 5/14/2020  Hospital Length of Stay: 2 days  Code Status: Full Code   Attending Physician: Scott Munoz MD   Primary Care Physician: Duke Bowden MD  Expected Discharge Date: 5/25/2020  Principal Problem:NSTEMI (non-ST elevated myocardial infarction)    Subjective:     Hospital Course:   No notes on file    Interval History: Pt evaluated by CTS, planning for CABG and aortic valve replacement 5/19. Titrating oral antihypertensive agents. Ambulating comfortably around his room.     Review of Systems   Constitution: Negative for chills, decreased appetite, diaphoresis, fever, malaise/fatigue, night sweats, weight gain and weight loss.   Eyes: Negative.    Cardiovascular: Negative for chest pain, irregular heartbeat, leg swelling, near-syncope, orthopnea, palpitations, paroxysmal nocturnal dyspnea and syncope.   Respiratory: Negative for cough, shortness of breath, sputum production and wheezing.    Endocrine: Negative.    Hematologic/Lymphatic: Negative for bleeding problem.   Skin: Negative for color change, flushing, rash and suspicious lesions.   Musculoskeletal: Negative.    Gastrointestinal: Negative for bloating, abdominal pain, change in bowel habit, constipation, diarrhea, heartburn, melena, nausea and vomiting.   Genitourinary: Negative for flank pain, frequency, hematuria, incomplete emptying and urgency.   Neurological: Negative for dizziness, focal weakness, headaches, light-headedness, numbness, paresthesias, seizures, sensory change and weakness.   Psychiatric/Behavioral: Negative for altered mental status. The patient is not nervous/anxious.      Objective:     Vital Signs (Most Recent):  Temp: 97.8 °F (36.6 °C) (05/16/20 0700)  Pulse: 89 (05/16/20 0800)  Resp: 18 (05/16/20 0800)  BP: (!) 148/66 (05/16/20 0800)  SpO2: 97 % (05/16/20 0800) Vital Signs (24h Range):  Temp:  [97.7  °F (36.5 °C)-98 °F (36.7 °C)] 97.8 °F (36.6 °C)  Pulse:  [55-89] 89  Resp:  [10-39] 18  SpO2:  [94 %-100 %] 97 %  BP: (103-162)/(52-69) 148/66     Weight: 81.6 kg (180 lb)  Body mass index is 26.58 kg/m².     SpO2: 97 %  O2 Device (Oxygen Therapy): room air      Intake/Output Summary (Last 24 hours) at 5/16/2020 0846  Last data filed at 5/16/2020 0100  Gross per 24 hour   Intake 1455.72 ml   Output --   Net 1455.72 ml       Lines/Drains/Airways     Peripheral Intravenous Line                 Peripheral IV - Single Lumen 05/14/20 1755 18 G Left Antecubital 1 day         Peripheral IV - Single Lumen 05/14/20 1758 18 G Left Hand 1 day                Physical Exam   Constitutional: He is oriented to person, place, and time. He appears well-developed and well-nourished. No distress.   HENT:   Head: Normocephalic and atraumatic.   Mouth/Throat: Oropharynx is clear and moist.   Eyes: Pupils are equal, round, and reactive to light. EOM are normal.   Neck: Normal range of motion. Neck supple. No JVD present.   Cardiovascular: Normal rate and regular rhythm. Exam reveals no gallop and no friction rub.   No murmur heard.  Pulses:       Radial pulses are 2+ on the right side, and 2+ on the left side.        Femoral pulses are 2+ on the right side, and 2+ on the left side.       Dorsalis pedis pulses are 2+ on the right side, and 2+ on the left side.        Posterior tibial pulses are 2+ on the right side, and 2+ on the left side.   Pulmonary/Chest: Effort normal and breath sounds normal. No respiratory distress. He has no wheezes. He has no rales. He exhibits no tenderness.   Abdominal: Soft. Bowel sounds are normal. He exhibits no distension. There is no tenderness.   Musculoskeletal: Normal range of motion. He exhibits no edema.   Lymphadenopathy:     He has no cervical adenopathy.   Neurological: He is alert and oriented to person, place, and time.   Skin: Skin is warm and dry. No erythema.   Psychiatric: He has a normal  mood and affect. His behavior is normal. Judgment and thought content normal.       Significant Labs:   ABG: No results for input(s): PH, PCO2, HCO3, POCSATURATED, BE in the last 48 hours., Blood Culture: No results for input(s): LABBLOO in the last 48 hours., CMP   Recent Labs   Lab 05/14/20  1755 05/15/20  0800 05/16/20  0438    137 139   K 5.3* 3.9 4.2    104 107   CO2 22* 22* 25   GLU 93 266* 85   BUN 18 20 24*   CREATININE 1.3 1.3 1.3   CALCIUM 9.7 8.7 8.8   PROT 8.0  --   --    ALBUMIN 4.6  --   --    BILITOT 0.7  --   --    ALKPHOS 51*  --   --    AST 40  --   --    ALT 29  --   --    ANIONGAP 11 11 7*   ESTGFRAFRICA >60.0 >60.0 >60.0   EGFRNONAA 54.1* 54.1* 54.1*   , CBC   Recent Labs   Lab 05/14/20  1958 05/15/20  0358 05/16/20  0438   WBC 9.36 9.43 11.11   HGB 12.9* 12.7* 12.3*   HCT 41.3 39.8* 38.9*    241 221   , Troponin   Recent Labs   Lab 05/14/20  1755 05/15/20  0800   TROPONINI 2.136* 1.657*    and All pertinent lab results from the last 24 hours have been reviewed.    Significant Imaging: US Carotid Bilateral  Narrative: EXAMINATION:  US CAROTID BILATERAL    CLINICAL HISTORY:  CAD;    TECHNIQUE:  Grayscale and color spectral Doppler ultrasound imaging of the carotid and vertebral artery systems bilaterally.    COMPARISON:  None.    FINDINGS:  Right:    Internal Carotid Artery (ICA) peak systolic velocity 120 cm/sec    Internal Carotid Artery (ICA) end-diastolic velocity 19 cm/sec    Common Carotid Artery (CCA) peak systolic velocity 106 cm/sec    Common Carotid Artery (CCA) end-diastolic velocity 0 cm/sec    ICA/CCA peak systolic velocity ratio: 1.1    ICA/CCA end-diastolic velocity ratio: 0    Plaque formation: Homogeneous    Vertebral artery: Antegrade flow and normal waveform.    Left:    Internal Carotid Artery (ICA)  peak systolic velocity 122 cm/sec    Internal Carotid Artery (ICA) end-diastolic velocity 19 cm/sec    Common Carotid Artery (CCA) peak systolic velocity 122  cm/sec    Common Carotid Artery (CCA) end-diastolic velocity 0 cm/sec    ICA/CCA peak systolic velocity ratio: 1    ICA/CCA end diastolic velocity ratio: 0    Plaque formation: Homogeneous    Vertebral artery: Antegrade flow and normal waveform.  Impression: 1 - 39% stenosis of the right internal carotid artery.    1 - 39% stenosis of the left internal carotid artery.    Electronically signed by resident: Laura Gusman  Date:    05/15/2020  Time:    16:45    Electronically signed by: Luz Villa MD  Date:    05/15/2020  Time:    17:00  Echo Color Flow Doppler? Yes  · Mild left ventricular enlargement (LVEDD 63mm)  · Preserved left ventricular systolic function with mild hypokinesis of   the LV apex. The estimated ejection fraction is 55%.  · Normal right ventricular systolic function.  · Normal LV diastolic function.  · Moderate-to-severe aortic regurgitation. The aortic valve's left and   right cusp leaflets may be partially fused as previously described.  · Intermediate central venous pressure (8 mmHg).     Cardiac catheterization  Preoperative Diagnosis: NSTEMI (non-ST elevated myocardial infarction)   [I21.4]      Indication     Brad Kearns is a 74 y.o. male  referred by Dr Munoz  for NSTEMI.     Patient understands the risks, benefits, and alternatives of angiography   and intervention and gives informed consent.    Operators  Surgeon(s) and Role:     * Ben Butler MD - Primary     * Tyrone Mac MD - Fellow     * Adelaida Roberts MD - Fellow      Description of procedure     The patient was brought to the cath lab in the fasting state, he was   prepped in the usual sterile fashion. A timeout was performed. Access was   obtained using the Seldinger technique in the right radial artery with a 6   Fr sheath.     Diagnostic catheterization  The left coronary artery was cannulated with a 6 Fr JL4 diagnostic   catheter. Angiography was performed in multiple views. The right  coronary   artery was cannulated with a 5 Fr Edwin diagnostic catheter. Angiography   was performed in multiple views. A pigtail catheter was used to cross the   aortic valve into the ventricle and measure hemodynamics.    A ventriculogram and an aortogram were performed via the pigtail catheter   using the power injector.    Complications: None  Estimated blood loss: <50 mL  Contrast used: 150 mL  Specimen removed: No     Findings:     1. Coronary dominance: Right  2. The left main coronary artery (LMCA) has a 60% distal stenosis. It   gives origin to the left anterior descending (LAD) and left circumflex   (LCx) arteries. There is no ramus intermedius. There is LORENA 3 flow.  3. The LAD is diffusely diseased, it has a 90% stenosis in its proximal   segment and a 90% stenosis in its mid segment. It gives origin to one   large diagonal branch(es). There is LORENA 3 flow.  4. The LCx has a proximal 90% stenosis. It gives origin to one large   obtuse marginal branch(es). There is LORENA 3 flow.  5. The RCA is dominant. It as a distal 50% stenosis. It gives origin to   the posterior descending artery and the posterolateral branch.    6. LVEDP 15 mmHg  7. LVEF 40%  8. Severe aortic insufficiency in bicuspid aortic valve (4+)       Summary:     1. Severe three vessel coronary artery disease  2. Severe aortic insufficiency  3. Ischemic cardiomyopathy with LVEF 40%     Recommendations:     1. Cardiothoracic surgery consultation    Ben Higuera MD [facility]  Interventional Cardiology  Structural/Valvular heart disease  [phone]    I certify that I was present for catheter insertion, catheter   manipulation, angiography, and angiographic interpretation of this   patient.    Procedure Log documented by Documenter: RT Patrizia and verified   by Ben Higuera MD.    Date: 5/15/2020  Time: 11:17 AM       Assessment and Plan:       * NSTEMI (non-ST elevated myocardial infarction)  MICHAEL 108  LORENA 4    Currently HD stable  but hypertensive with ECG changes. Trop 2.1.   - DAPT load (ASA and Plavix); holding plavix for possible surgery.  - Heparin gtt  - HI statin  - Beta blocker (no evidence of cardiac shock)  - Home ARB  - NPO at [facility] 5/15 showing three vessel disease with severe aortic insufficiency.   -CTS consulted for possible bypass and surgical valve repair. Tentatively plan for CABG x 2-3 and possible bio bentall on 5/19/2020  - Will discuss with CTS timing for transferring pt to their service    Nonrheumatic aortic valve insufficiency  ?bicuspid aortic valve with mild dilation of ascending aorta  - Euvolemic on exam. No symptoms of CHF  - Continue to monitor    Hypertension  Home valsartan 160 mg BID  - Imdur 120mg daily  - Titrate as needed        VTE Risk Mitigation (From admission, onward)         Ordered     heparin infusion 1,000 units/500 ml in 0.9% NaCl (pressure line flush)  Intra-op continuous PRN      05/15/20 0906     heparin 25,000 units in dextrose 5% (100 units/ml) IV bolus from bag - ADDITIONAL PRN BOLUS - 60 units/kg (max bolus 4000 units)  As needed (PRN)     Question:  Heparin Infusion Adjustment (DO NOT MODIFY ANSWER)  Answer:  \\[email or web address]\epic\Images\Pharmacy\HeparinInfusions\heparin LOW INTENSITY nomogram for [facility] [id number]    05/14/20 1903     heparin 25,000 units in dextrose 5% (100 units/ml) IV bolus from bag - ADDITIONAL PRN BOLUS - 30 units/kg (max bolus 4000 units)  As needed (PRN)     Question:  Heparin Infusion Adjustment (DO NOT MODIFY ANSWER)  Answer:  \\[facility]\epic\Images\Pharmacy\HeparinInfusions\heparin LOW INTENSITY nomogram for [facility] [id number]    05/14/20 1903     heparin 25,000 units in dextrose 5% 250 mL (100 units/mL) infusion LOW INTENSITY nomogram - [facility]  Continuous     Question:  Heparin Infusion Adjustment (DO NOT MODIFY ANSWER)  Answer:  \\[facility]\epic\Images\Pharmacy\HeparinInfusions\heparin LOW INTENSITY nomogram for [facility] [id number]    05/14/20 1903     IP VTE HIGH  RISK PATIENT  Once      05/1946     Place sequential compression device  Until discontinued      05/1946                Krzysztof Pretty, DO  Cardiology  [facility]

## 2020-05-16 NOTE — PLAN OF CARE
Problem: Fall Injury Risk  Goal: Absence of Fall and Fall-Related Injury  Outcome: Ongoing, Progressing     Problem: Adult Inpatient Plan of Care  Goal: Plan of Care Review  Outcome: Ongoing, Progressing  Goal: Patient-Specific Goal (Individualization) - Maintain BP normotensive and SPO2 >92%  Outcome: Ongoing, Progressing  Goal: Absence of Hospital-Acquired Illness or Injury  Outcome: Ongoing, Progressing

## 2020-05-16 NOTE — NURSING TRANSFER
Nursing Transfer Note      5/16/2020     Transfer To:  from [facility]ICU     Transfer via wheelchair    Transfer with cardiac monitoring    Transported by SUNNY Harris     Medicines sent: heparin gtt    Chart send with patient: Yes- handed to  Rebeca    Notified: spouse     Patient reassessed at: transfer    Upon arrival to floor: cardiac monitor applied, patient oriented to room, call bell in reach and bed in lowest position    * All personal belongings accounted for by patient upon transfer

## 2020-05-16 NOTE — PROGRESS NOTES
0700: patient refusing Heparin infusion and continuous monitoring when going to restroom, stated will remove himself if not disconnected. Education provided. DO emily Pretty

## 2020-05-16 NOTE — SUBJECTIVE & OBJECTIVE
Interval History: Pt evaluated by CTS, planning for CABG and aortic valve replacement 5/19. Titrating oral antihypertensive agents. Ambulating comfortably around his room.     Review of Systems   Constitution: Negative for chills, decreased appetite, diaphoresis, fever, malaise/fatigue, night sweats, weight gain and weight loss.   Eyes: Negative.    Cardiovascular: Negative for chest pain, irregular heartbeat, leg swelling, near-syncope, orthopnea, palpitations, paroxysmal nocturnal dyspnea and syncope.   Respiratory: Negative for cough, shortness of breath, sputum production and wheezing.    Endocrine: Negative.    Hematologic/Lymphatic: Negative for bleeding problem.   Skin: Negative for color change, flushing, rash and suspicious lesions.   Musculoskeletal: Negative.    Gastrointestinal: Negative for bloating, abdominal pain, change in bowel habit, constipation, diarrhea, heartburn, melena, nausea and vomiting.   Genitourinary: Negative for flank pain, frequency, hematuria, incomplete emptying and urgency.   Neurological: Negative for dizziness, focal weakness, headaches, light-headedness, numbness, paresthesias, seizures, sensory change and weakness.   Psychiatric/Behavioral: Negative for altered mental status. The patient is not nervous/anxious.      Objective:     Vital Signs (Most Recent):  Temp: 97.8 °F (36.6 °C) (05/16/20 0700)  Pulse: 89 (05/16/20 0800)  Resp: 18 (05/16/20 0800)  BP: (!) 148/66 (05/16/20 0800)  SpO2: 97 % (05/16/20 0800) Vital Signs (24h Range):  Temp:  [97.7 °F (36.5 °C)-98 °F (36.7 °C)] 97.8 °F (36.6 °C)  Pulse:  [55-89] 89  Resp:  [10-39] 18  SpO2:  [94 %-100 %] 97 %  BP: (103-162)/(52-69) 148/66     Weight: 81.6 kg (180 lb)  Body mass index is 26.58 kg/m².     SpO2: 97 %  O2 Device (Oxygen Therapy): room air      Intake/Output Summary (Last 24 hours) at 5/16/2020 0846  Last data filed at 5/16/2020 0100  Gross per 24 hour   Intake 1455.72 ml   Output --   Net 1455.72 ml        Lines/Drains/Airways     Peripheral Intravenous Line                 Peripheral IV - Single Lumen 05/14/20 1755 18 G Left Antecubital 1 day         Peripheral IV - Single Lumen 05/14/20 1758 18 G Left Hand 1 day                Physical Exam   Constitutional: He is oriented to person, place, and time. He appears well-developed and well-nourished. No distress.   HENT:   Head: Normocephalic and atraumatic.   Mouth/Throat: Oropharynx is clear and moist.   Eyes: Pupils are equal, round, and reactive to light. EOM are normal.   Neck: Normal range of motion. Neck supple. No JVD present.   Cardiovascular: Normal rate and regular rhythm. Exam reveals no gallop and no friction rub.   No murmur heard.  Pulses:       Radial pulses are 2+ on the right side, and 2+ on the left side.        Femoral pulses are 2+ on the right side, and 2+ on the left side.       Dorsalis pedis pulses are 2+ on the right side, and 2+ on the left side.        Posterior tibial pulses are 2+ on the right side, and 2+ on the left side.   Pulmonary/Chest: Effort normal and breath sounds normal. No respiratory distress. He has no wheezes. He has no rales. He exhibits no tenderness.   Abdominal: Soft. Bowel sounds are normal. He exhibits no distension. There is no tenderness.   Musculoskeletal: Normal range of motion. He exhibits no edema.   Lymphadenopathy:     He has no cervical adenopathy.   Neurological: He is alert and oriented to person, place, and time.   Skin: Skin is warm and dry. No erythema.   Psychiatric: He has a normal mood and affect. His behavior is normal. Judgment and thought content normal.       Significant Labs:   ABG: No results for input(s): PH, PCO2, HCO3, POCSATURATED, BE in the last 48 hours., Blood Culture: No results for input(s): LABBLOO in the last 48 hours., CMP   Recent Labs   Lab 05/14/20  1755 05/15/20  0800 05/16/20  0438    137 139   K 5.3* 3.9 4.2    104 107   CO2 22* 22* 25   GLU 93 266* 85   BUN 18  20 24*   CREATININE 1.3 1.3 1.3   CALCIUM 9.7 8.7 8.8   PROT 8.0  --   --    ALBUMIN 4.6  --   --    BILITOT 0.7  --   --    ALKPHOS 51*  --   --    AST 40  --   --    ALT 29  --   --    ANIONGAP 11 11 7*   ESTGFRAFRICA >60.0 >60.0 >60.0   EGFRNONAA 54.1* 54.1* 54.1*   , CBC   Recent Labs   Lab 05/14/20  1958 05/15/20  0358 05/16/20  0438   WBC 9.36 9.43 11.11   HGB 12.9* 12.7* 12.3*   HCT 41.3 39.8* 38.9*    241 221   , Troponin   Recent Labs   Lab 05/14/20  1755 05/15/20  0800   TROPONINI 2.136* 1.657*    and All pertinent lab results from the last 24 hours have been reviewed.    Significant Imaging: US Carotid Bilateral  Narrative: EXAMINATION:  US CAROTID BILATERAL    CLINICAL HISTORY:  CAD;    TECHNIQUE:  Grayscale and color spectral Doppler ultrasound imaging of the carotid and vertebral artery systems bilaterally.    COMPARISON:  None.    FINDINGS:  Right:    Internal Carotid Artery (ICA) peak systolic velocity 120 cm/sec    Internal Carotid Artery (ICA) end-diastolic velocity 19 cm/sec    Common Carotid Artery (CCA) peak systolic velocity 106 cm/sec    Common Carotid Artery (CCA) end-diastolic velocity 0 cm/sec    ICA/CCA peak systolic velocity ratio: 1.1    ICA/CCA end-diastolic velocity ratio: 0    Plaque formation: Homogeneous    Vertebral artery: Antegrade flow and normal waveform.    Left:    Internal Carotid Artery (ICA)  peak systolic velocity 122 cm/sec    Internal Carotid Artery (ICA) end-diastolic velocity 19 cm/sec    Common Carotid Artery (CCA) peak systolic velocity 122 cm/sec    Common Carotid Artery (CCA) end-diastolic velocity 0 cm/sec    ICA/CCA peak systolic velocity ratio: 1    ICA/CCA end diastolic velocity ratio: 0    Plaque formation: Homogeneous    Vertebral artery: Antegrade flow and normal waveform.  Impression: 1 - 39% stenosis of the right internal carotid artery.    1 - 39% stenosis of the left internal carotid artery.    Electronically signed by resident: Laura  Uzma  Date:    05/15/2020  Time:    16:45    Electronically signed by: Luz Villa MD  Date:    05/15/2020  Time:    17:00  Echo Color Flow Doppler? Yes  · Mild left ventricular enlargement (LVEDD 63mm)  · Preserved left ventricular systolic function with mild hypokinesis of   the LV apex. The estimated ejection fraction is 55%.  · Normal right ventricular systolic function.  · Normal LV diastolic function.  · Moderate-to-severe aortic regurgitation. The aortic valve's left and   right cusp leaflets may be partially fused as previously described.  · Intermediate central venous pressure (8 mmHg).     Cardiac catheterization  Preoperative Diagnosis: NSTEMI (non-ST elevated myocardial infarction)   [I21.4]      Indication     Brad Kearns is a 74 y.o. male  referred by Dr Munoz  for NSTEMI.     Patient understands the risks, benefits, and alternatives of angiography   and intervention and gives informed consent.    Operators  Surgeon(s) and Role:     * Ben Butler MD - Primary     * Tyrone Mac MD - Fellow     * Adelaida Roberts MD - Fellow      Description of procedure     The patient was brought to the cath lab in the fasting state, he was   prepped in the usual sterile fashion. A timeout was performed. Access was   obtained using the Seldinger technique in the right radial artery with a 6   Fr sheath.     Diagnostic catheterization  The left coronary artery was cannulated with a 6 Fr JL4 diagnostic   catheter. Angiography was performed in multiple views. The right coronary   artery was cannulated with a 5 Fr Edwin diagnostic catheter. Angiography   was performed in multiple views. A pigtail catheter was used to cross the   aortic valve into the ventricle and measure hemodynamics.    A ventriculogram and an aortogram were performed via the pigtail catheter   using the power injector.    Complications: None  Estimated blood loss: <50 mL  Contrast used: 150 mL  Specimen removed:  No     Findings:     1. Coronary dominance: Right  2. The left main coronary artery (LMCA) has a 60% distal stenosis. It   gives origin to the left anterior descending (LAD) and left circumflex   (LCx) arteries. There is no ramus intermedius. There is LORENA 3 flow.  3. The LAD is diffusely diseased, it has a 90% stenosis in its proximal   segment and a 90% stenosis in its mid segment. It gives origin to one   large diagonal branch(es). There is LORENA 3 flow.  4. The LCx has a proximal 90% stenosis. It gives origin to one large   obtuse marginal branch(es). There is LORENA 3 flow.  5. The RCA is dominant. It as a distal 50% stenosis. It gives origin to   the posterior descending artery and the posterolateral branch.    6. LVEDP 15 mmHg  7. LVEF 40%  8. Severe aortic insufficiency in bicuspid aortic valve (4+)       Summary:     1. Severe three vessel coronary artery disease  2. Severe aortic insufficiency  3. Ischemic cardiomyopathy with LVEF 40%     Recommendations:     1. Cardiothoracic surgery consultation    Ben Higuera MD [facility]  Interventional Cardiology  Structural/Valvular heart disease  [phone]    I certify that I was present for catheter insertion, catheter   manipulation, angiography, and angiographic interpretation of this   patient.    Procedure Log documented by Documenter: RT Patrizia and verified   by Ben Higuera MD.    Date: 5/15/2020  Time: 11:17 AM

## 2020-05-17 LAB
ANION GAP SERPL CALC-SCNC: 9 MMOL/L (ref 8–16)
APTT BLDCRRT: 30.6 SEC (ref 21–32)
APTT BLDCRRT: 51.6 SEC (ref 21–32)
APTT BLDCRRT: 76.7 SEC (ref 21–32)
BUN SERPL-MCNC: 22 MG/DL (ref 8–23)
CALCIUM SERPL-MCNC: 8.3 MG/DL (ref 8.7–10.5)
CHLORIDE SERPL-SCNC: 109 MMOL/L (ref 95–110)
CO2 SERPL-SCNC: 21 MMOL/L (ref 23–29)
CREAT SERPL-MCNC: 1.2 MG/DL (ref 0.5–1.4)
EST. GFR  (AFRICAN AMERICAN): >60 ML/MIN/1.73 M^2
EST. GFR  (NON AFRICAN AMERICAN): 59.6 ML/MIN/1.73 M^2
GLUCOSE SERPL-MCNC: 80 MG/DL (ref 70–110)
POTASSIUM SERPL-SCNC: 3.9 MMOL/L (ref 3.5–5.1)
SODIUM SERPL-SCNC: 139 MMOL/L (ref 136–145)

## 2020-05-17 PROCEDURE — 85730 THROMBOPLASTIN TIME PARTIAL: CPT

## 2020-05-17 PROCEDURE — 85730 THROMBOPLASTIN TIME PARTIAL: CPT | Mod: 91

## 2020-05-17 PROCEDURE — 63600175 PHARM REV CODE 636 W HCPCS: Performed by: INTERNAL MEDICINE

## 2020-05-17 PROCEDURE — 94761 N-INVAS EAR/PLS OXIMETRY MLT: CPT

## 2020-05-17 PROCEDURE — 25000003 PHARM REV CODE 250: Performed by: STUDENT IN AN ORGANIZED HEALTH CARE EDUCATION/TRAINING PROGRAM

## 2020-05-17 PROCEDURE — 25000003 PHARM REV CODE 250: Performed by: INTERNAL MEDICINE

## 2020-05-17 PROCEDURE — 20600001 HC STEP DOWN PRIVATE ROOM

## 2020-05-17 PROCEDURE — 36415 COLL VENOUS BLD VENIPUNCTURE: CPT

## 2020-05-17 PROCEDURE — 80048 BASIC METABOLIC PNL TOTAL CA: CPT

## 2020-05-17 RX ADMIN — HEPARIN SODIUM 16 UNITS/KG/HR: 10000 INJECTION, SOLUTION INTRAVENOUS at 08:05

## 2020-05-17 RX ADMIN — HEPARIN SODIUM 16 UNITS/KG/HR: 10000 INJECTION, SOLUTION INTRAVENOUS at 02:05

## 2020-05-17 RX ADMIN — METOPROLOL TARTRATE 25 MG: 25 TABLET, FILM COATED ORAL at 08:05

## 2020-05-17 RX ADMIN — AMLODIPINE BESYLATE 5 MG: 5 TABLET ORAL at 08:05

## 2020-05-17 RX ADMIN — ASPIRIN 81 MG: 81 TABLET, COATED ORAL at 08:05

## 2020-05-17 RX ADMIN — ATORVASTATIN CALCIUM 40 MG: 20 TABLET, FILM COATED ORAL at 08:05

## 2020-05-17 RX ADMIN — ISOSORBIDE MONONITRATE 120 MG: 60 TABLET, EXTENDED RELEASE ORAL at 08:05

## 2020-05-17 RX ADMIN — VALSARTAN 160 MG: 80 TABLET, FILM COATED ORAL at 08:05

## 2020-05-17 NOTE — PROGRESS NOTES
[facility]  Cardiothoracic Surgery  Progress Note    Patient Name: Brad Kearns  MRN: [id number]  Admission Date: 5/14/2020  Hospital Length of Stay: 3 days  Code Status: Full Code   Attending Physician: Guicho Quezada MD   Referring Provider: Self, Aaareferral  Principal Problem:NSTEMI (non-ST elevated myocardial infarction)      Subjective:     Post-Op Info:  Procedure(s) (LRB):  Left heart cath (Left)  Aortogram (N/A)  Ventriculogram, Left   2 Days Post-Op     Interval History: no SOB or chest pain    ROS  Medications:  Continuous Infusions:   sodium chloride 0.9% 50 mL/hr (05/15/20 0850)    heparin (porcine) in D5W 14 Units/kg/hr (05/17/20 1551)    heparin (porcine)       Scheduled Meds:   amLODIPine  5 mg Oral Daily    aspirin  81 mg Oral Daily    atorvastatin  40 mg Oral QHS    isosorbide mononitrate  120 mg Oral Daily    metoprolol tartrate  25 mg Oral BID    valsartan  160 mg Oral BID     PRN Meds:sodium chloride 0.9%, acetaminophen, heparin (PORCINE), heparin (PORCINE), heparin (porcine), magnesium oxide, magnesium oxide, ondansetron, potassium chloride 10%, potassium chloride 10%, potassium chloride 10%, potassium, sodium phosphates, potassium, sodium phosphates, potassium, sodium phosphates, senna-docusate 8.6-50 mg, sodium chloride 0.9%, sodium chloride 0.9%     Objective:     Vital Signs (Most Recent):  Temp: 98 °F (36.7 °C) (05/17/20 1555)  Pulse: 62 (05/17/20 1555)  Resp: 16 (05/17/20 1555)  BP: 129/63 (05/17/20 1555)  SpO2: (!) 94 % (05/17/20 1555) Vital Signs (24h Range):  Temp:  [97.4 °F (36.3 °C)-98.2 °F (36.8 °C)] 98 °F (36.7 °C)  Pulse:  [52-84] 62  Resp:  [14-18] 16  SpO2:  [92 %-97 %] 94 %  BP: (127-156)/(63-80) 129/63     Weight: 81.6 kg (180 lb)  Body mass index is 26.58 kg/m².    SpO2: (!) 94 %  O2 Device (Oxygen Therapy): room air    Intake/Output - Last 3 Shifts       05/15 0700 - 05/16 0659 05/16 0700 - 05/17 0659 05/17 0700 - 05/18 0659    P.O. 300 840  970    I.V. (mL/kg) 156.6 (1.9) 284.2 (3.5)     IV Piggyback 1024  40    Total Intake(mL/kg) 1480.6 (18.1) 1124.2 (13.8) 1010 (12.4)    Urine (mL/kg/hr)  450 (0.2)     Total Output  450     Net +1480.6 +674.2 +1010           Urine Occurrence 3 x 1 x 3 x    Stool Occurrence 2 x 1 x 1 x          Lines/Drains/Airways     Peripheral Intravenous Line                 Peripheral IV - Single Lumen 05/14/20 1755 18 G Left Antecubital 3 days         Peripheral IV - Single Lumen 05/14/20 1758 18 G Left Hand 3 days                Physical Exam  Reg rate; 93% on RA    Significant Labs:  BMP:   Recent Labs   Lab 05/17/20  0522   GLU 80      K 3.9      CO2 21*   BUN 22   CREATININE 1.2   CALCIUM 8.3*     Cardiac markers: No results for input(s): CKMB, CPKMB, TROPONINT, TROPONINI, MYOGLOBIN in the last 48 hours.  CBC:   Recent Labs   Lab 05/16/20  0438   WBC 11.11   RBC 4.01*   HGB 12.3*   HCT 38.9*      MCV 97   MCH 30.7   MCHC 31.6*       Significant Diagnostics:  Echo with AS    Assessment/Plan:     * NSTEMI (non-ST elevated myocardial infarction)  Brad Kearns is a 73 yo M with medical hx hypertension and mod-severe AI with mild aortic root dilation and possible bicuspid AV. Presented to the ED with chest pain and SOB. Went for LHC today which showed multivessel disease and severe AI (possible bicuspid on TTE). Was loaded with plavix 5/14/2020. CTA chest, carotid US, and PFTs ordered. Tentatively plan for CABG x 2-3 and possible bio bentall on 5/19/2020.  --transferred to CT service  --plan for OR on 5/19; pre-op on 5/18  --continue medical management of recent nstemi with beta blocker, heparin gtt, BP control        Ernesto Blackwood MD  Cardiothoracic Surgery  [facility]

## 2020-05-17 NOTE — ASSESSMENT & PLAN NOTE
Brad Kearns is a 75 yo M with medical hx hypertension and mod-severe AI with mild aortic root dilation and possible bicuspid AV. Presented to the ED with chest pain and SOB. Went for LHC today which showed multivessel disease and severe AI (possible bicuspid on TTE). Was loaded with plavix 5/14/2020. CTA chest, carotid US, and PFTs ordered. Tentatively plan for CABG x 2-3 and possible bio bentall on 5/19/2020.  --transferred to CT service  --plan for OR on 5/19; pre-op on 5/18  --continue medical management of recent nstemi with beta blocker, heparin gtt, BP control

## 2020-05-17 NOTE — PLAN OF CARE
POC reviewed with patient. VSS. Patient free of injuries and falls. Patient has no c/o pain or discomfort. Telemetry monitor showing sinus soniya-normal sinus rhythm. Patient on heparin gtt; infusing per MAR. Patient awaiting CABG and possible bentall procedure on 5/19. All questions were addressed. WCTM.

## 2020-05-17 NOTE — SUBJECTIVE & OBJECTIVE
Interval History: no SOB or chest pain    ROS  Medications:  Continuous Infusions:   sodium chloride 0.9% 50 mL/hr (05/15/20 0850)    heparin (porcine) in D5W 14 Units/kg/hr (05/17/20 1551)    heparin (porcine)       Scheduled Meds:   amLODIPine  5 mg Oral Daily    aspirin  81 mg Oral Daily    atorvastatin  40 mg Oral QHS    isosorbide mononitrate  120 mg Oral Daily    metoprolol tartrate  25 mg Oral BID    valsartan  160 mg Oral BID     PRN Meds:sodium chloride 0.9%, acetaminophen, heparin (PORCINE), heparin (PORCINE), heparin (porcine), magnesium oxide, magnesium oxide, ondansetron, potassium chloride 10%, potassium chloride 10%, potassium chloride 10%, potassium, sodium phosphates, potassium, sodium phosphates, potassium, sodium phosphates, senna-docusate 8.6-50 mg, sodium chloride 0.9%, sodium chloride 0.9%     Objective:     Vital Signs (Most Recent):  Temp: 98 °F (36.7 °C) (05/17/20 1555)  Pulse: 62 (05/17/20 1555)  Resp: 16 (05/17/20 1555)  BP: 129/63 (05/17/20 1555)  SpO2: (!) 94 % (05/17/20 1555) Vital Signs (24h Range):  Temp:  [97.4 °F (36.3 °C)-98.2 °F (36.8 °C)] 98 °F (36.7 °C)  Pulse:  [52-84] 62  Resp:  [14-18] 16  SpO2:  [92 %-97 %] 94 %  BP: (127-156)/(63-80) 129/63     Weight: 81.6 kg (180 lb)  Body mass index is 26.58 kg/m².    SpO2: (!) 94 %  O2 Device (Oxygen Therapy): room air    Intake/Output - Last 3 Shifts       05/15 0700 - 05/16 0659 05/16 0700 - 05/17 0659 05/17 0700 - 05/18 0659    P.O. 300 840 970    I.V. (mL/kg) 156.6 (1.9) 284.2 (3.5)     IV Piggyback 1024  40    Total Intake(mL/kg) 1480.6 (18.1) 1124.2 (13.8) 1010 (12.4)    Urine (mL/kg/hr)  450 (0.2)     Total Output  450     Net +1480.6 +674.2 +1010           Urine Occurrence 3 x 1 x 3 x    Stool Occurrence 2 x 1 x 1 x          Lines/Drains/Airways     Peripheral Intravenous Line                 Peripheral IV - Single Lumen 05/14/20 1755 18 G Left Antecubital 3 days         Peripheral IV - Single Lumen 05/14/20 1758 18 G  Left Hand 3 days                Physical Exam  Reg rate; 93% on RA    Significant Labs:  BMP:   Recent Labs   Lab 05/17/20  0522   GLU 80      K 3.9      CO2 21*   BUN 22   CREATININE 1.2   CALCIUM 8.3*     Cardiac markers: No results for input(s): CKMB, CPKMB, TROPONINT, TROPONINI, MYOGLOBIN in the last 48 hours.  CBC:   Recent Labs   Lab 05/16/20  0438   WBC 11.11   RBC 4.01*   HGB 12.3*   HCT 38.9*      MCV 97   MCH 30.7   MCHC 31.6*       Significant Diagnostics:  Echo with AS

## 2020-05-17 NOTE — PLAN OF CARE
Pt maintained free from falls/ trauma/ injuries and skin breakdown. Pt denied pain or discomfort. On Heparin drip Aptt 76.2, due to be recheck at 10pm tonight. Pt may get a CABG done on 5/19. Plan of care reviewed. Pt verbalized understanding. All questions and concerns addressed. Will continue to monitor.

## 2020-05-18 ENCOUNTER — ANESTHESIA EVENT (OUTPATIENT)
Dept: SURGERY | Facility: HOSPITAL | Age: 75
DRG: 216 | End: 2020-05-18
Payer: MEDICARE

## 2020-05-18 DIAGNOSIS — Z95.1 S/P CABG X 3: Primary | ICD-10-CM

## 2020-05-18 LAB
ABO + RH BLD: NORMAL
ALBUMIN SERPL BCP-MCNC: 3.5 G/DL (ref 3.5–5.2)
ALP SERPL-CCNC: 55 U/L (ref 55–135)
ALT SERPL W/O P-5'-P-CCNC: 38 U/L (ref 10–44)
ANION GAP SERPL CALC-SCNC: 8 MMOL/L (ref 8–16)
APTT BLDCRRT: 48.4 SEC (ref 21–32)
AST SERPL-CCNC: 32 U/L (ref 10–40)
BASOPHILS # BLD AUTO: 0.04 K/UL (ref 0–0.2)
BASOPHILS NFR BLD: 0.5 % (ref 0–1.9)
BILIRUB DIRECT SERPL-MCNC: 0.4 MG/DL (ref 0.1–0.3)
BILIRUB SERPL-MCNC: 0.7 MG/DL (ref 0.1–1)
BLD GP AB SCN CELLS X3 SERPL QL: NORMAL
BUN SERPL-MCNC: 21 MG/DL (ref 8–23)
CALCIUM SERPL-MCNC: 8.6 MG/DL (ref 8.7–10.5)
CHLORIDE SERPL-SCNC: 110 MMOL/L (ref 95–110)
CO2 SERPL-SCNC: 21 MMOL/L (ref 23–29)
CREAT SERPL-MCNC: 1.1 MG/DL (ref 0.5–1.4)
DIFFERENTIAL METHOD: ABNORMAL
EOSINOPHIL # BLD AUTO: 0.2 K/UL (ref 0–0.5)
EOSINOPHIL NFR BLD: 2.8 % (ref 0–8)
ERYTHROCYTE [DISTWIDTH] IN BLOOD BY AUTOMATED COUNT: 14.4 % (ref 11.5–14.5)
EST. GFR  (AFRICAN AMERICAN): >60 ML/MIN/1.73 M^2
EST. GFR  (NON AFRICAN AMERICAN): >60 ML/MIN/1.73 M^2
GLUCOSE SERPL-MCNC: 92 MG/DL (ref 70–110)
HCT VFR BLD AUTO: 37.9 % (ref 40–54)
HGB BLD-MCNC: 12.1 G/DL (ref 14–18)
IMM GRANULOCYTES # BLD AUTO: 0.03 K/UL (ref 0–0.04)
IMM GRANULOCYTES NFR BLD AUTO: 0.4 % (ref 0–0.5)
LYMPHOCYTES # BLD AUTO: 2.7 K/UL (ref 1–4.8)
LYMPHOCYTES NFR BLD: 32.7 % (ref 18–48)
MCH RBC QN AUTO: 30.6 PG (ref 27–31)
MCHC RBC AUTO-ENTMCNC: 31.9 G/DL (ref 32–36)
MCV RBC AUTO: 96 FL (ref 82–98)
MONOCYTES # BLD AUTO: 0.7 K/UL (ref 0.3–1)
MONOCYTES NFR BLD: 8.8 % (ref 4–15)
NEUTROPHILS # BLD AUTO: 4.6 K/UL (ref 1.8–7.7)
NEUTROPHILS NFR BLD: 54.8 % (ref 38–73)
NRBC BLD-RTO: 0 /100 WBC
PLATELET # BLD AUTO: 195 K/UL (ref 150–350)
PMV BLD AUTO: 10.3 FL (ref 9.2–12.9)
POTASSIUM SERPL-SCNC: 4.1 MMOL/L (ref 3.5–5.1)
PROT SERPL-MCNC: 6.6 G/DL (ref 6–8.4)
RBC # BLD AUTO: 3.95 M/UL (ref 4.6–6.2)
SODIUM SERPL-SCNC: 139 MMOL/L (ref 136–145)
WBC # BLD AUTO: 8.32 K/UL (ref 3.9–12.7)

## 2020-05-18 PROCEDURE — 20600001 HC STEP DOWN PRIVATE ROOM

## 2020-05-18 PROCEDURE — 25000003 PHARM REV CODE 250: Performed by: INTERNAL MEDICINE

## 2020-05-18 PROCEDURE — 94761 N-INVAS EAR/PLS OXIMETRY MLT: CPT

## 2020-05-18 PROCEDURE — 85730 THROMBOPLASTIN TIME PARTIAL: CPT

## 2020-05-18 PROCEDURE — 80076 HEPATIC FUNCTION PANEL: CPT

## 2020-05-18 PROCEDURE — 80048 BASIC METABOLIC PNL TOTAL CA: CPT

## 2020-05-18 PROCEDURE — 86920 COMPATIBILITY TEST SPIN: CPT

## 2020-05-18 PROCEDURE — 86850 RBC ANTIBODY SCREEN: CPT

## 2020-05-18 PROCEDURE — 85025 COMPLETE CBC W/AUTO DIFF WBC: CPT

## 2020-05-18 PROCEDURE — 25000003 PHARM REV CODE 250: Performed by: STUDENT IN AN ORGANIZED HEALTH CARE EDUCATION/TRAINING PROGRAM

## 2020-05-18 PROCEDURE — 36415 COLL VENOUS BLD VENIPUNCTURE: CPT

## 2020-05-18 PROCEDURE — U0003 INFECTIOUS AGENT DETECTION BY NUCLEIC ACID (DNA OR RNA); SEVERE ACUTE RESPIRATORY SYNDROME CORONAVIRUS 2 (SARS-COV-2) (CORONAVIRUS DISEASE [COVID-19]), AMPLIFIED PROBE TECHNIQUE, MAKING USE OF HIGH THROUGHPUT TECHNOLOGIES AS DESCRIBED BY CMS-2020-01-R: HCPCS

## 2020-05-18 RX ORDER — CEFAZOLIN SODIUM 1 G/3ML
2 INJECTION, POWDER, FOR SOLUTION INTRAMUSCULAR; INTRAVENOUS
Status: DISCONTINUED | OUTPATIENT
Start: 2020-05-18 | End: 2020-05-19 | Stop reason: HOSPADM

## 2020-05-18 RX ORDER — TALC
6 POWDER (GRAM) TOPICAL ONCE
Status: COMPLETED | OUTPATIENT
Start: 2020-05-18 | End: 2020-05-18

## 2020-05-18 RX ORDER — MUPIROCIN 20 MG/G
OINTMENT TOPICAL
Status: DISCONTINUED | OUTPATIENT
Start: 2020-05-18 | End: 2020-05-19 | Stop reason: HOSPADM

## 2020-05-18 RX ORDER — CALCIUM CARBONATE 200(500)MG
500 TABLET,CHEWABLE ORAL ONCE
Status: COMPLETED | OUTPATIENT
Start: 2020-05-18 | End: 2020-05-18

## 2020-05-18 RX ADMIN — Medication 6 MG: at 08:05

## 2020-05-18 RX ADMIN — AMLODIPINE BESYLATE 5 MG: 5 TABLET ORAL at 08:05

## 2020-05-18 RX ADMIN — ATORVASTATIN CALCIUM 40 MG: 20 TABLET, FILM COATED ORAL at 08:05

## 2020-05-18 RX ADMIN — METOPROLOL TARTRATE 25 MG: 25 TABLET, FILM COATED ORAL at 08:05

## 2020-05-18 RX ADMIN — ASPIRIN 81 MG: 81 TABLET, COATED ORAL at 08:05

## 2020-05-18 RX ADMIN — CALCIUM CARBONATE (ANTACID) CHEW TAB 500 MG 500 MG: 500 CHEW TAB at 08:05

## 2020-05-18 NOTE — SUBJECTIVE & OBJECTIVE
Interval History: Patient remains free of chest pain or shortness of breath. Heparin drip infusing.  Patient able to ambulate around room without difficulty.  All questions answered.    Review of Systems   Constitution: Negative for malaise/fatigue.   Cardiovascular: Negative for chest pain, claudication, dyspnea on exertion, leg swelling and palpitations.   Respiratory: Negative for cough and shortness of breath.    Hematologic/Lymphatic: Negative for bleeding problem.   Gastrointestinal: Negative for abdominal pain.   Genitourinary: Negative for dysuria.   Neurological: Negative for headaches and weakness.     Medications:  Continuous Infusions:   sodium chloride 0.9% 50 mL/hr (05/15/20 0850)    heparin (porcine) in D5W 14 Units/kg/hr (05/17/20 1551)    heparin (porcine)       Scheduled Meds:   amLODIPine  5 mg Oral Daily    aspirin  81 mg Oral Daily    atorvastatin  40 mg Oral QHS    metoprolol tartrate  25 mg Oral BID     PRN Meds:sodium chloride 0.9%, acetaminophen, heparin (PORCINE), heparin (PORCINE), heparin (porcine), magnesium oxide, magnesium oxide, ondansetron, potassium chloride 10%, potassium chloride 10%, potassium chloride 10%, potassium, sodium phosphates, potassium, sodium phosphates, potassium, sodium phosphates, senna-docusate 8.6-50 mg, sodium chloride 0.9%, sodium chloride 0.9%     Objective:     Vital Signs (Most Recent):  Temp: 97.6 °F (36.4 °C) (05/18/20 0900)  Pulse: 72 (05/18/20 0653)  Resp: 18 (05/18/20 0900)  BP: (!) 167/69 (05/18/20 0900)  SpO2: 99 % (05/18/20 0900) Vital Signs (24h Range):  Temp:  [97.4 °F (36.3 °C)-98 °F (36.7 °C)] 97.6 °F (36.4 °C)  Pulse:  [48-88] 72  Resp:  [14-18] 18  SpO2:  [92 %-99 %] 99 %  BP: (127-167)/(63-72) 167/69     Weight: 80.3 kg (177 lb 0.5 oz)  Body mass index is 26.14 kg/m².    SpO2: 99 %  O2 Device (Oxygen Therapy): room air    Intake/Output - Last 3 Shifts       05/16 0700 - 05/17 0659 05/17 0700 - 05/18 0659 05/18 0700 - 05/19 0659    P.O.  840 1150     I.V. (mL/kg) 284.2 (3.5)      IV Piggyback  40     Total Intake(mL/kg) 1124.2 (13.8) 1190 (14.6)     Urine (mL/kg/hr) 450 (0.2) 850 (0.4)     Total Output 450 850     Net +674.2 +340            Urine Occurrence 1 x 3 x     Stool Occurrence 1 x 1 x           Lines/Drains/Airways     Peripheral Intravenous Line                 Peripheral IV - Single Lumen 05/14/20 1755 18 G Left Antecubital 3 days         Peripheral IV - Single Lumen 05/14/20 1758 18 G Left Hand 3 days                Physical Exam   Constitutional: He is oriented to person, place, and time. He appears well-developed and well-nourished.   Cardiovascular: Normal rate, regular rhythm and normal heart sounds.   Pulmonary/Chest: Effort normal and breath sounds normal.   Abdominal: Soft.   Musculoskeletal: Normal range of motion.   Neurological: He is alert and oriented to person, place, and time.   Skin: Skin is warm, dry and intact.   Psychiatric: He has a normal mood and affect.       Significant Labs:  BMP:   Recent Labs   Lab 05/18/20  0651   GLU 92      K 4.1      CO2 21*   BUN 21   CREATININE 1.1   CALCIUM 8.6*     CBC:   Recent Labs   Lab 05/18/20  0651   WBC 8.32   RBC 3.95*   HGB 12.1*   HCT 37.9*      MCV 96   MCH 30.6   MCHC 31.9*     Coagulation:   Recent Labs   Lab 05/18/20  0651   APTT 48.4*       Significant Diagnostics:  I have reviewed all pertinent imaging results/findings within the past 24 hours.

## 2020-05-18 NOTE — ANESTHESIA PREPROCEDURE EVALUATION
[facility]  Anesthesia Pre-Operative Evaluation         Patient Name: Brad Kearns  YOB: 1945  MRN: [id number]    SUBJECTIVE:     Pre-operative evaluation for Procedure(s) (LRB):  CORONARY ARTERY BYPASS GRAFT (CABG) (N/A)  BENTALL PROCEDURE (N/A)     05/18/2020    Brad Kearns is a 74 y.o. male w/ a significant PMHx of moderate to severe aortic regurgitation, HTN, GERD. Presented to ED from clinic for subxyphoid pain not related to exertion and was found to have NSTEMI. Troponin peak 2.14. He was started on ACS protocol with DAPT. LHC showed multivessel disease.    Patient now presents for the above procedure(s).      LDA:        Peripheral IV - Single Lumen 05/14/20 1755 18 G Left Antecubital (Active)        Peripheral IV - Single Lumen 05/14/20 1758 18 G Left Hand (Active)     Prev airway: LMA without complications    Drips: None documented.      Patient Active Problem List   Diagnosis    Hypertension    Nonrheumatic aortic valve insufficiency    NSTEMI (non-ST elevated myocardial infarction)    Aortic insufficiency       Review of patient's allergies indicates:   Allergen Reactions    Demerol [meperidine] Hives     [id number]    Lisinopril      cough       Current Inpatient Medications:      Current Facility-Administered Medications on File Prior to Encounter   Medication Dose Route Frequency Provider Last Rate Last Dose    0.9%  NaCl infusion    Continuous PRN Ben Butler MD 50 mL/hr at 05/15/20 0850 50 mL/hr at 05/15/20 0850    acetaminophen tablet 650 mg  650 mg Oral Q6H PRN Kapil Singh MD        amLODIPine tablet 5 mg  5 mg Oral Daily Krzysztof Pretty DO   5 mg at 05/18/20 0852    aspirin EC tablet 81 mg  81 mg Oral Daily Gordon Lofton MD   81 mg at 05/18/20 0852    atorvastatin tablet 40 mg  40 mg Oral QHS Gordon Lofton MD   40 mg at 05/17/20 2020    heparin 25,000 units in dextrose 5% (100 units/ml) IV bolus from bag -  ADDITIONAL PRN BOLUS - 30 units/kg (max bolus 4000 units)  30 Units/kg (Adjusted) Intravenous PRN Gordon Lofton MD   2,260 Units at 05/15/20 0539    heparin 25,000 units in dextrose 5% (100 units/ml) IV bolus from bag - ADDITIONAL PRN BOLUS - 60 units/kg (max bolus 4000 units)  53.1 Units/kg (Adjusted) Intravenous PRN Gordon Lofton MD   4,000 Units at 05/17/20 0827    heparin 25,000 units in dextrose 5% 250 mL (100 units/mL) infusion LOW INTENSITY nomogram - [facility]  12 Units/kg/hr (Adjusted) Intravenous Continuous Manda Goodson NP 10.5 mL/hr at 05/17/20 1551 14 Units/kg/hr at 05/17/20 1551    heparin infusion 1,000 units/500 ml in 0.9% NaCl (pressure line flush)    Continuous PRN Ben Butler MD   1,500 mL at 05/15/20 0835    magnesium oxide tablet 800 mg  800 mg Oral PRN Kapil Singh MD        magnesium oxide tablet 800 mg  800 mg Oral PRN Kapil Singh MD        metoprolol tartrate (LOPRESSOR) tablet 25 mg  25 mg Oral BID Gordon Lofton MD   25 mg at 05/18/20 0852    ondansetron disintegrating tablet 8 mg  8 mg Oral Q8H PRN Kapil Singh MD        potassium chloride 10% oral solution 40 mEq  40 mEq Oral PRN Kapil Singh MD        potassium chloride 10% oral solution 40 mEq  40 mEq Oral PRN Kapil Singh MD        potassium chloride 10% oral solution 60 mEq  60 mEq Oral PRN Kapil LNicole Singh MD        potassium, sodium phosphates 280-160-250 mg packet 2 packet  2 packet Oral PRN Kapil Singh MD        potassium, sodium phosphates 280-160-250 mg packet 2 packet  2 packet Oral PRN Kapil Singh MD        potassium, sodium phosphates 280-160-250 mg packet 2 packet  2 packet Oral PRN Kapil Singh MD        senna-docusate 8.6-50 mg per tablet 1 tablet  1 tablet Oral Daily PRN Kapil Singh MD        sodium chloride 0.9% flush 10 mL  10 mL Intravenous PRN Gordon Lofton MD        sodium chloride 0.9% flush 10 mL  10 mL Intravenous PRN Adelaida Rosario  MD Alejandra         Current Outpatient Medications on File Prior to Encounter   Medication Sig Dispense Refill    ascorbic acid (VITAMIN C) 1000 MG tablet Take 1,000 mg by mouth every morning.      aspirin (ECOTRIN) 81 MG EC tablet Take 81 mg by mouth every morning.      esomeprazole (NEXIUM) 40 MG capsule once daily.      fish oil-omega-3 fatty acids 300-1,000 mg capsule Take 1 g by mouth every morning.      multivitamin capsule Take 1 capsule by mouth once daily.      pravastatin (PRAVACHOL) 40 MG tablet Take 1 tablet (40 mg total) by mouth once daily. 90 tablet 3    thiamine (VITAMIN B-1) 50 MG tablet Take 50 mg by mouth once daily.      valsartan (DIOVAN) 160 MG tablet Take 1 tablet (160 mg total) by mouth 2 (two) times daily. 180 tablet 3       Past Surgical History:   Procedure Laterality Date    AORTOGRAPHY N/A 5/15/2020    Procedure: Aortogram;  Surgeon: Ben Butler MD;  Location: [facility] CATH LAB;  Service: Cardiology;  Laterality: N/A;    APPENDECTOMY      CORONARY ANGIOGRAPHY N/A 5/15/2020    Procedure: ANGIOGRAM, CORONARY ARTERY;  Surgeon: Ben Butler MD;  Location: [facility] CATH LAB;  Service: Cardiology;  Laterality: N/A;    INGUINAL HERNIA REPAIR      LEFT HEART CATHETERIZATION Left 5/15/2020    Procedure: Left heart cath;  Surgeon: Ben Butler MD;  Location: [facility] CATH LAB;  Service: Cardiology;  Laterality: Left;    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    Right knee arthroscopy         Social History     Socioeconomic History    Marital status:      Spouse name: Not on file    Number of children: Not on file    Years of education: Not on file    Highest education level: Not on file   Occupational History    Not on file   Social Needs    Financial resource strain: Not hard at all    Food insecurity:     Worry: Never true     Inability: Never true    Transportation needs:     Medical: No     Non-medical: No   Tobacco Use    Smoking  status: Never Smoker    Smokeless tobacco: Never Used   Substance and Sexual Activity    Alcohol use: Yes     Frequency: Monthly or less     Drinks per session: Patient refused     Binge frequency: Never     Comment: Rarely    Drug use: Not on file    Sexual activity: Not on file   Lifestyle    Physical activity:     Days per week: 0 days     Minutes per session: 0 min    Stress: Not at all   Relationships    Social connections:     Talks on phone: More than three times a week     Gets together: Once a week     Attends Voodoo service: Not on file     Active member of club or organization: No     Attends meetings of clubs or organizations: Never     Relationship status:    Other Topics Concern    Not on file   Social History Narrative    Not on file       OBJECTIVE:     Vital Signs Range (Last 24H):  Temp:  [36.3 °C (97.4 °F)-36.7 °C (98 °F)]   Pulse:  [48-88]   Resp:  [14-18]   BP: (127-167)/(63-72)   SpO2:  [92 %-99 %]       Significant Labs:  Lab Results   Component Value Date    WBC 8.32 05/18/2020    HGB 12.1 (L) 05/18/2020    HCT 37.9 (L) 05/18/2020     05/18/2020    CHOL 194 03/20/2020    TRIG 79 03/20/2020    HDL 40 03/20/2020    ALT 38 05/18/2020    AST 32 05/18/2020     05/18/2020    K 4.1 05/18/2020     05/18/2020    CREATININE 1.1 05/18/2020    BUN 21 05/18/2020    CO2 21 (L) 05/18/2020    TSH 1.054 03/20/2020    INR 1.0 05/14/2020    HGBA1C 5.1 05/14/2020       Diagnostic Studies: No relevant studies.    EKG:   Results for orders placed or performed during the hospital encounter of 05/14/20   EKG 12-lead    Collection Time: 05/15/20 10:42 AM    Narrative    Test Reason : (Not Selected)    Vent. Rate : 058 BPM     Atrial Rate : 058 BPM     P-R Int : 220 ms          QRS Dur : 102 ms      QT Int : 534 ms       P-R-T Axes : 045 045 237 degrees     QTc Int : 524 ms      Sinus bradycardia with 1st degree A-V block  ST and/or T wave abnormalities suggesting myocardial  ischemia  Prolonged QT  Abnormal ECG  When compared with ECG of 14-MAY-2020 23:12,  No significant change was found  Confirmed by CESAR ROCHE MD ([id number]) on 5/15/2020 10:23:36 PM    Referred By: [email or web address]   SELF           Confirmed By:CESAR ROCHE MD       ECHOCARDIOGRAM:  TTE:  Results for orders placed or performed during the hospital encounter of 05/14/20   Echo Color Flow Doppler? Yes    Narrative    · Mild left ventricular enlargement (LVEDD 63mm)  · Preserved left ventricular systolic function with mild hypokinesis of   the LV apex. The estimated ejection fraction is 55%.  · Normal right ventricular systolic function.  · Normal LV diastolic function.  · Moderate-to-severe aortic regurgitation. The aortic valve's left and   right cusp leaflets may be partially fused as previously described.  · Intermediate central venous pressure (8 mmHg).              ASSESSMENT/PLAN:         Anesthesia Evaluation    I have reviewed the Patient Summary Reports.    I have reviewed the Nursing Notes.   I have reviewed the Medications.     Review of Systems  Anesthesia Hx:  No problems with previous Anesthesia  History of prior surgery of interest to airway management or planning: Denies Family Hx of Anesthesia complications.   Denies Personal Hx of Anesthesia complications.   Social:  Non-Smoker    Cardiovascular:   Hypertension Valvular problems/Murmurs, AI Past MI     Pulmonary:  Pulmonary Normal    Hepatic/GI:   GERD    Neurological:  Neurology Normal        Physical Exam  General:  Well nourished    Airway/Jaw/Neck:  Airway Findings: Mouth Opening: Normal Tongue: Normal  General Airway Assessment: Adult  Mallampati: II  TM Distance: Normal, at least 6 cm      Dental:  Dental Findings: In tact        Mental Status:  Mental Status Findings:  Alert and Oriented, Cooperative         Anesthesia Plan  Type of Anesthesia, risks & benefits discussed:  Anesthesia Type:  general  Patient's Preference:   Intra-op Monitoring Plan: arterial  line, central line and standard ASA monitors  Intra-op Monitoring Plan Comments:   Post Op Pain Control Plan: multimodal analgesia  Post Op Pain Control Plan Comments:   Induction:   IV  Beta Blocker:  Patient is on a Beta-Blocker and has received one dose within the past 24 hours (No further documentation required).       Informed Consent: Patient understands risks and agrees with Anesthesia plan.  Questions answered. Anesthesia consent signed with patient.  ASA Score: 4     Day of Surgery Review of History & Physical:    H&P update referred to the surgeon.         Ready For Surgery From Anesthesia Perspective.

## 2020-05-18 NOTE — PROGRESS NOTES
[facility]  Cardiothoracic Surgery  Progress Note    Patient Name: Brad Kearns  MRN: [id number]  Admission Date: 5/14/2020  Hospital Length of Stay: 4 days  Code Status: Full Code   Attending Physician: Guicho Quezada MD   Referring Provider: Self, Aaareferral  Principal Problem:NSTEMI (non-ST elevated myocardial infarction)            Subjective:     Post-Op Info:  Procedure(s) (LRB):  Left heart cath (Left)  Aortogram (N/A)  Ventriculogram, Left  ANGIOGRAM, CORONARY ARTERY (N/A)   3 Days Post-Op     Interval History: Patient remains free of chest pain or shortness of breath. Heparin drip infusing.  Patient able to ambulate around room without difficulty.  All questions answered.    Review of Systems   Constitution: Negative for malaise/fatigue.   Cardiovascular: Negative for chest pain, claudication, dyspnea on exertion, leg swelling and palpitations.   Respiratory: Negative for cough and shortness of breath.    Hematologic/Lymphatic: Negative for bleeding problem.   Gastrointestinal: Negative for abdominal pain.   Genitourinary: Negative for dysuria.   Neurological: Negative for headaches and weakness.     Medications:  Continuous Infusions:   sodium chloride 0.9% 50 mL/hr (05/15/20 0850)    heparin (porcine) in D5W 14 Units/kg/hr (05/17/20 1551)    heparin (porcine)       Scheduled Meds:   amLODIPine  5 mg Oral Daily    aspirin  81 mg Oral Daily    atorvastatin  40 mg Oral QHS    metoprolol tartrate  25 mg Oral BID     PRN Meds:sodium chloride 0.9%, acetaminophen, heparin (PORCINE), heparin (PORCINE), heparin (porcine), magnesium oxide, magnesium oxide, ondansetron, potassium chloride 10%, potassium chloride 10%, potassium chloride 10%, potassium, sodium phosphates, potassium, sodium phosphates, potassium, sodium phosphates, senna-docusate 8.6-50 mg, sodium chloride 0.9%, sodium chloride 0.9%     Objective:     Vital Signs (Most Recent):  Temp: 97.6 °F (36.4 °C) (05/18/20  0900)  Pulse: 72 (05/18/20 0653)  Resp: 18 (05/18/20 0900)  BP: (!) 167/69 (05/18/20 0900)  SpO2: 99 % (05/18/20 0900) Vital Signs (24h Range):  Temp:  [97.4 °F (36.3 °C)-98 °F (36.7 °C)] 97.6 °F (36.4 °C)  Pulse:  [48-88] 72  Resp:  [14-18] 18  SpO2:  [92 %-99 %] 99 %  BP: (127-167)/(63-72) 167/69     Weight: 80.3 kg (177 lb 0.5 oz)  Body mass index is 26.14 kg/m².    SpO2: 99 %  O2 Device (Oxygen Therapy): room air    Intake/Output - Last 3 Shifts       05/16 0700 - 05/17 0659 05/17 0700 - 05/18 0659 05/18 0700 - 05/19 0659    P.O. 840 1150     I.V. (mL/kg) 284.2 (3.5)      IV Piggyback  40     Total Intake(mL/kg) 1124.2 (13.8) 1190 (14.6)     Urine (mL/kg/hr) 450 (0.2) 850 (0.4)     Total Output 450 850     Net +674.2 +340            Urine Occurrence 1 x 3 x     Stool Occurrence 1 x 1 x           Lines/Drains/Airways     Peripheral Intravenous Line                 Peripheral IV - Single Lumen 05/14/20 1755 18 G Left Antecubital 3 days         Peripheral IV - Single Lumen 05/14/20 1758 18 G Left Hand 3 days                Physical Exam   Constitutional: He is oriented to person, place, and time. He appears well-developed and well-nourished.   Cardiovascular: Normal rate, regular rhythm and normal heart sounds.   Pulmonary/Chest: Effort normal and breath sounds normal.   Abdominal: Soft.   Musculoskeletal: Normal range of motion.   Neurological: He is alert and oriented to person, place, and time.   Skin: Skin is warm, dry and intact.   Psychiatric: He has a normal mood and affect.       Significant Labs:  BMP:   Recent Labs   Lab 05/18/20  0651   GLU 92      K 4.1      CO2 21*   BUN 21   CREATININE 1.1   CALCIUM 8.6*     CBC:   Recent Labs   Lab 05/18/20  0651   WBC 8.32   RBC 3.95*   HGB 12.1*   HCT 37.9*      MCV 96   MCH 30.6   MCHC 31.9*     Coagulation:   Recent Labs   Lab 05/18/20  0651   APTT 48.4*       Significant Diagnostics:  I have reviewed all pertinent imaging results/findings  within the past 24 hours.    Assessment/Plan:     * NSTEMI (non-ST elevated myocardial infarction)  Brad Kearns is a 75 yo M with medical hx hypertension and mod-severe AI with mild aortic root dilation and possible bicuspid AV. Presented to the ED with chest pain and SOB. Went for LHC today which showed multivessel disease and severe AI (possible bicuspid on TTE). Was loaded with plavix 5/14/2020. CTA chest, carotid US, and PFTs ordered. Tentatively plan for CABG x 2-3 and possible bio bentall on 5/19/2020.  -Patient remains on heparin gtt, will discontinue to midnight tonight  -Plan for OR on 5/19; pre-op today  -Continue Meto  -Continue ASA  -Continue statin  -Continue Norvasc  -Stop ARBs (Valsartan)  -Stop Imdur      Aortic insufficiency  -See NSTEMI        Manda Goodson NP  Cardiothoracic Surgery  [facility]

## 2020-05-18 NOTE — ASSESSMENT & PLAN NOTE
Brad Kearns is a 75 yo M with medical hx hypertension and mod-severe AI with mild aortic root dilation and possible bicuspid AV. Presented to the ED with chest pain and SOB. Went for LHC today which showed multivessel disease and severe AI (possible bicuspid on TTE). Was loaded with plavix 5/14/2020. CTA chest, carotid US, and PFTs ordered. Tentatively plan for CABG x 2-3 and possible bio bentall on 5/19/2020.  -Patient remains on heparin gtt, will discontinue to midnight tonight  -Plan for OR on 5/19; pre-op today  -Continue Meto  -Continue ASA  -Continue statin  -Continue Norvasc  -Stop ARBs (Valsartan)  -Stop Imdur

## 2020-05-18 NOTE — PROGRESS NOTES
Plan of care discussed with patient. Patient scheduled for CABAG tomorrow morning 5/19. Heparin gtt continues at 14 units 10.5 ml/hr. Heparin gtt d/c at MN. Pt NPO at MN.  Patient is free of fall/trauma/injury. Denies CP, SOB, or pain/discomfort. All questions addressed. Will continue to monitor

## 2020-05-19 ENCOUNTER — ANESTHESIA (OUTPATIENT)
Dept: SURGERY | Facility: HOSPITAL | Age: 75
DRG: 216 | End: 2020-05-19
Payer: MEDICARE

## 2020-05-19 PROBLEM — Z95.2 STATUS POST AORTIC VALVE REPLACEMENT: Status: ACTIVE | Noted: 2020-05-19

## 2020-05-19 PROBLEM — I35.1 AORTIC INSUFFICIENCY: Status: RESOLVED | Noted: 2020-05-16 | Resolved: 2020-05-19

## 2020-05-19 PROBLEM — I35.1 NONRHEUMATIC AORTIC VALVE INSUFFICIENCY: Status: RESOLVED | Noted: 2020-03-13 | Resolved: 2020-05-19

## 2020-05-19 PROBLEM — I25.10 CAD (CORONARY ARTERY DISEASE): Status: ACTIVE | Noted: 2020-05-19

## 2020-05-19 PROBLEM — I21.4 NSTEMI (NON-ST ELEVATED MYOCARDIAL INFARCTION): Status: RESOLVED | Noted: 2020-05-14 | Resolved: 2020-05-19

## 2020-05-19 LAB
ALBUMIN SERPL BCP-MCNC: 2.2 G/DL (ref 3.5–5.2)
ALBUMIN SERPL BCP-MCNC: 3.2 G/DL (ref 3.5–5.2)
ALBUMIN SERPL BCP-MCNC: 3.7 G/DL (ref 3.5–5.2)
ALLENS TEST: ABNORMAL
ALP SERPL-CCNC: 35 U/L (ref 55–135)
ALP SERPL-CCNC: 41 U/L (ref 55–135)
ALP SERPL-CCNC: 66 U/L (ref 55–135)
ALT SERPL W/O P-5'-P-CCNC: 31 U/L (ref 10–44)
ALT SERPL W/O P-5'-P-CCNC: 36 U/L (ref 10–44)
ALT SERPL W/O P-5'-P-CCNC: 52 U/L (ref 10–44)
ANION GAP SERPL CALC-SCNC: 11 MMOL/L (ref 8–16)
ANION GAP SERPL CALC-SCNC: 12 MMOL/L (ref 8–16)
ANION GAP SERPL CALC-SCNC: 12 MMOL/L (ref 8–16)
APTT BLDCRRT: 25.6 SEC (ref 21–32)
APTT BLDCRRT: 27.5 SEC (ref 21–32)
APTT BLDCRRT: 52.1 SEC (ref 21–32)
AST SERPL-CCNC: 45 U/L (ref 10–40)
AST SERPL-CCNC: 60 U/L (ref 10–40)
AST SERPL-CCNC: 61 U/L (ref 10–40)
BASOPHILS # BLD AUTO: 0.01 K/UL (ref 0–0.2)
BASOPHILS # BLD AUTO: 0.03 K/UL (ref 0–0.2)
BASOPHILS # BLD AUTO: 0.04 K/UL (ref 0–0.2)
BASOPHILS NFR BLD: 0.1 % (ref 0–1.9)
BASOPHILS NFR BLD: 0.2 % (ref 0–1.9)
BASOPHILS NFR BLD: 0.5 % (ref 0–1.9)
BILIRUB DIRECT SERPL-MCNC: 0.3 MG/DL (ref 0.1–0.3)
BILIRUB SERPL-MCNC: 0.5 MG/DL (ref 0.1–1)
BILIRUB SERPL-MCNC: 0.7 MG/DL (ref 0.1–1)
BILIRUB SERPL-MCNC: 0.9 MG/DL (ref 0.1–1)
BLD PROD TYP BPU: NORMAL
BLOOD UNIT EXPIRATION DATE: NORMAL
BLOOD UNIT TYPE CODE: 600
BLOOD UNIT TYPE CODE: 6200
BLOOD UNIT TYPE CODE: 6200
BLOOD UNIT TYPE: NORMAL
BUN SERPL-MCNC: 20 MG/DL (ref 8–23)
BUN SERPL-MCNC: 20 MG/DL (ref 8–23)
BUN SERPL-MCNC: 23 MG/DL (ref 8–23)
CALCIUM SERPL-MCNC: 6.8 MG/DL (ref 8.7–10.5)
CALCIUM SERPL-MCNC: 7.3 MG/DL (ref 8.7–10.5)
CALCIUM SERPL-MCNC: 9.5 MG/DL (ref 8.7–10.5)
CHLORIDE SERPL-SCNC: 108 MMOL/L (ref 95–110)
CHLORIDE SERPL-SCNC: 112 MMOL/L (ref 95–110)
CHLORIDE SERPL-SCNC: 114 MMOL/L (ref 95–110)
CO2 SERPL-SCNC: 16 MMOL/L (ref 23–29)
CO2 SERPL-SCNC: 18 MMOL/L (ref 23–29)
CO2 SERPL-SCNC: 19 MMOL/L (ref 23–29)
CODING SYSTEM: NORMAL
CREAT SERPL-MCNC: 0.9 MG/DL (ref 0.5–1.4)
CREAT SERPL-MCNC: 1 MG/DL (ref 0.5–1.4)
CREAT SERPL-MCNC: 1.2 MG/DL (ref 0.5–1.4)
DELSYS: ABNORMAL
DIFFERENTIAL METHOD: ABNORMAL
DISPENSE STATUS: NORMAL
EOSINOPHIL # BLD AUTO: 0 K/UL (ref 0–0.5)
EOSINOPHIL # BLD AUTO: 0 K/UL (ref 0–0.5)
EOSINOPHIL # BLD AUTO: 0.2 K/UL (ref 0–0.5)
EOSINOPHIL NFR BLD: 0 % (ref 0–8)
EOSINOPHIL NFR BLD: 0.1 % (ref 0–8)
EOSINOPHIL NFR BLD: 2 % (ref 0–8)
ERYTHROCYTE [DISTWIDTH] IN BLOOD BY AUTOMATED COUNT: 14.2 % (ref 11.5–14.5)
ERYTHROCYTE [DISTWIDTH] IN BLOOD BY AUTOMATED COUNT: 14.3 % (ref 11.5–14.5)
ERYTHROCYTE [DISTWIDTH] IN BLOOD BY AUTOMATED COUNT: 14.6 % (ref 11.5–14.5)
ERYTHROCYTE [SEDIMENTATION RATE] IN BLOOD BY WESTERGREN METHOD: 14 MM/H
EST. GFR  (AFRICAN AMERICAN): >60 ML/MIN/1.73 M^2
EST. GFR  (NON AFRICAN AMERICAN): 59.6 ML/MIN/1.73 M^2
EST. GFR  (NON AFRICAN AMERICAN): >60 ML/MIN/1.73 M^2
EST. GFR  (NON AFRICAN AMERICAN): >60 ML/MIN/1.73 M^2
FIBRINOGEN PPP-MCNC: 222 MG/DL (ref 182–366)
FIO2: 40
FIO2: 6
FIO2: 60
FIO2: 70
GLUCOSE SERPL-MCNC: 108 MG/DL (ref 70–110)
GLUCOSE SERPL-MCNC: 118 MG/DL (ref 70–110)
GLUCOSE SERPL-MCNC: 121 MG/DL (ref 70–110)
GLUCOSE SERPL-MCNC: 129 MG/DL (ref 70–110)
GLUCOSE SERPL-MCNC: 130 MG/DL (ref 70–110)
GLUCOSE SERPL-MCNC: 134 MG/DL (ref 70–110)
GLUCOSE SERPL-MCNC: 135 MG/DL (ref 70–110)
GLUCOSE SERPL-MCNC: 135 MG/DL (ref 70–110)
GLUCOSE SERPL-MCNC: 78 MG/DL (ref 70–110)
GLUCOSE SERPL-MCNC: 80 MG/DL (ref 70–110)
GLUCOSE SERPL-MCNC: 87 MG/DL (ref 70–110)
HCO3 UR-SCNC: 17.1 MMOL/L (ref 24–28)
HCO3 UR-SCNC: 17.1 MMOL/L (ref 24–28)
HCO3 UR-SCNC: 17.4 MMOL/L (ref 24–28)
HCO3 UR-SCNC: 17.4 MMOL/L (ref 24–28)
HCO3 UR-SCNC: 17.6 MMOL/L (ref 24–28)
HCO3 UR-SCNC: 17.9 MMOL/L (ref 24–28)
HCO3 UR-SCNC: 20.7 MMOL/L (ref 24–28)
HCO3 UR-SCNC: 21.6 MMOL/L (ref 24–28)
HCO3 UR-SCNC: 22.6 MMOL/L (ref 24–28)
HCO3 UR-SCNC: 23.9 MMOL/L (ref 24–28)
HCO3 UR-SCNC: 23.9 MMOL/L (ref 24–28)
HCT VFR BLD AUTO: 26.7 % (ref 40–54)
HCT VFR BLD AUTO: 30.8 % (ref 40–54)
HCT VFR BLD AUTO: 40.7 % (ref 40–54)
HCT VFR BLD CALC: 21 %PCV (ref 36–54)
HCT VFR BLD CALC: 25 %PCV (ref 36–54)
HCT VFR BLD CALC: 26 %PCV (ref 36–54)
HCT VFR BLD CALC: 27 %PCV (ref 36–54)
HCT VFR BLD CALC: 29 %PCV (ref 36–54)
HCT VFR BLD CALC: 35 %PCV (ref 36–54)
HGB BLD-MCNC: 10 G/DL (ref 14–18)
HGB BLD-MCNC: 12.9 G/DL (ref 14–18)
HGB BLD-MCNC: 8.5 G/DL (ref 14–18)
IMM GRANULOCYTES # BLD AUTO: 0.05 K/UL (ref 0–0.04)
IMM GRANULOCYTES # BLD AUTO: 0.14 K/UL (ref 0–0.04)
IMM GRANULOCYTES # BLD AUTO: 0.14 K/UL (ref 0–0.04)
IMM GRANULOCYTES NFR BLD AUTO: 0.6 % (ref 0–0.5)
IMM GRANULOCYTES NFR BLD AUTO: 0.8 % (ref 0–0.5)
IMM GRANULOCYTES NFR BLD AUTO: 0.8 % (ref 0–0.5)
INR PPP: 1.2 (ref 0.8–1.2)
INR PPP: 1.2 (ref 0.8–1.2)
LDH SERPL L TO P-CCNC: 1.59 MMOL/L (ref 0.36–1.25)
LDH SERPL L TO P-CCNC: 1.64 MMOL/L (ref 0.36–1.25)
LDH SERPL L TO P-CCNC: 1.96 MMOL/L (ref 0.36–1.25)
LDH SERPL L TO P-CCNC: 2.11 MMOL/L (ref 0.36–1.25)
LYMPHOCYTES # BLD AUTO: 1 K/UL (ref 1–4.8)
LYMPHOCYTES # BLD AUTO: 1.1 K/UL (ref 1–4.8)
LYMPHOCYTES # BLD AUTO: 1.9 K/UL (ref 1–4.8)
LYMPHOCYTES NFR BLD: 21.6 % (ref 18–48)
LYMPHOCYTES NFR BLD: 5.7 % (ref 18–48)
LYMPHOCYTES NFR BLD: 6.5 % (ref 18–48)
MAGNESIUM SERPL-MCNC: 2.6 MG/DL (ref 1.6–2.6)
MAGNESIUM SERPL-MCNC: 2.6 MG/DL (ref 1.6–2.6)
MCH RBC QN AUTO: 30.2 PG (ref 27–31)
MCH RBC QN AUTO: 31 PG (ref 27–31)
MCH RBC QN AUTO: 31.1 PG (ref 27–31)
MCHC RBC AUTO-ENTMCNC: 31.7 G/DL (ref 32–36)
MCHC RBC AUTO-ENTMCNC: 31.8 G/DL (ref 32–36)
MCHC RBC AUTO-ENTMCNC: 32.5 G/DL (ref 32–36)
MCV RBC AUTO: 95 FL (ref 82–98)
MCV RBC AUTO: 96 FL (ref 82–98)
MCV RBC AUTO: 97 FL (ref 82–98)
MODE: ABNORMAL
MONOCYTES # BLD AUTO: 0.8 K/UL (ref 0.3–1)
MONOCYTES # BLD AUTO: 1.1 K/UL (ref 0.3–1)
MONOCYTES # BLD AUTO: 1.2 K/UL (ref 0.3–1)
MONOCYTES NFR BLD: 6.1 % (ref 4–15)
MONOCYTES NFR BLD: 7 % (ref 4–15)
MONOCYTES NFR BLD: 9.1 % (ref 4–15)
NEUTROPHILS # BLD AUTO: 14.6 K/UL (ref 1.8–7.7)
NEUTROPHILS # BLD AUTO: 16.1 K/UL (ref 1.8–7.7)
NEUTROPHILS # BLD AUTO: 5.8 K/UL (ref 1.8–7.7)
NEUTROPHILS NFR BLD: 66.2 % (ref 38–73)
NEUTROPHILS NFR BLD: 85.6 % (ref 38–73)
NEUTROPHILS NFR BLD: 87.1 % (ref 38–73)
NRBC BLD-RTO: 0 /100 WBC
PCO2 BLDA: 29 MMHG (ref 35–45)
PCO2 BLDA: 31.8 MMHG (ref 35–45)
PCO2 BLDA: 32.8 MMHG (ref 35–45)
PCO2 BLDA: 33.2 MMHG (ref 35–45)
PCO2 BLDA: 34.6 MMHG (ref 35–45)
PCO2 BLDA: 36.8 MMHG (ref 35–45)
PCO2 BLDA: 37.2 MMHG (ref 35–45)
PCO2 BLDA: 39.2 MMHG (ref 35–45)
PCO2 BLDA: 40.3 MMHG (ref 35–45)
PCO2 BLDA: 40.4 MMHG (ref 35–45)
PCO2 BLDA: 41.1 MMHG (ref 35–45)
PEEP: 5
PH SMN: 7.29 [PH] (ref 7.35–7.45)
PH SMN: 7.31 [PH] (ref 7.35–7.45)
PH SMN: 7.32 [PH] (ref 7.35–7.45)
PH SMN: 7.32 [PH] (ref 7.35–7.45)
PH SMN: 7.33 [PH] (ref 7.35–7.45)
PH SMN: 7.33 [PH] (ref 7.35–7.45)
PH SMN: 7.34 [PH] (ref 7.35–7.45)
PH SMN: 7.38 [PH] (ref 7.35–7.45)
PH SMN: 7.39 [PH] (ref 7.35–7.45)
PHOSPHATE SERPL-MCNC: 3.4 MG/DL (ref 2.7–4.5)
PHOSPHATE SERPL-MCNC: 4.2 MG/DL (ref 2.7–4.5)
PLATELET # BLD AUTO: 119 K/UL (ref 150–350)
PLATELET # BLD AUTO: 135 K/UL (ref 150–350)
PLATELET # BLD AUTO: 229 K/UL (ref 150–350)
PMV BLD AUTO: 10.4 FL (ref 9.2–12.9)
PMV BLD AUTO: 10.5 FL (ref 9.2–12.9)
PMV BLD AUTO: 10.8 FL (ref 9.2–12.9)
PO2 BLDA: 108 MMHG (ref 80–100)
PO2 BLDA: 110 MMHG (ref 80–100)
PO2 BLDA: 118 MMHG (ref 80–100)
PO2 BLDA: 129 MMHG (ref 80–100)
PO2 BLDA: 139 MMHG (ref 80–100)
PO2 BLDA: 170 MMHG (ref 80–100)
PO2 BLDA: 296 MMHG (ref 80–100)
PO2 BLDA: 303 MMHG (ref 80–100)
PO2 BLDA: 305 MMHG (ref 80–100)
PO2 BLDA: 369 MMHG (ref 80–100)
PO2 BLDA: 53 MMHG (ref 40–60)
POC BE: -1 MMOL/L
POC BE: -1 MMOL/L
POC BE: -2 MMOL/L
POC BE: -4 MMOL/L
POC BE: -5 MMOL/L
POC BE: -8 MMOL/L
POC BE: -8 MMOL/L
POC BE: -9 MMOL/L
POC IONIZED CALCIUM: 0.96 MMOL/L (ref 1.06–1.42)
POC IONIZED CALCIUM: 0.97 MMOL/L (ref 1.06–1.42)
POC IONIZED CALCIUM: 0.98 MMOL/L (ref 1.06–1.42)
POC IONIZED CALCIUM: 0.99 MMOL/L (ref 1.06–1.42)
POC IONIZED CALCIUM: 1.02 MMOL/L (ref 1.06–1.42)
POC IONIZED CALCIUM: 1.06 MMOL/L (ref 1.06–1.42)
POC IONIZED CALCIUM: 1.07 MMOL/L (ref 1.06–1.42)
POC IONIZED CALCIUM: 1.14 MMOL/L (ref 1.06–1.42)
POC IONIZED CALCIUM: 1.17 MMOL/L (ref 1.06–1.42)
POC IONIZED CALCIUM: 1.27 MMOL/L (ref 1.06–1.42)
POC PCO2 TEMP: 37.2 MMHG
POC PCO2 TEMP: 39.2 MMHG
POC PCO2 TEMP: 40.3 MMHG
POC PCO2 TEMP: 41.1 MMHG
POC PH TEMP: 7.33
POC PH TEMP: 7.38
POC PH TEMP: 7.39
POC PH TEMP: 7.39
POC PO2 TEMP: 296 MMHG
POC PO2 TEMP: 303 MMHG
POC PO2 TEMP: 305 MMHG
POC PO2 TEMP: 369 MMHG
POC SATURATED O2: 100 % (ref 95–100)
POC SATURATED O2: 84 % (ref 95–100)
POC SATURATED O2: 98 % (ref 95–100)
POC SATURATED O2: 99 % (ref 95–100)
POC TCO2: 18 MMOL/L (ref 23–27)
POC TCO2: 19 MMOL/L (ref 23–27)
POC TCO2: 19 MMOL/L (ref 23–27)
POC TCO2: 22 MMOL/L (ref 24–29)
POC TCO2: 23 MMOL/L (ref 23–27)
POC TCO2: 24 MMOL/L (ref 23–27)
POC TCO2: 25 MMOL/L (ref 23–27)
POC TCO2: 25 MMOL/L (ref 23–27)
POC TEMPERATURE: ABNORMAL
POCT GLUCOSE: 114 MG/DL (ref 70–110)
POCT GLUCOSE: 114 MG/DL (ref 70–110)
POCT GLUCOSE: 117 MG/DL (ref 70–110)
POCT GLUCOSE: 125 MG/DL (ref 70–110)
POTASSIUM BLD-SCNC: 3.7 MMOL/L (ref 3.5–5.1)
POTASSIUM BLD-SCNC: 3.7 MMOL/L (ref 3.5–5.1)
POTASSIUM BLD-SCNC: 3.9 MMOL/L (ref 3.5–5.1)
POTASSIUM BLD-SCNC: 4.2 MMOL/L (ref 3.5–5.1)
POTASSIUM BLD-SCNC: 4.4 MMOL/L (ref 3.5–5.1)
POTASSIUM BLD-SCNC: 5.6 MMOL/L (ref 3.5–5.1)
POTASSIUM BLD-SCNC: 6.1 MMOL/L (ref 3.5–5.1)
POTASSIUM BLD-SCNC: 6.1 MMOL/L (ref 3.5–5.1)
POTASSIUM BLD-SCNC: 6.2 MMOL/L (ref 3.5–5.1)
POTASSIUM BLD-SCNC: 6.7 MMOL/L (ref 3.5–5.1)
POTASSIUM SERPL-SCNC: 3.9 MMOL/L (ref 3.5–5.1)
POTASSIUM SERPL-SCNC: 4.7 MMOL/L (ref 3.5–5.1)
POTASSIUM SERPL-SCNC: 4.7 MMOL/L (ref 3.5–5.1)
PROT SERPL-MCNC: 4 G/DL (ref 6–8.4)
PROT SERPL-MCNC: 4.6 G/DL (ref 6–8.4)
PROT SERPL-MCNC: 7.1 G/DL (ref 6–8.4)
PROTHROMBIN TIME: 12.1 SEC (ref 9–12.5)
PROTHROMBIN TIME: 12.5 SEC (ref 9–12.5)
RBC # BLD AUTO: 2.74 M/UL (ref 4.6–6.2)
RBC # BLD AUTO: 3.22 M/UL (ref 4.6–6.2)
RBC # BLD AUTO: 4.27 M/UL (ref 4.6–6.2)
SAMPLE: ABNORMAL
SARS-COV-2 RNA RESP QL NAA+PROBE: NOT DETECTED
SITE: ABNORMAL
SODIUM BLD-SCNC: 136 MMOL/L (ref 136–145)
SODIUM BLD-SCNC: 137 MMOL/L (ref 136–145)
SODIUM BLD-SCNC: 139 MMOL/L (ref 136–145)
SODIUM BLD-SCNC: 140 MMOL/L (ref 136–145)
SODIUM BLD-SCNC: 140 MMOL/L (ref 136–145)
SODIUM BLD-SCNC: 142 MMOL/L (ref 136–145)
SODIUM BLD-SCNC: 143 MMOL/L (ref 136–145)
SODIUM BLD-SCNC: 143 MMOL/L (ref 136–145)
SODIUM SERPL-SCNC: 139 MMOL/L (ref 136–145)
SODIUM SERPL-SCNC: 141 MMOL/L (ref 136–145)
SODIUM SERPL-SCNC: 142 MMOL/L (ref 136–145)
TRANS PLATPHERESIS VOL PATIENT: NORMAL ML
TRANS PLATPHERESIS VOL PATIENT: NORMAL ML
UNIT NUMBER: NORMAL
VT: 450
WBC # BLD AUTO: 17.08 K/UL (ref 3.9–12.7)
WBC # BLD AUTO: 18.39 K/UL (ref 3.9–12.7)
WBC # BLD AUTO: 8.71 K/UL (ref 3.9–12.7)

## 2020-05-19 PROCEDURE — 85520 HEPARIN ASSAY: CPT

## 2020-05-19 PROCEDURE — 63600175 PHARM REV CODE 636 W HCPCS: Performed by: STUDENT IN AN ORGANIZED HEALTH CARE EDUCATION/TRAINING PROGRAM

## 2020-05-19 PROCEDURE — 88305 TISSUE EXAM BY PATHOLOGIST: ICD-10-PCS | Mod: 26,,, | Performed by: PATHOLOGY

## 2020-05-19 PROCEDURE — 93312 TEE: ICD-10-PCS | Mod: 26,59,, | Performed by: ANESTHESIOLOGY

## 2020-05-19 PROCEDURE — 80076 HEPATIC FUNCTION PANEL: CPT

## 2020-05-19 PROCEDURE — 93010 ELECTROCARDIOGRAM REPORT: CPT | Mod: ,,, | Performed by: INTERNAL MEDICINE

## 2020-05-19 PROCEDURE — P9045 ALBUMIN (HUMAN), 5%, 250 ML: HCPCS | Mod: JG | Performed by: STUDENT IN AN ORGANIZED HEALTH CARE EDUCATION/TRAINING PROGRAM

## 2020-05-19 PROCEDURE — 85025 COMPLETE CBC W/AUTO DIFF WBC: CPT | Mod: 91

## 2020-05-19 PROCEDURE — 33508 PR ENDOSCOPY W/VIDEO-ASST VEIN HARVEST,CABG: ICD-10-PCS | Mod: ,,, | Performed by: THORACIC SURGERY (CARDIOTHORACIC VASCULAR SURGERY)

## 2020-05-19 PROCEDURE — 85610 PROTHROMBIN TIME: CPT

## 2020-05-19 PROCEDURE — 33533 CABG ARTERIAL SINGLE: CPT | Mod: 51,,, | Performed by: THORACIC SURGERY (CARDIOTHORACIC VASCULAR SURGERY)

## 2020-05-19 PROCEDURE — 88305 TISSUE EXAM BY PATHOLOGIST: CPT | Mod: 26,,, | Performed by: PATHOLOGY

## 2020-05-19 PROCEDURE — 37799 UNLISTED PX VASCULAR SURGERY: CPT

## 2020-05-19 PROCEDURE — 93312 ECHO TRANSESOPHAGEAL: CPT | Mod: 26,59,, | Performed by: ANESTHESIOLOGY

## 2020-05-19 PROCEDURE — 33517 PR CABG, ARTERY-VEIN, SINGLE: ICD-10-PCS | Mod: ,,, | Performed by: THORACIC SURGERY (CARDIOTHORACIC VASCULAR SURGERY)

## 2020-05-19 PROCEDURE — 27800595 HC HEART VALVES

## 2020-05-19 PROCEDURE — 25000003 PHARM REV CODE 250: Performed by: ANESTHESIOLOGY

## 2020-05-19 PROCEDURE — 27000221 HC OXYGEN, UP TO 24 HOURS

## 2020-05-19 PROCEDURE — 88305 TISSUE EXAM BY PATHOLOGIST: CPT | Performed by: PATHOLOGY

## 2020-05-19 PROCEDURE — 36415 COLL VENOUS BLD VENIPUNCTURE: CPT

## 2020-05-19 PROCEDURE — S0028 INJECTION, FAMOTIDINE, 20 MG: HCPCS | Performed by: ANESTHESIOLOGY

## 2020-05-19 PROCEDURE — 99900035 HC TECH TIME PER 15 MIN (STAT)

## 2020-05-19 PROCEDURE — 63600175 PHARM REV CODE 636 W HCPCS: Mod: JG | Performed by: STUDENT IN AN ORGANIZED HEALTH CARE EDUCATION/TRAINING PROGRAM

## 2020-05-19 PROCEDURE — P9016 RBC LEUKOCYTES REDUCED: HCPCS

## 2020-05-19 PROCEDURE — 27000175 HC ADULT BYPASS PUMP

## 2020-05-19 PROCEDURE — 80053 COMPREHEN METABOLIC PANEL: CPT

## 2020-05-19 PROCEDURE — 82803 BLOOD GASES ANY COMBINATION: CPT

## 2020-05-19 PROCEDURE — 27201423 OPTIME MED/SURG SUP & DEVICES STERILE SUPPLY: Performed by: THORACIC SURGERY (CARDIOTHORACIC VASCULAR SURGERY)

## 2020-05-19 PROCEDURE — 25000003 PHARM REV CODE 250: Performed by: STUDENT IN AN ORGANIZED HEALTH CARE EDUCATION/TRAINING PROGRAM

## 2020-05-19 PROCEDURE — 36556 INSERT NON-TUNNEL CV CATH: CPT | Mod: 59,,, | Performed by: ANESTHESIOLOGY

## 2020-05-19 PROCEDURE — 37000009 HC ANESTHESIA EA ADD 15 MINS: Performed by: THORACIC SURGERY (CARDIOTHORACIC VASCULAR SURGERY)

## 2020-05-19 PROCEDURE — 27100088 HC CELL SAVER

## 2020-05-19 PROCEDURE — C1729 CATH, DRAINAGE: HCPCS | Performed by: THORACIC SURGERY (CARDIOTHORACIC VASCULAR SURGERY)

## 2020-05-19 PROCEDURE — P9012 CRYOPRECIPITATE EACH UNIT: HCPCS

## 2020-05-19 PROCEDURE — P9045 ALBUMIN (HUMAN), 5%, 250 ML: HCPCS | Mod: JG

## 2020-05-19 PROCEDURE — 85347 COAGULATION TIME ACTIVATED: CPT

## 2020-05-19 PROCEDURE — 20000000 HC ICU ROOM

## 2020-05-19 PROCEDURE — 25000003 PHARM REV CODE 250: Performed by: INTERNAL MEDICINE

## 2020-05-19 PROCEDURE — 63600175 PHARM REV CODE 636 W HCPCS: Mod: JG

## 2020-05-19 PROCEDURE — 36620 INSERTION CATHETER ARTERY: CPT | Mod: 59,,, | Performed by: ANESTHESIOLOGY

## 2020-05-19 PROCEDURE — D9220A PRA ANESTHESIA: Mod: ,,, | Performed by: ANESTHESIOLOGY

## 2020-05-19 PROCEDURE — 27000191 HC C-V MONITORING

## 2020-05-19 PROCEDURE — P9035 PLATELET PHERES LEUKOREDUCED: HCPCS

## 2020-05-19 PROCEDURE — 94761 N-INVAS EAR/PLS OXIMETRY MLT: CPT

## 2020-05-19 PROCEDURE — 83605 ASSAY OF LACTIC ACID: CPT

## 2020-05-19 PROCEDURE — 85730 THROMBOPLASTIN TIME PARTIAL: CPT

## 2020-05-19 PROCEDURE — 27000445 HC TEMPORARY PACEMAKER LEADS

## 2020-05-19 PROCEDURE — 63600175 PHARM REV CODE 636 W HCPCS: Performed by: THORACIC SURGERY (CARDIOTHORACIC VASCULAR SURGERY)

## 2020-05-19 PROCEDURE — 85384 FIBRINOGEN ACTIVITY: CPT

## 2020-05-19 PROCEDURE — 85730 THROMBOPLASTIN TIME PARTIAL: CPT | Mod: 91

## 2020-05-19 PROCEDURE — 33508 ENDOSCOPIC VEIN HARVEST: CPT | Mod: ,,, | Performed by: THORACIC SURGERY (CARDIOTHORACIC VASCULAR SURGERY)

## 2020-05-19 PROCEDURE — 93010 EKG 12-LEAD: ICD-10-PCS | Mod: ,,, | Performed by: INTERNAL MEDICINE

## 2020-05-19 PROCEDURE — 37000008 HC ANESTHESIA 1ST 15 MINUTES: Performed by: THORACIC SURGERY (CARDIOTHORACIC VASCULAR SURGERY)

## 2020-05-19 PROCEDURE — 36592 COLLECT BLOOD FROM PICC: CPT

## 2020-05-19 PROCEDURE — 80048 BASIC METABOLIC PNL TOTAL CA: CPT

## 2020-05-19 PROCEDURE — 33533 PR CABG, ARTERIAL, SINGLE: ICD-10-PCS | Mod: 51,,, | Performed by: THORACIC SURGERY (CARDIOTHORACIC VASCULAR SURGERY)

## 2020-05-19 PROCEDURE — 36556 CENTRAL LINE: ICD-10-PCS | Mod: 59,,, | Performed by: ANESTHESIOLOGY

## 2020-05-19 PROCEDURE — 86965 POOLING BLOOD PLATELETS: CPT

## 2020-05-19 PROCEDURE — 27200953 HC CARDIOPLEGIA SYSTEM

## 2020-05-19 PROCEDURE — 84100 ASSAY OF PHOSPHORUS: CPT | Mod: 91

## 2020-05-19 PROCEDURE — 33517 CABG ARTERY-VEIN SINGLE: CPT | Mod: ,,, | Performed by: THORACIC SURGERY (CARDIOTHORACIC VASCULAR SURGERY)

## 2020-05-19 PROCEDURE — 33405 PR REPLACE AORT VALV,PROSTH VALV: ICD-10-PCS | Mod: ,,, | Performed by: THORACIC SURGERY (CARDIOTHORACIC VASCULAR SURGERY)

## 2020-05-19 PROCEDURE — 25000003 PHARM REV CODE 250: Performed by: THORACIC SURGERY (CARDIOTHORACIC VASCULAR SURGERY)

## 2020-05-19 PROCEDURE — 94002 VENT MGMT INPAT INIT DAY: CPT

## 2020-05-19 PROCEDURE — 36620 ARTERIAL: ICD-10-PCS | Mod: 59,,, | Performed by: ANESTHESIOLOGY

## 2020-05-19 PROCEDURE — 33405 REPLACEMENT AORTIC VALVE OPN: CPT | Mod: ,,, | Performed by: THORACIC SURGERY (CARDIOTHORACIC VASCULAR SURGERY)

## 2020-05-19 PROCEDURE — D9220A PRA ANESTHESIA: ICD-10-PCS | Mod: ,,, | Performed by: ANESTHESIOLOGY

## 2020-05-19 PROCEDURE — 36000712 HC OR TIME LEV V 1ST 15 MIN: Performed by: THORACIC SURGERY (CARDIOTHORACIC VASCULAR SURGERY)

## 2020-05-19 PROCEDURE — 27201037 HC PRESSURE MONITORING SET UP

## 2020-05-19 PROCEDURE — 36000713 HC OR TIME LEV V EA ADD 15 MIN: Performed by: THORACIC SURGERY (CARDIOTHORACIC VASCULAR SURGERY)

## 2020-05-19 PROCEDURE — 93005 ELECTROCARDIOGRAM TRACING: CPT

## 2020-05-19 PROCEDURE — 83735 ASSAY OF MAGNESIUM: CPT

## 2020-05-19 DEVICE — VALVE MOSAIC AOR ULTRA 29MM: Type: IMPLANTABLE DEVICE | Site: CHEST | Status: FUNCTIONAL

## 2020-05-19 RX ORDER — VASOPRESSIN 20 [USP'U]/ML
INJECTION, SOLUTION INTRAMUSCULAR; SUBCUTANEOUS
Status: DISCONTINUED | OUTPATIENT
Start: 2020-05-19 | End: 2020-05-19

## 2020-05-19 RX ORDER — ACETAMINOPHEN 325 MG/1
650 TABLET ORAL EVERY 6 HOURS PRN
Status: DISCONTINUED | OUTPATIENT
Start: 2020-05-19 | End: 2020-05-20

## 2020-05-19 RX ORDER — PRAVASTATIN SODIUM 40 MG/1
40 TABLET ORAL DAILY
Status: DISCONTINUED | OUTPATIENT
Start: 2020-05-20 | End: 2020-05-26 | Stop reason: HOSPADM

## 2020-05-19 RX ORDER — HEPARIN SODIUM 1000 [USP'U]/ML
INJECTION, SOLUTION INTRAVENOUS; SUBCUTANEOUS
Status: DISCONTINUED | OUTPATIENT
Start: 2020-05-19 | End: 2020-05-19

## 2020-05-19 RX ORDER — KETAMINE HCL IN 0.9 % NACL 50 MG/5 ML
SYRINGE (ML) INTRAVENOUS
Status: DISCONTINUED | OUTPATIENT
Start: 2020-05-19 | End: 2020-05-19

## 2020-05-19 RX ORDER — POTASSIUM CHLORIDE 14.9 MG/ML
60 INJECTION INTRAVENOUS
Status: DISCONTINUED | OUTPATIENT
Start: 2020-05-19 | End: 2020-05-24

## 2020-05-19 RX ORDER — ONDANSETRON 8 MG/1
8 TABLET, ORALLY DISINTEGRATING ORAL EVERY 8 HOURS PRN
Status: DISCONTINUED | OUTPATIENT
Start: 2020-05-19 | End: 2020-05-26 | Stop reason: HOSPADM

## 2020-05-19 RX ORDER — ALBUMIN HUMAN 50 G/1000ML
SOLUTION INTRAVENOUS
Status: COMPLETED
Start: 2020-05-19 | End: 2020-05-19

## 2020-05-19 RX ORDER — MUPIROCIN 20 MG/G
1 OINTMENT TOPICAL 2 TIMES DAILY
Status: COMPLETED | OUTPATIENT
Start: 2020-05-19 | End: 2020-05-24

## 2020-05-19 RX ORDER — INDOMETHACIN 25 MG/1
CAPSULE ORAL
Status: DISCONTINUED | OUTPATIENT
Start: 2020-05-19 | End: 2020-05-19

## 2020-05-19 RX ORDER — BISACODYL 10 MG
10 SUPPOSITORY, RECTAL RECTAL EVERY 12 HOURS PRN
Status: DISCONTINUED | OUTPATIENT
Start: 2020-05-19 | End: 2020-05-26 | Stop reason: HOSPADM

## 2020-05-19 RX ORDER — HEPARIN SODIUM 1000 [USP'U]/ML
INJECTION, SOLUTION INTRAVENOUS; SUBCUTANEOUS
Status: DISCONTINUED | OUTPATIENT
Start: 2020-05-19 | End: 2020-05-19 | Stop reason: HOSPADM

## 2020-05-19 RX ORDER — ALBUMIN HUMAN 50 G/1000ML
25 SOLUTION INTRAVENOUS ONCE
Status: COMPLETED | OUTPATIENT
Start: 2020-05-19 | End: 2020-05-19

## 2020-05-19 RX ORDER — PROPOFOL 10 MG/ML
VIAL (ML) INTRAVENOUS
Status: DISCONTINUED | OUTPATIENT
Start: 2020-05-19 | End: 2020-05-19

## 2020-05-19 RX ORDER — PAPAVERINE HYDROCHLORIDE 30 MG/ML
INJECTION INTRAMUSCULAR; INTRAVENOUS
Status: DISCONTINUED | OUTPATIENT
Start: 2020-05-19 | End: 2020-05-19 | Stop reason: HOSPADM

## 2020-05-19 RX ORDER — LIDOCAINE HCL/PF 100 MG/5ML
SYRINGE (ML) INTRAVENOUS
Status: DISCONTINUED | OUTPATIENT
Start: 2020-05-19 | End: 2020-05-19

## 2020-05-19 RX ORDER — NOREPINEPHRINE BITARTRATE/D5W 4MG/250ML
0.02 PLASTIC BAG, INJECTION (ML) INTRAVENOUS CONTINUOUS
Status: DISCONTINUED | OUTPATIENT
Start: 2020-05-19 | End: 2020-05-21

## 2020-05-19 RX ORDER — HYDROCODONE BITARTRATE AND ACETAMINOPHEN 500; 5 MG/1; MG/1
TABLET ORAL
Status: DISCONTINUED | OUTPATIENT
Start: 2020-05-20 | End: 2020-05-20

## 2020-05-19 RX ORDER — CALCIUM CARBONATE 200(500)MG
500 TABLET,CHEWABLE ORAL DAILY PRN
Status: DISCONTINUED | OUTPATIENT
Start: 2020-05-19 | End: 2020-05-19

## 2020-05-19 RX ORDER — FAMOTIDINE 10 MG/ML
20 INJECTION INTRAVENOUS ONCE
Status: COMPLETED | OUTPATIENT
Start: 2020-05-19 | End: 2020-05-19

## 2020-05-19 RX ORDER — TRANEXAMIC ACID 100 MG/ML
INJECTION, SOLUTION INTRAVENOUS
Status: DISCONTINUED | OUTPATIENT
Start: 2020-05-19 | End: 2020-05-19

## 2020-05-19 RX ORDER — PROTAMINE SULFATE 10 MG/ML
INJECTION, SOLUTION INTRAVENOUS
Status: DISCONTINUED | OUTPATIENT
Start: 2020-05-19 | End: 2020-05-19

## 2020-05-19 RX ORDER — MAGNESIUM SULFATE HEPTAHYDRATE 40 MG/ML
2 INJECTION, SOLUTION INTRAVENOUS
Status: DISCONTINUED | OUTPATIENT
Start: 2020-05-19 | End: 2020-05-26 | Stop reason: HOSPADM

## 2020-05-19 RX ORDER — TRANEXAMIC ACID 100 MG/ML
INJECTION, SOLUTION INTRAVENOUS CONTINUOUS PRN
Status: DISCONTINUED | OUTPATIENT
Start: 2020-05-19 | End: 2020-05-19

## 2020-05-19 RX ORDER — ASPIRIN 325 MG
325 TABLET ORAL ONCE
Status: DISCONTINUED | OUTPATIENT
Start: 2020-05-20 | End: 2020-05-20

## 2020-05-19 RX ORDER — PANTOPRAZOLE SODIUM 40 MG/10ML
40 INJECTION, POWDER, LYOPHILIZED, FOR SOLUTION INTRAVENOUS DAILY
Status: DISCONTINUED | OUTPATIENT
Start: 2020-05-20 | End: 2020-05-24

## 2020-05-19 RX ORDER — HYDROMORPHONE HYDROCHLORIDE 1 MG/ML
1 INJECTION, SOLUTION INTRAMUSCULAR; INTRAVENOUS; SUBCUTANEOUS
Status: DISCONTINUED | OUTPATIENT
Start: 2020-05-19 | End: 2020-05-26 | Stop reason: HOSPADM

## 2020-05-19 RX ORDER — ROCURONIUM BROMIDE 10 MG/ML
INJECTION, SOLUTION INTRAVENOUS
Status: DISCONTINUED | OUTPATIENT
Start: 2020-05-19 | End: 2020-05-19

## 2020-05-19 RX ORDER — POLYETHYLENE GLYCOL 3350 17 G/17G
17 POWDER, FOR SOLUTION ORAL DAILY
Status: DISCONTINUED | OUTPATIENT
Start: 2020-05-20 | End: 2020-05-26 | Stop reason: HOSPADM

## 2020-05-19 RX ORDER — HEPARIN SODIUM 5000 [USP'U]/ML
5000 INJECTION, SOLUTION INTRAVENOUS; SUBCUTANEOUS EVERY 8 HOURS
Status: DISCONTINUED | OUTPATIENT
Start: 2020-05-20 | End: 2020-05-20

## 2020-05-19 RX ORDER — ASPIRIN 325 MG
325 TABLET ORAL DAILY
Status: DISCONTINUED | OUTPATIENT
Start: 2020-05-20 | End: 2020-05-20

## 2020-05-19 RX ORDER — ACETAMINOPHEN 325 MG/1
650 TABLET ORAL EVERY 4 HOURS PRN
Status: DISCONTINUED | OUTPATIENT
Start: 2020-05-19 | End: 2020-05-22

## 2020-05-19 RX ORDER — POTASSIUM CHLORIDE 14.9 MG/ML
20 INJECTION INTRAVENOUS
Status: DISCONTINUED | OUTPATIENT
Start: 2020-05-19 | End: 2020-05-24

## 2020-05-19 RX ORDER — PROPOFOL 10 MG/ML
VIAL (ML) INTRAVENOUS CONTINUOUS PRN
Status: DISCONTINUED | OUTPATIENT
Start: 2020-05-19 | End: 2020-05-19

## 2020-05-19 RX ORDER — ONDANSETRON 2 MG/ML
4 INJECTION INTRAMUSCULAR; INTRAVENOUS EVERY 12 HOURS PRN
Status: DISCONTINUED | OUTPATIENT
Start: 2020-05-19 | End: 2020-05-26 | Stop reason: HOSPADM

## 2020-05-19 RX ORDER — MAGNESIUM SULFATE HEPTAHYDRATE 40 MG/ML
4 INJECTION, SOLUTION INTRAVENOUS
Status: DISCONTINUED | OUTPATIENT
Start: 2020-05-19 | End: 2020-05-26 | Stop reason: HOSPADM

## 2020-05-19 RX ORDER — OXYCODONE HYDROCHLORIDE 5 MG/1
5 TABLET ORAL EVERY 4 HOURS PRN
Status: DISCONTINUED | OUTPATIENT
Start: 2020-05-19 | End: 2020-05-26 | Stop reason: HOSPADM

## 2020-05-19 RX ORDER — NICARDIPINE HYDROCHLORIDE 0.2 MG/ML
5 INJECTION INTRAVENOUS CONTINUOUS
Status: DISCONTINUED | OUTPATIENT
Start: 2020-05-19 | End: 2020-05-24

## 2020-05-19 RX ORDER — MIDAZOLAM HYDROCHLORIDE 1 MG/ML
INJECTION, SOLUTION INTRAMUSCULAR; INTRAVENOUS
Status: DISCONTINUED | OUTPATIENT
Start: 2020-05-19 | End: 2020-05-19

## 2020-05-19 RX ORDER — NICARDIPINE HYDROCHLORIDE 0.2 MG/ML
INJECTION INTRAVENOUS
Status: DISPENSED
Start: 2020-05-19 | End: 2020-05-20

## 2020-05-19 RX ORDER — ONDANSETRON 2 MG/ML
INJECTION INTRAMUSCULAR; INTRAVENOUS
Status: DISCONTINUED | OUTPATIENT
Start: 2020-05-19 | End: 2020-05-19

## 2020-05-19 RX ORDER — POTASSIUM CHLORIDE 29.8 MG/ML
40 INJECTION INTRAVENOUS
Status: DISCONTINUED | OUTPATIENT
Start: 2020-05-19 | End: 2020-05-24

## 2020-05-19 RX ORDER — DEXMEDETOMIDINE HYDROCHLORIDE 4 UG/ML
0.2 INJECTION, SOLUTION INTRAVENOUS CONTINUOUS
Status: DISCONTINUED | OUTPATIENT
Start: 2020-05-19 | End: 2020-05-21

## 2020-05-19 RX ORDER — DEXTROSE MONOHYDRATE, SODIUM CHLORIDE, AND POTASSIUM CHLORIDE 50; 1.49; 9 G/1000ML; G/1000ML; G/1000ML
5 INJECTION, SOLUTION INTRAVENOUS CONTINUOUS
Status: DISCONTINUED | OUTPATIENT
Start: 2020-05-19 | End: 2020-05-23

## 2020-05-19 RX ORDER — PROPOFOL 10 MG/ML
5 INJECTION, EMULSION INTRAVENOUS CONTINUOUS
Status: DISCONTINUED | OUTPATIENT
Start: 2020-05-19 | End: 2020-05-21

## 2020-05-19 RX ORDER — CEFAZOLIN SODIUM 1 G/3ML
INJECTION, POWDER, FOR SOLUTION INTRAMUSCULAR; INTRAVENOUS
Status: DISCONTINUED | OUTPATIENT
Start: 2020-05-19 | End: 2020-05-19

## 2020-05-19 RX ORDER — NICARDIPINE HYDROCHLORIDE 0.2 MG/ML
1 INJECTION INTRAVENOUS CONTINUOUS
Status: DISCONTINUED | OUTPATIENT
Start: 2020-05-19 | End: 2020-05-19

## 2020-05-19 RX ORDER — INDOMETHACIN 25 MG/1
50 CAPSULE ORAL ONCE
Status: COMPLETED | OUTPATIENT
Start: 2020-05-20 | End: 2020-05-20

## 2020-05-19 RX ORDER — PROPOFOL 10 MG/ML
5 INJECTION, EMULSION INTRAVENOUS CONTINUOUS
Status: DISCONTINUED | OUTPATIENT
Start: 2020-05-19 | End: 2020-05-19

## 2020-05-19 RX ORDER — OXYCODONE HYDROCHLORIDE 5 MG/1
10 TABLET ORAL EVERY 4 HOURS PRN
Status: DISCONTINUED | OUTPATIENT
Start: 2020-05-19 | End: 2020-05-26 | Stop reason: HOSPADM

## 2020-05-19 RX ORDER — CEFAZOLIN SODIUM 1 G/3ML
2 INJECTION, POWDER, FOR SOLUTION INTRAMUSCULAR; INTRAVENOUS
Status: COMPLETED | OUTPATIENT
Start: 2020-05-20 | End: 2020-05-21

## 2020-05-19 RX ORDER — NITROGLYCERIN 5 MG/ML
INJECTION, SOLUTION INTRAVENOUS
Status: DISCONTINUED | OUTPATIENT
Start: 2020-05-19 | End: 2020-05-19

## 2020-05-19 RX ORDER — ALBUMIN HUMAN 50 G/1000ML
12.5 SOLUTION INTRAVENOUS ONCE
Status: COMPLETED | OUTPATIENT
Start: 2020-05-19 | End: 2020-05-19

## 2020-05-19 RX ORDER — FENTANYL CITRATE 50 UG/ML
INJECTION, SOLUTION INTRAMUSCULAR; INTRAVENOUS
Status: DISCONTINUED | OUTPATIENT
Start: 2020-05-19 | End: 2020-05-19

## 2020-05-19 RX ADMIN — Medication 20 MG: at 07:05

## 2020-05-19 RX ADMIN — ROCURONIUM BROMIDE 20 MG: 10 INJECTION, SOLUTION INTRAVENOUS at 05:05

## 2020-05-19 RX ADMIN — DEXTROSE MONOHYDRATE, SODIUM CHLORIDE, AND POTASSIUM CHLORIDE 5 ML/HR: 50; 9; 1.49 INJECTION, SOLUTION INTRAVENOUS at 09:05

## 2020-05-19 RX ADMIN — FENTANYL CITRATE 200 MCG: 50 INJECTION, SOLUTION INTRAMUSCULAR; INTRAVENOUS at 12:05

## 2020-05-19 RX ADMIN — HEPARIN SODIUM 2500 UNITS: 1000 INJECTION, SOLUTION INTRAVENOUS; SUBCUTANEOUS at 02:05

## 2020-05-19 RX ADMIN — CALCIUM CHLORIDE 1 G: 100 INJECTION, SOLUTION INTRAVENOUS at 06:05

## 2020-05-19 RX ADMIN — FENTANYL CITRATE 50 MCG: 50 INJECTION, SOLUTION INTRAMUSCULAR; INTRAVENOUS at 07:05

## 2020-05-19 RX ADMIN — MIDAZOLAM HYDROCHLORIDE 1 MG: 1 INJECTION, SOLUTION INTRAMUSCULAR; INTRAVENOUS at 12:05

## 2020-05-19 RX ADMIN — NITROGLYCERIN 100 MCG: 5 INJECTION, SOLUTION INTRAVENOUS at 06:05

## 2020-05-19 RX ADMIN — VASOPRESSIN 2 UNITS: 20 INJECTION INTRAVENOUS at 07:05

## 2020-05-19 RX ADMIN — EPINEPHRINE 0.04 MCG/KG/MIN: 1 INJECTION INTRAMUSCULAR; INTRAVENOUS; SUBCUTANEOUS at 05:05

## 2020-05-19 RX ADMIN — SODIUM BICARBONATE 50 MEQ: 84 INJECTION, SOLUTION INTRAVENOUS at 07:05

## 2020-05-19 RX ADMIN — CALCIUM CARBONATE (ANTACID) CHEW TAB 500 MG 500 MG: 500 CHEW TAB at 08:05

## 2020-05-19 RX ADMIN — SODIUM CHLORIDE, SODIUM GLUCONATE, SODIUM ACETATE, POTASSIUM CHLORIDE, MAGNESIUM CHLORIDE, SODIUM PHOSPHATE, DIBASIC, AND POTASSIUM PHOSPHATE: .53; .5; .37; .037; .03; .012; .00082 INJECTION, SOLUTION INTRAVENOUS at 07:05

## 2020-05-19 RX ADMIN — MUPIROCIN 1 G: 20 OINTMENT TOPICAL at 09:05

## 2020-05-19 RX ADMIN — TRANEXAMIC ACID 1 MG/KG/HR: 100 INJECTION, SOLUTION INTRAVENOUS at 01:05

## 2020-05-19 RX ADMIN — HEPARIN SODIUM 31000 UNITS: 1000 INJECTION, SOLUTION INTRAVENOUS; SUBCUTANEOUS at 02:05

## 2020-05-19 RX ADMIN — ROCURONIUM BROMIDE 100 MG: 10 INJECTION, SOLUTION INTRAVENOUS at 12:05

## 2020-05-19 RX ADMIN — VASOPRESSIN 2 UNITS: 20 INJECTION INTRAVENOUS at 06:05

## 2020-05-19 RX ADMIN — PROPOFOL 25 MCG/KG/MIN: 10 INJECTION, EMULSION INTRAVENOUS at 08:05

## 2020-05-19 RX ADMIN — PROTAMINE SULFATE 220 MG: 10 INJECTION, SOLUTION INTRAVENOUS at 06:05

## 2020-05-19 RX ADMIN — SODIUM CHLORIDE, SODIUM GLUCONATE, SODIUM ACETATE, POTASSIUM CHLORIDE, MAGNESIUM CHLORIDE, SODIUM PHOSPHATE, DIBASIC, AND POTASSIUM PHOSPHATE: .53; .5; .37; .037; .03; .012; .00082 INJECTION, SOLUTION INTRAVENOUS at 01:05

## 2020-05-19 RX ADMIN — SUGAMMADEX 200 MG: 100 INJECTION, SOLUTION INTRAVENOUS at 08:05

## 2020-05-19 RX ADMIN — DEXMEDETOMIDINE HYDROCHLORIDE 0.2 MCG/KG/HR: 4 INJECTION, SOLUTION INTRAVENOUS at 10:05

## 2020-05-19 RX ADMIN — CALCIUM GLUCONATE 1000 MG: 98 INJECTION, SOLUTION INTRAVENOUS at 09:05

## 2020-05-19 RX ADMIN — NOREPINEPHRINE BITARTRATE 0.04 MCG/KG/MIN: 1 INJECTION, SOLUTION, CONCENTRATE INTRAVENOUS at 01:05

## 2020-05-19 RX ADMIN — SODIUM CHLORIDE, SODIUM GLUCONATE, SODIUM ACETATE, POTASSIUM CHLORIDE, MAGNESIUM CHLORIDE, SODIUM PHOSPHATE, DIBASIC, AND POTASSIUM PHOSPHATE: .53; .5; .37; .037; .03; .012; .00082 INJECTION, SOLUTION INTRAVENOUS at 02:05

## 2020-05-19 RX ADMIN — ROCURONIUM BROMIDE 20 MG: 10 INJECTION, SOLUTION INTRAVENOUS at 03:05

## 2020-05-19 RX ADMIN — PROPOFOL 20 MG: 10 INJECTION, EMULSION INTRAVENOUS at 07:05

## 2020-05-19 RX ADMIN — ALBUMIN (HUMAN) 12.5 G: 12.5 SOLUTION INTRAVENOUS at 08:05

## 2020-05-19 RX ADMIN — PROPOFOL 100 MG: 10 INJECTION, EMULSION INTRAVENOUS at 12:05

## 2020-05-19 RX ADMIN — PROPOFOL 20 MCG/KG/MIN: 10 INJECTION, EMULSION INTRAVENOUS at 07:05

## 2020-05-19 RX ADMIN — TRANEXAMIC ACID 800 MG: 100 INJECTION, SOLUTION INTRAVENOUS at 01:05

## 2020-05-19 RX ADMIN — CEFAZOLIN 2 G: 330 INJECTION, POWDER, FOR SOLUTION INTRAMUSCULAR; INTRAVENOUS at 02:05

## 2020-05-19 RX ADMIN — LIDOCAINE HYDROCHLORIDE 50 MG: 20 INJECTION, SOLUTION INTRAVENOUS at 12:05

## 2020-05-19 RX ADMIN — Medication 30 MG: at 12:05

## 2020-05-19 RX ADMIN — HYDROMORPHONE HYDROCHLORIDE 1 MG: 1 INJECTION, SOLUTION INTRAMUSCULAR; INTRAVENOUS; SUBCUTANEOUS at 10:05

## 2020-05-19 RX ADMIN — FAMOTIDINE 20 MG: 10 INJECTION INTRAVENOUS at 10:05

## 2020-05-19 RX ADMIN — METOPROLOL TARTRATE 25 MG: 25 TABLET, FILM COATED ORAL at 08:05

## 2020-05-19 RX ADMIN — CEFAZOLIN 2 G: 330 INJECTION, POWDER, FOR SOLUTION INTRAMUSCULAR; INTRAVENOUS at 05:05

## 2020-05-19 RX ADMIN — ROCURONIUM BROMIDE 10 MG: 10 INJECTION, SOLUTION INTRAVENOUS at 07:05

## 2020-05-19 RX ADMIN — ALBUMIN HUMAN 12.5 G: 50 SOLUTION INTRAVENOUS at 08:05

## 2020-05-19 RX ADMIN — SODIUM CHLORIDE 1 UNITS/HR: 9 INJECTION, SOLUTION INTRAVENOUS at 08:05

## 2020-05-19 RX ADMIN — SODIUM CHLORIDE, SODIUM GLUCONATE, SODIUM ACETATE, POTASSIUM CHLORIDE, MAGNESIUM CHLORIDE, SODIUM PHOSPHATE, DIBASIC, AND POTASSIUM PHOSPHATE: .53; .5; .37; .037; .03; .012; .00082 INJECTION, SOLUTION INTRAVENOUS at 03:05

## 2020-05-19 RX ADMIN — FENTANYL CITRATE 100 MCG: 50 INJECTION, SOLUTION INTRAMUSCULAR; INTRAVENOUS at 07:05

## 2020-05-19 RX ADMIN — ALBUMIN (HUMAN) 25 G: 12.5 SOLUTION INTRAVENOUS at 09:05

## 2020-05-19 RX ADMIN — FENTANYL CITRATE 50 MCG: 50 INJECTION, SOLUTION INTRAMUSCULAR; INTRAVENOUS at 12:05

## 2020-05-19 NOTE — PROGRESS NOTES
Pt. Resting comfortably and is much calmer. Pt reports that he is feeling relaxed and his chest burning has subsided. Will continue to monitor.

## 2020-05-19 NOTE — SUBJECTIVE & OBJECTIVE
Interval History: No acute events over night.  Patient heparin drip stopped at midnight in anticipation of cardiac surgery today.  Patient will be transferred from OR to ICU. All questions answered preoperatively.    Review of Systems   Constitution: Negative for malaise/fatigue.   Cardiovascular: Negative for chest pain, claudication, dyspnea on exertion, irregular heartbeat, leg swelling and palpitations.   Respiratory: Negative for cough and shortness of breath.    Hematologic/Lymphatic: Negative for bleeding problem.   Gastrointestinal: Negative for abdominal pain.   Genitourinary: Negative for dysuria.   Neurological: Negative for headaches and weakness.     Medications:  Continuous Infusions:   sodium chloride 0.9% 50 mL/hr (05/15/20 0850)    heparin (porcine)       Scheduled Meds:   famotidine (PF)  20 mg Intravenous Once    metoprolol tartrate  25 mg Oral BID    microplegia arrest solution (KCL 80mEq/40 mL)  80 mEq Other Once    microplegia protection soln (LIDOCAINE 100mg/MAGNESIUM 4g/qs NS 40mL)   Other Once     PRN Meds:sodium chloride 0.9%, acetaminophen, AMINO ACID ENRICHED CARDIOPLEGIA SOLN (CARDIA REPERFUSATE) Dextrose-70% 17.3ml,Sterile water-102.7ml,CP2D soln-112.5ml,Tromethamine 0.3M-112.5ml,MSA/MSG 0.46M-125ml, KCl 2meq/ml-7.5ml Total volume (477.5ml), calcium carbonate, ceFAZolin (ANCEF) IVPB, heparin (PORCINE), heparin (PORCINE), heparin (porcine), microplegia arrest solution (KCL 120mEq/60 mL) refill syringe, microplegia arrest solution (KCL 80mEq/40 mL), microplegia protection soln (LIDOCAINE 100mg/MAGNESIUM 4g/qs NS 40mL), mupirocin, ondansetron, senna-docusate 8.6-50 mg, sodium chloride 0.9%, sodium chloride 0.9%     Objective:     Vital Signs (Most Recent):  Temp: 97.8 °F (36.6 °C) (05/19/20 0938)  Pulse: 64 (05/19/20 0938)  Resp: 17 (05/19/20 0938)  BP: (!) 163/72 (05/19/20 0938)  SpO2: 98 % (05/19/20 0938) Vital Signs (24h Range):  Temp:  [97.3 °F (36.3 °C)-97.8 °F (36.6 °C)] 97.8  °F (36.6 °C)  Pulse:  [50-98] 64  Resp:  [16-18] 17  SpO2:  [92 %-99 %] 98 %  BP: (152-195)/(67-92) 163/72     Weight: 78.2 kg (172 lb 6.4 oz)  Body mass index is 25.46 kg/m².    SpO2: 98 %  O2 Device (Oxygen Therapy): room air    Intake/Output - Last 3 Shifts       05/17 0700 - 05/18 0659 05/18 0700 - 05/19 0659 05/19 0700 - 05/20 0659    P.O. 1150 784     I.V. (mL/kg)       IV Piggyback 40      Total Intake(mL/kg) 1190 (14.6) 784 (9.8)     Urine (mL/kg/hr) 850 (0.4) 550 (0.3)     Stool  0     Total Output 850 550     Net +340 +234            Urine Occurrence 3 x      Stool Occurrence 1 x 0 x           Lines/Drains/Airways     Peripheral Intravenous Line                 Peripheral IV - Single Lumen 05/14/20 1755 18 G Left Antecubital 4 days         Peripheral IV - Single Lumen 05/19/20 0623 20 G Right Forearm less than 1 day                Physical Exam   Constitutional: He is oriented to person, place, and time. He appears well-developed and well-nourished.   Cardiovascular: Normal rate, regular rhythm and normal heart sounds.   Pulmonary/Chest: Effort normal and breath sounds normal.   Abdominal: Soft.   Musculoskeletal: Normal range of motion.   Neurological: He is alert and oriented to person, place, and time.   Skin: Skin is warm, dry and intact.   Psychiatric: He has a normal mood and affect.       Significant Labs:  BMP:   Recent Labs   Lab 05/19/20  0327   GLU 78      K 3.9      CO2 19*   BUN 23   CREATININE 1.2   CALCIUM 9.5     CBC:   Recent Labs   Lab 05/19/20 0327   WBC 8.71   RBC 4.27*   HGB 12.9*   HCT 40.7      MCV 95   MCH 30.2   MCHC 31.7*     Coagulation:   Recent Labs   Lab 05/19/20 0327   APTT 25.6       Significant Diagnostics:  I have reviewed all pertinent imaging results/findings within the past 24 hours.

## 2020-05-19 NOTE — ANESTHESIA PROCEDURE NOTES
Central Line    Diagnosis: CAD  Patient location during procedure: done in OR  Procedure start time: 5/19/2020 1:03 AM  Timeout: 5/19/2020 1:03 AM  Procedure end time: 5/19/2020 1:18 AM    Staffing  Authorizing Provider: Maverick Sam MD  Performing Provider: Maverick Sam MD    Staffing  Anesthesiologist: Joshua Mckay MD  Resident/CRNA: Maria L Herbert DO  Performed: resident/CRNA   Anesthesiologist was present at the time of the procedure.  Preanesthetic Checklist  Completed: patient identified, site marked, surgical consent, pre-op evaluation, timeout performed, IV checked, risks and benefits discussed, monitors and equipment checked and anesthesia consent given  Indication   Indication: hemodynamic monitoring, vascular access, med administration     Anesthesia   general anesthesia    Central Line   Skin Prep: skin prepped with ChloraPrep, skin prep agent completely dried prior to procedure  maximum sterile barriers used during central venous catheter insertion  hand hygiene performed prior to central venous catheter insertion  Location: right, internal jugular.   Catheter type: quad lumen  Catheter Size: 12 Fr  Ultrasound: vascular probe with ultrasound  Vessel Caliber: large, patent  Vascular Doppler:  not done, compressibility normal  Needle advanced into vessel with real time Ultrasound guidance.  Guidewire confirmed in vessel.   Manometry: Venous cannualation confirmed by visual estimation of blood vessel pressure using manometry.  Insertion Attempts: 1   Securement:line sutured, chlorhexidine patch, sterile dressing applied and blood return through all ports    Post-Procedure   X-Ray: successful placement  Adverse Events:none    Guidewire Guidewire removed intact.

## 2020-05-19 NOTE — PROGRESS NOTES
"Pt reporting chest "burning" that "feels like a burp." Sensation is midsternal and does not radiate. VS obtained. /77. Pt feeling anxious, requesting medicine to help him relax and sleep. Dr. Dukes notified, and stated to notify her if chest burning becomes pressure and radiates. Ordered calcium carbonate and melatonin. Dr. Shane also notified that "Emergency CABG" consent is in the chart, to make sure that this was sufficient. Dr. Shane stated that they will check in AM and do the proper consents if any additional consents are needed. Will continue to monitor.      "

## 2020-05-19 NOTE — PROGRESS NOTES
Pts wife would like to be able to see him before the procedure tomorrow AM. Spoke with charge nurse Екатерина, advised that day shift house supervisor will be the person to make this call, as it is on a case-by-case basis. Wife notified by RN and is expecting information tomorrow AM w regards to the conditions and where/when she is able to come. Will notify day shift RN at shift change.

## 2020-05-19 NOTE — ANESTHESIA PROCEDURE NOTES
SANDRA    Diagnosis: Dx: ACS (acute coronary syndrome) (I24.9 (ICD-10-CM))  Patient location during procedure: OR  Procedure start time: 5/19/2020 4:45 PM  Timeout: 5/19/2020 4:45 PM  Procedure end time: 5/19/2020 4:45 PM  Surgery related to: AVR CABG   Exam type: Baseline  Staffing  Anesthesiologist: Joshua Mckay MD  Performed: anesthesiologist and fellow   Preanesthetic Checklist  Completed: patient identified, surgical consent, pre-op evaluation, timeout performed, risks and benefits discussed, monitors and equipment checked, anesthesia consent given, oxygen available, suction available, hand hygiene performed and patient being monitored  Setup & Induction  Patient preparation: bite block inserted  Probe Insertion: easy  Exam: complete      Findings  Impression  Other Findings  Pre CPB    Normal biventricular systolic function, EF 55% GLS -14.8 (lowest segment mid inferior septal -9)  Severe eccentric AI, Trileaflet AV, with what appears to be prolapse of the RCC, P1/2t 350, Intermediate flow reversal in descending thoracic aorta   Jet area/Lvot area 30%   The sinus of valsalva is dilated and measures 4.55cm   No IAS defect, No PFO   No effusions   Aorta intact, no dissection     Post AVR CABG   Probe Removal      Exam     Left Heart  Left Atruim: normal  Left appendage velocity:61    Left Ventricle: 6.3 cm  LV Wall Thickness (posterior wall): cm, normal (men 0.6-1.0 cm; women 0.6-0.9 cm)    LVAD:no  Estimated Ejection Fraction: > 55% normal  Regional Wall Abnormalities: no RWMA            Right Heart  Right Ventricle Function: normal  Right Atria: cm and normal    Intra Atrial Septum  PFO: no shunt by color flow doppler  no IAS aneurysm  no lipomatous hypertrophy  no Atrial Septal Defect (Asd)    Right Ventricle, Free Wall Thickness: normal    Aortic Valve:  Stenosis: none  Morphology: trileaflet  Regurgitation: moderate to severe aortic regurgitation     Mitral Valve:  Morphology:normal    Tricuspid  Valve:  Morphology: normal    Pulmonic Valve:  Morphology:normal    Great Vessels  Ascending Aorta Atherosclerosis: 2=mild dz (<3mm)  Aortic Arch Atherosclerosis: 2=mild dz (<3mm)  IABP: no  Descending Aorta Atherosclerosis: 2=mild dz (<3mm)  Aorta    Descending aorta IABP: no    Effusions  Effusions: none    Summary  Findings discussed with surgeon.    Other Findings   Pre CPB    Normal biventricular systolic function, EF 55% GLS -14.8 (lowest segment mid inferior septal -9)  Severe eccentric AI, Trileaflet AV, with what appears to be prolapse of the RCC, P1/2t 350, Intermediate flow reversal in descending thoracic aorta   Jet area/Lvot area 30%   The sinus of valsalva is dilated and measures 4.55cm   No IAS defect, No PFO   No effusions   Aorta intact, no dissection     Post AVR CABG

## 2020-05-19 NOTE — ANESTHESIA PROCEDURE NOTES
Arterial    Diagnosis: CAD and AS    Patient location during procedure: done in OR  Procedure start time: 5/19/2020 12:52 PM  Timeout: 5/19/2020 12:52 PM  Procedure end time: 5/19/2020 12:59 PM    Staffing  Authorizing Provider: Maverick Sam MD  Performing Provider: Maverick Sam MD    Anesthesiologist was present at the time of the procedure.    Preanesthetic Checklist  Completed: patient identified, site marked, surgical consent, pre-op evaluation, timeout performed, IV checked, risks and benefits discussed, monitors and equipment checked and anesthesia consent givenArterial  Skin Prep: chlorhexidine gluconate  Local Infiltration: lidocaine  Orientation: left  Location: radial  Catheter Size: 20 G  Catheter placement by Ultrasound guidance. Heme positive aspiration all ports.  Vessel Caliber: medium, patent, compressibility normal  Needle advanced into vessel with real time Ultrasound guidance.  Sterile sheath used.Insertion Attempts: 3  Assessment  Dressing: secured with tape and tegaderm  Patient: Tolerated well

## 2020-05-19 NOTE — PROGRESS NOTES
[facility]  Cardiothoracic Surgery  Progress Note    Patient Name: Brad Kearns  MRN: [id number]  Admission Date: 5/14/2020  Hospital Length of Stay: 5 days  Code Status: Full Code   Attending Physician: Guicho Quezada MD   Referring Provider: Self, Aaareferral  Principal Problem:NSTEMI (non-ST elevated myocardial infarction)            Subjective:     Post-Op Info:  Procedure(s) (LRB):  CORONARY ARTERY BYPASS GRAFT (CABG) (N/A)  BENTALL PROCEDURE (N/A)   Day of Surgery     Interval History: No acute events over night.  Patient heparin drip stopped at midnight in anticipation of cardiac surgery today.  Patient will be transferred from OR to ICU. All questions answered preoperatively.    Review of Systems   Constitution: Negative for malaise/fatigue.   Cardiovascular: Negative for chest pain, claudication, dyspnea on exertion, irregular heartbeat, leg swelling and palpitations.   Respiratory: Negative for cough and shortness of breath.    Hematologic/Lymphatic: Negative for bleeding problem.   Gastrointestinal: Negative for abdominal pain.   Genitourinary: Negative for dysuria.   Neurological: Negative for headaches and weakness.     Medications:  Continuous Infusions:   sodium chloride 0.9% 50 mL/hr (05/15/20 0850)    heparin (porcine)       Scheduled Meds:   famotidine (PF)  20 mg Intravenous Once    metoprolol tartrate  25 mg Oral BID    microplegia arrest solution (KCL 80mEq/40 mL)  80 mEq Other Once    microplegia protection soln (LIDOCAINE 100mg/MAGNESIUM 4g/qs NS 40mL)   Other Once     PRN Meds:sodium chloride 0.9%, acetaminophen, AMINO ACID ENRICHED CARDIOPLEGIA SOLN (CARDIA REPERFUSATE) Dextrose-70% 17.3ml,Sterile water-102.7ml,CP2D soln-112.5ml,Tromethamine 0.3M-112.5ml,MSA/MSG 0.46M-125ml, KCl 2meq/ml-7.5ml Total volume (477.5ml), calcium carbonate, ceFAZolin (ANCEF) IVPB, heparin (PORCINE), heparin (PORCINE), heparin (porcine), microplegia arrest solution (KCL 120mEq/60 mL)  refill syringe, microplegia arrest solution (KCL 80mEq/40 mL), microplegia protection soln (LIDOCAINE 100mg/MAGNESIUM 4g/qs NS 40mL), mupirocin, ondansetron, senna-docusate 8.6-50 mg, sodium chloride 0.9%, sodium chloride 0.9%     Objective:     Vital Signs (Most Recent):  Temp: 97.8 °F (36.6 °C) (05/19/20 0938)  Pulse: 64 (05/19/20 0938)  Resp: 17 (05/19/20 0938)  BP: (!) 163/72 (05/19/20 0938)  SpO2: 98 % (05/19/20 0938) Vital Signs (24h Range):  Temp:  [97.3 °F (36.3 °C)-97.8 °F (36.6 °C)] 97.8 °F (36.6 °C)  Pulse:  [50-98] 64  Resp:  [16-18] 17  SpO2:  [92 %-99 %] 98 %  BP: (152-195)/(67-92) 163/72     Weight: 78.2 kg (172 lb 6.4 oz)  Body mass index is 25.46 kg/m².    SpO2: 98 %  O2 Device (Oxygen Therapy): room air    Intake/Output - Last 3 Shifts       05/17 0700 - 05/18 0659 05/18 0700 - 05/19 0659 05/19 0700 - 05/20 0659    P.O. 1150 784     I.V. (mL/kg)       IV Piggyback 40      Total Intake(mL/kg) 1190 (14.6) 784 (9.8)     Urine (mL/kg/hr) 850 (0.4) 550 (0.3)     Stool  0     Total Output 850 550     Net +340 +234            Urine Occurrence 3 x      Stool Occurrence 1 x 0 x           Lines/Drains/Airways     Peripheral Intravenous Line                 Peripheral IV - Single Lumen 05/14/20 1755 18 G Left Antecubital 4 days         Peripheral IV - Single Lumen 05/19/20 0623 20 G Right Forearm less than 1 day                Physical Exam   Constitutional: He is oriented to person, place, and time. He appears well-developed and well-nourished.   Cardiovascular: Normal rate, regular rhythm and normal heart sounds.   Pulmonary/Chest: Effort normal and breath sounds normal.   Abdominal: Soft.   Musculoskeletal: Normal range of motion.   Neurological: He is alert and oriented to person, place, and time.   Skin: Skin is warm, dry and intact.   Psychiatric: He has a normal mood and affect.       Significant Labs:  BMP:   Recent Labs   Lab 05/19/20  0327   GLU 78      K 3.9      CO2 19*   BUN 23    CREATININE 1.2   CALCIUM 9.5     CBC:   Recent Labs   Lab 05/19/20  0327   WBC 8.71   RBC 4.27*   HGB 12.9*   HCT 40.7      MCV 95   MCH 30.2   MCHC 31.7*     Coagulation:   Recent Labs   Lab 05/19/20  0327   APTT 25.6       Significant Diagnostics:  I have reviewed all pertinent imaging results/findings within the past 24 hours.    Assessment/Plan:     * NSTEMI (non-ST elevated myocardial infarction)  Brad Kearns is a 75 yo M with medical hx hypertension and mod-severe AI with mild aortic root dilation and possible bicuspid AV. Presented to the ED with chest pain and SOB. Went for LHC today which showed multivessel disease and severe AI (possible bicuspid on TTE). Was loaded with plavix 5/14/2020. CTA chest, carotid US, and PFTs ordered. Tentatively plan for CABG x 2-3 and possible bio bentall on 5/19/2020.  -Patient remains on heparin gtt, will discontinue to midnight tonight  -Plan for OR on 5/19; pre-op today  -Continue Meto  -Continue ASA  -Continue statin  -Stop ARBs (Valsartan)  -Stop Imdur  -Prepare for OR today    Aortic insufficiency  -See NSTEMI    Hypertension  Stop Norvasc this morning        Manda Goodson, CAMERON  Cardiothoracic Surgery  [facility]

## 2020-05-19 NOTE — PLAN OF CARE
05/19/20 0759   Discharge Reassessment   Assessment Type Discharge Planning Reassessment   Do you have any problems affording any of your prescribed medications? No   Discharge Plan A Home with family   Admit Dx MV CAD. CABG scheduled for today. No DC needs identified at this time.

## 2020-05-19 NOTE — ANESTHESIA PROCEDURE NOTES
Intubation  Performed by: Maverick Sam MD  Authorized by: Maverick Sam MD     Intubation:     Induction:  Intravenous    Intubated:  Postinduction    Mask Ventilation:  Easy with oral airway    Attempts:  1    Attempted By:  Resident anesthesiologist    Method of Intubation:  Direct    Blade:  Mercado 2    Laryngeal View Grade: Grade IIA - cords partially seen      Difficult Airway Encountered?: No      Complications:  None    Airway Device:  Oral endotracheal tube    Airway Device Size:  7.5    Style/Cuff Inflation:  Cuffed    Inflation Amount (mL):  8    Tube secured:  25    Secured at:  The lips    Placement Verified By:  Capnometry    Complicating Factors:  Anterior larynx and large prominent central incisors    Findings Post-Intubation:  BS equal bilateral and atraumatic/condition of teeth unchanged

## 2020-05-19 NOTE — ASSESSMENT & PLAN NOTE
Brad Kearns is a 75 yo M with medical hx hypertension and mod-severe AI with mild aortic root dilation and possible bicuspid AV. Presented to the ED with chest pain and SOB. Went for LHC today which showed multivessel disease and severe AI (possible bicuspid on TTE). Was loaded with plavix 5/14/2020. CTA chest, carotid US, and PFTs ordered. Tentatively plan for CABG x 2-3 and possible bio bentall on 5/19/2020.  -Patient remains on heparin gtt, will discontinue to midnight tonight  -Plan for OR on 5/19; pre-op today  -Continue Meto  -Continue ASA  -Continue statin  -Stop ARBs (Valsartan)  -Stop Imdur  -Prepare for OR today

## 2020-05-19 NOTE — PLAN OF CARE
Pt free of falls and injury. Fall precautions remain in place. Pt remains on room air. NSR on telemetry. Educated pt on importance of accurate I/Os. Plan of care reviewed with pt. Pt remains NPO for CABG today. Pt resting comfortably with no complaints of pain. Pt VSS, no distress, will continue to monitor.

## 2020-05-19 NOTE — NURSING
Pt clipped chin to ankles, some irritation to skin/nicks present on both sides of abdomen.     Pt first chlorhexidine bath complete.    New PIV placed, 20G to R forearm.

## 2020-05-20 ENCOUNTER — ANESTHESIA EVENT (OUTPATIENT)
Dept: SURGERY | Facility: HOSPITAL | Age: 75
DRG: 216 | End: 2020-05-20
Payer: MEDICARE

## 2020-05-20 ENCOUNTER — ANESTHESIA (OUTPATIENT)
Dept: SURGERY | Facility: HOSPITAL | Age: 75
DRG: 216 | End: 2020-05-20
Payer: MEDICARE

## 2020-05-20 PROBLEM — Z95.1 S/P CABG (CORONARY ARTERY BYPASS GRAFT): Status: ACTIVE | Noted: 2020-05-20

## 2020-05-20 PROBLEM — R73.9 ACUTE HYPERGLYCEMIA: Status: ACTIVE | Noted: 2020-05-20

## 2020-05-20 LAB
ALBUMIN SERPL BCP-MCNC: 3.2 G/DL (ref 3.5–5.2)
ALBUMIN SERPL BCP-MCNC: 3.3 G/DL (ref 3.5–5.2)
ALLENS TEST: ABNORMAL
ALP SERPL-CCNC: 29 U/L (ref 55–135)
ALP SERPL-CCNC: 30 U/L (ref 55–135)
ALT SERPL W/O P-5'-P-CCNC: 22 U/L (ref 10–44)
ALT SERPL W/O P-5'-P-CCNC: 22 U/L (ref 10–44)
ANION GAP SERPL CALC-SCNC: 12 MMOL/L (ref 8–16)
ANION GAP SERPL CALC-SCNC: 15 MMOL/L (ref 8–16)
ANION GAP SERPL CALC-SCNC: 21 MMOL/L (ref 8–16)
ANISOCYTOSIS BLD QL SMEAR: SLIGHT
APTT BLDCRRT: 24.9 SEC (ref 21–32)
APTT BLDCRRT: 29.3 SEC (ref 21–32)
APTT BLDCRRT: 34 SEC (ref 21–32)
AST SERPL-CCNC: 49 U/L (ref 10–40)
AST SERPL-CCNC: 57 U/L (ref 10–40)
BASOPHILS # BLD AUTO: 0.01 K/UL (ref 0–0.2)
BASOPHILS # BLD AUTO: 0.01 K/UL (ref 0–0.2)
BASOPHILS # BLD AUTO: 0.02 K/UL (ref 0–0.2)
BASOPHILS NFR BLD: 0.1 % (ref 0–1.9)
BILIRUB SERPL-MCNC: 1 MG/DL (ref 0.1–1)
BILIRUB SERPL-MCNC: 1.6 MG/DL (ref 0.1–1)
BLD PROD TYP BPU: NORMAL
BLOOD UNIT EXPIRATION DATE: NORMAL
BLOOD UNIT TYPE CODE: 600
BLOOD UNIT TYPE CODE: 6200
BLOOD UNIT TYPE CODE: 7300
BLOOD UNIT TYPE CODE: 8400
BLOOD UNIT TYPE: NORMAL
BUN SERPL-MCNC: 20 MG/DL (ref 8–23)
BUN SERPL-MCNC: 21 MG/DL (ref 8–23)
BUN SERPL-MCNC: 21 MG/DL (ref 8–23)
BURR CELLS BLD QL SMEAR: ABNORMAL
CALCIUM SERPL-MCNC: 7.5 MG/DL (ref 8.7–10.5)
CALCIUM SERPL-MCNC: 7.8 MG/DL (ref 8.7–10.5)
CALCIUM SERPL-MCNC: 7.9 MG/DL (ref 8.7–10.5)
CHLORIDE SERPL-SCNC: 112 MMOL/L (ref 95–110)
CHLORIDE SERPL-SCNC: 113 MMOL/L (ref 95–110)
CHLORIDE SERPL-SCNC: 114 MMOL/L (ref 95–110)
CO2 SERPL-SCNC: 13 MMOL/L (ref 23–29)
CO2 SERPL-SCNC: 18 MMOL/L (ref 23–29)
CO2 SERPL-SCNC: 23 MMOL/L (ref 23–29)
CODING SYSTEM: NORMAL
CREAT SERPL-MCNC: 1.1 MG/DL (ref 0.5–1.4)
CREAT SERPL-MCNC: 1.2 MG/DL (ref 0.5–1.4)
CREAT SERPL-MCNC: 1.3 MG/DL (ref 0.5–1.4)
DELSYS: ABNORMAL
DIFFERENTIAL METHOD: ABNORMAL
DISPENSE STATUS: NORMAL
EOSINOPHIL # BLD AUTO: 0 K/UL (ref 0–0.5)
EOSINOPHIL NFR BLD: 0 % (ref 0–8)
ERYTHROCYTE [DISTWIDTH] IN BLOOD BY AUTOMATED COUNT: 14.2 % (ref 11.5–14.5)
ERYTHROCYTE [DISTWIDTH] IN BLOOD BY AUTOMATED COUNT: 15.6 % (ref 11.5–14.5)
ERYTHROCYTE [DISTWIDTH] IN BLOOD BY AUTOMATED COUNT: 16.1 % (ref 11.5–14.5)
ERYTHROCYTE [SEDIMENTATION RATE] IN BLOOD BY WESTERGREN METHOD: 14 MM/H
EST. GFR  (AFRICAN AMERICAN): >60 ML/MIN/1.73 M^2
EST. GFR  (NON AFRICAN AMERICAN): 54.1 ML/MIN/1.73 M^2
EST. GFR  (NON AFRICAN AMERICAN): 59.6 ML/MIN/1.73 M^2
EST. GFR  (NON AFRICAN AMERICAN): >60 ML/MIN/1.73 M^2
FIBRINOGEN PPP-MCNC: 279 MG/DL (ref 182–366)
FIO2: 100
FIO2: 100
FIO2: 40
FIO2: 60
GLUCOSE SERPL-MCNC: 152 MG/DL (ref 70–110)
GLUCOSE SERPL-MCNC: 167 MG/DL (ref 70–110)
GLUCOSE SERPL-MCNC: 179 MG/DL (ref 70–110)
GLUCOSE SERPL-MCNC: 192 MG/DL (ref 70–110)
GLUCOSE SERPL-MCNC: 209 MG/DL (ref 70–110)
HCO3 UR-SCNC: 14.8 MMOL/L (ref 24–28)
HCO3 UR-SCNC: 16 MMOL/L (ref 24–28)
HCO3 UR-SCNC: 16.2 MMOL/L (ref 24–28)
HCO3 UR-SCNC: 17.8 MMOL/L (ref 24–28)
HCO3 UR-SCNC: 18.6 MMOL/L (ref 24–28)
HCO3 UR-SCNC: 18.9 MMOL/L (ref 24–28)
HCO3 UR-SCNC: 19.1 MMOL/L (ref 24–28)
HCO3 UR-SCNC: 19.7 MMOL/L (ref 24–28)
HCO3 UR-SCNC: 20.1 MMOL/L (ref 24–28)
HCO3 UR-SCNC: 20.8 MMOL/L (ref 24–28)
HCO3 UR-SCNC: 21.3 MMOL/L (ref 24–28)
HCO3 UR-SCNC: 23.5 MMOL/L (ref 24–28)
HCO3 UR-SCNC: 23.7 MMOL/L (ref 24–28)
HCO3 UR-SCNC: 23.8 MMOL/L (ref 24–28)
HCO3 UR-SCNC: 25 MMOL/L (ref 24–28)
HCT VFR BLD AUTO: 18.1 % (ref 40–54)
HCT VFR BLD AUTO: 30.3 % (ref 40–54)
HCT VFR BLD AUTO: 30.4 % (ref 40–54)
HCT VFR BLD CALC: 15 %PCV (ref 36–54)
HCT VFR BLD CALC: 18 %PCV (ref 36–54)
HCT VFR BLD CALC: 20 %PCV (ref 36–54)
HCT VFR BLD CALC: 22 %PCV (ref 36–54)
HCT VFR BLD CALC: 27 %PCV (ref 36–54)
HCT VFR BLD CALC: 28 %PCV (ref 36–54)
HCT VFR BLD CALC: 29 %PCV (ref 36–54)
HCT VFR BLD CALC: 29 %PCV (ref 36–54)
HGB BLD-MCNC: 10.1 G/DL (ref 14–18)
HGB BLD-MCNC: 5.9 G/DL (ref 14–18)
HGB BLD-MCNC: 9.7 G/DL (ref 14–18)
HYPOCHROMIA BLD QL SMEAR: ABNORMAL
IMM GRANULOCYTES # BLD AUTO: 0.04 K/UL (ref 0–0.04)
IMM GRANULOCYTES # BLD AUTO: 0.04 K/UL (ref 0–0.04)
IMM GRANULOCYTES # BLD AUTO: 0.1 K/UL (ref 0–0.04)
IMM GRANULOCYTES NFR BLD AUTO: 0.3 % (ref 0–0.5)
IMM GRANULOCYTES NFR BLD AUTO: 0.5 % (ref 0–0.5)
IMM GRANULOCYTES NFR BLD AUTO: 0.7 % (ref 0–0.5)
INR PPP: 1.1 (ref 0.8–1.2)
INR PPP: 1.2 (ref 0.8–1.2)
IP: 5
IP: 5
IT: 0.8
IT: 0.8
LDH SERPL L TO P-CCNC: 0.84 MMOL/L (ref 0.36–1.25)
LDH SERPL L TO P-CCNC: 2.41 MMOL/L (ref 0.36–1.25)
LDH SERPL L TO P-CCNC: 2.8 MMOL/L (ref 0.36–1.25)
LDH SERPL L TO P-CCNC: 3.09 MMOL/L (ref 0.36–1.25)
LDH SERPL L TO P-CCNC: 5.17 MMOL/L (ref 0.36–1.25)
LDH SERPL L TO P-CCNC: 5.39 MMOL/L (ref 0.36–1.25)
LDH SERPL L TO P-CCNC: 7.45 MMOL/L (ref 0.36–1.25)
LDH SERPL L TO P-CCNC: 7.92 MMOL/L (ref 0.36–1.25)
LDH SERPL L TO P-CCNC: 8.22 MMOL/L (ref 0.36–1.25)
LDH SERPL L TO P-CCNC: 8.41 MMOL/L (ref 0.36–1.25)
LDH SERPL L TO P-CCNC: 9.56 MMOL/L (ref 0.36–1.25)
LDH SERPL L TO P-CCNC: 9.6 MMOL/L (ref 0.36–1.25)
LYMPHOCYTES # BLD AUTO: 0.6 K/UL (ref 1–4.8)
LYMPHOCYTES # BLD AUTO: 0.6 K/UL (ref 1–4.8)
LYMPHOCYTES # BLD AUTO: 1.1 K/UL (ref 1–4.8)
LYMPHOCYTES NFR BLD: 3.8 % (ref 18–48)
LYMPHOCYTES NFR BLD: 7.4 % (ref 18–48)
LYMPHOCYTES NFR BLD: 7.5 % (ref 18–48)
MAGNESIUM SERPL-MCNC: 2.3 MG/DL (ref 1.6–2.6)
MCH RBC QN AUTO: 29.3 PG (ref 27–31)
MCH RBC QN AUTO: 29.5 PG (ref 27–31)
MCH RBC QN AUTO: 31.6 PG (ref 27–31)
MCHC RBC AUTO-ENTMCNC: 31.9 G/DL (ref 32–36)
MCHC RBC AUTO-ENTMCNC: 32.6 G/DL (ref 32–36)
MCHC RBC AUTO-ENTMCNC: 33.3 G/DL (ref 32–36)
MCV RBC AUTO: 89 FL (ref 82–98)
MCV RBC AUTO: 92 FL (ref 82–98)
MCV RBC AUTO: 97 FL (ref 82–98)
MODE: ABNORMAL
MONOCYTES # BLD AUTO: 0.6 K/UL (ref 0.3–1)
MONOCYTES # BLD AUTO: 0.9 K/UL (ref 0.3–1)
MONOCYTES # BLD AUTO: 1.2 K/UL (ref 0.3–1)
MONOCYTES NFR BLD: 6.1 % (ref 4–15)
MONOCYTES NFR BLD: 7.5 % (ref 4–15)
MONOCYTES NFR BLD: 8.1 % (ref 4–15)
NEUTROPHILS # BLD AUTO: 11.9 K/UL (ref 1.8–7.7)
NEUTROPHILS # BLD AUTO: 13.6 K/UL (ref 1.8–7.7)
NEUTROPHILS # BLD AUTO: 7.2 K/UL (ref 1.8–7.7)
NEUTROPHILS NFR BLD: 83.6 % (ref 38–73)
NEUTROPHILS NFR BLD: 84.5 % (ref 38–73)
NEUTROPHILS NFR BLD: 89.7 % (ref 38–73)
NRBC BLD-RTO: 0 /100 WBC
NUM UNITS TRANS FFP: NORMAL
NUM UNITS TRANS FFP: NORMAL
NUM UNITS TRANS PACKED RBC: NORMAL
OVALOCYTES BLD QL SMEAR: ABNORMAL
PCO2 BLDA: 27.9 MMHG (ref 35–45)
PCO2 BLDA: 32.6 MMHG (ref 35–45)
PCO2 BLDA: 32.9 MMHG (ref 35–45)
PCO2 BLDA: 34.2 MMHG (ref 35–45)
PCO2 BLDA: 34.3 MMHG (ref 35–45)
PCO2 BLDA: 34.8 MMHG (ref 35–45)
PCO2 BLDA: 35.5 MMHG (ref 35–45)
PCO2 BLDA: 35.9 MMHG (ref 35–45)
PCO2 BLDA: 35.9 MMHG (ref 35–45)
PCO2 BLDA: 36 MMHG (ref 35–45)
PCO2 BLDA: 36.9 MMHG (ref 35–45)
PCO2 BLDA: 37.2 MMHG (ref 35–45)
PCO2 BLDA: 38.2 MMHG (ref 35–45)
PCO2 BLDA: 38.6 MMHG (ref 35–45)
PCO2 BLDA: 38.9 MMHG (ref 35–45)
PEEP: 5
PH SMN: 7.28 [PH] (ref 7.35–7.45)
PH SMN: 7.29 [PH] (ref 7.35–7.45)
PH SMN: 7.29 [PH] (ref 7.35–7.45)
PH SMN: 7.3 [PH] (ref 7.35–7.45)
PH SMN: 7.3 [PH] (ref 7.35–7.45)
PH SMN: 7.33 [PH] (ref 7.35–7.45)
PH SMN: 7.34 [PH] (ref 7.35–7.45)
PH SMN: 7.35 [PH] (ref 7.35–7.45)
PH SMN: 7.37 [PH] (ref 7.35–7.45)
PH SMN: 7.37 [PH] (ref 7.35–7.45)
PH SMN: 7.38 [PH] (ref 7.35–7.45)
PH SMN: 7.42 [PH] (ref 7.35–7.45)
PH SMN: 7.43 [PH] (ref 7.35–7.45)
PH SMN: 7.44 [PH] (ref 7.35–7.45)
PH SMN: 7.45 [PH] (ref 7.35–7.45)
PHOSPHATE SERPL-MCNC: 2.4 MG/DL (ref 2.7–4.5)
PHOSPHATE SERPL-MCNC: 2.9 MG/DL (ref 2.7–4.5)
PHOSPHATE SERPL-MCNC: 4.3 MG/DL (ref 2.7–4.5)
PLATELET # BLD AUTO: 134 K/UL (ref 150–350)
PLATELET # BLD AUTO: 192 K/UL (ref 150–350)
PLATELET # BLD AUTO: 231 K/UL (ref 150–350)
PLATELET BLD QL SMEAR: ABNORMAL
PMV BLD AUTO: 10 FL (ref 9.2–12.9)
PMV BLD AUTO: 10.3 FL (ref 9.2–12.9)
PMV BLD AUTO: 10.5 FL (ref 9.2–12.9)
PO2 BLDA: 118 MMHG (ref 80–100)
PO2 BLDA: 127 MMHG (ref 80–100)
PO2 BLDA: 213 MMHG (ref 80–100)
PO2 BLDA: 53 MMHG (ref 80–100)
PO2 BLDA: 57 MMHG (ref 80–100)
PO2 BLDA: 60 MMHG (ref 80–100)
PO2 BLDA: 63 MMHG (ref 80–100)
PO2 BLDA: 66 MMHG (ref 80–100)
PO2 BLDA: 68 MMHG (ref 80–100)
PO2 BLDA: 72 MMHG (ref 80–100)
PO2 BLDA: 74 MMHG (ref 80–100)
PO2 BLDA: 75 MMHG (ref 80–100)
PO2 BLDA: 83 MMHG (ref 80–100)
PO2 BLDA: 88 MMHG (ref 80–100)
PO2 BLDA: 91 MMHG (ref 80–100)
POC BE: -1 MMOL/L
POC BE: -10 MMOL/L
POC BE: -11 MMOL/L
POC BE: -11 MMOL/L
POC BE: -4 MMOL/L
POC BE: -5 MMOL/L
POC BE: -5 MMOL/L
POC BE: -6 MMOL/L
POC BE: -6 MMOL/L
POC BE: -8 MMOL/L
POC BE: -8 MMOL/L
POC BE: -9 MMOL/L
POC BE: 0 MMOL/L
POC BE: 0 MMOL/L
POC BE: 1 MMOL/L
POC IONIZED CALCIUM: 1.01 MMOL/L (ref 1.06–1.42)
POC IONIZED CALCIUM: 1.04 MMOL/L (ref 1.06–1.42)
POC IONIZED CALCIUM: 1.05 MMOL/L (ref 1.06–1.42)
POC IONIZED CALCIUM: 1.06 MMOL/L (ref 1.06–1.42)
POC IONIZED CALCIUM: 1.06 MMOL/L (ref 1.06–1.42)
POC IONIZED CALCIUM: 1.07 MMOL/L (ref 1.06–1.42)
POC IONIZED CALCIUM: 1.08 MMOL/L (ref 1.06–1.42)
POC IONIZED CALCIUM: 1.09 MMOL/L (ref 1.06–1.42)
POC IONIZED CALCIUM: 1.09 MMOL/L (ref 1.06–1.42)
POC IONIZED CALCIUM: 1.11 MMOL/L (ref 1.06–1.42)
POC IONIZED CALCIUM: 1.21 MMOL/L (ref 1.06–1.42)
POC SATURATED O2: 100 % (ref 95–100)
POC SATURATED O2: 88 % (ref 95–100)
POC SATURATED O2: 89 % (ref 95–100)
POC SATURATED O2: 90 % (ref 95–100)
POC SATURATED O2: 91 % (ref 95–100)
POC SATURATED O2: 91 % (ref 95–100)
POC SATURATED O2: 93 % (ref 95–100)
POC SATURATED O2: 95 % (ref 95–100)
POC SATURATED O2: 95 % (ref 95–100)
POC SATURATED O2: 97 % (ref 95–100)
POC SATURATED O2: 97 % (ref 95–100)
POC SATURATED O2: 98 % (ref 95–100)
POC SATURATED O2: 99 % (ref 95–100)
POC TCO2: 16 MMOL/L (ref 23–27)
POC TCO2: 17 MMOL/L (ref 23–27)
POC TCO2: 17 MMOL/L (ref 23–27)
POC TCO2: 19 MMOL/L (ref 23–27)
POC TCO2: 20 MMOL/L (ref 23–27)
POC TCO2: 21 MMOL/L (ref 23–27)
POC TCO2: 21 MMOL/L (ref 23–27)
POC TCO2: 22 MMOL/L (ref 23–27)
POC TCO2: 22 MMOL/L (ref 23–27)
POC TCO2: 25 MMOL/L (ref 23–27)
POC TCO2: 26 MMOL/L (ref 23–27)
POCT GLUCOSE: 115 MG/DL (ref 70–110)
POCT GLUCOSE: 117 MG/DL (ref 70–110)
POCT GLUCOSE: 118 MG/DL (ref 70–110)
POCT GLUCOSE: 118 MG/DL (ref 70–110)
POCT GLUCOSE: 121 MG/DL (ref 70–110)
POCT GLUCOSE: 121 MG/DL (ref 70–110)
POCT GLUCOSE: 135 MG/DL (ref 70–110)
POCT GLUCOSE: 144 MG/DL (ref 70–110)
POCT GLUCOSE: 176 MG/DL (ref 70–110)
POCT GLUCOSE: 188 MG/DL (ref 70–110)
POCT GLUCOSE: 195 MG/DL (ref 70–110)
POCT GLUCOSE: 196 MG/DL (ref 70–110)
POCT GLUCOSE: 197 MG/DL (ref 70–110)
POCT GLUCOSE: 203 MG/DL (ref 70–110)
POCT GLUCOSE: 93 MG/DL (ref 70–110)
POCT GLUCOSE: 94 MG/DL (ref 70–110)
POIKILOCYTOSIS BLD QL SMEAR: SLIGHT
POLYCHROMASIA BLD QL SMEAR: ABNORMAL
POTASSIUM BLD-SCNC: 3.1 MMOL/L (ref 3.5–5.1)
POTASSIUM BLD-SCNC: 3.2 MMOL/L (ref 3.5–5.1)
POTASSIUM BLD-SCNC: 3.3 MMOL/L (ref 3.5–5.1)
POTASSIUM BLD-SCNC: 3.6 MMOL/L (ref 3.5–5.1)
POTASSIUM BLD-SCNC: 3.7 MMOL/L (ref 3.5–5.1)
POTASSIUM BLD-SCNC: 3.7 MMOL/L (ref 3.5–5.1)
POTASSIUM BLD-SCNC: 3.8 MMOL/L (ref 3.5–5.1)
POTASSIUM BLD-SCNC: 3.8 MMOL/L (ref 3.5–5.1)
POTASSIUM BLD-SCNC: 3.9 MMOL/L (ref 3.5–5.1)
POTASSIUM BLD-SCNC: 4.1 MMOL/L (ref 3.5–5.1)
POTASSIUM BLD-SCNC: 4.2 MMOL/L (ref 3.5–5.1)
POTASSIUM BLD-SCNC: 4.2 MMOL/L (ref 3.5–5.1)
POTASSIUM BLD-SCNC: 4.6 MMOL/L (ref 3.5–5.1)
POTASSIUM SERPL-SCNC: 3.5 MMOL/L (ref 3.5–5.1)
POTASSIUM SERPL-SCNC: 3.8 MMOL/L (ref 3.5–5.1)
POTASSIUM SERPL-SCNC: 4.3 MMOL/L (ref 3.5–5.1)
PROT SERPL-MCNC: 4.7 G/DL (ref 6–8.4)
PROT SERPL-MCNC: 4.7 G/DL (ref 6–8.4)
PROTHROMBIN TIME: 11.7 SEC (ref 9–12.5)
PROTHROMBIN TIME: 12 SEC (ref 9–12.5)
RBC # BLD AUTO: 1.87 M/UL (ref 4.6–6.2)
RBC # BLD AUTO: 3.31 M/UL (ref 4.6–6.2)
RBC # BLD AUTO: 3.42 M/UL (ref 4.6–6.2)
SAMPLE: ABNORMAL
SITE: ABNORMAL
SMUDGE CELLS BLD QL SMEAR: PRESENT
SODIUM BLD-SCNC: 142 MMOL/L (ref 136–145)
SODIUM BLD-SCNC: 146 MMOL/L (ref 136–145)
SODIUM BLD-SCNC: 147 MMOL/L (ref 136–145)
SODIUM BLD-SCNC: 147 MMOL/L (ref 136–145)
SODIUM BLD-SCNC: 148 MMOL/L (ref 136–145)
SODIUM BLD-SCNC: 149 MMOL/L (ref 136–145)
SODIUM BLD-SCNC: 150 MMOL/L (ref 136–145)
SODIUM BLD-SCNC: 150 MMOL/L (ref 136–145)
SODIUM SERPL-SCNC: 146 MMOL/L (ref 136–145)
SODIUM SERPL-SCNC: 146 MMOL/L (ref 136–145)
SODIUM SERPL-SCNC: 149 MMOL/L (ref 136–145)
SP02: 100
SP02: 60
SP02: 60
SP02: 80
SP02: 80
SPHEROCYTES BLD QL SMEAR: ABNORMAL
TRANS ERYTHROCYTES VOL PATIENT: NORMAL ML
TRANS PLATPHERESIS VOL PATIENT: NORMAL ML
TRANS PLATPHERESIS VOL PATIENT: NORMAL ML
UNIT NUMBER: NORMAL
VT: 450
WBC # BLD AUTO: 14.26 K/UL (ref 3.9–12.7)
WBC # BLD AUTO: 15.18 K/UL (ref 3.9–12.7)
WBC # BLD AUTO: 8.51 K/UL (ref 3.9–12.7)

## 2020-05-20 PROCEDURE — 82803 BLOOD GASES ANY COMBINATION: CPT

## 2020-05-20 PROCEDURE — 63600175 PHARM REV CODE 636 W HCPCS: Performed by: STUDENT IN AN ORGANIZED HEALTH CARE EDUCATION/TRAINING PROGRAM

## 2020-05-20 PROCEDURE — 27000221 HC OXYGEN, UP TO 24 HOURS

## 2020-05-20 PROCEDURE — 83735 ASSAY OF MAGNESIUM: CPT | Mod: 91

## 2020-05-20 PROCEDURE — 86965 POOLING BLOOD PLATELETS: CPT

## 2020-05-20 PROCEDURE — 82330 ASSAY OF CALCIUM: CPT

## 2020-05-20 PROCEDURE — 37000008 HC ANESTHESIA 1ST 15 MINUTES: Performed by: THORACIC SURGERY (CARDIOTHORACIC VASCULAR SURGERY)

## 2020-05-20 PROCEDURE — 37799 UNLISTED PX VASCULAR SURGERY: CPT

## 2020-05-20 PROCEDURE — 85610 PROTHROMBIN TIME: CPT | Mod: 91

## 2020-05-20 PROCEDURE — P9016 RBC LEUKOCYTES REDUCED: HCPCS

## 2020-05-20 PROCEDURE — P9021 RED BLOOD CELLS UNIT: HCPCS

## 2020-05-20 PROCEDURE — C9113 INJ PANTOPRAZOLE SODIUM, VIA: HCPCS | Performed by: STUDENT IN AN ORGANIZED HEALTH CARE EDUCATION/TRAINING PROGRAM

## 2020-05-20 PROCEDURE — 25000003 PHARM REV CODE 250: Performed by: STUDENT IN AN ORGANIZED HEALTH CARE EDUCATION/TRAINING PROGRAM

## 2020-05-20 PROCEDURE — 99900026 HC AIRWAY MAINTENANCE (STAT)

## 2020-05-20 PROCEDURE — P9012 CRYOPRECIPITATE EACH UNIT: HCPCS

## 2020-05-20 PROCEDURE — 99223 1ST HOSP IP/OBS HIGH 75: CPT | Mod: ,,, | Performed by: NURSE PRACTITIONER

## 2020-05-20 PROCEDURE — P9045 ALBUMIN (HUMAN), 5%, 250 ML: HCPCS | Mod: JG | Performed by: STUDENT IN AN ORGANIZED HEALTH CARE EDUCATION/TRAINING PROGRAM

## 2020-05-20 PROCEDURE — P9017 PLASMA 1 DONOR FRZ W/IN 8 HR: HCPCS

## 2020-05-20 PROCEDURE — 36430 TRANSFUSION BLD/BLD COMPNT: CPT

## 2020-05-20 PROCEDURE — 35820 PR EXPLOR POSTOP BLEED,INFEC,CLOT-CHST: ICD-10-PCS | Mod: 78,,, | Performed by: THORACIC SURGERY (CARDIOTHORACIC VASCULAR SURGERY)

## 2020-05-20 PROCEDURE — 35820 EXPLORE CHEST VESSELS: CPT | Mod: 78,,, | Performed by: THORACIC SURGERY (CARDIOTHORACIC VASCULAR SURGERY)

## 2020-05-20 PROCEDURE — 85610 PROTHROMBIN TIME: CPT

## 2020-05-20 PROCEDURE — 84295 ASSAY OF SERUM SODIUM: CPT

## 2020-05-20 PROCEDURE — 27000239 HC STAND-BY BYPASS PUMP

## 2020-05-20 PROCEDURE — 80048 BASIC METABOLIC PNL TOTAL CA: CPT

## 2020-05-20 PROCEDURE — 85730 THROMBOPLASTIN TIME PARTIAL: CPT | Mod: 91

## 2020-05-20 PROCEDURE — 85520 HEPARIN ASSAY: CPT

## 2020-05-20 PROCEDURE — 85384 FIBRINOGEN ACTIVITY: CPT

## 2020-05-20 PROCEDURE — 27201037 HC PRESSURE MONITORING SET UP

## 2020-05-20 PROCEDURE — 84100 ASSAY OF PHOSPHORUS: CPT

## 2020-05-20 PROCEDURE — C1729 CATH, DRAINAGE: HCPCS | Performed by: THORACIC SURGERY (CARDIOTHORACIC VASCULAR SURGERY)

## 2020-05-20 PROCEDURE — 36000707: Performed by: THORACIC SURGERY (CARDIOTHORACIC VASCULAR SURGERY)

## 2020-05-20 PROCEDURE — 37000009 HC ANESTHESIA EA ADD 15 MINS: Performed by: THORACIC SURGERY (CARDIOTHORACIC VASCULAR SURGERY)

## 2020-05-20 PROCEDURE — 63600175 PHARM REV CODE 636 W HCPCS: Mod: JG | Performed by: STUDENT IN AN ORGANIZED HEALTH CARE EDUCATION/TRAINING PROGRAM

## 2020-05-20 PROCEDURE — 80053 COMPREHEN METABOLIC PANEL: CPT

## 2020-05-20 PROCEDURE — D9220A PRA ANESTHESIA: Mod: ,,, | Performed by: ANESTHESIOLOGY

## 2020-05-20 PROCEDURE — 99223 PR INITIAL HOSPITAL CARE,LEVL III: ICD-10-PCS | Mod: ,,, | Performed by: NURSE PRACTITIONER

## 2020-05-20 PROCEDURE — 84132 ASSAY OF SERUM POTASSIUM: CPT

## 2020-05-20 PROCEDURE — 99291 PR CRITICAL CARE, E/M 30-74 MINUTES: ICD-10-PCS | Mod: GC,,, | Performed by: SURGERY

## 2020-05-20 PROCEDURE — D9220A PRA ANESTHESIA: ICD-10-PCS | Mod: ,,, | Performed by: ANESTHESIOLOGY

## 2020-05-20 PROCEDURE — 83605 ASSAY OF LACTIC ACID: CPT

## 2020-05-20 PROCEDURE — 63600175 PHARM REV CODE 636 W HCPCS: Performed by: THORACIC SURGERY (CARDIOTHORACIC VASCULAR SURGERY)

## 2020-05-20 PROCEDURE — 63700000 PHARM REV CODE 250 ALT 637 W/O HCPCS: Performed by: STUDENT IN AN ORGANIZED HEALTH CARE EDUCATION/TRAINING PROGRAM

## 2020-05-20 PROCEDURE — 94761 N-INVAS EAR/PLS OXIMETRY MLT: CPT

## 2020-05-20 PROCEDURE — 85025 COMPLETE CBC W/AUTO DIFF WBC: CPT | Mod: 91

## 2020-05-20 PROCEDURE — 25000003 PHARM REV CODE 250

## 2020-05-20 PROCEDURE — 99291 CRITICAL CARE FIRST HOUR: CPT | Mod: GC,,, | Performed by: SURGERY

## 2020-05-20 PROCEDURE — 85730 THROMBOPLASTIN TIME PARTIAL: CPT

## 2020-05-20 PROCEDURE — 20000000 HC ICU ROOM

## 2020-05-20 PROCEDURE — 27200966 HC CLOSED SUCTION SYSTEM

## 2020-05-20 PROCEDURE — 80053 COMPREHEN METABOLIC PANEL: CPT | Mod: 91

## 2020-05-20 PROCEDURE — 85347 COAGULATION TIME ACTIVATED: CPT

## 2020-05-20 PROCEDURE — 94003 VENT MGMT INPAT SUBQ DAY: CPT

## 2020-05-20 PROCEDURE — 99900035 HC TECH TIME PER 15 MIN (STAT)

## 2020-05-20 PROCEDURE — P9035 PLATELET PHERES LEUKOREDUCED: HCPCS

## 2020-05-20 PROCEDURE — 84100 ASSAY OF PHOSPHORUS: CPT | Mod: 91

## 2020-05-20 PROCEDURE — 36000706: Performed by: THORACIC SURGERY (CARDIOTHORACIC VASCULAR SURGERY)

## 2020-05-20 PROCEDURE — 85014 HEMATOCRIT: CPT

## 2020-05-20 RX ORDER — ALBUMIN HUMAN 50 G/1000ML
25 SOLUTION INTRAVENOUS ONCE
Status: COMPLETED | OUTPATIENT
Start: 2020-05-20 | End: 2020-05-20

## 2020-05-20 RX ORDER — INDOMETHACIN 25 MG/1
100 CAPSULE ORAL ONCE
Status: COMPLETED | OUTPATIENT
Start: 2020-05-20 | End: 2020-05-20

## 2020-05-20 RX ORDER — FENTANYL CITRATE 50 UG/ML
INJECTION, SOLUTION INTRAMUSCULAR; INTRAVENOUS
Status: DISCONTINUED | OUTPATIENT
Start: 2020-05-20 | End: 2020-05-20

## 2020-05-20 RX ORDER — INDOMETHACIN 25 MG/1
50 CAPSULE ORAL ONCE
Status: COMPLETED | OUTPATIENT
Start: 2020-05-20 | End: 2020-05-20

## 2020-05-20 RX ORDER — HYDROCODONE BITARTRATE AND ACETAMINOPHEN 500; 5 MG/1; MG/1
TABLET ORAL
Status: DISCONTINUED | OUTPATIENT
Start: 2020-05-20 | End: 2020-05-20

## 2020-05-20 RX ORDER — ROCURONIUM BROMIDE 10 MG/ML
INJECTION, SOLUTION INTRAVENOUS
Status: DISCONTINUED | OUTPATIENT
Start: 2020-05-20 | End: 2020-05-20

## 2020-05-20 RX ORDER — INDOMETHACIN 25 MG/1
CAPSULE ORAL
Status: COMPLETED
Start: 2020-05-20 | End: 2020-05-20

## 2020-05-20 RX ORDER — LIDOCAINE HYDROCHLORIDE 10 MG/ML
INJECTION, SOLUTION EPIDURAL; INFILTRATION; INTRACAUDAL; PERINEURAL
Status: COMPLETED
Start: 2020-05-20 | End: 2020-05-20

## 2020-05-20 RX ORDER — POTASSIUM CHLORIDE 20 MEQ/15ML
40 SOLUTION ORAL ONCE
Status: COMPLETED | OUTPATIENT
Start: 2020-05-20 | End: 2020-05-20

## 2020-05-20 RX ORDER — ACETAMINOPHEN 325 MG/1
650 TABLET ORAL EVERY 6 HOURS PRN
Status: DISCONTINUED | OUTPATIENT
Start: 2020-05-20 | End: 2020-05-22

## 2020-05-20 RX ORDER — MIDAZOLAM HYDROCHLORIDE 1 MG/ML
INJECTION, SOLUTION INTRAMUSCULAR; INTRAVENOUS
Status: DISCONTINUED | OUTPATIENT
Start: 2020-05-20 | End: 2020-05-20

## 2020-05-20 RX ORDER — ASPIRIN 325 MG
325 TABLET ORAL DAILY
Status: DISCONTINUED | OUTPATIENT
Start: 2020-05-21 | End: 2020-05-24

## 2020-05-20 RX ORDER — GLUCAGON 1 MG
1 KIT INJECTION
Status: DISCONTINUED | OUTPATIENT
Start: 2020-05-20 | End: 2020-05-21

## 2020-05-20 RX ORDER — FUROSEMIDE 10 MG/ML
20 INJECTION INTRAMUSCULAR; INTRAVENOUS ONCE
Status: COMPLETED | OUTPATIENT
Start: 2020-05-20 | End: 2020-05-20

## 2020-05-20 RX ORDER — CALCIUM CHLORIDE INJECTION 100 MG/ML
1 INJECTION, SOLUTION INTRAVENOUS ONCE
Status: COMPLETED | OUTPATIENT
Start: 2020-05-20 | End: 2020-05-20

## 2020-05-20 RX ORDER — POTASSIUM CHLORIDE 20 MEQ/1
40 TABLET, EXTENDED RELEASE ORAL ONCE
Status: DISCONTINUED | OUTPATIENT
Start: 2020-05-20 | End: 2020-05-20

## 2020-05-20 RX ORDER — NIFEDIPINE 60 MG/1
60 TABLET, EXTENDED RELEASE ORAL 2 TIMES DAILY
Status: ON HOLD | COMMUNITY
End: 2020-05-26 | Stop reason: HOSPADM

## 2020-05-20 RX ORDER — INSULIN ASPART 100 [IU]/ML
0-5 INJECTION, SOLUTION INTRAVENOUS; SUBCUTANEOUS EVERY 6 HOURS PRN
Status: DISCONTINUED | OUTPATIENT
Start: 2020-05-20 | End: 2020-05-21

## 2020-05-20 RX ORDER — ASPIRIN 325 MG
325 TABLET ORAL DAILY
Status: DISCONTINUED | OUTPATIENT
Start: 2020-05-21 | End: 2020-05-20

## 2020-05-20 RX ADMIN — ROCURONIUM BROMIDE 50 MG: 10 INJECTION, SOLUTION INTRAVENOUS at 06:05

## 2020-05-20 RX ADMIN — INDOMETHACIN 100 MEQ: 25 CAPSULE ORAL at 02:05

## 2020-05-20 RX ADMIN — PROPOFOL 5 MCG/KG/MIN: 10 INJECTION, EMULSION INTRAVENOUS at 12:05

## 2020-05-20 RX ADMIN — SODIUM BICARBONATE 100 MEQ: 84 INJECTION, SOLUTION INTRAVENOUS at 04:05

## 2020-05-20 RX ADMIN — HYDROMORPHONE HYDROCHLORIDE 1 MG: 1 INJECTION, SOLUTION INTRAMUSCULAR; INTRAVENOUS; SUBCUTANEOUS at 11:05

## 2020-05-20 RX ADMIN — ALBUMIN (HUMAN) 25 G: 12.5 SOLUTION INTRAVENOUS at 09:05

## 2020-05-20 RX ADMIN — FENTANYL CITRATE 50 MCG: 50 INJECTION, SOLUTION INTRAMUSCULAR; INTRAVENOUS at 06:05

## 2020-05-20 RX ADMIN — SODIUM BICARBONATE 50 MEQ: 84 INJECTION, SOLUTION INTRAVENOUS at 12:05

## 2020-05-20 RX ADMIN — MUPIROCIN 1 G: 20 OINTMENT TOPICAL at 10:05

## 2020-05-20 RX ADMIN — SUGAMMADEX 200 MG: 100 INJECTION, SOLUTION INTRAVENOUS at 09:05

## 2020-05-20 RX ADMIN — CEFAZOLIN 2 G: 330 INJECTION, POWDER, FOR SOLUTION INTRAMUSCULAR; INTRAVENOUS at 07:05

## 2020-05-20 RX ADMIN — NICARDIPINE HYDROCHLORIDE 7.5 MG/HR: 0.2 INJECTION, SOLUTION INTRAVENOUS at 07:05

## 2020-05-20 RX ADMIN — HYDROMORPHONE HYDROCHLORIDE 1 MG: 1 INJECTION, SOLUTION INTRAMUSCULAR; INTRAVENOUS; SUBCUTANEOUS at 02:05

## 2020-05-20 RX ADMIN — PROPOFOL 50 MCG/KG/MIN: 10 INJECTION, EMULSION INTRAVENOUS at 11:05

## 2020-05-20 RX ADMIN — FENTANYL CITRATE 50 MCG: 50 INJECTION, SOLUTION INTRAMUSCULAR; INTRAVENOUS at 07:05

## 2020-05-20 RX ADMIN — SODIUM CHLORIDE, SODIUM GLUCONATE, SODIUM ACETATE, POTASSIUM CHLORIDE, MAGNESIUM CHLORIDE, SODIUM PHOSPHATE, DIBASIC, AND POTASSIUM PHOSPHATE: .53; .5; .37; .037; .03; .012; .00082 INJECTION, SOLUTION INTRAVENOUS at 06:05

## 2020-05-20 RX ADMIN — HYDROMORPHONE HYDROCHLORIDE 1 MG: 1 INJECTION, SOLUTION INTRAMUSCULAR; INTRAVENOUS; SUBCUTANEOUS at 10:05

## 2020-05-20 RX ADMIN — ALBUMIN (HUMAN) 25 G: 12.5 SOLUTION INTRAVENOUS at 02:05

## 2020-05-20 RX ADMIN — NICARDIPINE HYDROCHLORIDE 2.5 MG/HR: 0.2 INJECTION, SOLUTION INTRAVENOUS at 07:05

## 2020-05-20 RX ADMIN — FENTANYL CITRATE 50 MCG: 50 INJECTION, SOLUTION INTRAMUSCULAR; INTRAVENOUS at 08:05

## 2020-05-20 RX ADMIN — FUROSEMIDE 20 MG: 10 INJECTION, SOLUTION INTRAMUSCULAR; INTRAVENOUS at 06:05

## 2020-05-20 RX ADMIN — CEFAZOLIN 2 G: 330 INJECTION, POWDER, FOR SOLUTION INTRAMUSCULAR; INTRAVENOUS at 02:05

## 2020-05-20 RX ADMIN — INDOMETHACIN 50 MEQ: 25 CAPSULE ORAL at 12:05

## 2020-05-20 RX ADMIN — CEFAZOLIN 2 G: 330 INJECTION, POWDER, FOR SOLUTION INTRAMUSCULAR; INTRAVENOUS at 11:05

## 2020-05-20 RX ADMIN — PROPOFOL 40 MCG/KG/MIN: 10 INJECTION, EMULSION INTRAVENOUS at 03:05

## 2020-05-20 RX ADMIN — POTASSIUM CHLORIDE 40 MEQ: 400 INJECTION, SOLUTION INTRAVENOUS at 12:05

## 2020-05-20 RX ADMIN — LIDOCAINE HYDROCHLORIDE 50 MG: 10 INJECTION, SOLUTION EPIDURAL; INFILTRATION; INTRACAUDAL; PERINEURAL at 02:05

## 2020-05-20 RX ADMIN — POTASSIUM CHLORIDE 20 MEQ: 200 INJECTION, SOLUTION INTRAVENOUS at 02:05

## 2020-05-20 RX ADMIN — ROCURONIUM BROMIDE 50 MG: 10 INJECTION, SOLUTION INTRAVENOUS at 07:05

## 2020-05-20 RX ADMIN — ALBUMIN (HUMAN) 25 G: 12.5 SOLUTION INTRAVENOUS at 12:05

## 2020-05-20 RX ADMIN — CALCIUM CHLORIDE 1 G: 100 INJECTION INTRAVENOUS; INTRAVENTRICULAR at 12:05

## 2020-05-20 RX ADMIN — PANTOPRAZOLE SODIUM 40 MG: 40 INJECTION, POWDER, LYOPHILIZED, FOR SOLUTION INTRAVENOUS at 04:05

## 2020-05-20 RX ADMIN — DESMOPRESSIN ACETATE 24.6 MCG: 4 SOLUTION INTRAVENOUS at 01:05

## 2020-05-20 RX ADMIN — MUPIROCIN 1 G: 20 OINTMENT TOPICAL at 08:05

## 2020-05-20 RX ADMIN — POTASSIUM CHLORIDE 40 MEQ: 20 SOLUTION ORAL at 02:05

## 2020-05-20 RX ADMIN — MIDAZOLAM HYDROCHLORIDE 1 MG: 1 INJECTION, SOLUTION INTRAMUSCULAR; INTRAVENOUS at 06:05

## 2020-05-20 RX ADMIN — NICARDIPINE HYDROCHLORIDE 10 MG/HR: 0.2 INJECTION, SOLUTION INTRAVENOUS at 03:05

## 2020-05-20 RX ADMIN — PRAVASTATIN SODIUM 40 MG: 40 TABLET ORAL at 12:05

## 2020-05-20 RX ADMIN — POTASSIUM CHLORIDE 40 MEQ: 400 INJECTION, SOLUTION INTRAVENOUS at 04:05

## 2020-05-20 RX ADMIN — SODIUM BICARBONATE 100 MEQ: 84 INJECTION, SOLUTION INTRAVENOUS at 02:05

## 2020-05-20 NOTE — H&P
[facility]  General Surgery  History & Physical     Patient Name: Brad Kearns  MRN: [id number]  Admission Date: 5/14/2020  Attending Physician: Guicho Quezada MD   Primary Care Provider: Duke Bowden MD     Patient information was obtained from patient and past medical records.      Subjective:      Chief Complaint/Reason for Admission: s/p CABGx2 and AVR     History of Present Illness: Mr. Kearns is a pleasant 74 year old male nonsmoker with known aortic regurgitation with bicuspid aortic valve, heart failure preserved ejection fraction, and hypertension who presented with NSTEMI (peak troponin 2.1) and heart failure symptoms, underwent coronary angiogram showing left main disease.     He underwent:  - Aortic valve replacement with 29mm Medtronic Mosaic porcine bioprosthesis  - Double vessel coronary artery bypass grafting  ? Left internal mammary artery (skeletonized) to the left anterior descending artery  ? Saphenous vein graft to the first obtuse marginal artery (left posterolateral ventricular artery)  - Endoscopic saphenous vein harvest from the left lower extremity      Only cell saver in the OR. Surgery went well.     No current facility-administered medications on file prior to encounter.            Current Outpatient Medications on File Prior to Encounter   Medication Sig    ascorbic acid (VITAMIN C) 1000 MG tablet Take 1,000 mg by mouth every morning.    aspirin (ECOTRIN) 81 MG EC tablet Take 81 mg by mouth every morning.    esomeprazole (NEXIUM) 40 MG capsule once daily.    fish oil-omega-3 fatty acids 300-1,000 mg capsule Take 1 g by mouth every morning.    multivitamin capsule Take 1 capsule by mouth once daily.    pravastatin (PRAVACHOL) 40 MG tablet Take 1 tablet (40 mg total) by mouth once daily.    thiamine (VITAMIN B-1) 50 MG tablet Take 50 mg by mouth once daily.    valsartan (DIOVAN) 160 MG tablet Take 1 tablet (160 mg total) by mouth 2 (two) times  daily.               Review of patient's allergies indicates:   Allergen Reactions    Demerol [meperidine] Hives       [id number]    Lisinopril         cough              Past Medical History:   Diagnosis Date    GERD (gastroesophageal reflux disease)      Hypertension      Nonrheumatic aortic valve insufficiency 3/13/2020            Past Surgical History:   Procedure Laterality Date    AORTOGRAPHY N/A 5/15/2020     Procedure: Aortogram;  Surgeon: Ben Butler MD;  Location: [facility] CATH LAB;  Service: Cardiology;  Laterality: N/A;    APPENDECTOMY        CORONARY ANGIOGRAPHY N/A 5/15/2020     Procedure: ANGIOGRAM, CORONARY ARTERY;  Surgeon: Ben Butler MD;  Location: [facility] CATH LAB;  Service: Cardiology;  Laterality: N/A;    INGUINAL HERNIA REPAIR        LEFT HEART CATHETERIZATION Left 5/15/2020     Procedure: Left heart cath;  Surgeon: Ben Butler MD;  Location: [facility] CATH LAB;  Service: Cardiology;  Laterality: Left;    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    Right knee arthroscopy               Family History      Problem Relation (Age of Onset)     Cancer Sister (80), Brother (74), Brother     Heart attack Father, Son     Heart disease Brother, Son, Brother     Hyperlipidemia Sister, Sister     Rheum arthritis Brother     Stroke Mother                Tobacco Use    Smoking status: Never Smoker    Smokeless tobacco: Never Used   Substance and Sexual Activity    Alcohol use: Yes       Frequency: Monthly or less       Drinks per session: Patient refused       Binge frequency: Never       Comment: Rarely    Drug use: Not on file    Sexual activity: Not on file      Review of Systems   Unable to perform ROS: Intubated      Objective:      Vital Signs (Most Recent):  Temp: (!) 93 °F (33.9 °C) (05/19/20 2015)  Pulse: 87 (05/19/20 2015)  Resp: (!) 1 (05/19/20 2015)  BP: 116/60 (05/19/20 2015)  SpO2: 98 % (05/19/20 2015) Vital Signs (24h Range):  Temp:  [93 °F  (33.9 °C)-97.8 °F (36.6 °C)] 93 °F (33.9 °C)  Pulse:  [50-92] 87  Resp:  [1-18] 1  SpO2:  [92 %-100 %] 98 %  BP: (116-185)/(60-74) 116/60  Arterial Line BP: (105)/(70) 105/70      Weight: 86.5 kg (190 lb 11.2 oz)  Body mass index is 28.16 kg/m².     Physical Exam   Constitutional: He appears well-developed and well-nourished. No distress.   HENT:   Head: Atraumatic.   intubated   Cardiovascular: Normal rate, regular rhythm and intact distal pulses.   Chest tubes with sanguinous output   Pulmonary/Chest:   Intubated on minimal settings  Reversed on arrival to SICU  Equal chest rise   Abdominal: Soft. He exhibits no distension.   Musculoskeletal: He exhibits no edema.   Neurological:   sedated   Skin: Skin is warm and dry. He is not diaphoretic.   Nursing note and vitals reviewed.        Significant Labs:  CBC:        Recent Labs   Lab 05/19/20 2016 05/19/20 2115   WBC 18.39*  --    RBC 3.22*  --    HGB 10.0*  --    HCT 30.8* 27*   *  --    MCV 96  --    MCH 31.1*  --    MCHC 32.5  --       CMP:       Recent Labs   Lab 05/19/20 2016   *   CALCIUM 6.8*   ALBUMIN 2.2*   PROT 4.0*      K 4.7   CO2 18*   *   BUN 20   CREATININE 0.9   ALKPHOS 41*   ALT 36   AST 61*   BILITOT 0.5      ABGs:       Recent Labs   Lab 05/19/20 2115   PH 7.332*   PCO2 33.2*   PO2 110*   HCO3 17.6*   POCSATURATED 98   BE -8         Significant Diagnostics:  I have reviewed all pertinent imaging results/findings within the past 24 hours.     Assessment/Plan:      Status post aortic valve replacement  Mr. Kearns is a 74-year-old man status post aortic valve replacement and CABG x2 on 5/19 for aortic regurgitation and NSTEMI.     Neuro:  Sedated on propofol and prn fentanyl.  Has oxycodone and Dilaudid ordered for when he can tolerate p.o. Moves all extremities.     CV:  Status post aortic valve repair with bioprosthetic valve and CABG x2 on 05/19.  He is somewhat vaso plegic, requiring Levophed as well as albumin  resuscitation in the immediate postoperative period.  Blood pressure goal is minimum map of 65 with max systolic of 110.  Cardene p.r.n..     Pulm:  He is intubated on minimal settings.  Will put him on spontaneous and obtain parameters in 1 hour for possible extubation.     Renal:  Adequate urine output in the postop period.  Continue volume resuscitation as needed to support blood pressure and urine output.  Postop creatinine is 0.9.     GI: NPO for now. Advanced diet once extubated and passes bedside swallow.     Id:  No antibiotics.     Heme:  He received no blood products in the operating room.  Only Cell Saver.  Will trend ABGs for hematocrit.     Endo:  Glucose is within acceptable limits.     Dispo: Extubate.  ICU care.                  VTE Risk Mitigation (From admission, onward)                  Ordered        heparin (porcine) injection 5,000 Units  Every 8 hours      05/19/20 2041        IP VTE HIGH RISK PATIENT  Once      05/19/20 2041        Place sequential compression device  Until discontinued      05/19/20 2041                     CHAPINCITO Rodriguez MD  General Surgery  [facility]

## 2020-05-20 NOTE — TRANSFER OF CARE
"Anesthesia Transfer of Care Note    Patient: Brad Kearns    Procedure(s) Performed: Procedure(s) (LRB):  EXPLORATION, WOUND- Chest washout (N/A)    Patient location: ICU    Anesthesia Type: general    Transport from OR: Transported from OR intubated on 100% O2 by AMBU with assisted ventilation. Upon arrival to PACU/ICU, patient attached to ventilator and auscultated to confirm bilateral breath sounds and adequate TV. Continuous ECG monitoring in transport. Continuous SpO2 monitoring in transport. Continuos invasive BP monitoring in transport. Continuous CVP monitoring in transport    Post pain: adequate analgesia    Post assessment: no apparent anesthetic complications    Post vital signs: stable    Level of consciousness: sedated    Nausea/Vomiting: no nausea/vomiting    Complications: none    Transfer of care protocol was followed      Last vitals:   Visit Vitals  /68   Pulse 86   Temp 37.2 °C (99 °F) (Oral)   Resp (!) 22   Ht 5' 9" (1.753 m)   Wt 86.5 kg (190 lb 11.2 oz)   SpO2 99%   BMI 28.16 kg/m²     "

## 2020-05-20 NOTE — ANESTHESIA POSTPROCEDURE EVALUATION
Anesthesia Post Evaluation    Patient: Brad Kearns    Procedure(s) Performed: Procedure(s) (LRB):  EXPLORATION, WOUND- Chest washout (N/A)    Final Anesthesia Type: general    Patient location during evaluation: ICU  Patient participation: No - Unable to Participate, Intubation  Level of consciousness: sedated  Post-procedure vital signs: reviewed and stable  Pain management: adequate  Airway patency: patent  BARBARA mitigation strategies: Multimodal analgesia  PONV status at discharge: No PONV  Anesthetic complications: no      Cardiovascular status: hemodynamically stable  Respiratory status: ETT and ventilator  Hydration status: euvolemic  Follow-up not needed.          Vitals Value Taken Time   /62 5/20/2020  5:02 PM   Temp 37 °C (98.6 °F) 5/20/2020  3:00 PM   Pulse 84 5/20/2020  5:30 PM   Resp 19 5/20/2020 11:03 AM   SpO2 94 % 5/20/2020  5:30 PM   Vitals shown include unvalidated device data.      No case tracking events are documented in the log.      Pain/Kathrin Score: Pain Rating Prior to Med Admin: 4 (5/20/2020 11:50 AM)

## 2020-05-20 NOTE — HPI
Mr. Kearns is a pleasant 74 year old male nonsmoker with known aortic regurgitation with bicuspid aortic valve, heart failure preserved ejection fraction, and hypertension who presented with NSTEMI (peak troponin 2.1) and heart failure symptoms, underwent coronary angiogram showing left main disease.    He underwent:  - Aortic valve replacement with 29mm Medtronic Mosaic porcine bioprosthesis  - Double vessel coronary artery bypass grafting  ? Left internal mammary artery (skeletonized) to the left anterior descending artery  ? Saphenous vein graft to the first obtuse marginal artery (left posterolateral ventricular artery)  - Endoscopic saphenous vein harvest from the left lower extremity

## 2020-05-20 NOTE — H&P
[facility]  General Surgery  History & Physical    Patient Name: Brad Kearns  MRN: [id number]  Admission Date: 5/14/2020  Attending Physician: Guicho Quezada MD   Primary Care Provider: Duke Bowden MD    Patient information was obtained from patient and past medical records.     Subjective:     Chief Complaint/Reason for Admission: s/p CABGx2 and AVR    History of Present Illness: Mr. Kearns is a pleasant 74 year old male nonsmoker with known aortic regurgitation with bicuspid aortic valve, heart failure preserved ejection fraction, and hypertension who presented with NSTEMI (peak troponin 2.1) and heart failure symptoms, underwent coronary angiogram showing left main disease.    He underwent:  - Aortic valve replacement with 29mm Medtronic Mosaic porcine bioprosthesis  - Double vessel coronary artery bypass grafting  ? Left internal mammary artery (skeletonized) to the left anterior descending artery  ? Saphenous vein graft to the first obtuse marginal artery (left posterolateral ventricular artery)  - Endoscopic saphenous vein harvest from the left lower extremity        No current facility-administered medications on file prior to encounter.      Current Outpatient Medications on File Prior to Encounter   Medication Sig    ascorbic acid (VITAMIN C) 1000 MG tablet Take 1,000 mg by mouth every morning.    aspirin (ECOTRIN) 81 MG EC tablet Take 81 mg by mouth every morning.    esomeprazole (NEXIUM) 40 MG capsule once daily.    fish oil-omega-3 fatty acids 300-1,000 mg capsule Take 1 g by mouth every morning.    multivitamin capsule Take 1 capsule by mouth once daily.    pravastatin (PRAVACHOL) 40 MG tablet Take 1 tablet (40 mg total) by mouth once daily.    thiamine (VITAMIN B-1) 50 MG tablet Take 50 mg by mouth once daily.    valsartan (DIOVAN) 160 MG tablet Take 1 tablet (160 mg total) by mouth 2 (two) times daily.       Review of patient's allergies indicates:   Allergen  Reactions    Demerol [meperidine] Hives     [id number]    Lisinopril      cough       Past Medical History:   Diagnosis Date    GERD (gastroesophageal reflux disease)     Hypertension     Nonrheumatic aortic valve insufficiency 3/13/2020     Past Surgical History:   Procedure Laterality Date    AORTOGRAPHY N/A 5/15/2020    Procedure: Aortogram;  Surgeon: Ben Butler MD;  Location: [facility] CATH LAB;  Service: Cardiology;  Laterality: N/A;    APPENDECTOMY      CORONARY ANGIOGRAPHY N/A 5/15/2020    Procedure: ANGIOGRAM, CORONARY ARTERY;  Surgeon: Ben Butler MD;  Location: [facility] CATH LAB;  Service: Cardiology;  Laterality: N/A;    INGUINAL HERNIA REPAIR      LEFT HEART CATHETERIZATION Left 5/15/2020    Procedure: Left heart cath;  Surgeon: Ben Butler MD;  Location: [facility] CATH LAB;  Service: Cardiology;  Laterality: Left;    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    Right knee arthroscopy       Family History     Problem Relation (Age of Onset)    Cancer Sister (80), Brother (74), Brother    Heart attack Father, Son    Heart disease Brother, Son, Brother    Hyperlipidemia Sister, Sister    Rheum arthritis Brother    Stroke Mother        Tobacco Use    Smoking status: Never Smoker    Smokeless tobacco: Never Used   Substance and Sexual Activity    Alcohol use: Yes     Frequency: Monthly or less     Drinks per session: Patient refused     Binge frequency: Never     Comment: Rarely    Drug use: Not on file    Sexual activity: Not on file     Review of Systems   Unable to perform ROS: Intubated     Objective:     Vital Signs (Most Recent):  Temp: (!) 93 °F (33.9 °C) (05/19/20 2015)  Pulse: 87 (05/19/20 2015)  Resp: (!) 1 (05/19/20 2015)  BP: 116/60 (05/19/20 2015)  SpO2: 98 % (05/19/20 2015) Vital Signs (24h Range):  Temp:  [93 °F (33.9 °C)-97.8 °F (36.6 °C)] 93 °F (33.9 °C)  Pulse:  [50-92] 87  Resp:  [1-18] 1  SpO2:  [92 %-100 %] 98 %  BP: (116-185)/(60-74)  116/60  Arterial Line BP: (105)/(70) 105/70     Weight: 86.5 kg (190 lb 11.2 oz)  Body mass index is 28.16 kg/m².    Physical Exam   Constitutional: He appears well-developed and well-nourished. No distress.   HENT:   Head: Atraumatic.   intubated   Cardiovascular: Normal rate, regular rhythm and intact distal pulses.   Chest tubes with sanguinous output   Pulmonary/Chest:   Intubated on minimal settings  Reversed on arrival to SICU  Equal chest rise   Abdominal: Soft. He exhibits no distension.   Musculoskeletal: He exhibits no edema.   Neurological:   sedated   Skin: Skin is warm and dry. He is not diaphoretic.   Nursing note and vitals reviewed.      Significant Labs:  CBC:   Recent Labs   Lab 05/19/20 2016 05/19/20 2115   WBC 18.39*  --    RBC 3.22*  --    HGB 10.0*  --    HCT 30.8* 27*   *  --    MCV 96  --    MCH 31.1*  --    MCHC 32.5  --      CMP:   Recent Labs   Lab 05/19/20 2016   *   CALCIUM 6.8*   ALBUMIN 2.2*   PROT 4.0*      K 4.7   CO2 18*   *   BUN 20   CREATININE 0.9   ALKPHOS 41*   ALT 36   AST 61*   BILITOT 0.5     ABGs:   Recent Labs   Lab 05/19/20 2115   PH 7.332*   PCO2 33.2*   PO2 110*   HCO3 17.6*   POCSATURATED 98   BE -8       Significant Diagnostics:  I have reviewed all pertinent imaging results/findings within the past 24 hours.    Assessment/Plan:     Status post aortic valve replacement  Mr. Kearns is a 74-year-old man status post aortic valve replacement and CABG x2 on 5/19 for aortic regurgitation and NSTEMI.    Neuro:  Sedated on propofol and prn fentanyl.  Has oxycodone and Dilaudid ordered for when he can tolerate p.o. Moves all extremities.    CV:  Status post aortic valve repair with bioprosthetic valve and CABG x2 on 05/19.  He is somewhat vaso plegic, requiring Levophed as well as albumin resuscitation in the immediate postoperative period.  Blood pressure goal is minimum map of 65 with max systolic of 110.  Cardene p.r.n..    Pulm:  He is  intubated on minimal settings.  Will put him on spontaneous and obtain parameters in 1 hour for possible extubation.    Renal:  Adequate urine output in the postop period.  Continue volume resuscitation as needed to support blood pressure and urine output.  Postop creatinine is 0.9.    GI: NPO for now. Advanced diet once extubated and passes bedside swallow.    Id:  No antibiotics.    Heme:  He received no blood products in the operating room.  Only Cell Saver.  Will trend ABGs for hematocrit.    Endo:  Glucose is within acceptable limits.    Dispo: Extubate.  ICU care.          VTE Risk Mitigation (From admission, onward)         Ordered     heparin (porcine) injection 5,000 Units  Every 8 hours      05/19/20 2041     IP VTE HIGH RISK PATIENT  Once      05/19/20 2041     Place sequential compression device  Until discontinued      05/19/20 2041                CHAPINCITO Rodriguez MD  General Surgery  [facility]

## 2020-05-20 NOTE — OP NOTE
[facility]  Cardiothoracic Surgery Operative Report    Patient Name:  Brad Kearns; [id number]    Preoperative Diagnosis: Pericardial effusion, left hemothorax    Postoperative Diagnosis:  Same    Date of Operation:  05/20/2020     Operation:  mediastinal exploration with evacuation of hemopericardium and left hemothorax     Surgeon:  Guicho Quezada MD     Fellow:  Dodie Starkey MD     Anesthesiologist:  Gene Sullivan MD     ---------------------------------------------------------------------------------------------------------------------        Indications for surgery: The patient is a 74 year old male who underwent aortic valve replacement, double vessel coronary artery bypass surgery yesterday evening,  Overnight, chest tube output was low until around 5 hours postoperatively, the patient awoke and coughed vigorously.  Afterwards, chest tube output increased moderately.  An initial chest X ray showed minimal pleural effusion and blood products were given which he appeared to respond to well as the chest tube output decreased.  Repeat chest X ray this morning showed a hemothorax and a chest tube was placed.  Chest tube output afterwards had low to moderate output and a repeat chest X ray after chest tube insertion showed residual left hemothorax.  The decision was made to take him to the operating room for mediastinal exploration and drainage of left hemothorax.  The risks and benefits were explained to the patient's family and informed consent was obtained.      Gross findings: After initial skin entry, a bleeding vessel was noted at the superior aspect of the manubrium which was cauterized.  Large amount of clot found in the pericardial space and left hemothorax.  This was evacuated.  No other sites of bleeding noted.        Procedure:  The patient was brought to the operating room and he was placed supine on the operating room table and prepped and draped in the usual sterile fashion. A  surgical time out was performed.      The sternum was re-entered at the previous site.  Immediately upon re-entry, a hematoma and a bleeding vessel were noted at the superior aspect of the manubrium which was cauterized.  This vessel was likely the culprit.  Wires were all removed.  A retractor was placed and the pericardium was noted to be full of clotted blood, about 300cc.  This was slowly and carefully excavated.  Next, the left hemothorax was excavated with care to avoid injury to the mammary artery.  All possible other bleeding sites were thoroughly inspected, including the aortotomy, proximal and distal anastomoses, pacing wire sites, right atriotomy, right superior pulmonary vein site, aortic cannulation site, and retrograde cannulation site.  No bleeding was noted and hemostasis was excellent.  The chest was copiously irrigated with warm saline and again no bleeding was seen.  The chest wall was inspected including the mammary bed.  No bleeding was noted.  The chest tubes were cleared with saline and placed behind the sternum and in the left pleural space.     The chest was closed with steel wires and the soft tissue was closed with absorbable suture.  A sterile dressing was applied and the patient was brought to the ICU in stable condition.       I was present in the operating room for the entire operation and immediately available thereafter.

## 2020-05-20 NOTE — PLAN OF CARE
Phone call received from radiologist Dr. Constance Ulloa there are no retained instruments detected.   MD notified.

## 2020-05-20 NOTE — PLAN OF CARE
05/20/20 0938   Discharge Reassessment   Assessment Type Discharge Planning Reassessment   Provided patient/caregiver education on the expected discharge date and the discharge plan Yes   Do you have any problems affording any of your prescribed medications? No   Discharge Plan A Home Health   Discharge Plan B Skilled Nursing Facility   DME Needed Upon Discharge  other (see comments)  (TBD)   Post-Acute Status   Discharge Delays None known at this time       Charlotte Sullivan MPH, RN, CM  Ext. [phone]

## 2020-05-20 NOTE — TRANSFER OF CARE
"Anesthesia Transfer of Care Note    Patient: Brad Kearns    Procedure(s) Performed: Procedure(s) (LRB):  CORONARY ARTERY BYPASS GRAFT (CABG) x2 (N/A)  SURGICAL PROCUREMENT, VEIN, ENDOSCOPIC (Left)  REPLACEMENT, AORTIC VALVE (N/A)    Patient location: ICU    Anesthesia Type: general    Transport from OR: Transported from OR intubated on 100% O2 by AMBU with adequate controlled ventilation    Post pain: adequate analgesia    Post assessment: no apparent anesthetic complications    Post vital signs: stable    Level of consciousness: sedated    Nausea/Vomiting: no nausea/vomiting    Complications: none    Transfer of care protocol was followed      Last vitals:   Visit Vitals  BP (!) 163/72 (Patient Position: Lying)   Pulse 87   Temp 36.6 °C (97.8 °F)   Resp 14   Ht 5' 9" (1.753 m)   Wt 78.2 kg (172 lb 6.4 oz)   SpO2 100%   BMI 25.46 kg/m²     "

## 2020-05-20 NOTE — PROCEDURES
CHEST TUBE  Procedure Note    Indication: Mr. Kearns is a 74 year old male who is POD0 s/p AVR with bioprosthetic valve and CABGx2. He has had high chest tube output requiring multiple rounds of blood products, hemodynamic instability requiring vasopressor and inotropic support and evidence of retained left hemothorax on CXR. We elected to emergently place a 32F chest tube to drain residual hemothorax on the left. Consent by two physician consent given emergent nature.    Procedure:  The patient prepped and draped in standard fashion.  Time-out performed.  All in agreement.  Consent assumed due to emergent nature of procedure.  10 cc of 1% lidocaine were injected along the site of the expected incision at the mid axillary inframammary fold.  A 3 cm incision made with 11 blade.  An additional 10 cc of 1% lidocaine were injected into the periosteum.  Dissection performed with Tiny clamp down to the level of the bone.  Access gained to the left hemithorax with immediate return of blood with a Tiny clamp.  The left hemithorax was carefully suctioned with the Yankauer. The incision was extened by 1cm to ease placement of the chest tube.  Then, a 32 Salvadorean chest tube was placed into the left hemithorax such that it coursed laterally and superiorly.  A total of 450 cc of blood was returned.  The chest tube was connected to the atrium and placed to -20 suction.  The chest tube was secured in place with 1 0 Ethibond.  The incision was closed with interrupted Ethibond.  Dressing was applied.  Patient tolerated procedure well.    BARRETT Rodriguez MD   General Surgery, PGY-2  Pager: [phone]

## 2020-05-20 NOTE — HPI
Reason for Consult: Management of Hyperglycemia     Surgical Procedure and Date: CORONARY ARTERY BYPASS GRAFT (CABG) (N/A)  BENTALL PROCEDURE (N/A) 5/19/20; mediastinal exploration with evacuation of hemopericardium and left hemothorax 5/20/20       HPI:   Patient is a 74 y.o. male with a diagnosis of  aortic regurgitation with bicuspid aortic valve, heart failure, and hypertension who presented with NSTEMI  and heart failure symptoms. Patient is s/p above surgeries. No history of DM per chart review. Endocrinology consulted for BG management.       Lab Results   Component Value Date    HGBA1C 5.1 05/14/2020

## 2020-05-20 NOTE — OP NOTE
[facility]  Cardiothoracic Surgery Operative Report    Patient Name:  Brad Kearns; [id number]    Preoperative Diagnosis: Coronary Artery Disease, NSTEMI (121.4), acute on chronic diastolic heart failure (150.33), aortic regurgitation    Postoperative Diagnosis:  Same    Date of Operation:  05/19/2020     Operation:  Aortic valve replacement with 29mm Medtronic Mosaic porcine bioprosthesis  Double vessel coronary artery bypass grafting  · Left internal mammary artery (skeletonized) to the left anterior descending artery  · Saphenous vein graft to the first obtuse marginal artery (left posterolateral ventricular artery)  Endoscopic saphenous vein harvest from the left lower extremity    Surgeon:  Guicho Quezada MD    Assistant Surgeon:  none    Fellow:  Dodie Starkey MD    Anesthesiologist:  Donovan Albert MD; Joshua Mckay MD    ---------------------------------------------------------------------------------------------------------------------      Indications for surgery: Mr. Kearns is a pleasant 74 year old male nonsmoker with known aortic regurgitation with bicuspid aortic valve, heart failure preserved ejection fraction, and hypertension who presented with NSTEMI (peak troponin 2.1) and heart failure symptoms, underwent coronary angiogram showing left main disease.  Angiogram details include 60% distal left main disease, 90% early mid LAD + 90% early distal LAD with moderate mid and distal LAD targets, moderate sized first diagonal artery with 90% distal lesion, 90% mid LCx lesion with moderate sized left posterolateral ventricular artery, 60% mid RCA and 50% distal RCA lesions.  The transthoracic echo from May 2020 shows 55% ejection fraction, LVEDD 6.4cm, severe aortic regurgitation with bicuspid aortic valve, Sinus of Valsalva 3.5cm, ST junction 3.6cm, Ascending Aorta 3.5cm.  The transthoracic echo from March 2020 shows Sinus of Valsalva 4.8cm, ST junction 3.9cm, Ascending Aorta 3.9cm.    CTA chest showed 4.1cm Sinus of Valsalva, 3.5cm ST junction, and 3.5cm Ascending Aorta.     Given the severity of disease and the symptoms, I recommend coronary artery bypass surgery x 2 ( skeletonized LIMA-mid or distal LAD, SVG-LPL), aortic valve replacement (bioprosthetic), possible aortic root replacement, possible ascending aorta replacement, possible karon arch replacement under hypothermic circulatory arrest. The risks and benefits were explained and informed consent was obtained.     Gross findings: No pericardial adhesions. The aortic valve was tricuspid with severe diffuse fenestrations in all leaflets.  The aortic root was mildly dilated with the right coronary cusp being more dilated than the left and noncoronary cusps.  The dilation of the right coronary cusp was mostly inferiorly near the annulus.  Large bites taken with the pledgetted mattress sutures around the annulus and the dilated portion of the right coronary cusp to plicate the mild aneurysmal portion.  Moderate sized distal LAD target with extensive calcifications, moderate sized LPL target.  Good biventricular function pre and post bypass.      Procedure:  The patient was brought to the holding area and the indications for surgery were reviewed.  He was placed supine on the operating room table and prepped and draped in the usual sterile fashion. A surgical time out was performed.    A midline incision was followed with division of the sternum. The left internal mammary artery was harvested in a skeletonized fashion. Simultaneously, one piece of saphenous vein was harvested endoscopically from the leg. ACT guided heparinization was administered and the ascending aorta and right atrium were cannulated.  Cardiopulmonary bypass was commenced.  The ascending aorta was cannulated for administration of cardioplegia and a retrograde cardioplegia catheter was placed in the coronary sinus.  A left ventricular vent was placed through the right superior  pulmonary vein.  A cross-clamp was applied and 300cc of cold blood cardioplegia was administered antegrade (severe aortic regurgitation), followed by 1500cc of retrograde cardioplegia. Subsequent doses of retrograde cardioplegia were administered every 20 minutes.       Attention was first turned to the obtuse marginal artery (left posterolateral ventricular artery). The heart was positioned and the vessel was exposed. The artery was opened and the arteriotomy elongated with scissors. A segment of vein was prepared for use.  An end to side anastomosis was constructed with continuous 7-0 prolene. The vein was infused with 100cc of cardioplegia to confirm patency and hemostasis.     Attention was turned to the distal left anterior descending artery. The heart was repositioned and the LAD was identified. The vessel was opened and arteriotomy extended with scissors.  The LIMA was brought to the field and prepared for use.  The LIMA was sewn end to side to the LAD with continuous 7-0 prolene. The vessel was briefly unclamped to assess for hemostasis.     Attention was turned to the aortic valve.  The aorta was opened and retraction sutures were placed. The valve was examined and found to be tricuspid with severe diffuse fenestrations in all leaflets.  It was carefully resected. A rongeur was used to remove any additional calcium taking care to eliminate any loose fragments.  The LVOT was sized with a Medtronic Mosaic valve sizer and found to be 29mm.  The aortic root was noted to be mildly dilated with the right coronary cusp being more dilated than the left and noncoronary cusps.  The dilation of the right coronary cusp was mostly inferiorly near the annulus.  Large bites were taken with the pledgetted mattress sutures around the annulus and the dilated portion of the right coronary cusp to plicate the mild aneurysmal portion.  The remaining pledgetted mattress sutures were placed circumferentially through the LVOT and  "again through the sewing cuff of the valve. The valve was tied in place. The aortotomy was closed with two layers of continuous 4-0 prolene suture.    An aortotomy was made and the proximal anastomosis of the SVG-LPL graft was constructed with 6-0 prolene.  A dose of warm "hot shot" cardioplegia was given retrograde.  Air was evacuated and the cross-clamp was removed. All anastomoses were checked for hemostasis and the patient was weaned from bypass on minimal inotropic support.  The total cardiopulmonary bypass time was 187 minutes and cross clamp time was 162 minutes.  The cannulae were removed and heparin was reversed.  Temporary ventricular pacing wires were placed on the heart.  Drainage tubes were placed behind the sternum and in the left pleural space. The chest was closed with steel wires and the soft tissue was closed with absorbable suture.  A sterile dressing was applied and the patient was brought to the ICU in stable condition.     I was present in the operating room for the entire operation and immediately available thereafter.      "

## 2020-05-20 NOTE — CONSULTS
"  [facility]  Adult Nutrition  Consult Note    SUMMARY     Recommendations  1. When able to extubate, ADAT to Cardiac with texture per SLP.   2. If unable to extubate, recommend initiating trickle TF of Peptamen Intense VHP.   3. RD to monitor.    Goals: Patient to receive nutrition by RD follow-up  Nutrition Goal Status: new  Communication of RD Recs: reviewed with RN    Reason for Assessment  Reason For Assessment: consult  Diagnosis: surgery/postoperative complications(s/p CABG & AVR 5/19)  Relevant Medical History: HTN  Interdisciplinary Rounds: did not attend  General Information Comments: RD working remotely. Patient is POD#1 s/p CABG and AVR. Remains intubated, sedated. Per chart review, no weight loss PTA and good PO intake prior to surgery. Patient does not meet criteria for malnutrition at this time.  Nutrition Discharge Planning: Unable to determine at this time.    Nutrition Risk Screen  Nutrition Risk Screen: no indicators present    Nutrition/Diet History  Spiritual, Cultural Beliefs, Caodaism Practices, Values that Affect Care: no  Food Allergies: NKFA  Factors Affecting Nutritional Intake: NPO, on mechanical ventilation    Anthropometrics  Temp: 99 °F (37.2 °C)  Height Method: Stated  Height: 5' 9" (175.3 cm)  Height (inches): 69 in  Weight Method: Standard Scale  Weight: 86.5 kg (190 lb 11.2 oz)  Weight (lb): 190.7 lb  Ideal Body Weight (IBW), Male: 160 lb  % Ideal Body Weight, Male (lb): 119.19 %  BMI (Calculated): 28.1  BMI Grade: 25 - 29.9 - overweight    Lab/Procedures/Meds  Pertinent Labs Reviewed: reviewed  Pertinent Labs Comments: Na 146, Cl 112, Glu 209, Alb 3.2  Pertinent Medications Reviewed: reviewed  Pertinent Medications Comments: famotidine, pantoprazole, precedex, epinephrine, insulin drip, cardene, levophed, propofol, vasopressin    Estimated/Assessed Needs  Weight Used For Calorie Calculations: 82 kg (180 lb 12.4 oz)(dosing weight)  Energy Calorie Requirements " (kcal): 1820 kcal/day  Energy Need Method: [facility]  Protein Requirements:  g/day(1.2-1.4 g/kg)  Weight Used For Protein Calculations: 82 kg (180 lb 12.4 oz)(dosing weight)  Fluid Requirements (mL): 1 mL/kcal or per MD  Estimated Fluid Requirement Method: RDA Method  RDA Method (mL): 1820    Nutrition Prescription Ordered  Current Diet Order: NPO    Evaluation of Received Nutrient/Fluid Intake  Other Calories (kcal): 195(propofol)  I/O: +9.3L x 24hrs, +12L since admit  Comments: LBM 5/17  % Intake of Estimated Energy Needs: 0 - 25 %  % Meal Intake: NPO    Nutrition Risk  Level of Risk/Frequency of Follow-up: high(2x/week)     Assessment and Plan  Nutrition Problem  Increased nutrient needs    Related to (etiology):   Physiological causes related to healing    Signs and Symptoms (as evidenced by):   S/p CABG & AVR 5/19     Interventions (treatment strategy):  Collaboration of nutrition care with other providers    Nutrition Diagnosis Status:   New    Monitor and Evaluation  Food and Nutrient Intake: energy intake  Food and Nutrient Adminstration: diet order  Anthropometric Measurements: weight, weight change  Biochemical Data, Medical Tests and Procedures: electrolyte and renal panel, gastrointestinal profile, inflammatory profile  Nutrition-Focused Physical Findings: overall appearance     Nutrition Follow-Up  RD Follow-up?: Yes

## 2020-05-20 NOTE — NURSING
Skin note: all skin intact with no breakdown noted. Sacral mepilex intact, heels elevated with boots. Turned q2, waffle matress inflated

## 2020-05-20 NOTE — CONSULTS
[facility]  Endocrinology  Diabetes Consult Note    Consult Requested by: Guicho Quezada MD   Reason for admit: CAD (coronary artery disease)    HISTORY OF PRESENT ILLNESS:  Reason for Consult: Management of Hyperglycemia     Surgical Procedure and Date: CORONARY ARTERY BYPASS GRAFT (CABG) (N/A)  BENTALL PROCEDURE (N/A) 5/19/20; mediastinal exploration with evacuation of hemopericardium and left hemothorax 5/20/20       HPI:   Patient is a 74 y.o. male with a diagnosis of  aortic regurgitation with bicuspid aortic valve, heart failure, and hypertension who presented with NSTEMI  and heart failure symptoms. Patient is s/p above surgeries. No history of DM per chart review. Endocrinology consulted for BG management.       Lab Results   Component Value Date    HGBA1C 5.1 05/14/2020               Interval HPI:   Overnight events: Received in ICU. Remains intubated. Pressor support. BG at or above goal on IV insulin infusion rate 1-4 u/hr.   Eating:   NPO  Nausea: No  Hypoglycemia and intervention: No  Fever: No  TPN and/or TF: No    PMH, PSH, FH, SH reviewed       Review of Systems  Unable to obtain due to: Sedation,Intubation,Altered mental status,Critical illness,Reviewed ROS from note dated 5/14/20 per Dr. Lofton.     Current Medications and/or Treatments Impacting Glycemic Control  Immunotherapy:    Immunosuppressants     None        Steroids:   Hormones (From admission, onward)    Start     Stop Route Frequency Ordered    05/19/20 2145  vasopressin (PITRESSIN) 0.2 Units/mL in dextrose 5 % 100 mL infusion      -- IV Continuous 05/19/20 2041        Pressors:    Autonomic Drugs (From admission, onward)    Start     Stop Route Frequency Ordered    05/19/20 2145  EPINEPHrine (ADRENALIN) 5 mg in sodium chloride 0.9% 250 mL infusion     Question Answer Comment   Titrate by: (in mcg/kg/min) 0.01    Titrate interval: (in minutes) 5    Titrate to maintain: (SBP or MAP or Cardiac Index) SBP    Maximum  dose: (in mcg/kg/min) 2        -- IV Continuous 05/19/20 2041 05/19/20 2145  norepinephrine 4 mg in dextrose 5% 250 mL infusion (premix) (titrating)     Question Answer Comment   Titrate by: (in mcg/kg/min) 0.05    Titrate interval: (in minutes) 5    Titrate to maintain: (MAP or SBP) MAP    Greater than: (in mmHg) 65    Maximum dose: (in mcg/kg/min) 3        -- IV Continuous 05/19/20 2041        Hyperglycemia/Diabetes Medications:   Antihyperglycemics (From admission, onward)    Start     Stop Route Frequency Ordered    05/19/20 2145  insulin regular 100 Units in sodium chloride 0.9% 100 mL infusion     Question:  Insulin rate changes (DO NOT MODIFY ANSWER)  Answer:  \\[employer]\epic\Images\Pharmacy\InsulinInfusions\CTS INSULIN [id number].pdf    -- IV Continuous 05/19/20 2041             PHYSICAL EXAMINATION:  Vitals:    05/20/20 0600   BP: 109/68   Pulse: 108   Resp: (!) 22   Temp:      Body mass index is 28.16 kg/m².    Physical Exam   Physical exam deferred due to COVID-19 restrictions.            Labs Reviewed and Include   Recent Labs   Lab 05/20/20  0053 05/20/20  0401   * 209*   CALCIUM 7.9* 7.8*   ALBUMIN 3.2*  --    PROT 4.7*  --    * 146*   K 3.5 3.8   CO2 18* 13*   * 112*   BUN 20 21   CREATININE 1.1 1.3   ALKPHOS 29*  --    ALT 22  --    AST 49*  --    BILITOT 1.0  --      Lab Results   Component Value Date    WBC 8.51 05/20/2020    HGB 10.1 (L) 05/20/2020    HCT 28 (L) 05/20/2020    MCV 89 05/20/2020     (L) 05/20/2020     No results for input(s): TSH, FREET4 in the last 168 hours.  Lab Results   Component Value Date    HGBA1C 5.1 05/14/2020       Nutritional status:   Body mass index is 28.16 kg/m².  Lab Results   Component Value Date    ALBUMIN 3.2 (L) 05/20/2020    ALBUMIN 3.2 (L) 05/19/2020    ALBUMIN 2.2 (L) 05/19/2020     No results found for: PREALBUMIN    Estimated Creatinine Clearance: 55.1 mL/min (based on SCr of 1.3 mg/dL).    Accu-Checks  Recent Labs      05/19/20  2307 05/20/20  0023 05/20/20  0059 05/20/20  0211 05/20/20  0310 05/20/20  0406 05/20/20  0509 05/20/20  0605 05/20/20  0922 05/20/20  1016   POCTGLUCOSE 125* 135* 176* 196* 197* 195* 203* 188* 144* 118*        ASSESSMENT and PLAN    * CAD (coronary artery disease)  Optimize glucose control and  avoid hypoglycemia        Acute hyperglycemia  Change BG goal to 140-180 given age.       Continue IV insulin infusion protocol  Requires intensive BG monitoring while on protocol         S/P CABG (coronary artery bypass graft)  Optimize BG for surgical wound healing.         Status post aortic valve replacement  Optimize BG for surgical wound healing.             Plan discussed with SUNNY.    Janneth Anderson NP  Endocrinology  [facility]

## 2020-05-20 NOTE — ASSESSMENT & PLAN NOTE
Mr. Kearns is a 74-year-old man status post aortic valve replacement and CABG x2 on 5/19 for aortic regurgitation and NSTEMI.    Neuro:  Sedated on propofol and prn fentanyl.  Has oxycodone and Dilaudid ordered for when he can tolerate p.o. Moves all extremities.    CV:  Status post aortic valve repair with bioprosthetic valve and CABG x2 on 05/19.  He is somewhat vaso plegic, requiring Levophed as well as albumin resuscitation in the immediate postoperative period.  Blood pressure goal is minimum map of 65 with max systolic of 110.  Cardene p.r.n..    Pulm:  He is intubated on minimal settings.  Will put him on spontaneous and obtain parameters in 1 hour for possible extubation.    Renal:  Adequate urine output in the postop period.  Continue volume resuscitation as needed to support blood pressure and urine output.  Postop creatinine is 0.9.    GI: NPO for now. Advanced diet once extubated and passes bedside swallow.    Id:  No antibiotics.    Heme:  He received no blood products in the operating room.  Only Cell Saver.  Will trend ABGs for hematocrit.    Endo:  Glucose is within acceptable limits.    Dispo: Extubate.  ICU care.

## 2020-05-20 NOTE — PROGRESS NOTES
Patient on 60% and 5 of PEEP, Epi currently at 0.06 mcg/kg/min, levo at 0.06 mcg/kg/min, propofol at 50 mcg/kg/min, insulin at 3.7 u/hr. Patient MAPs > 65 with interventions, see previous note for products given and flow sheet for titration. Patient labs sent and replacements given per MD orders. Thomas output in flow sheet, greater than 60 cc/hr, chest tube output in flow sheet. Patient consents done by MDs and patient transported with 3x MD at 0615 to OR, see previous notes for details. All questions answered by RN, will continue to monitor.

## 2020-05-20 NOTE — ASSESSMENT & PLAN NOTE
Change BG goal to 140-180 given age.       Continue IV insulin infusion protocol  Requires intensive BG monitoring while on protocol

## 2020-05-20 NOTE — SUBJECTIVE & OBJECTIVE
No current facility-administered medications on file prior to encounter.      Current Outpatient Medications on File Prior to Encounter   Medication Sig    ascorbic acid (VITAMIN C) 1000 MG tablet Take 1,000 mg by mouth every morning.    aspirin (ECOTRIN) 81 MG EC tablet Take 81 mg by mouth every morning.    esomeprazole (NEXIUM) 40 MG capsule once daily.    fish oil-omega-3 fatty acids 300-1,000 mg capsule Take 1 g by mouth every morning.    multivitamin capsule Take 1 capsule by mouth once daily.    pravastatin (PRAVACHOL) 40 MG tablet Take 1 tablet (40 mg total) by mouth once daily.    thiamine (VITAMIN B-1) 50 MG tablet Take 50 mg by mouth once daily.    valsartan (DIOVAN) 160 MG tablet Take 1 tablet (160 mg total) by mouth 2 (two) times daily.       Review of patient's allergies indicates:   Allergen Reactions    Demerol [meperidine] Hives     [id number]    Lisinopril      cough       Past Medical History:   Diagnosis Date    GERD (gastroesophageal reflux disease)     Hypertension     Nonrheumatic aortic valve insufficiency 3/13/2020     Past Surgical History:   Procedure Laterality Date    AORTOGRAPHY N/A 5/15/2020    Procedure: Aortogram;  Surgeon: Ben Butler MD;  Location: [facility] CATH LAB;  Service: Cardiology;  Laterality: N/A;    APPENDECTOMY      CORONARY ANGIOGRAPHY N/A 5/15/2020    Procedure: ANGIOGRAM, CORONARY ARTERY;  Surgeon: Ben Butler MD;  Location: [facility] CATH LAB;  Service: Cardiology;  Laterality: N/A;    INGUINAL HERNIA REPAIR      LEFT HEART CATHETERIZATION Left 5/15/2020    Procedure: Left heart cath;  Surgeon: Ben Butler MD;  Location: [facility] CATH LAB;  Service: Cardiology;  Laterality: Left;    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    Right knee arthroscopy       Family History     Problem Relation (Age of Onset)    Cancer Sister (80), Brother (74), Brother    Heart attack Father, Son    Heart disease Brother, Son, Brother     Hyperlipidemia Sister, Sister    Rheum arthritis Brother    Stroke Mother        Tobacco Use    Smoking status: Never Smoker    Smokeless tobacco: Never Used   Substance and Sexual Activity    Alcohol use: Yes     Frequency: Monthly or less     Drinks per session: Patient refused     Binge frequency: Never     Comment: Rarely    Drug use: Not on file    Sexual activity: Not on file     Review of Systems   Unable to perform ROS: Intubated     Objective:     Vital Signs (Most Recent):  Temp: (!) 93 °F (33.9 °C) (05/19/20 2015)  Pulse: 87 (05/19/20 2015)  Resp: (!) 1 (05/19/20 2015)  BP: 116/60 (05/19/20 2015)  SpO2: 98 % (05/19/20 2015) Vital Signs (24h Range):  Temp:  [93 °F (33.9 °C)-97.8 °F (36.6 °C)] 93 °F (33.9 °C)  Pulse:  [50-92] 87  Resp:  [1-18] 1  SpO2:  [92 %-100 %] 98 %  BP: (116-185)/(60-74) 116/60  Arterial Line BP: (105)/(70) 105/70     Weight: 86.5 kg (190 lb 11.2 oz)  Body mass index is 28.16 kg/m².    Physical Exam   Constitutional: He appears well-developed and well-nourished. No distress.   HENT:   Head: Atraumatic.   intubated   Cardiovascular: Normal rate, regular rhythm and intact distal pulses.   Chest tubes with sanguinous output   Pulmonary/Chest:   Intubated on minimal settings  Reversed on arrival to SICU  Equal chest rise   Abdominal: Soft. He exhibits no distension.   Musculoskeletal: He exhibits no edema.   Neurological:   sedated   Skin: Skin is warm and dry. He is not diaphoretic.   Nursing note and vitals reviewed.      Significant Labs:  CBC:   Recent Labs   Lab 05/19/20 2016 05/19/20 2115   WBC 18.39*  --    RBC 3.22*  --    HGB 10.0*  --    HCT 30.8* 27*   *  --    MCV 96  --    MCH 31.1*  --    MCHC 32.5  --      CMP:   Recent Labs   Lab 05/19/20 2016   *   CALCIUM 6.8*   ALBUMIN 2.2*   PROT 4.0*      K 4.7   CO2 18*   *   BUN 20   CREATININE 0.9   ALKPHOS 41*   ALT 36   AST 61*   BILITOT 0.5     ABGs:   Recent Labs   Lab 05/19/20  2115   PH  7.332*   PCO2 33.2*   PO2 110*   HCO3 17.6*   POCSATURATED 98   BE -8       Significant Diagnostics:  I have reviewed all pertinent imaging results/findings within the past 24 hours.

## 2020-05-20 NOTE — ASSESSMENT & PLAN NOTE
Mr. Kearns is a 74-year-old man status post aortic valve replacement and CABG x2 on 5/19 for aortic regurgitation and NSTEMI.     Neuro:  Sedated on propofol and prn fentanyl.  Has oxycodone and Dilaudid ordered for when he can tolerate p.o. Moves all extremities.     CV:  Status post aortic valve repair with bioprosthetic valve and CABG x2 on 05/19.    Had a bleed and went back on 5/20.  One small manubrial bleeder found but otherwise none.     Pulm:  Intubated, high Fi, will work on getting this down.  Good acid/base status     Renal:  Good UOP  Cr near baseline     GI: NPO for now. Advanced diet once extubated and passes bedside swallow.     Id:  No antibiotics.     Heme:  He received no blood products in the operating room.  Only Cell Saver.  Will trend ABGs for hematocrit.     Endo:  Glucose is within acceptable limits.     Dispo: Extubate.  ICU care.

## 2020-05-20 NOTE — NURSING
Pt returned to [id number] from OR per anesthesia. CTS aware of pts arrival to SICU. All vitals stable. Received order for 500cc albumin, See chart for all orders received

## 2020-05-20 NOTE — PT/OT/SLP PROGRESS
Occupational Therapy      Patient Name:  Brad Kearns   MRN:  [id number]    Patient not seen today secondary to pt intubated and medically inappropriate for therapy. RN hold 2* requiring increased vent setting. Will follow-up when medically appropriate.    Magali Degroot OT  5/20/2020

## 2020-05-20 NOTE — PT/OT/SLP PROGRESS
Physical Therapy      Patient Name:  Brad Kearns   MRN:  [id number]    Patient not seen today. Pt remained intubated and medically inappropriate for therapy on this date. RN reported that pt is requiring increased vent settings at this time.  Will follow-up when appropriate.    Lachelle Ponce, PT, DPT  5/20/2020  [phone]

## 2020-05-20 NOTE — RESPIRATORY THERAPY
Received patient from OR intubated and placed on SICU ventilator at documented settings on flow sheet. ABG performed with results shown to MD. Mirza bag and mask at bedside. Awaiting any new orders and will continue to monitor patient.

## 2020-05-20 NOTE — PROGRESS NOTES
[facility]  Critical Care - Surgery  Progress Note    Patient Name: Brad Kearns  MRN: [id number]  Admission Date: 5/14/2020  Hospital Length of Stay: 6 days  Code Status: Full Code  Attending Provider: Guicho Quezada MD  Primary Care Provider: Duke Bowden MD   Principal Problem: CAD (coronary artery disease)    Subjective:     Hospital/ICU Course:  No notes on file    Interval History/Significant Events:   Overnight had bleeding.  Or this AM.  Came back off gtts.    Follow-up For: Procedure(s) (LRB):  EXPLORATION, WOUND- Chest washout (N/A)    Post-Operative Day: Day of Surgery    Objective:     Vital Signs (Most Recent):  Temp: 99 °F (37.2 °C) (05/20/20 0925)  Pulse: 89 (05/20/20 1030)  Resp: (!) 21 (05/20/20 1030)  BP: 109/68 (05/20/20 0600)  SpO2: 96 % (05/20/20 1030) Vital Signs (24h Range):  Temp:  [93 °F (33.9 °C)-99 °F (37.2 °C)] 99 °F (37.2 °C)  Pulse:  [] 89  Resp:  [1-37] 21  SpO2:  [86 %-100 %] 96 %  BP: ()/(44-75) 109/68  Arterial Line BP: ()/(22-70) 115/65     Weight: 86.5 kg (190 lb 11.2 oz)  Body mass index is 28.16 kg/m².      Intake/Output Summary (Last 24 hours) at 5/20/2020 1053  Last data filed at 5/20/2020 1000  Gross per 24 hour   Intake 99962 ml   Output 3710 ml   Net 54605 ml       Physical Exam   Vitals reviewed.  Physical Exam   Constitutional: He appears well-developed and well-nourished.   HENT:   Head: Normocephalic.   Neck: Normal range of motion. No thyromegaly present.   Cardiovascular: Normal rate and regular rhythm.   Pulmonary/Chest:   Intubated, sedated.  Sternotomy incisions c/d/i.  Chest tubes x 3 in place.  Pacer wires in place.  RIJ in place.   Abdominal: Soft. He exhibits no distension.   Musculoskeletal: Normal range of motion.   Neurological:   sedated   Skin: Skin is warm. Capillary refill takes less than 2 seconds.   Vitals reviewed.      Vents:  Vent Mode: A/C (05/20/20 0937)  Ventilator Initiated: Yes(chart correction)  (05/19/20 2010)  Set Rate: 14 BPM (05/20/20 0937)  Vt Set: 450 mL (05/20/20 0937)  Pressure Support: 0 cmH20 (05/20/20 0937)  PEEP/CPAP: 5 cmH20 (05/20/20 0937)  Oxygen Concentration (%): 60 (05/20/20 1030)  Peak Airway Pressure: 24 cmH2O (05/20/20 0937)  Plateau Pressure: 0 cmH20 (05/20/20 0937)  Total Ve: 8.03 mL (05/20/20 0937)  F/VT Ratio<105 (RSBI): (!) 39.65 (05/20/20 0937)    Lines/Drains/Airways     Central Venous Catheter Line                 Percutaneous Central Line Insertion/Assessment - Quad lumen  05/19/20 0103 1 day    Trialysis (Dialysis) Catheter 05/19/20 1215 right internal jugular less than 1 day          Drain                 Chest Tube 05/19/20 1848 1 Left Pleural 19 Fr. less than 1 day         Chest Tube 05/20/20 0300 1 Left Pleural 32 Fr. less than 1 day         NG/OG Tube 05/19/20 1900 orogastric Center mouth less than 1 day         Urethral Catheter 05/19/20 1307 Non-latex;Straight-tip;Temperature probe 14 Fr. less than 1 day         Y Chest Tube 1 and 2 05/19/20 1847 1 Anterior Mediastinal 19 Fr. 2 Anterior Mediastinal 19 Fr. less than 1 day          Airway                 Airway - Non-Surgical 05/19/20 1310 less than 1 day          Arterial Line                 Arterial Line 05/19/20 1252 Left Radial less than 1 day         Arterial Line 05/20/20 0008 Right Femoral less than 1 day          Peripheral Intravenous Line                 Peripheral IV - Single Lumen 05/19/20 0623 20 G Right Forearm 1 day         Peripheral IV - Single Lumen 05/19/20 1310 16 G Right Hand less than 1 day         Peripheral IV - Single Lumen 05/20/20 0019 20 G Left Forearm less than 1 day                Significant Labs:    CBC/Anemia Profile:  Recent Labs   Lab 05/20/20  0053  05/20/20  0401  05/20/20  0603 05/20/20  0921 05/20/20  0925   WBC 14.26*  --  15.18*  --   --  8.51  --    HGB 5.9*  --  9.7*  --   --  10.1*  --    HCT 18.1*   < > 30.4*   < > 28* 30.3* 28*     --  192  --   --  134*  --    MCV 97   --  92  --   --  89  --    RDW 14.2  --  15.6*  --   --  16.1*  --     < > = values in this interval not displayed.        Chemistries:  Recent Labs   Lab 05/19/20 2016 05/19/20  2259 05/20/20  0053 05/20/20  0401    141 146* 146*   K 4.7 4.7 3.5 3.8   * 114* 113* 112*   CO2 18* 16* 18* 13*   BUN 20 20 20 21   CREATININE 0.9 1.0 1.1 1.3   CALCIUM 6.8* 7.3* 7.9* 7.8*   ALBUMIN 2.2* 3.2* 3.2*  --    PROT 4.0* 4.6* 4.7*  --    BILITOT 0.5 0.9 1.0  --    ALKPHOS 41* 35* 29*  --    ALT 36 31 22  --    AST 61* 60* 49*  --    MG 2.6 2.6 2.3 2.3   PHOS 4.2 3.4 2.9 4.3         Significant Imaging:  I have reviewed and interpreted all pertinent imaging results/findings within the past 24 hours.    Assessment/Plan:     S/P CABG (coronary artery bypass graft)  Mr. Kearns is a 74-year-old man status post aortic valve replacement and CABG x2 on 5/19 for aortic regurgitation and NSTEMI.     Neuro:  Sedated on propofol and prn fentanyl.  Has oxycodone and Dilaudid ordered for when he can tolerate p.o. Moves all extremities.     CV:  Status post aortic valve repair with bioprosthetic valve and CABG x2 on 05/19.    Had a bleed and went back on 5/20.  One small manubrial bleeder found but otherwise none.     Pulm:  Intubated, high Fi, will work on getting this down.  Good acid/base status     Renal:  Good UOP  Cr near baseline     GI: NPO for now. Advanced diet once extubated and passes bedside swallow.     Id:  No antibiotics.     Heme:  He received no blood products in the operating room.  Only Cell Saver.  Will trend ABGs for hematocrit.     Endo:  Glucose is within acceptable limits.     Dispo: Extubate.  ICU care.         Critical care was time spent personally by me on the following activities: development of treatment plan with patient or surrogate and bedside caregivers, discussions with consultants, evaluation of patient's response to treatment, examination of patient, ordering and performing treatments and  interventions, ordering and review of laboratory studies, ordering and review of radiographic studies, pulse oximetry, re-evaluation of patient's condition.  This critical care time did not overlap with that of any other provider or involve time for any procedures.     Warren Wood MD  Critical Care - Surgery  [facility]

## 2020-05-20 NOTE — ANESTHESIA POSTPROCEDURE EVALUATION
Anesthesia Post Evaluation    Patient: Brad Kearns    Procedure(s) Performed: Procedure(s) (LRB):  CORONARY ARTERY BYPASS GRAFT (CABG) x2 (N/A)  SURGICAL PROCUREMENT, VEIN, ENDOSCOPIC (Left)  REPLACEMENT, AORTIC VALVE (N/A)    Final Anesthesia Type: general    Patient location during evaluation: ICU  Patient participation: No - Unable to Participate, Sedation  Level of consciousness: sedated  Post-procedure vital signs: reviewed and stable  Pain management: adequate  Airway patency: patent    PONV status at discharge: No PONV  Anesthetic complications: no      Cardiovascular status: blood pressure returned to baseline  Respiratory status: ETT  Hydration status: euvolemic  Follow-up not needed.  Comments: Patient did well in the immediate postop period but brought back to OR emergently the following AM for washout.           Vitals Value Taken Time   /68 5/20/2020  6:02 AM   Temp 37.1 °C (98.8 °F) 5/20/2020  4:15 AM   Pulse 113 5/20/2020  6:11 AM   Resp 12 5/20/2020  6:11 AM   SpO2 95 % 5/20/2020  6:11 AM   Vitals shown include unvalidated device data.      No case tracking events are documented in the log.      Pain/Kathrin Score: Pain Rating Prior to Med Admin: 0 (5/20/2020  2:43 AM)

## 2020-05-20 NOTE — SUBJECTIVE & OBJECTIVE
Interval History/Significant Events:   Overnight had bleeding.  Or this AM.  Came back off gtts.    Follow-up For: Procedure(s) (LRB):  EXPLORATION, WOUND- Chest washout (N/A)    Post-Operative Day: Day of Surgery    Objective:     Vital Signs (Most Recent):  Temp: 99 °F (37.2 °C) (05/20/20 0925)  Pulse: 89 (05/20/20 1030)  Resp: (!) 21 (05/20/20 1030)  BP: 109/68 (05/20/20 0600)  SpO2: 96 % (05/20/20 1030) Vital Signs (24h Range):  Temp:  [93 °F (33.9 °C)-99 °F (37.2 °C)] 99 °F (37.2 °C)  Pulse:  [] 89  Resp:  [1-37] 21  SpO2:  [86 %-100 %] 96 %  BP: ()/(44-75) 109/68  Arterial Line BP: ()/(22-70) 115/65     Weight: 86.5 kg (190 lb 11.2 oz)  Body mass index is 28.16 kg/m².      Intake/Output Summary (Last 24 hours) at 5/20/2020 1053  Last data filed at 5/20/2020 1000  Gross per 24 hour   Intake 15491 ml   Output 3710 ml   Net 21756 ml       Physical Exam   Vitals reviewed.  Physical Exam   Constitutional: He appears well-developed and well-nourished.   HENT:   Head: Normocephalic.   Neck: Normal range of motion. No thyromegaly present.   Cardiovascular: Normal rate and regular rhythm.   Pulmonary/Chest:   Intubated, sedated.  Sternotomy incisions c/d/i.  Chest tubes x 3 in place.  Pacer wires in place.  RIJ in place.   Abdominal: Soft. He exhibits no distension.   Musculoskeletal: Normal range of motion.   Neurological:   sedated   Skin: Skin is warm. Capillary refill takes less than 2 seconds.   Vitals reviewed.      Vents:  Vent Mode: A/C (05/20/20 0937)  Ventilator Initiated: Yes(chart correction) (05/19/20 2010)  Set Rate: 14 BPM (05/20/20 0937)  Vt Set: 450 mL (05/20/20 0937)  Pressure Support: 0 cmH20 (05/20/20 0937)  PEEP/CPAP: 5 cmH20 (05/20/20 0937)  Oxygen Concentration (%): 60 (05/20/20 1030)  Peak Airway Pressure: 24 cmH2O (05/20/20 0937)  Plateau Pressure: 0 cmH20 (05/20/20 0937)  Total Ve: 8.03 mL (05/20/20 0937)  F/VT Ratio<105 (RSBI): (!) 39.65 (05/20/20  0937)    Lines/Drains/Airways     Central Venous Catheter Line                 Percutaneous Central Line Insertion/Assessment - Quad lumen  05/19/20 0103 1 day    Trialysis (Dialysis) Catheter 05/19/20 1215 right internal jugular less than 1 day          Drain                 Chest Tube 05/19/20 1848 1 Left Pleural 19 Fr. less than 1 day         Chest Tube 05/20/20 0300 1 Left Pleural 32 Fr. less than 1 day         NG/OG Tube 05/19/20 1900 orogastric Center mouth less than 1 day         Urethral Catheter 05/19/20 1307 Non-latex;Straight-tip;Temperature probe 14 Fr. less than 1 day         Y Chest Tube 1 and 2 05/19/20 1847 1 Anterior Mediastinal 19 Fr. 2 Anterior Mediastinal 19 Fr. less than 1 day          Airway                 Airway - Non-Surgical 05/19/20 1310 less than 1 day          Arterial Line                 Arterial Line 05/19/20 1252 Left Radial less than 1 day         Arterial Line 05/20/20 0008 Right Femoral less than 1 day          Peripheral Intravenous Line                 Peripheral IV - Single Lumen 05/19/20 0623 20 G Right Forearm 1 day         Peripheral IV - Single Lumen 05/19/20 1310 16 G Right Hand less than 1 day         Peripheral IV - Single Lumen 05/20/20 0019 20 G Left Forearm less than 1 day                Significant Labs:    CBC/Anemia Profile:  Recent Labs   Lab 05/20/20 0053 05/20/20  0401  05/20/20  0603 05/20/20  0921 05/20/20  0925   WBC 14.26*  --  15.18*  --   --  8.51  --    HGB 5.9*  --  9.7*  --   --  10.1*  --    HCT 18.1*   < > 30.4*   < > 28* 30.3* 28*     --  192  --   --  134*  --    MCV 97  --  92  --   --  89  --    RDW 14.2  --  15.6*  --   --  16.1*  --     < > = values in this interval not displayed.        Chemistries:  Recent Labs   Lab 05/19/20 2016 05/19/20  2259 05/20/20  0053 05/20/20  0401    141 146* 146*   K 4.7 4.7 3.5 3.8   * 114* 113* 112*   CO2 18* 16* 18* 13*   BUN 20 20 20 21   CREATININE 0.9 1.0 1.1 1.3   CALCIUM 6.8* 7.3* 7.9*  7.8*   ALBUMIN 2.2* 3.2* 3.2*  --    PROT 4.0* 4.6* 4.7*  --    BILITOT 0.5 0.9 1.0  --    ALKPHOS 41* 35* 29*  --    ALT 36 31 22  --    AST 61* 60* 49*  --    MG 2.6 2.6 2.3 2.3   PHOS 4.2 3.4 2.9 4.3         Significant Imaging:  I have reviewed and interpreted all pertinent imaging results/findings within the past 24 hours.

## 2020-05-20 NOTE — ANESTHESIA PREPROCEDURE EVALUATION
05/20/2020  [facility]  Anesthesia Pre-Operative Evaluation         Patient Name: Brad Kearns  YOB: 1945  MRN: [id number]    SUBJECTIVE:     05/20/2020    Pre-operative evaluation for Procedure(s) (LRB):  EXPLORATION, WOUND- Chest washout (N/A)    Brad Kearns is a 74 y.o. male with aortic regurgitation with bicuspid aortic valve, heart failure preserved ejection fraction, and hypertension who presented with NSTEMI who is s/p AVR with bioprosthetic valve and CABGx2. He has had high chest tube output requiring multiple rounds of blood products (6 PRBC, 2 Platelets, 2Cryo)    Patient now presents as a class A  for the above procedure(s).      LDA:        Percutaneous Central Line Insertion/Assessment - Quad lumen  05/19/20 0103 (Active)   Number of days: 1       Trialysis (Dialysis) Catheter 05/19/20 1215 right internal jugular (Active)   Verification by X-ray Yes 5/19/2020  8:00 PM   Site Assessment No drainage;No redness;No swelling;No warmth 5/19/2020  8:00 PM   Line Securement Device Secured with sutures 5/19/2020  8:00 PM   Dressing Type Central line dressing with pants 5/20/2020  3:00 AM   Dressing Status Clean;Dry;Intact 5/20/2020  3:00 AM   Dressing Intervention Integrity maintained 5/20/2020  3:00 AM   Venous Patency/Care infusing 5/20/2020  3:00 AM   Arterial Patency/Care infusing 5/20/2020  3:00 AM   Distal Patency/Care intermittent infusion cap applied 5/20/2020  3:00 AM   Number of days: 0            Peripheral IV - Single Lumen 05/19/20 0623 20 G Right Forearm (Active)   Site Assessment Clean;Dry;Intact 5/20/2020  3:00 AM   Line Status Saline locked 5/20/2020  3:00 AM   Number of days: 0            Peripheral IV - Single Lumen 05/19/20 1310 16 G Right Hand (Active)   Site Assessment Clean;Dry;Intact 5/20/2020  3:00 AM   Line Status Saline locked 5/20/2020   3:00 AM   Dressing Status Clean;Intact;Dry 5/20/2020  3:00 AM   Dressing Intervention Integrity maintained 5/20/2020  3:00 AM   Number of days: 0            Peripheral IV - Single Lumen 05/20/20 0019 20 G Left Forearm (Active)   Site Assessment Clean;Dry;Intact 5/20/2020  3:00 AM   Line Status Infusing 5/20/2020  3:00 AM   Dressing Status Clean;Intact;Dry 5/20/2020  3:00 AM   Number of days: 0            Arterial Line 05/19/20 1252 Left Radial (Active)   Site Assessment Clean;Dry;Intact 5/20/2020  3:00 AM   Line Status Pulsatile blood flow 5/20/2020  3:00 AM   Art Line Waveform Appropriate 5/20/2020  3:00 AM   Arterial Line Interventions Zeroed and calibrated;Leveled;Connections checked and tightened 5/20/2020  3:00 AM   Color/Movement/Sensation Capillary refill less than 3 sec 5/20/2020  3:00 AM   Dressing Type Biopatch in place 5/20/2020  3:00 AM   Dressing Status Clean;Dry;Intact 5/20/2020  3:00 AM   Number of days: 0            Arterial Line 05/20/20 0008 Right Femoral (Active)   Site Assessment Dry;Clean;Intact 5/20/2020  3:00 AM   Line Status Pulsatile blood flow 5/20/2020  3:00 AM   Art Line Waveform Appropriate 5/20/2020  3:00 AM   Arterial Line Interventions Zeroed and calibrated;Leveled;Connections checked and tightened 5/20/2020  3:00 AM   Color/Movement/Sensation Capillary refill less than 3 sec 5/20/2020  3:00 AM   Dressing Type Biopatch in place 5/20/2020  3:00 AM   Dressing Status Clean;Dry;Intact 5/20/2020  3:00 AM   Dressing Intervention First dressing 5/20/2020  3:00 AM   Dressing Change Due 05/27/20 5/20/2020  3:00 AM   Number of days: 0            Chest Tube 05/19/20 1848 1 Left Pleural 19 Fr. (Active)   Air Leak/Fluctuation air leak not present 5/20/2020  3:00 AM   Safety all tubing connections taped;2 rubber-tipped hemostats w/ patient 5/20/2020  3:00 AM   Securement tubing secured to body distal to insertion site w/ tape 5/20/2020  3:00 AM   Patency Intervention Tip/tilt 5/20/2020  3:00 AM    Left Subcutaneous Emphysema none present 5/20/2020  3:00 AM   Right Subcutaneous Emphysema none present 5/20/2020  3:00 AM   Site Assessment Dry;Intact;Clean 5/20/2020  3:00 AM   Surrounding Skin Intact 5/20/2020  3:00 AM   Output (mL) 85 mL 5/20/2020  5:00 AM   Number of days: 0            Chest Tube 05/20/20 0300 1 Left Pleural 32 Fr. (Active)   Number of days: 0            NG/OG Tube 05/19/20 1900 orogastric Center mouth (Active)   Placement Check placement verified by x-ray 5/20/2020  3:00 AM   Tolerance no signs/symptoms of discomfort 5/20/2020  3:00 AM   Clamp Status/Tolerance clamped 5/20/2020  3:00 AM   Number of days: 0            Urethral Catheter 05/19/20 1307 Non-latex;Straight-tip;Temperature probe 14 Fr. (Active)   Site Assessment Clean;Intact;Dry 5/20/2020  3:00 AM   Collection Container Urimeter 5/20/2020  3:00 AM   Securement Method secured to top of thigh w/ adhesive device 5/20/2020  3:00 AM   Catheter Care Performed yes 5/20/2020  3:00 AM   Output (mL) 65 mL 5/20/2020  5:00 AM   Number of days: 0            Y Chest Tube 1 and 2 05/19/20 1847 1 Anterior Mediastinal 19 Fr. 2 Anterior Mediastinal 19 Fr. (Active)   Air Leak/Fluctuation connections tightened 5/20/2020  3:00 AM   Safety all tubing connections taped;2 rubber-tipped hemostats w/ patient;all connections secured;suction checked 5/20/2020  3:00 AM   Securement tubing secured to body distal to insertion site w/ tape 5/20/2020  3:00 AM   Left Subcutaneous Emphysema none present 5/20/2020  3:00 AM   Right Subcutaneous Emphysema none present 5/20/2020  3:00 AM   Patency Intervention Tip/tilt 5/20/2020  3:00 AM   Output (mL) 70 mL 5/20/2020  5:00 AM   Number of days: 0       Previous airway:   7.5 ETT at 24 cm at the lip      Drips:    dexmedetomidine (PRECEDEX) infusion Stopped (05/19/20 2300)    dextrose 5 % and 0.9 % NaCl with KCl 20 mEq 5 mL/hr (05/20/20 0000)    epinephrine 0.6 mcg/kg/min (05/20/20 0500)    insulin (HUMAN R) infusion  (adults) 3.7 Units/hr (05/20/20 0500)    niCARdipine Stopped (05/19/20 2053)    norepinephrine bitartrate-D5W 0.08 mcg/kg/min (05/20/20 0500)    propofoL 50 mcg/kg/min (05/20/20 0500)    vasopressin (PITRESSIN) infusion         Patient Active Problem List   Diagnosis    Hypertension    CAD (coronary artery disease)    Status post aortic valve replacement       Review of patient's allergies indicates:   Allergen Reactions    Demerol [meperidine] Hives     [id number]    Lisinopril      cough       Current Inpatient Medications:   albumin human 5%  25 g Intravenous Once    aspirin  325 mg Oral Once    aspirin  325 mg Oral Daily    ceFAZolin (ANCEF) IVPB  2 g Intravenous Q8H    heparin (porcine)  5,000 Units Subcutaneous Q8H    mupirocin  1 g Nasal BID    pantoprazole  40 mg Intravenous Daily    polyethylene glycol  17 g Oral Daily    potassium chloride  40 mEq Oral Once    pravastatin  40 mg Oral Daily       No current facility-administered medications on file prior to encounter.      Current Outpatient Medications on File Prior to Encounter   Medication Sig Dispense Refill    ascorbic acid (VITAMIN C) 1000 MG tablet Take 1,000 mg by mouth every morning.      aspirin (ECOTRIN) 81 MG EC tablet Take 81 mg by mouth every morning.      esomeprazole (NEXIUM) 40 MG capsule once daily.      fish oil-omega-3 fatty acids 300-1,000 mg capsule Take 1 g by mouth every morning.      multivitamin capsule Take 1 capsule by mouth once daily.      pravastatin (PRAVACHOL) 40 MG tablet Take 1 tablet (40 mg total) by mouth once daily. 90 tablet 3    thiamine (VITAMIN B-1) 50 MG tablet Take 50 mg by mouth once daily.      valsartan (DIOVAN) 160 MG tablet Take 1 tablet (160 mg total) by mouth 2 (two) times daily. 180 tablet 3       Past Surgical History:   Procedure Laterality Date    AORTOGRAPHY N/A 5/15/2020    Procedure: Aortogram;  Surgeon: Ben Butler MD;  Location: [facility] CATH LAB;  Service:  Cardiology;  Laterality: N/A;    APPENDECTOMY      CORONARY ANGIOGRAPHY N/A 5/15/2020    Procedure: ANGIOGRAM, CORONARY ARTERY;  Surgeon: Ben Butler MD;  Location: [facility] CATH LAB;  Service: Cardiology;  Laterality: N/A;    INGUINAL HERNIA REPAIR      LEFT HEART CATHETERIZATION Left 5/15/2020    Procedure: Left heart cath;  Surgeon: Ben Butler MD;  Location: [facility] CATH LAB;  Service: Cardiology;  Laterality: Left;    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    Right knee arthroscopy         Social History     Socioeconomic History    Marital status:      Spouse name: Not on file    Number of children: Not on file    Years of education: Not on file    Highest education level: Not on file   Occupational History    Not on file   Social Needs    Financial resource strain: Not hard at all    Food insecurity:     Worry: Never true     Inability: Never true    Transportation needs:     Medical: No     Non-medical: No   Tobacco Use    Smoking status: Never Smoker    Smokeless tobacco: Never Used   Substance and Sexual Activity    Alcohol use: Yes     Frequency: Monthly or less     Drinks per session: Patient refused     Binge frequency: Never     Comment: Rarely    Drug use: Not on file    Sexual activity: Not on file   Lifestyle    Physical activity:     Days per week: 0 days     Minutes per session: 0 min    Stress: Not at all   Relationships    Social connections:     Talks on phone: More than three times a week     Gets together: Once a week     Attends Orthodox service: Not on file     Active member of club or organization: No     Attends meetings of clubs or organizations: Never     Relationship status:    Other Topics Concern    Not on file   Social History Narrative    Not on file       OBJECTIVE:     Vital Signs Range (Last 24H):  Temp:  [33.9 °C (93 °F)-37.1 °C (98.8 °F)]   Pulse:  []   Resp:  [1-37]   BP: ()/(44-75)   SpO2:  [86 %-100  %]   Arterial Line BP: ()/(22-70)       Significant Labs:  Lab Results   Component Value Date    WBC 15.18 (H) 05/20/2020    HGB 9.7 (L) 05/20/2020    HCT 29 (L) 05/20/2020     05/20/2020    CHOL 194 03/20/2020    TRIG 79 03/20/2020    HDL 40 03/20/2020    ALT 22 05/20/2020    AST 49 (H) 05/20/2020     (H) 05/20/2020    K 3.8 05/20/2020     (H) 05/20/2020    CREATININE 1.3 05/20/2020    BUN 21 05/20/2020    CO2 13 (L) 05/20/2020    TSH 1.054 03/20/2020    INR 1.1 05/20/2020    HGBA1C 5.1 05/14/2020         Diagnostic Studies:  No relevant studies.      Cardiac Studies:  EKG:   Sinus rhythm with 1st degree A-V block  Septal infarct ,age undetermined  T wave abnormality, consider inferior ischemia  T wave abnormality, consider anterolateral ischemia  Prolonged QT  Abnormal ECG  When compared with ECG of 15-MAY-2020 10:42,  Septal infarct is now Present    Confirmation pending    2D Echo:  · Mild left ventricular enlargement (LVEDD 63mm)  · Preserved left ventricular systolic function with mild hypokinesis of the LV apex. The estimated ejection fraction is 55%.  · Normal right ventricular systolic function.  · Normal LV diastolic function.  · Moderate-to-severe aortic regurgitation. The aortic valve's left and right cusp leaflets may be partially fused as previously described.  · Intermediate central venous pressure (8 mmHg).         ASSESSMENT/PLAN:         Anesthesia Evaluation    I have reviewed the Patient Summary Reports.    I have reviewed the Nursing Notes.   I have reviewed the Medications.     Review of Systems  Anesthesia Hx:  No problems with previous Anesthesia  History of prior surgery of interest to airway management or planning: Denies Family Hx of Anesthesia complications.   Denies Personal Hx of Anesthesia complications.   Social:  Non-Smoker    Cardiovascular:   Hypertension Valvular problems/Murmurs, AI Past MI     Pulmonary:  Pulmonary Normal    Hepatic/GI:   GERD     Neurological:  Neurology Normal        Physical Exam  General:  Well nourished    Airway/Jaw/Neck:  Airway Findings: Mouth Opening: Normal Tongue: Normal  Pre-Existing Airway Tube(s): Oral Endotracheal tube  Size: 7.5 at 24 cm at the lip  General Airway Assessment: Adult  TM Distance: Normal, at least 6 cm      Dental:  Dental Findings: In tact   Chest/Lungs:  Chest/Lungs Findings: Mechanical ventilation.    Vent Mode: A/C  Oxygen Concentration (%):  () 60  Resp Rate Total:  (14 br/min-31 br/min) 16 br/min  Vt Set:  (450 mL) 450 mL  PEEP/CPAP:  (5 cmH20) 5 cmH20  Pressure Support:  (0 cmH20) 0 cmH20  Mean Airway Pressure:  (7.4 cmH20-8.9 cmH20) 8.9 cmH20       Heart/Vascular:  Heart Findings: Rate: Tachycardia  Rhythm: Regular Rhythm  Other Heart Findings: Sternal incision dressed          Mental Status:  Mental Status Findings:  Alert and Oriented, Cooperative         Anesthesia Plan  Type of Anesthesia, risks & benefits discussed:  Anesthesia Type:  general  Patient's Preference:   Intra-op Monitoring Plan: arterial line, central line and standard ASA monitors  Intra-op Monitoring Plan Comments:   Post Op Pain Control Plan: multimodal analgesia, IV/PO Opioids PRN and per primary service following discharge from PACU  Post Op Pain Control Plan Comments:   Induction:   IV  Beta Blocker:  Patient is not currently on a Beta-Blocker (No further documentation required).       Informed Consent: Patient representative understands risks and agrees with Anesthesia plan.  Questions answered. Anesthesia consent signed with patient.  ASA Score: 4  emergent   Day of Surgery Review of History & Physical:    H&P update referred to the surgeon.     Anesthesia Plan Notes: Patient for Class A emergency bring-back for washout. Lactate 8 preop and on .06 epi and .08 norepi, so very high-risk.     Good IV access noted.         Ready For Surgery From Anesthesia Perspective.

## 2020-05-20 NOTE — PROGRESS NOTES
MDs called to bedside to assess patient after hypotension despite fluid and pressor administration. Patient MAPs in 40s at this time, labs sent and ABG done per MD order. Patient further decline, with large amount of bright red drainage from chest tubes. In total 2500 mL Albumin, 6 PRBC, 2 Plt., 2 Cryo, 2 FFP, 1 DDABP, 6 Bicarb, 1 Calcium push through shift. MDs at bedside through shift for rt. Femoral arterial line and left sided pleural chest tube emergently to stabilize patient. Total output from left pleural chest tube 400 cc within first hour, other chest tube cannister total output of ~1500 mL through shift. Will continue to monitor through shift.

## 2020-05-20 NOTE — SUBJECTIVE & OBJECTIVE
Interval HPI:   Overnight events: Received in ICU. Remains intubated. Pressor support. BG at or above goal on IV insulin infusion rate 1-4 u/hr.   Eating:   NPO  Nausea: No  Hypoglycemia and intervention: No  Fever: No  TPN and/or TF: No    PMH, PSH, FH, SH reviewed       Review of Systems  Unable to obtain due to: Sedation,Intubation,Altered mental status,Critical illness,Reviewed ROS from note dated 5/14/20 per Dr. Lofton.     Current Medications and/or Treatments Impacting Glycemic Control  Immunotherapy:    Immunosuppressants     None        Steroids:   Hormones (From admission, onward)    Start     Stop Route Frequency Ordered    05/19/20 2145  vasopressin (PITRESSIN) 0.2 Units/mL in dextrose 5 % 100 mL infusion      -- IV Continuous 05/19/20 2041        Pressors:    Autonomic Drugs (From admission, onward)    Start     Stop Route Frequency Ordered    05/19/20 2145  EPINEPHrine (ADRENALIN) 5 mg in sodium chloride 0.9% 250 mL infusion     Question Answer Comment   Titrate by: (in mcg/kg/min) 0.01    Titrate interval: (in minutes) 5    Titrate to maintain: (SBP or MAP or Cardiac Index) SBP    Maximum dose: (in mcg/kg/min) 2        -- IV Continuous 05/19/20 2041 05/19/20 2145  norepinephrine 4 mg in dextrose 5% 250 mL infusion (premix) (titrating)     Question Answer Comment   Titrate by: (in mcg/kg/min) 0.05    Titrate interval: (in minutes) 5    Titrate to maintain: (MAP or SBP) MAP    Greater than: (in mmHg) 65    Maximum dose: (in mcg/kg/min) 3        -- IV Continuous 05/19/20 2041        Hyperglycemia/Diabetes Medications:   Antihyperglycemics (From admission, onward)    Start     Stop Route Frequency Ordered    05/19/20 2145  insulin regular 100 Units in sodium chloride 0.9% 100 mL infusion     Question:  Insulin rate changes (DO NOT MODIFY ANSWER)  Answer:  [email or web address]    -- IV Continuous 05/19/20 2041             PHYSICAL  EXAMINATION:  Vitals:    05/20/20 0600   BP: 109/68   Pulse: 108   Resp: (!) 22   Temp:      Body mass index is 28.16 kg/m².    Physical Exam   Physical exam deferred due to COVID-19 restrictions.

## 2020-05-20 NOTE — PLAN OF CARE
Unable to perform instrument count due to emergent case, MD aware.  Will obtain xray at end of case.

## 2020-05-20 NOTE — PLAN OF CARE
Plan of care reviewed with pts family via phone. Vent settings 80% peep 12 o2 sats 94%. Uop 60-75cc/hr cvp 10-11. 500cc albumin given upon arrival from OR. Pt follows commands and moves all extremities. Pain controlled with prn diluadid. Propofol at 50mcg/kg/min.

## 2020-05-20 NOTE — PLAN OF CARE
Recommendations  1. When able to extubate, ADAT to Cardiac with texture per SLP.   2. If unable to extubate, recommend initiating trickle TF of Peptamen Intense VHP.   3. RD to monitor.    Goals: Patient to receive nutrition by RD follow-up  Nutrition Goal Status: new    Full assessment completed, see RD Note 5/20/2020.

## 2020-05-21 LAB
ABO + RH BLD: NORMAL
ALLENS TEST: ABNORMAL
ALLENS TEST: NORMAL
ALLENS TEST: NORMAL
ANION GAP SERPL CALC-SCNC: 11 MMOL/L (ref 8–16)
ANION GAP SERPL CALC-SCNC: 7 MMOL/L (ref 8–16)
ANION GAP SERPL CALC-SCNC: 8 MMOL/L (ref 8–16)
APTT BLDCRRT: 25.4 SEC (ref 21–32)
BASOPHILS # BLD AUTO: 0.03 K/UL (ref 0–0.2)
BASOPHILS NFR BLD: 0.2 % (ref 0–1.9)
BLD GP AB SCN CELLS X3 SERPL QL: NORMAL
BLD PROD TYP BPU: NORMAL
BLOOD UNIT EXPIRATION DATE: NORMAL
BLOOD UNIT TYPE CODE: 600
BLOOD UNIT TYPE CODE: 600
BLOOD UNIT TYPE CODE: 6200
BLOOD UNIT TYPE: NORMAL
BUN SERPL-MCNC: 22 MG/DL (ref 8–23)
BUN SERPL-MCNC: 23 MG/DL (ref 8–23)
BUN SERPL-MCNC: 24 MG/DL (ref 8–23)
CALCIUM SERPL-MCNC: 7.8 MG/DL (ref 8.7–10.5)
CALCIUM SERPL-MCNC: 8.1 MG/DL (ref 8.7–10.5)
CALCIUM SERPL-MCNC: 8.1 MG/DL (ref 8.7–10.5)
CHLORIDE SERPL-SCNC: 108 MMOL/L (ref 95–110)
CHLORIDE SERPL-SCNC: 110 MMOL/L (ref 95–110)
CHLORIDE SERPL-SCNC: 113 MMOL/L (ref 95–110)
CO2 SERPL-SCNC: 26 MMOL/L (ref 23–29)
CO2 SERPL-SCNC: 27 MMOL/L (ref 23–29)
CO2 SERPL-SCNC: 28 MMOL/L (ref 23–29)
CODING SYSTEM: NORMAL
CREAT SERPL-MCNC: 1.1 MG/DL (ref 0.5–1.4)
CREAT SERPL-MCNC: 1.2 MG/DL (ref 0.5–1.4)
CREAT SERPL-MCNC: 1.3 MG/DL (ref 0.5–1.4)
DELSYS: ABNORMAL
DELSYS: NORMAL
DELSYS: NORMAL
DIFFERENTIAL METHOD: ABNORMAL
DISPENSE STATUS: NORMAL
EOSINOPHIL # BLD AUTO: 0 K/UL (ref 0–0.5)
EOSINOPHIL NFR BLD: 0 % (ref 0–8)
ERYTHROCYTE [DISTWIDTH] IN BLOOD BY AUTOMATED COUNT: 16.9 % (ref 11.5–14.5)
ERYTHROCYTE [SEDIMENTATION RATE] IN BLOOD BY WESTERGREN METHOD: 16 MM/H
EST. GFR  (AFRICAN AMERICAN): >60 ML/MIN/1.73 M^2
EST. GFR  (NON AFRICAN AMERICAN): 54.1 ML/MIN/1.73 M^2
EST. GFR  (NON AFRICAN AMERICAN): 59.6 ML/MIN/1.73 M^2
EST. GFR  (NON AFRICAN AMERICAN): >60 ML/MIN/1.73 M^2
FIO2: 50
FIO2: 70
FLOW: 10
GLUCOSE SERPL-MCNC: 111 MG/DL (ref 70–110)
GLUCOSE SERPL-MCNC: 119 MG/DL (ref 70–110)
GLUCOSE SERPL-MCNC: 121 MG/DL (ref 70–110)
HCO3 UR-SCNC: 27.7 MMOL/L (ref 24–28)
HCO3 UR-SCNC: 27.9 MMOL/L (ref 24–28)
HCO3 UR-SCNC: 28 MMOL/L (ref 24–28)
HCO3 UR-SCNC: 28.8 MMOL/L (ref 24–28)
HCT VFR BLD AUTO: 31.6 % (ref 40–54)
HCT VFR BLD CALC: 26 %PCV (ref 36–54)
HCT VFR BLD CALC: 30 %PCV (ref 36–54)
HGB BLD-MCNC: 10.5 G/DL (ref 14–18)
IMM GRANULOCYTES # BLD AUTO: 0.07 K/UL (ref 0–0.04)
IMM GRANULOCYTES NFR BLD AUTO: 0.5 % (ref 0–0.5)
INR PPP: 1.1 (ref 0.8–1.2)
LDH SERPL L TO P-CCNC: 0.68 MMOL/L (ref 0.36–1.25)
LDH SERPL L TO P-CCNC: 0.8 MMOL/L (ref 0.36–1.25)
LYMPHOCYTES # BLD AUTO: 1.7 K/UL (ref 1–4.8)
LYMPHOCYTES NFR BLD: 12.2 % (ref 18–48)
MAGNESIUM SERPL-MCNC: 2.4 MG/DL (ref 1.6–2.6)
MCH RBC QN AUTO: 29.8 PG (ref 27–31)
MCHC RBC AUTO-ENTMCNC: 33.2 G/DL (ref 32–36)
MCV RBC AUTO: 90 FL (ref 82–98)
MODE: ABNORMAL
MODE: NORMAL
MODE: NORMAL
MONOCYTES # BLD AUTO: 1.2 K/UL (ref 0.3–1)
MONOCYTES NFR BLD: 8.5 % (ref 4–15)
NEUTROPHILS # BLD AUTO: 10.6 K/UL (ref 1.8–7.7)
NEUTROPHILS NFR BLD: 78.6 % (ref 38–73)
NRBC BLD-RTO: 0 /100 WBC
NUM UNITS TRANS FFP: NORMAL
PCO2 BLDA: 35.4 MMHG (ref 35–45)
PCO2 BLDA: 37 MMHG (ref 35–45)
PCO2 BLDA: 37.1 MMHG (ref 35–45)
PCO2 BLDA: 38.3 MMHG (ref 35–45)
PEEP: 10
PEEP: 12
PEEP: 5
PEEP: 8
PEEP: 8
PH SMN: 7.47 [PH] (ref 7.35–7.45)
PH SMN: 7.49 [PH] (ref 7.35–7.45)
PH SMN: 7.5 [PH] (ref 7.35–7.45)
PH SMN: 7.5 [PH] (ref 7.35–7.45)
PHOSPHATE SERPL-MCNC: 2.6 MG/DL (ref 2.7–4.5)
PLATELET # BLD AUTO: 111 K/UL (ref 150–350)
PMV BLD AUTO: 10.2 FL (ref 9.2–12.9)
PO2 BLDA: 60 MMHG (ref 80–100)
PO2 BLDA: 69 MMHG (ref 80–100)
PO2 BLDA: 75 MMHG (ref 80–100)
PO2 BLDA: 88 MMHG (ref 80–100)
POC BE: 4 MMOL/L
POC BE: 5 MMOL/L
POC BE: 5 MMOL/L
POC BE: 6 MMOL/L
POC IONIZED CALCIUM: 1.06 MMOL/L (ref 1.06–1.42)
POC IONIZED CALCIUM: 1.1 MMOL/L (ref 1.06–1.42)
POC SATURATED O2: 92 % (ref 95–100)
POC SATURATED O2: 95 % (ref 95–100)
POC SATURATED O2: 96 % (ref 95–100)
POC SATURATED O2: 98 % (ref 95–100)
POC TCO2: 29 MMOL/L (ref 23–27)
POC TCO2: 30 MMOL/L (ref 23–27)
POCT GLUCOSE: 89 MG/DL (ref 70–110)
POCT GLUCOSE: 97 MG/DL (ref 70–110)
POCT GLUCOSE: 98 MG/DL (ref 70–110)
POTASSIUM BLD-SCNC: 3.6 MMOL/L (ref 3.5–5.1)
POTASSIUM BLD-SCNC: 3.6 MMOL/L (ref 3.5–5.1)
POTASSIUM SERPL-SCNC: 3.7 MMOL/L (ref 3.5–5.1)
POTASSIUM SERPL-SCNC: 3.7 MMOL/L (ref 3.5–5.1)
POTASSIUM SERPL-SCNC: 3.8 MMOL/L (ref 3.5–5.1)
PROTHROMBIN TIME: 10.9 SEC (ref 9–12.5)
RBC # BLD AUTO: 3.52 M/UL (ref 4.6–6.2)
SAMPLE: ABNORMAL
SAMPLE: NORMAL
SAMPLE: NORMAL
SITE: ABNORMAL
SITE: NORMAL
SITE: NORMAL
SODIUM BLD-SCNC: 147 MMOL/L (ref 136–145)
SODIUM BLD-SCNC: 148 MMOL/L (ref 136–145)
SODIUM SERPL-SCNC: 144 MMOL/L (ref 136–145)
SODIUM SERPL-SCNC: 147 MMOL/L (ref 136–145)
SODIUM SERPL-SCNC: 147 MMOL/L (ref 136–145)
SP02: 97
VT: 420
WBC # BLD AUTO: 13.54 K/UL (ref 3.9–12.7)

## 2020-05-21 PROCEDURE — 94640 AIRWAY INHALATION TREATMENT: CPT

## 2020-05-21 PROCEDURE — 83605 ASSAY OF LACTIC ACID: CPT

## 2020-05-21 PROCEDURE — 85025 COMPLETE CBC W/AUTO DIFF WBC: CPT

## 2020-05-21 PROCEDURE — C9113 INJ PANTOPRAZOLE SODIUM, VIA: HCPCS | Performed by: STUDENT IN AN ORGANIZED HEALTH CARE EDUCATION/TRAINING PROGRAM

## 2020-05-21 PROCEDURE — 82330 ASSAY OF CALCIUM: CPT

## 2020-05-21 PROCEDURE — 80048 BASIC METABOLIC PNL TOTAL CA: CPT | Mod: 91

## 2020-05-21 PROCEDURE — 94150 VITAL CAPACITY TEST: CPT

## 2020-05-21 PROCEDURE — 99291 PR CRITICAL CARE, E/M 30-74 MINUTES: ICD-10-PCS | Mod: GC,,, | Performed by: SURGERY

## 2020-05-21 PROCEDURE — 27000221 HC OXYGEN, UP TO 24 HOURS

## 2020-05-21 PROCEDURE — 25000003 PHARM REV CODE 250: Performed by: STUDENT IN AN ORGANIZED HEALTH CARE EDUCATION/TRAINING PROGRAM

## 2020-05-21 PROCEDURE — 84100 ASSAY OF PHOSPHORUS: CPT

## 2020-05-21 PROCEDURE — 85610 PROTHROMBIN TIME: CPT

## 2020-05-21 PROCEDURE — 83735 ASSAY OF MAGNESIUM: CPT

## 2020-05-21 PROCEDURE — 63600175 PHARM REV CODE 636 W HCPCS: Performed by: STUDENT IN AN ORGANIZED HEALTH CARE EDUCATION/TRAINING PROGRAM

## 2020-05-21 PROCEDURE — 80048 BASIC METABOLIC PNL TOTAL CA: CPT

## 2020-05-21 PROCEDURE — 94003 VENT MGMT INPAT SUBQ DAY: CPT

## 2020-05-21 PROCEDURE — 84295 ASSAY OF SERUM SODIUM: CPT

## 2020-05-21 PROCEDURE — 27200966 HC CLOSED SUCTION SYSTEM

## 2020-05-21 PROCEDURE — 37799 UNLISTED PX VASCULAR SURGERY: CPT

## 2020-05-21 PROCEDURE — 97161 PT EVAL LOW COMPLEX 20 MIN: CPT

## 2020-05-21 PROCEDURE — 85730 THROMBOPLASTIN TIME PARTIAL: CPT

## 2020-05-21 PROCEDURE — 99900017 HC EXTUBATION W/PARAMETERS (STAT)

## 2020-05-21 PROCEDURE — 63700000 PHARM REV CODE 250 ALT 637 W/O HCPCS: Performed by: STUDENT IN AN ORGANIZED HEALTH CARE EDUCATION/TRAINING PROGRAM

## 2020-05-21 PROCEDURE — 20000000 HC ICU ROOM

## 2020-05-21 PROCEDURE — 99900026 HC AIRWAY MAINTENANCE (STAT)

## 2020-05-21 PROCEDURE — 94761 N-INVAS EAR/PLS OXIMETRY MLT: CPT

## 2020-05-21 PROCEDURE — 99900035 HC TECH TIME PER 15 MIN (STAT)

## 2020-05-21 PROCEDURE — 97165 OT EVAL LOW COMPLEX 30 MIN: CPT

## 2020-05-21 PROCEDURE — 25000242 PHARM REV CODE 250 ALT 637 W/ HCPCS: Performed by: STUDENT IN AN ORGANIZED HEALTH CARE EDUCATION/TRAINING PROGRAM

## 2020-05-21 PROCEDURE — 97110 THERAPEUTIC EXERCISES: CPT

## 2020-05-21 PROCEDURE — 86850 RBC ANTIBODY SCREEN: CPT

## 2020-05-21 PROCEDURE — 85014 HEMATOCRIT: CPT

## 2020-05-21 PROCEDURE — 82803 BLOOD GASES ANY COMBINATION: CPT

## 2020-05-21 PROCEDURE — 84132 ASSAY OF SERUM POTASSIUM: CPT

## 2020-05-21 PROCEDURE — 97535 SELF CARE MNGMENT TRAINING: CPT

## 2020-05-21 PROCEDURE — 99291 CRITICAL CARE FIRST HOUR: CPT | Mod: GC,,, | Performed by: SURGERY

## 2020-05-21 PROCEDURE — 25000003 PHARM REV CODE 250: Performed by: THORACIC SURGERY (CARDIOTHORACIC VASCULAR SURGERY)

## 2020-05-21 RX ORDER — GLUCAGON 1 MG
1 KIT INJECTION
Status: DISCONTINUED | OUTPATIENT
Start: 2020-05-21 | End: 2020-05-23

## 2020-05-21 RX ORDER — DEXMEDETOMIDINE HYDROCHLORIDE 4 UG/ML
0.2 INJECTION, SOLUTION INTRAVENOUS CONTINUOUS
Status: DISCONTINUED | OUTPATIENT
Start: 2020-05-21 | End: 2020-05-21

## 2020-05-21 RX ORDER — LEVALBUTEROL INHALATION SOLUTION 0.63 MG/3ML
0.63 SOLUTION RESPIRATORY (INHALATION) EVERY 4 HOURS PRN
Status: DISCONTINUED | OUTPATIENT
Start: 2020-05-21 | End: 2020-05-26 | Stop reason: HOSPADM

## 2020-05-21 RX ORDER — POTASSIUM CHLORIDE 20 MEQ/15ML
40 SOLUTION ORAL 2 TIMES DAILY
Status: DISCONTINUED | OUTPATIENT
Start: 2020-05-21 | End: 2020-05-26 | Stop reason: HOSPADM

## 2020-05-21 RX ORDER — IBUPROFEN 200 MG
24 TABLET ORAL
Status: DISCONTINUED | OUTPATIENT
Start: 2020-05-21 | End: 2020-05-23

## 2020-05-21 RX ORDER — ACETAMINOPHEN 500 MG
1000 TABLET ORAL EVERY 8 HOURS
Status: DISCONTINUED | OUTPATIENT
Start: 2020-05-21 | End: 2020-05-26 | Stop reason: HOSPADM

## 2020-05-21 RX ORDER — IBUPROFEN 200 MG
16 TABLET ORAL
Status: DISCONTINUED | OUTPATIENT
Start: 2020-05-21 | End: 2020-05-23

## 2020-05-21 RX ORDER — INSULIN ASPART 100 [IU]/ML
0-5 INJECTION, SOLUTION INTRAVENOUS; SUBCUTANEOUS
Status: DISCONTINUED | OUTPATIENT
Start: 2020-05-21 | End: 2020-05-23

## 2020-05-21 RX ADMIN — POTASSIUM CHLORIDE 40 MEQ: 20 SOLUTION ORAL at 09:05

## 2020-05-21 RX ADMIN — LEVALBUTEROL HYDROCHLORIDE 0.63 MG: 0.63 SOLUTION RESPIRATORY (INHALATION) at 05:05

## 2020-05-21 RX ADMIN — MUPIROCIN 1 G: 20 OINTMENT TOPICAL at 08:05

## 2020-05-21 RX ADMIN — OXYCODONE HYDROCHLORIDE 10 MG: 10 TABLET ORAL at 11:05

## 2020-05-21 RX ADMIN — NICARDIPINE HYDROCHLORIDE 2 MG/HR: 0.2 INJECTION, SOLUTION INTRAVENOUS at 07:05

## 2020-05-21 RX ADMIN — SODIUM PHOSPHATE, MONOBASIC, MONOHYDRATE 15 MMOL: 276; 142 INJECTION, SOLUTION INTRAVENOUS at 06:05

## 2020-05-21 RX ADMIN — ACETAMINOPHEN 1000 MG: 500 TABLET ORAL at 09:05

## 2020-05-21 RX ADMIN — POTASSIUM CHLORIDE 40 MEQ: 400 INJECTION, SOLUTION INTRAVENOUS at 06:05

## 2020-05-21 RX ADMIN — HYDROMORPHONE HYDROCHLORIDE 1 MG: 1 INJECTION, SOLUTION INTRAMUSCULAR; INTRAVENOUS; SUBCUTANEOUS at 08:05

## 2020-05-21 RX ADMIN — DEXMEDETOMIDINE HYDROCHLORIDE 0.4 MCG/KG/HR: 4 INJECTION, SOLUTION INTRAVENOUS at 07:05

## 2020-05-21 RX ADMIN — PROPOFOL 50 MCG/KG/MIN: 10 INJECTION, EMULSION INTRAVENOUS at 02:05

## 2020-05-21 RX ADMIN — NICARDIPINE HYDROCHLORIDE 10 MG/HR: 0.2 INJECTION, SOLUTION INTRAVENOUS at 04:05

## 2020-05-21 RX ADMIN — ASPIRIN 325 MG ORAL TABLET 325 MG: 325 PILL ORAL at 08:05

## 2020-05-21 RX ADMIN — FUROSEMIDE 5 MG/HR: 10 INJECTION, SOLUTION INTRAMUSCULAR; INTRAVENOUS at 12:05

## 2020-05-21 RX ADMIN — POTASSIUM CHLORIDE 20 MEQ: 200 INJECTION, SOLUTION INTRAVENOUS at 11:05

## 2020-05-21 RX ADMIN — PRAVASTATIN SODIUM 40 MG: 40 TABLET ORAL at 08:05

## 2020-05-21 RX ADMIN — HYDROMORPHONE HYDROCHLORIDE 1 MG: 1 INJECTION, SOLUTION INTRAMUSCULAR; INTRAVENOUS; SUBCUTANEOUS at 03:05

## 2020-05-21 RX ADMIN — LEVALBUTEROL HYDROCHLORIDE 0.63 MG: 0.63 SOLUTION RESPIRATORY (INHALATION) at 01:05

## 2020-05-21 RX ADMIN — NICARDIPINE HYDROCHLORIDE 12.5 MG/HR: 0.2 INJECTION, SOLUTION INTRAVENOUS at 08:05

## 2020-05-21 RX ADMIN — CEFAZOLIN 2 G: 330 INJECTION, POWDER, FOR SOLUTION INTRAMUSCULAR; INTRAVENOUS at 08:05

## 2020-05-21 RX ADMIN — NICARDIPINE HYDROCHLORIDE 5 MG/HR: 0.2 INJECTION, SOLUTION INTRAVENOUS at 11:05

## 2020-05-21 RX ADMIN — PROPOFOL 50 MCG/KG/MIN: 10 INJECTION, EMULSION INTRAVENOUS at 06:05

## 2020-05-21 RX ADMIN — POLYETHYLENE GLYCOL 3350 17 G: 17 POWDER, FOR SOLUTION ORAL at 08:05

## 2020-05-21 RX ADMIN — POTASSIUM CHLORIDE 40 MEQ: 400 INJECTION, SOLUTION INTRAVENOUS at 07:05

## 2020-05-21 RX ADMIN — HYDROMORPHONE HYDROCHLORIDE 1 MG: 1 INJECTION, SOLUTION INTRAMUSCULAR; INTRAVENOUS; SUBCUTANEOUS at 04:05

## 2020-05-21 RX ADMIN — PANTOPRAZOLE SODIUM 40 MG: 40 INJECTION, POWDER, LYOPHILIZED, FOR SOLUTION INTRAVENOUS at 08:05

## 2020-05-21 RX ADMIN — OXYCODONE HYDROCHLORIDE 10 MG: 10 TABLET ORAL at 10:05

## 2020-05-21 RX ADMIN — ACETAMINOPHEN 1000 MG: 500 TABLET ORAL at 04:05

## 2020-05-21 RX ADMIN — MUPIROCIN 1 G: 20 OINTMENT TOPICAL at 09:05

## 2020-05-21 RX ADMIN — OXYCODONE HYDROCHLORIDE 10 MG: 10 TABLET ORAL at 04:05

## 2020-05-21 NOTE — CARE UPDATE
BG goal 140-180    Remains in ICU, NAEON. Extubated to HFNC. Diet advancing. BG well controlled without insulin. 1 Day Post-Op     Plan:  BG monitoring ac/hs and low dose correction scale.     Discharge planning: tbd - likely no needs     Endocrine to continue to follow    ** Please call Endocrine for any BG related issues **

## 2020-05-21 NOTE — PT/OT/SLP EVAL
Occupational Therapy   Evaluation and Treatment    Name: Brad Kearns  MRN: [id number]  Admitting Diagnosis:  CAD (coronary artery disease) 1 Day Post-Op    Recommendations:     Discharge Recommendations: home with home health  Discharge Equipment Recommendations:  (TBD)  Barriers to discharge:  None    Assessment:     Brad Kearns is a 74 y.o. male with a medical diagnosis of CAD (coronary artery disease).  He presents with a decline in occupational performance. Pt motivated to sit in bedside chair this date. Pt on 10L O2 at beginning of session increased to 15L per RT. SpO2 at 86% after transfer, RN present. Pt was oriented to self and birthday, not to month demonstrating mild confusion.  Performance deficits affecting function: weakness, impaired endurance, impaired self care skills, impaired functional mobilty, gait instability, impaired balance, orthopedic precautions.Pt would benefit from skilled OT services in order to maximize independence with ADLs and facilitate safe discharge. Pt would benefit from home health OT once medically stable for discharge.     Rehab Prognosis: Good; patient would benefit from acute skilled OT services to address these deficits and reach maximum level of function.       Plan:     Patient to be seen 4 x/week to address the above listed problems via self-care/home management, therapeutic activities, therapeutic exercises  · Plan of Care Expires: 06/20/20  · Plan of Care Reviewed with: patient    Subjective     Chief Complaint: pt c/o of dizziness with mobility  Patient/Family Comments/goals: to get better and go home    Occupational Profile:  Living Environment: Pt lives with his wife in a [facility] with 0 RISHABH  Previous level of function: PTA, pt was independent with self-care and mobility  Roles and Routines: Pt drives, owns a lawn care company, and exercises regularly swimming laps in his backyard  Equipment Used at Home:  none  Assistance upon Discharge: Upon discharge, pt will  have assistance from family     Pain/Comfort:  · Pain Rating 1: (pt reports sternal pain)    Patients cultural, spiritual, [employer]al conflicts given the current situation: no    Objective:     Communicated with: RN and PT prior to session.  Patient found HOB elevated with arterial line, blood pressure cuff, chest tube, silva catheter, telemetry, pulse ox (continuous), oxygen, central line, peripheral IV upon OT entry to room.    General Precautions: Standard, fall, NPO   Orthopedic Precautions:N/A   Braces: N/A     Occupational Performance:    Bed Mobility:    · Patient completed Supine to Sit with moderate assistance    Functional Mobility/Transfers:  · Patient completed Sit <> Stand Transfer with minimum assistance  with  hand-held assist   · Patient completed Bed <> Chair Transfer using Step Transfer technique with contact guard assistance with hand-held assist  · Functional Mobility: Pt ambulated bed >chair with CGA and HHA. Pt c/o of dizziness after transfer which subsided with seated rest and ankle pumps    Activities of Daily Living:  · Grooming: set-up A to swab mouth with HOB elevated and in bedside chair   · Toileting: Dependent void via silva    Cognitive/Visual Perceptual:  Cognitive/Psychosocial Skills:     -       Oriented to: Person and Place   -       Follows Commands/attention:Follows one-step commands  -       Communication: clear/fluent  -       Memory: No Deficits noted  -       Safety awareness/insight to disability: intact   -       Mood/Affect/Coping skills/emotional control: Appropriate to situation    Physical Exam:  Upper Extremity Range of Motion:     -       Right Upper Extremity: WFL  -       Left Upper Extremity: WFL  Upper Extremity Strength:    -       Right Upper Extremity: WFL  -       Left Upper Extremity: WFL   Strength:    -       Right Upper Extremity: WFL  -       Left Upper Extremity: WFL    AMPAC 6 Click ADL:  AMPAC Total Score: 13    Treatment & Education:  -Therapist  provided facilitation and instruction of proper body mechanics, energy conservation, and fall prevention strategies during tasks listed above.  -Pt educated on role of OT, POC and goals for therapy  -Pt educated on importance of OOB activities with staff member assistance and sitting OOB majority of the day.   -Pt educated regarding 3/3 sternal precautions. Pt compliant throoughout session.   -Pt verbalized understanding. Pt expressed no further concerns/questions    Education:    Patient left up in chair with all lines intact, call button in reach and RN present    GOALS:   Multidisciplinary Problems     Occupational Therapy Goals        Problem: Occupational Therapy Goal    Goal Priority Disciplines Outcome Interventions   Occupational Therapy Goal     OT, PT/OT Ongoing, Progressing    Description:  Goals to be met by: 5/31/20     Patient will increase functional independence with ADLs by performing:    UE Dressing with Supervision.  LE Dressing with Minimal Assistance.  Grooming while standing with Supervision.  Toileting from toilet with Minimal Assistance for hygiene and clothing management.   Toilet transfer to toilet with Stand-by Assistance.  Pt will recall 3/3 sternal precautions with independence.                      History:     Past Medical History:   Diagnosis Date    GERD (gastroesophageal reflux disease)     Hypertension     Nonrheumatic aortic valve insufficiency 3/13/2020       Past Surgical History:   Procedure Laterality Date    AORTIC VALVE REPLACEMENT N/A 5/19/2020    Procedure: REPLACEMENT, AORTIC VALVE;  Surgeon: Guicho Quezada MD;  Location: [facility] OR [address] [place] [address];  Service: Cardiovascular;  Laterality: N/A;    AORTOGRAPHY N/A 5/15/2020    Procedure: Aortogram;  Surgeon: Ben Butler MD;  Location: [facility] CATH LAB;  Service: Cardiology;  Laterality: N/A;    APPENDECTOMY      CORONARY ANGIOGRAPHY N/A 5/15/2020    Procedure: ANGIOGRAM, CORONARY ARTERY;  Surgeon: Ben Butler  MD;  Location: [facility] CATH LAB;  Service: Cardiology;  Laterality: N/A;    CORONARY ARTERY BYPASS GRAFT (CABG) N/A 5/19/2020    Procedure: CORONARY ARTERY BYPASS GRAFT (CABG) x2;  Surgeon: Guicho Quezada MD;  Location: [facility] OR [address] [place] [address];  Service: Cardiovascular;  Laterality: N/A;  CORONARY ARTERY BYPASS GRAFT (CABG) x2    ENDOSCOPIC HARVEST OF VEIN Left 5/19/2020    Procedure: SURGICAL PROCUREMENT, VEIN, ENDOSCOPIC;  Surgeon: Guicho Quezada MD;  Location: [address];  Service: Cardiovascular;  Laterality: Left;  [place] start: 1402  [place] stop: 1428  Prep start: 1432  Prep stop: 1452    INGUINAL HERNIA REPAIR      LEFT HEART CATHETERIZATION Left 5/15/2020    Procedure: Left heart cath;  Surgeon: Ben Butler MD;  Location: [facility] CATH LAB;  Service: Cardiology;  Laterality: Left;    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

## 2020-05-21 NOTE — PLAN OF CARE
PT REMAINS INTUBATED AND ON VENT IN SICU     05/21/20 1009   Discharge Reassessment   Assessment Type Discharge Planning Reassessment   Provided patient/caregiver education on the expected discharge date and the discharge plan No   Do you have any problems affording any of your prescribed medications? No

## 2020-05-21 NOTE — PROGRESS NOTES
[facility]  Critical Care - Surgery  Progress Note    Patient Name: Brad Kearns  MRN: [id number]  Admission Date: 5/14/2020  Hospital Length of Stay: 7 days  Code Status: Full Code  Attending Provider: Guicho Quezada MD  Primary Care Provider: Duke Bowden MD   Principal Problem: CAD (coronary artery disease)    Subjective:     Hospital/ICU Course:  No notes on file    Interval History/Significant Events:   Slowly improving on vent  Lasix gtt started yesterday    Follow-up For: Procedure(s) (LRB):  EXPLORATION, WOUND- Chest washout (N/A)    Post-Operative Day: 1 Day Post-Op    Objective:     Vital Signs (Most Recent):  Temp: 99.3 °F (37.4 °C) (05/21/20 0701)  Pulse: 84 (05/21/20 0800)  Resp: 18 (05/20/20 1100)  BP: 113/67 (05/21/20 0701)  SpO2: (!) 94 % (05/21/20 0800) Vital Signs (24h Range):  Temp:  [97.7 °F (36.5 °C)-99.3 °F (37.4 °C)] 99.3 °F (37.4 °C)  Pulse:  [72-92] 84  Resp:  [17-23] 18  SpO2:  [86 %-100 %] 94 %  BP: ()/(50-70) 113/67  Arterial Line BP: ()/(40-84) 110/53     Weight: 83.5 kg (184 lb 1.4 oz)  Body mass index is 27.18 kg/m².      Intake/Output Summary (Last 24 hours) at 5/21/2020 0811  Last data filed at 5/21/2020 0800  Gross per 24 hour   Intake 2290.4 ml   Output 4998 ml   Net -2707.6 ml       Physical Exam   Vitals reviewed.  Physical Exam   Constitutional: He appears well-developed and well-nourished.   HENT:   Head: Normocephalic.   Neck: Normal range of motion. No thyromegaly present.   Cardiovascular: Normal rate and regular rhythm.   Pulmonary/Chest:   Intubated, sedated.  Sternotomy incisions c/d/i.  Chest tubes x 3 in place.  Pacer wires in place.  RIJ in place.   Abdominal: Soft. He exhibits no distension.   Musculoskeletal: Normal range of motion.   Neurological:   sedated   Skin: Skin is warm. Capillary refill takes less than 2 seconds.   Vitals reviewed.      Vents:  Vent Mode: A/C (05/21/20 0322)  Ventilator Initiated: Yes(chart  correction) (05/19/20 2010)  Set Rate: 16 BPM (05/21/20 0322)  Vt Set: 420 mL (05/21/20 0322)  Pressure Support: 0 cmH20 (05/21/20 0322)  PEEP/CPAP: 12 cmH20 (05/21/20 0322)  Oxygen Concentration (%): 50 (05/21/20 0800)  Peak Airway Pressure: 23 cmH2O (05/21/20 0322)  Plateau Pressure: 22 cmH20 (05/21/20 0322)  Total Ve: 9.77 mL (05/21/20 0322)  F/VT Ratio<105 (RSBI): (!) 47.23 (05/20/20 1040)    Lines/Drains/Airways     Central Venous Catheter Line                 Percutaneous Central Line Insertion/Assessment - Quad lumen  05/19/20 0103 2 days    Trialysis (Dialysis) Catheter 05/19/20 1215 right internal jugular 1 day          Drain                 Chest Tube 05/19/20 1848 1 Left Pleural 19 Fr. 1 day         Chest Tube 05/20/20 0300 1 Left Pleural 32 Fr. 1 day         NG/OG Tube 05/19/20 1900 orogastric Center mouth 1 day         Urethral Catheter 05/19/20 1307 Non-latex;Straight-tip;Temperature probe 14 Fr. 1 day         Y Chest Tube 1 and 2 05/19/20 1847 1 Anterior Mediastinal 19 Fr. 2 Anterior Mediastinal 19 Fr. 1 day          Airway                 Airway - Non-Surgical 05/19/20 1310 1 day          Arterial Line                 Arterial Line 05/20/20 0008 Right Femoral 1 day          Line                 Pacer Wires 05/19/20 1500 1 day          Peripheral Intravenous Line                 Peripheral IV - Single Lumen 05/19/20 0623 20 G Right Forearm 2 days         Peripheral IV - Single Lumen 05/19/20 1310 16 G Right Hand 1 day         Peripheral IV - Single Lumen 05/20/20 0019 20 G Left Forearm 1 day                Significant Labs:    CBC/Anemia Profile:  Recent Labs   Lab 05/20/20  0401  05/20/20  0921  05/20/20  1157 05/21/20  0333 05/21/20  0725   WBC 15.18*  --  8.51  --   --  13.54*  --    HGB 9.7*  --  10.1*  --   --  10.5*  --    HCT 30.4*   < > 30.3*   < > 28* 31.6* 30*     --  134*  --   --  111*  --    MCV 92  --  89  --   --  90  --    RDW 15.6*  --  16.1*  --   --  16.9*  --     < > =  values in this interval not displayed.        Chemistries:  Recent Labs   Lab 05/19/20  2259 05/20/20  0053 05/20/20  0401 05/20/20  0921 05/21/20  0333 05/21/20  0336    146* 146* 149* 147*  --    K 4.7 3.5 3.8 4.3 3.7  --    * 113* 112* 114* 113*  --    CO2 16* 18* 13* 23 27  --    BUN 20 20 21 21 22  --    CREATININE 1.0 1.1 1.3 1.2 1.1  --    CALCIUM 7.3* 7.9* 7.8* 7.5* 7.8*  --    ALBUMIN 3.2* 3.2*  --  3.3*  --   --    PROT 4.6* 4.7*  --  4.7*  --   --    BILITOT 0.9 1.0  --  1.6*  --   --    ALKPHOS 35* 29*  --  30*  --   --    ALT 31 22  --  22  --   --    AST 60* 49*  --  57*  --   --    MG 2.6 2.3 2.3 2.3  --  2.4   PHOS 3.4 2.9 4.3 2.4*  --  2.6*         Significant Imaging:  I have reviewed and interpreted all pertinent imaging results/findings within the past 24 hours.    Assessment/Plan:     S/P CABG (coronary artery bypass graft)  Mr. Kearns is a 74-year-old man status post aortic valve replacement and CABG x2 on 5/19 for aortic regurgitation and NSTEMI.     Neuro:  Sedated on propofol and prn fentanyl.  Has oxycodone and Dilaudid ordered for when he can tolerate p.o. Moves all extremities.     CV:  Status post aortic valve repair with bioprosthetic valve and CABG x2 on 05/19.    Had a bleed and went back on 5/20.  One small manubrial bleeder found but otherwise none.     Pulm:  Intubated, high Fi, will work on getting this down.  Good acid/base status     Renal:  Good UOP  Cr near baseline  Lasix gtt with goal UOP near 200/hr     GI: NPO for now. Advanced diet once extubated and passes bedside swallow.     Id:  No antibiotics.     Heme:  He received no blood products in the operating room.  Only Cell Saver.  Will trend ABGs for hematocrit.     Endo:  Glucose is within acceptable limits.     Dispo: Extubate.  ICU care.         Critical care was time spent personally by me on the following activities: development of treatment plan with patient or surrogate and bedside caregivers,  discussions with consultants, evaluation of patient's response to treatment, examination of patient, ordering and performing treatments and interventions, ordering and review of laboratory studies, ordering and review of radiographic studies, pulse oximetry, re-evaluation of patient's condition.  This critical care time did not overlap with that of any other provider or involve time for any procedures.     Warren Wood MD  Critical Care - Surgery  [facility]

## 2020-05-21 NOTE — PLAN OF CARE
"      SICU PLAN OF CARE NOTE    Dx: CAD (coronary artery disease)    Shift Events: Patient and patient's family updated on patient's plan of care. Patient extubated this morning without issues. OOB to chair for 1.5-2 hours. Patient occasionally desatting to lower 80's but comes back up to upper 80's to low 90's. PRN breathing treatments given. Lasix gtt continued. All questions and concerns acknowledged and answered. See flow sheet for full assessment details. Will continue to monitor patient closely.    Goals of Care: diurese; PT/OT; wean off cardene    Neuro: AAO x4, Follows Commands and Moves All Extremities    Vital Signs: /60 (BP Location: Right arm, Patient Position: Lying)   Pulse 97   Temp 98.3 °F (36.8 °C) (Oral)   Resp 14   Ht 5' 9" (1.753 m)   Wt 83.5 kg (184 lb 1.4 oz)   SpO2 (!) 94%   BMI 27.18 kg/m²     Respiratory: HFNC 15L    Diet: Clear Liquid    Gtts: Lasix and Nicardipine    Urine Output: Urinary Catheter 2.4L /shift    Chest tubes total output: 215 serosanguinous drainage      Labs/Accuchecks: AC/HS.    Skin: No skin breakdown assessed. Midline incision CDI, painted with iodine and dressed with new island border dressing. Chest tube sites CDI, cleaned with chlorhexidine, and new occlusive gauze dressing placed.        "

## 2020-05-21 NOTE — PLAN OF CARE
"      SICU PLAN OF CARE NOTE      Shift Events: Lasix gtt started, SBP kept < 110 and MAP > 65, replaced phosphorus, transitioned from propofol to Precedex.     Goals of Care: Wean vent    Neuro: Arouses to Voice, Follows Commands and Moves All Extremities    Vital Signs: /61 (BP Location: Right arm, Patient Position: Lying)   Pulse 87   Temp 97.7 °F (36.5 °C) (Oral)   Resp 18   Ht 5' 9" (1.753 m)   Wt 83.5 kg (184 lb 1.4 oz)   SpO2 96%   BMI 27.18 kg/m²     Respiratory: Ventilator 60FiO2 and 10PEEP    Diet: NPO    Gtts: Lasix and Nicardipine and Precedex    Urine Output: Urinary Catheter 2715 cc/shift    Drains: Chest Tube, total output 183 cc /  shift    Restraints Left and right wrist      Labs/Accuchecks: Accuchecks Q6H.     Skin: No skin breakdown noted to sacrum, heels, or elbows. Heel boots and foam dressings in place. Waffle overlay in place, bed plugged in.       "

## 2020-05-21 NOTE — PLAN OF CARE
Problem: Physical Therapy Goal  Goal: Physical Therapy Goal  Description  Goals to be met by: 2020     Patient will increase functional independence with mobility by performin. Supine to sit with Stand-by Assistance  2. Sit to stand transfer with Supervision  3. Gait  x 200 feet with Supervision using no AD   4. Lower extremity exercise program x10 reps per handout, with independence     Outcome: Ongoing, Progressing     Pt evaluated and appropriate goals established.     Janneth Barone, PT, DPT  2020  [phone]

## 2020-05-21 NOTE — PLAN OF CARE
Problem: Occupational Therapy Goal  Goal: Occupational Therapy Goal  Description  Goals to be met by: 5/31/20     Patient will increase functional independence with ADLs by performing:    UE Dressing with Supervision.  LE Dressing with Minimal Assistance.  Grooming while standing with Supervision.  Toileting from toilet with Minimal Assistance for hygiene and clothing management.   Toilet transfer to toilet with Stand-by Assistance.  Pt will recall 3/3 sternal precautions with independence.     Outcome: Ongoing, Progressing     Evaluation complete-see note for details. Goals and POC established.    SOBIA Moura  5/21/2020   Pager #: [phone]

## 2020-05-21 NOTE — ASSESSMENT & PLAN NOTE
Mr. Kearns is a 74-year-old man status post aortic valve replacement and CABG x2 on 5/19 for aortic regurgitation and NSTEMI.     Neuro:  Sedated on propofol and prn fentanyl.  Has oxycodone and Dilaudid ordered for when he can tolerate p.o. Moves all extremities.     CV:  Status post aortic valve repair with bioprosthetic valve and CABG x2 on 05/19.    Had a bleed and went back on 5/20.  One small manubrial bleeder found but otherwise none.     Pulm:  Intubated, high Fi, will work on getting this down.  Good acid/base status     Renal:  Good UOP  Cr near baseline  Lasix gtt with goal UOP near 200/hr     GI: NPO for now. Advanced diet once extubated and passes bedside swallow.     Id:  No antibiotics.     Heme:  He received no blood products in the operating room.  Only Cell Saver.  Will trend ABGs for hematocrit.     Endo:  Glucose is within acceptable limits.     Dispo: Extubate.  ICU care.

## 2020-05-21 NOTE — PT/OT/SLP EVAL
Physical Therapy Evaluation and Treatment    Patient Name:  Brad Kearns   MRN:  [id number]    Pt educated on role of PT/POC. Pt verbalized understanding.   Recommendations:     Discharge Recommendations:  home health PT   Discharge Equipment Recommendations: none   Barriers to discharge: None    Assessment:     Brad Kearns is a 74 y.o. male admitted with a medical diagnosis of CAD (coronary artery disease).  He presents with the following impairments/functional limitations:  weakness, impaired endurance, impaired functional mobilty, impaired self care skills, gait instability, impaired balance, pain, impaired cardiopulmonary response to activity. Pt tolerated activity with increased oxygen requirements following transfer to chair. Pt on 10L at beginning of session, increased to 15 L. SpO2 at 86% following transfer, nursing present and aware. Pt demo'd some delirium, able to easily redirect. Pt anticipated to discharge home with home health once medically ready. Pt would continue to benefit from acute skilled therapy intervention to address deficits and progress toward prior level of function.       Rehab Prognosis: Good; patient would benefit from acute skilled PT services to address these deficits and reach maximum level of function.    Recent Surgery: Procedure(s) (LRB):  EXPLORATION, WOUND- Chest washout (N/A) 1 Day Post-Op    Plan:     During this hospitalization, patient to be seen 5 x/week to address the identified rehab impairments via gait training, therapeutic activities, therapeutic exercises, neuromuscular re-education and progress toward the following goals:    · Plan of Care Expires:  06/21/20    Subjective     Chief Complaint: Pt c/o light headedness throughout session   Patient/Family Comments/goals: to get better and return home   Pain/Comfort:  · Pain Rating 1: (Pt reports soreness in sternum )  · Pain Addressed 1: Distraction, Nurse notified    Patients cultural, spiritual, Tenriism  conflicts given the current situation: no    Living Environment:  Pt lives with spouse in a [facility] with no RISHABH.   Prior to admission, patients level of function was independent with mobility and ADLs. Pt was very active driving, working, exercising regularly.  Equipment used at home: none.  DME owned (not currently used): none.  Upon discharge, patient will have assistance from spouse.    Objective:     Communicated with RN prior to session.  Patient found HOB elevated with arterial line, blood pressure cuff, pulse ox (continuous), telemetry, silva catheter, oxygen, chest tube, central line  upon PT entry to room.    General Precautions: Standard, fall, sternal   Orthopedic Precautions:N/A   Braces: N/A     Exams:  · Cognitive Exam:  Patient is oriented to time and place, not time, followed all commands, communicates clearly and fluently  · Gross Motor Coordination:  WFL  · RLE ROM: WFL  · RLE Strength: WFL  · LLE ROM: WFL  · LLE Strength: WFL    Functional Mobility:  · Bed Mobility:     · Supine to Sit: moderate assistance with cuing for sequencing of movement   · Transfers:     · Sit to Stand:  minimum assistance with hand-held assist  · Bed to Chair: contact guard assistance with  hand-held assist  using  Step Transfer  · Gait: Pt ambulated 6 feet from bed to chair with contact guard assistance and HHA. Pt demo'd very small step size, decreased foot clearance, wide PATRICIA, excessive lateral sway. Pt with no LOB, no SOB, light headedness reported which improved with seated rest and ankle pumps.       Therapeutic Activities and Exercises:   Pt sat EOB with stand by assistance focusing on techniques to increase tolerance to upright. Pt performed pursed lip breathing and ankle pumps, reported improved dizziness but not resolved. Following transfer to chair, with increased oxygen requirements. Pt on 10L at beginning of session, increased to 15 L. SpO2 at 86% following transfer, nursing present and aware.  Pt educated on  role of PT/POC. Pt verbalized understanding.   Pt encouraged to only perform OOB mobility with assistance from nursing/therapy. Pt agreeable.   Pt educated regarding 3/3 sternal precautions. Pt compliant throughout session.  Pt educated on seated exercises to perform 20x, 3x/day. Exercises included bilateral LAQ, marching, ankle DF/PF      AM-PAC 6 CLICK MOBILITY  Total Score:15     Patient left up in chair with all lines intact, call button in reach and RN notified.    GOALS:   Multidisciplinary Problems     Physical Therapy Goals        Problem: Physical Therapy Goal    Goal Priority Disciplines Outcome Goal Variances Interventions   Physical Therapy Goal     PT, PT/OT Ongoing, Progressing     Description:  Goals to be met by: 2020     Patient will increase functional independence with mobility by performin. Supine to sit with Stand-by Assistance  2. Sit to stand transfer with Supervision  3. Gait  x 200 feet with Supervision using no AD   4. Lower extremity exercise program x10 reps per handout, with independence                      History:     Past Medical History:   Diagnosis Date    GERD (gastroesophageal reflux disease)     Hypertension     Nonrheumatic aortic valve insufficiency 3/13/2020       Past Surgical History:   Procedure Laterality Date    AORTIC VALVE REPLACEMENT N/A 2020    Procedure: REPLACEMENT, AORTIC VALVE;  Surgeon: Guicho Quezada MD;  Location: [facility] OR [address] [place] [address];  Service: Cardiovascular;  Laterality: N/A;    AORTOGRAPHY N/A 5/15/2020    Procedure: Aortogram;  Surgeon: Ben Butler MD;  Location: [facility] CATH LAB;  Service: Cardiology;  Laterality: N/A;    APPENDECTOMY      CORONARY ANGIOGRAPHY N/A 5/15/2020    Procedure: ANGIOGRAM, CORONARY ARTERY;  Surgeon: Ben Butler MD;  Location: [facility] CATH LAB;  Service: Cardiology;  Laterality: N/A;    CORONARY ARTERY BYPASS GRAFT (CABG) N/A 2020    Procedure: CORONARY ARTERY BYPASS GRAFT (CABG) x2;   Surgeon: Guicho Quezada MD;  Location: [address];  Service: Cardiovascular;  Laterality: N/A;  CORONARY ARTERY BYPASS GRAFT (CABG) x2    ENDOSCOPIC HARVEST OF VEIN Left 5/19/2020    Procedure: SURGICAL PROCUREMENT, VEIN, ENDOSCOPIC;  Surgeon: Guicho Quezada MD;  Location: [address];  Service: Cardiovascular;  Laterality: Left;  [place] start: 1402  [place] stop: 1428  Prep start: 1432  Prep stop: 1452    INGUINAL HERNIA REPAIR      LEFT HEART CATHETERIZATION Left 5/15/2020    Procedure: Left heart cath;  Surgeon: Ben Butler MD;  Location: [facility] CATH LAB;  Service: Cardiology;  Laterality: Left;    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

## 2020-05-21 NOTE — PROGRESS NOTES
Patient extubated to 7L humidified high flow nasal cannula per Yaron RRT. Patient able to cough and state name. Patient tolerated extubation well and denies pain at this time. Will continue to monitor.

## 2020-05-21 NOTE — PLAN OF CARE
SW is following this Pt for DC planning needs. There are no identified needs at this time.    SW will continue to coordinate with patient, family, team and insurance to complete patient's discharge plan.    Fernanda Go LMSW   - Case Management

## 2020-05-21 NOTE — SUBJECTIVE & OBJECTIVE
Interval History/Significant Events:   Slowly improving on vent  Lasix gtt started yesterday    Follow-up For: Procedure(s) (LRB):  EXPLORATION, WOUND- Chest washout (N/A)    Post-Operative Day: 1 Day Post-Op    Objective:     Vital Signs (Most Recent):  Temp: 99.3 °F (37.4 °C) (05/21/20 0701)  Pulse: 84 (05/21/20 0800)  Resp: 18 (05/20/20 1100)  BP: 113/67 (05/21/20 0701)  SpO2: (!) 94 % (05/21/20 0800) Vital Signs (24h Range):  Temp:  [97.7 °F (36.5 °C)-99.3 °F (37.4 °C)] 99.3 °F (37.4 °C)  Pulse:  [72-92] 84  Resp:  [17-23] 18  SpO2:  [86 %-100 %] 94 %  BP: ()/(50-70) 113/67  Arterial Line BP: ()/(40-84) 110/53     Weight: 83.5 kg (184 lb 1.4 oz)  Body mass index is 27.18 kg/m².      Intake/Output Summary (Last 24 hours) at 5/21/2020 0811  Last data filed at 5/21/2020 0800  Gross per 24 hour   Intake 2290.4 ml   Output 4998 ml   Net -2707.6 ml       Physical Exam   Vitals reviewed.  Physical Exam   Constitutional: He appears well-developed and well-nourished.   HENT:   Head: Normocephalic.   Neck: Normal range of motion. No thyromegaly present.   Cardiovascular: Normal rate and regular rhythm.   Pulmonary/Chest:   Intubated, sedated.  Sternotomy incisions c/d/i.  Chest tubes x 3 in place.  Pacer wires in place.  RIJ in place.   Abdominal: Soft. He exhibits no distension.   Musculoskeletal: Normal range of motion.   Neurological:   sedated   Skin: Skin is warm. Capillary refill takes less than 2 seconds.   Vitals reviewed.      Vents:  Vent Mode: A/C (05/21/20 0322)  Ventilator Initiated: Yes(chart correction) (05/19/20 2010)  Set Rate: 16 BPM (05/21/20 0322)  Vt Set: 420 mL (05/21/20 0322)  Pressure Support: 0 cmH20 (05/21/20 0322)  PEEP/CPAP: 12 cmH20 (05/21/20 0322)  Oxygen Concentration (%): 50 (05/21/20 0800)  Peak Airway Pressure: 23 cmH2O (05/21/20 0322)  Plateau Pressure: 22 cmH20 (05/21/20 0322)  Total Ve: 9.77 mL (05/21/20 0322)  F/VT Ratio<105 (RSBI): (!) 47.23 (05/20/20  1040)    Lines/Drains/Airways     Central Venous Catheter Line                 Percutaneous Central Line Insertion/Assessment - Quad lumen  05/19/20 0103 2 days    Trialysis (Dialysis) Catheter 05/19/20 1215 right internal jugular 1 day          Drain                 Chest Tube 05/19/20 1848 1 Left Pleural 19 Fr. 1 day         Chest Tube 05/20/20 0300 1 Left Pleural 32 Fr. 1 day         NG/OG Tube 05/19/20 1900 orogastric Center mouth 1 day         Urethral Catheter 05/19/20 1307 Non-latex;Straight-tip;Temperature probe 14 Fr. 1 day         Y Chest Tube 1 and 2 05/19/20 1847 1 Anterior Mediastinal 19 Fr. 2 Anterior Mediastinal 19 Fr. 1 day          Airway                 Airway - Non-Surgical 05/19/20 1310 1 day          Arterial Line                 Arterial Line 05/20/20 0008 Right Femoral 1 day          Line                 Pacer Wires 05/19/20 1500 1 day          Peripheral Intravenous Line                 Peripheral IV - Single Lumen 05/19/20 0623 20 G Right Forearm 2 days         Peripheral IV - Single Lumen 05/19/20 1310 16 G Right Hand 1 day         Peripheral IV - Single Lumen 05/20/20 0019 20 G Left Forearm 1 day                Significant Labs:    CBC/Anemia Profile:  Recent Labs   Lab 05/20/20  0401  05/20/20  0921  05/20/20  1157 05/21/20  0333 05/21/20  0725   WBC 15.18*  --  8.51  --   --  13.54*  --    HGB 9.7*  --  10.1*  --   --  10.5*  --    HCT 30.4*   < > 30.3*   < > 28* 31.6* 30*     --  134*  --   --  111*  --    MCV 92  --  89  --   --  90  --    RDW 15.6*  --  16.1*  --   --  16.9*  --     < > = values in this interval not displayed.        Chemistries:  Recent Labs   Lab 05/19/20  2259 05/20/20  0053 05/20/20  0401 05/20/20  0921 05/21/20  0333 05/21/20  0336    146* 146* 149* 147*  --    K 4.7 3.5 3.8 4.3 3.7  --    * 113* 112* 114* 113*  --    CO2 16* 18* 13* 23 27  --    BUN 20 20 21 21 22  --    CREATININE 1.0 1.1 1.3 1.2 1.1  --    CALCIUM 7.3* 7.9* 7.8* 7.5* 7.8*   --    ALBUMIN 3.2* 3.2*  --  3.3*  --   --    PROT 4.6* 4.7*  --  4.7*  --   --    BILITOT 0.9 1.0  --  1.6*  --   --    ALKPHOS 35* 29*  --  30*  --   --    ALT 31 22  --  22  --   --    AST 60* 49*  --  57*  --   --    MG 2.6 2.3 2.3 2.3  --  2.4   PHOS 3.4 2.9 4.3 2.4*  --  2.6*         Significant Imaging:  I have reviewed and interpreted all pertinent imaging results/findings within the past 24 hours.

## 2020-05-22 LAB
ANION GAP SERPL CALC-SCNC: 10 MMOL/L (ref 8–16)
ANION GAP SERPL CALC-SCNC: 7 MMOL/L (ref 8–16)
ANION GAP SERPL CALC-SCNC: 8 MMOL/L (ref 8–16)
ANION GAP SERPL CALC-SCNC: 8 MMOL/L (ref 8–16)
APTT BLDCRRT: 26 SEC (ref 21–32)
BASOPHILS # BLD AUTO: 0.04 K/UL (ref 0–0.2)
BASOPHILS NFR BLD: 0.2 % (ref 0–1.9)
BLD PROD TYP BPU: NORMAL
BLOOD UNIT EXPIRATION DATE: NORMAL
BLOOD UNIT TYPE CODE: 6200
BLOOD UNIT TYPE: NORMAL
BUN SERPL-MCNC: 24 MG/DL (ref 8–23)
BUN SERPL-MCNC: 24 MG/DL (ref 8–23)
BUN SERPL-MCNC: 25 MG/DL (ref 8–23)
BUN SERPL-MCNC: 26 MG/DL (ref 8–23)
CALCIUM SERPL-MCNC: 8 MG/DL (ref 8.7–10.5)
CALCIUM SERPL-MCNC: 8 MG/DL (ref 8.7–10.5)
CALCIUM SERPL-MCNC: 8.1 MG/DL (ref 8.7–10.5)
CALCIUM SERPL-MCNC: 8.4 MG/DL (ref 8.7–10.5)
CHLORIDE SERPL-SCNC: 103 MMOL/L (ref 95–110)
CHLORIDE SERPL-SCNC: 108 MMOL/L (ref 95–110)
CHLORIDE SERPL-SCNC: 109 MMOL/L (ref 95–110)
CHLORIDE SERPL-SCNC: 109 MMOL/L (ref 95–110)
CO2 SERPL-SCNC: 25 MMOL/L (ref 23–29)
CO2 SERPL-SCNC: 27 MMOL/L (ref 23–29)
CO2 SERPL-SCNC: 27 MMOL/L (ref 23–29)
CO2 SERPL-SCNC: 31 MMOL/L (ref 23–29)
CODING SYSTEM: NORMAL
CREAT SERPL-MCNC: 1.2 MG/DL (ref 0.5–1.4)
CREAT SERPL-MCNC: 1.3 MG/DL (ref 0.5–1.4)
DIFFERENTIAL METHOD: ABNORMAL
DISPENSE STATUS: NORMAL
EOSINOPHIL # BLD AUTO: 0 K/UL (ref 0–0.5)
EOSINOPHIL NFR BLD: 0.1 % (ref 0–8)
ERYTHROCYTE [DISTWIDTH] IN BLOOD BY AUTOMATED COUNT: 16.3 % (ref 11.5–14.5)
EST. GFR  (AFRICAN AMERICAN): >60 ML/MIN/1.73 M^2
EST. GFR  (NON AFRICAN AMERICAN): 54.1 ML/MIN/1.73 M^2
EST. GFR  (NON AFRICAN AMERICAN): 59.6 ML/MIN/1.73 M^2
FINAL PATHOLOGIC DIAGNOSIS: NORMAL
GLUCOSE SERPL-MCNC: 114 MG/DL (ref 70–110)
GLUCOSE SERPL-MCNC: 114 MG/DL (ref 70–110)
GLUCOSE SERPL-MCNC: 115 MG/DL (ref 70–110)
GLUCOSE SERPL-MCNC: 98 MG/DL (ref 70–110)
GROSS: NORMAL
HCT VFR BLD AUTO: 33.8 % (ref 40–54)
HGB BLD-MCNC: 10.8 G/DL (ref 14–18)
IMM GRANULOCYTES # BLD AUTO: 0.11 K/UL (ref 0–0.04)
IMM GRANULOCYTES NFR BLD AUTO: 0.6 % (ref 0–0.5)
INR PPP: 1 (ref 0.8–1.2)
LYMPHOCYTES # BLD AUTO: 2.2 K/UL (ref 1–4.8)
LYMPHOCYTES NFR BLD: 11.6 % (ref 18–48)
MAGNESIUM SERPL-MCNC: 2.3 MG/DL (ref 1.6–2.6)
MCH RBC QN AUTO: 29.4 PG (ref 27–31)
MCHC RBC AUTO-ENTMCNC: 32 G/DL (ref 32–36)
MCV RBC AUTO: 92 FL (ref 82–98)
MONOCYTES # BLD AUTO: 1.7 K/UL (ref 0.3–1)
MONOCYTES NFR BLD: 8.7 % (ref 4–15)
NEUTROPHILS # BLD AUTO: 15 K/UL (ref 1.8–7.7)
NEUTROPHILS NFR BLD: 78.8 % (ref 38–73)
NRBC BLD-RTO: 0 /100 WBC
NUM UNITS TRANS PACKED RBC: NORMAL
PHOSPHATE SERPL-MCNC: 2 MG/DL (ref 2.7–4.5)
PLATELET # BLD AUTO: 126 K/UL (ref 150–350)
PMV BLD AUTO: 10.7 FL (ref 9.2–12.9)
POCT GLUCOSE: 104 MG/DL (ref 70–110)
POCT GLUCOSE: 109 MG/DL (ref 70–110)
POCT GLUCOSE: 90 MG/DL (ref 70–110)
POCT GLUCOSE: 93 MG/DL (ref 70–110)
POCT GLUCOSE: 99 MG/DL (ref 70–110)
POTASSIUM SERPL-SCNC: 3.5 MMOL/L (ref 3.5–5.1)
POTASSIUM SERPL-SCNC: 4 MMOL/L (ref 3.5–5.1)
POTASSIUM SERPL-SCNC: 4.1 MMOL/L (ref 3.5–5.1)
POTASSIUM SERPL-SCNC: 4.1 MMOL/L (ref 3.5–5.1)
PROTHROMBIN TIME: 10.2 SEC (ref 9–12.5)
RBC # BLD AUTO: 3.67 M/UL (ref 4.6–6.2)
SODIUM SERPL-SCNC: 141 MMOL/L (ref 136–145)
SODIUM SERPL-SCNC: 143 MMOL/L (ref 136–145)
SODIUM SERPL-SCNC: 144 MMOL/L (ref 136–145)
SODIUM SERPL-SCNC: 144 MMOL/L (ref 136–145)
WBC # BLD AUTO: 19.01 K/UL (ref 3.9–12.7)

## 2020-05-22 PROCEDURE — C9113 INJ PANTOPRAZOLE SODIUM, VIA: HCPCS | Performed by: STUDENT IN AN ORGANIZED HEALTH CARE EDUCATION/TRAINING PROGRAM

## 2020-05-22 PROCEDURE — 80048 BASIC METABOLIC PNL TOTAL CA: CPT

## 2020-05-22 PROCEDURE — 84100 ASSAY OF PHOSPHORUS: CPT

## 2020-05-22 PROCEDURE — 27100171 HC OXYGEN HIGH FLOW UP TO 24 HOURS

## 2020-05-22 PROCEDURE — 97110 THERAPEUTIC EXERCISES: CPT

## 2020-05-22 PROCEDURE — 27000221 HC OXYGEN, UP TO 24 HOURS

## 2020-05-22 PROCEDURE — 85025 COMPLETE CBC W/AUTO DIFF WBC: CPT

## 2020-05-22 PROCEDURE — 25000003 PHARM REV CODE 250: Performed by: STUDENT IN AN ORGANIZED HEALTH CARE EDUCATION/TRAINING PROGRAM

## 2020-05-22 PROCEDURE — 94761 N-INVAS EAR/PLS OXIMETRY MLT: CPT

## 2020-05-22 PROCEDURE — 80048 BASIC METABOLIC PNL TOTAL CA: CPT | Mod: 91

## 2020-05-22 PROCEDURE — 99900035 HC TECH TIME PER 15 MIN (STAT)

## 2020-05-22 PROCEDURE — 85610 PROTHROMBIN TIME: CPT

## 2020-05-22 PROCEDURE — 63600175 PHARM REV CODE 636 W HCPCS: Performed by: STUDENT IN AN ORGANIZED HEALTH CARE EDUCATION/TRAINING PROGRAM

## 2020-05-22 PROCEDURE — 85730 THROMBOPLASTIN TIME PARTIAL: CPT

## 2020-05-22 PROCEDURE — 83735 ASSAY OF MAGNESIUM: CPT

## 2020-05-22 PROCEDURE — 63700000 PHARM REV CODE 250 ALT 637 W/O HCPCS: Performed by: STUDENT IN AN ORGANIZED HEALTH CARE EDUCATION/TRAINING PROGRAM

## 2020-05-22 PROCEDURE — 25000003 PHARM REV CODE 250: Performed by: THORACIC SURGERY (CARDIOTHORACIC VASCULAR SURGERY)

## 2020-05-22 PROCEDURE — 99233 SBSQ HOSP IP/OBS HIGH 50: CPT | Mod: GC,,, | Performed by: SURGERY

## 2020-05-22 PROCEDURE — 20000000 HC ICU ROOM

## 2020-05-22 PROCEDURE — 97116 GAIT TRAINING THERAPY: CPT

## 2020-05-22 PROCEDURE — 99233 PR SUBSEQUENT HOSPITAL CARE,LEVL III: ICD-10-PCS | Mod: GC,,, | Performed by: SURGERY

## 2020-05-22 RX ORDER — ISOSORBIDE MONONITRATE 30 MG/1
60 TABLET, EXTENDED RELEASE ORAL DAILY
Status: DISCONTINUED | OUTPATIENT
Start: 2020-05-22 | End: 2020-05-25

## 2020-05-22 RX ORDER — NIFEDIPINE 30 MG/1
60 TABLET, EXTENDED RELEASE ORAL 2 TIMES DAILY
Status: CANCELLED | OUTPATIENT
Start: 2020-05-22

## 2020-05-22 RX ORDER — METOPROLOL SUCCINATE 50 MG/1
50 TABLET, EXTENDED RELEASE ORAL DAILY
Status: DISCONTINUED | OUTPATIENT
Start: 2020-05-22 | End: 2020-05-23

## 2020-05-22 RX ORDER — ACETAMINOPHEN 325 MG/1
650 TABLET ORAL EVERY 6 HOURS PRN
Status: DISCONTINUED | OUTPATIENT
Start: 2020-05-22 | End: 2020-05-26 | Stop reason: HOSPADM

## 2020-05-22 RX ADMIN — METOPROLOL SUCCINATE 50 MG: 50 TABLET, EXTENDED RELEASE ORAL at 08:05

## 2020-05-22 RX ADMIN — OXYCODONE HYDROCHLORIDE 5 MG: 5 TABLET ORAL at 07:05

## 2020-05-22 RX ADMIN — OXYCODONE HYDROCHLORIDE 5 MG: 5 TABLET ORAL at 11:05

## 2020-05-22 RX ADMIN — ASPIRIN 325 MG ORAL TABLET 325 MG: 325 PILL ORAL at 08:05

## 2020-05-22 RX ADMIN — OXYCODONE HYDROCHLORIDE 10 MG: 10 TABLET ORAL at 05:05

## 2020-05-22 RX ADMIN — POTASSIUM CHLORIDE 40 MEQ: 20 SOLUTION ORAL at 08:05

## 2020-05-22 RX ADMIN — MUPIROCIN 1 G: 20 OINTMENT TOPICAL at 08:05

## 2020-05-22 RX ADMIN — POLYETHYLENE GLYCOL 3350 17 G: 17 POWDER, FOR SOLUTION ORAL at 09:05

## 2020-05-22 RX ADMIN — SODIUM PHOSPHATE, MONOBASIC, MONOHYDRATE 20.01 MMOL: 276; 142 INJECTION, SOLUTION INTRAVENOUS at 05:05

## 2020-05-22 RX ADMIN — ACETAMINOPHEN 1000 MG: 500 TABLET ORAL at 09:05

## 2020-05-22 RX ADMIN — NICARDIPINE HYDROCHLORIDE 12.5 MG/HR: 0.2 INJECTION, SOLUTION INTRAVENOUS at 12:05

## 2020-05-22 RX ADMIN — ACETAMINOPHEN 1000 MG: 500 TABLET ORAL at 05:05

## 2020-05-22 RX ADMIN — ACETAMINOPHEN 1000 MG: 500 TABLET ORAL at 02:05

## 2020-05-22 RX ADMIN — NICARDIPINE HYDROCHLORIDE 10 MG/HR: 0.2 INJECTION, SOLUTION INTRAVENOUS at 06:05

## 2020-05-22 RX ADMIN — POTASSIUM CHLORIDE 20 MEQ: 200 INJECTION, SOLUTION INTRAVENOUS at 05:05

## 2020-05-22 RX ADMIN — NICARDIPINE HYDROCHLORIDE 12.5 MG/HR: 0.2 INJECTION, SOLUTION INTRAVENOUS at 03:05

## 2020-05-22 RX ADMIN — PRAVASTATIN SODIUM 40 MG: 40 TABLET ORAL at 08:05

## 2020-05-22 RX ADMIN — PANTOPRAZOLE SODIUM 40 MG: 40 INJECTION, POWDER, LYOPHILIZED, FOR SOLUTION INTRAVENOUS at 08:05

## 2020-05-22 RX ADMIN — ISOSORBIDE MONONITRATE 60 MG: 30 TABLET, EXTENDED RELEASE ORAL at 06:05

## 2020-05-22 NOTE — PHYSICIAN QUERY
PT Name: Brad Kearns  MR #: [id number]     Perfusion Diagnosis Clarification     CDS/: Ron Jeff Jr, RN               Contact information:[email or web address]  This form is a permanent document in the medical record.     Query Date: May 22, 2020    By submitting this query, we are merely seeking further clarification of documentation.  Please utilize your independent clinical judgment when addressing the question(s) below.  The Medical Record contains the following:  Indicators Supporting Clinical Findings Location in Medical Record   x Acute Illness (e.g. AMI, Sepsis, etc.) He has had high chest tube output requiring multiple rounds of blood products, hemodynamic instability requiring vasopressor and inotropic support and evidence of retained left hemothorax on CXR   5/20 Procedure Note   x Vital Signs  BP=82/56-->59/44-->93/56-->79/50   5/19 VS Flowsheet     Acidosis documented      ABGs/Labs     x Hypotension or Low Blood Pressure documented MDs called to bedside to assess patient after hypotension despite fluid and pressor administration. Patient MAPs in 40s at this time 5/19 ICU Nursing Note    Altered Mental Status or Confusion      Diaphoresis, Cold Extremities or Cyanosis      Oliguria     x Medication/Treatment:  -Vasopressors  -Inotropic Drugs  -IV Fluids   -IV Antibiotics  -Cardiac Assist Devices  -Hemodynamic Monitoring  -Blood/Blood Products Patient further decline, with large amount of bright red drainage from chest tubes. In total 2500 mL Albumin, 6 PRBC, 2 Plt., 2 Cryo, 2 FFP, 1 DDABP, 6 Bicarb, 1 Calcium push through shift    Femoral arterial line and left sided pleural chest tube emergently to stabilize patient    We elected to emergently place a 32F chest tube to drain residual hemothorax on the left    desmopressin (DDAVP) 24.6 mcg  IVPB    5/19 ICU Nursing Note            5/19 ICU Nursing Note      5/20 Procedure Note        5/20 MAR    Other       Provider, please specify  diagnosis or diagnoses associated with above clinical findings.    [  x  ] Hemorrhagic Shock     [    ] Shock, Unspecified     [    ] Other Condition (please specify):      [  ] Clinically Undetermined       Please document in your progress notes daily for the duration of treatment until resolved and include in your discharge summary.

## 2020-05-22 NOTE — PHYSICIAN QUERY
"PT Name: Brad Kearns  MR #: [id number]    Physician Query Form - Hematology Clarification      CDS/: Ron Jeff Jr, RN               Contact information:[email or web address]    This form is a permanent document in the medical record.      Query Date: May 22, 2020    By submitting this query, we are merely seeking further clarification of documentation. Please utilize your independent clinical judgment when addressing the question(s) below.    The Medical record contains the following:   Indicators  Supporting Clinical Findings Location in Medical Record    "Anemia" documented     x H & H = H/H=12.9/40.7-->8.5/26.7-->5..9/18.1   5/19-5/20 MAR   x BP =                     HR=  BP=82/56-->59/44-->93/56-->79/50   5/19 VS Flowsheet     "GI bleeding" documented     x Acute bleeding (Non GI site) Gross findings: After initial skin entry, a bleeding vessel was noted at the superior aspect of the manubrium which was cauterized.  Large amount of clot found in the pericardial space and left hemothorax.  This was evacuated.  No other sites of bleeding noted.   5/20 Op Note   x Transfusion(s) Patient further decline, with large amount of bright red drainage from chest tubes. In total 2500 mL Albumin, 6 PRBC, 2 Plt., 2 Cryo, 2 FFP   5/19 ICU Nursing Progress Note   x Treatment: Operation:  mediastinal exploration with evacuation of hemopericardium and left hemothorax   5/20 Op Note   x Other:  Total output from left pleural chest tube 400 cc within first hour, other chest tube cannister total output of ~1500 mL through shift 5/19 ICU Nursing Progress Note     Provider, please specify diagnosis or diagnoses associated with above clinical findings.    [ x ] Acute blood loss anemia     [  ] Other Hematological Diagnosis (please specify):     [   ] Clinically Undetermined       Please document in your progress notes daily for the duration of treatment, until resolved, and include in your discharge summary. "

## 2020-05-22 NOTE — PT/OT/SLP PROGRESS
Physical Therapy Treatment    Patient Name:  Brad Kearns   MRN:  [id number]    *co-treatment with OT   Recommendations:     Discharge Recommendations:  home health PT   Discharge Equipment Recommendations: none   Barriers to discharge: Decreased caregiver support at current level of function     Assessment:     Brad Kearns is a 74 y.o. male admitted with a medical diagnosis of CAD (coronary artery disease).  He presents with the following impairments/functional limitations:  weakness, impaired endurance, impaired self care skills, impaired functional mobilty, gait instability, impaired balance, impaired cardiopulmonary response to activity, pain. Pt tolerated increased activity with VSS. Pt on 15L O2, SpO2 fluctuated 86-92%. Pt reported intermittent dizziness which resolved with seated rest and ankle pumps. Pt is progressing toward goals. Pt would continue to benefit from acute skilled therapy intervention to address deficits and progress toward prior level of function.       Rehab Prognosis: Good; patient would benefit from acute skilled PT services to address these deficits and reach maximum level of function.    Recent Surgery: Procedure(s) (LRB):  EXPLORATION, WOUND- Chest washout (N/A) 2 Days Post-Op    Plan:     During this hospitalization, patient to be seen 5 x/week to address the identified rehab impairments via therapeutic activities, gait training, therapeutic exercises, neuromuscular re-education and progress toward the following goals:    · Plan of Care Expires:  06/21/20    Subjective     Chief Complaint: Pt c/o intermittent dizziness during activity   Patient/Family Comments/goals: to get better and return home   Pain/Comfort:  · Pain Rating 1: 0/10  · Pain Rating Post-Intervention 1: 0/10      Objective:     Communicated with RN prior to session.  Patient found HOB elevated with arterial line, pulse ox (continuous), telemetry, central line, silva catheter, chest tube, oxygen, blood pressure  cuff upon PT entry to room.     General Precautions: Standard, fall, sternal   Orthopedic Precautions:N/A   Braces: N/A     Functional Mobility:  · Bed Mobility:     · Scooting: moderate assistance for anterior seated scooting toward EOB  · Supine to Sit: minimum assistance with cuing for sequencing of movement and to maintain sternal precautions   · Transfers:     · Sit to Stand: 2x from EOB with contact guard assistance with hand-held assist  · Gait: Pt ambulated 10 feet forward and backward, followed by 6 lateral steps to the L along EOB with contact guard assistance with HHA. Pt demo'd small step size, decreased foot clearance, narrow PATRICIA which improved with cuing and facilitation. Pt with no LOB, no SOB, intermittent dizziness which resolved with seated rest. Pt required rest breaks 2/2 decreased SpO2 to 86%, improved to >90% with rest.       AM-PAC 6 CLICK MOBILITY  Turning over in bed (including adjusting bedclothes, sheets and blankets)?: 3  Sitting down on and standing up from a chair with arms (e.g., wheelchair, bedside commode, etc.): 3  Moving from lying on back to sitting on the side of the bed?: 3  Moving to and from a bed to a chair (including a wheelchair)?: 3  Need to walk in hospital room?: 3  Climbing 3-5 steps with a railing?: 2  Basic Mobility Total Score: 17       Therapeutic Activities and Exercises:   Pt sat EOB for 5 mins with stand by assistance for static sitting balance.   Pt educated on role of PT/POC. Pt verbalized understanding.   Pt encouraged to only perform OOB mobility with assistance from nursing/therapy. Pt agreeable.   Pt encouraged to ambulate daily with assistance/supervision from nursing/therapy. Pt agreeable.  Pt educated regarding 3/3 sternal precautions. Pt compliant throughout session.  Pt returned to bed 2/2 RN preparing to pull femoral line.     Patient left HOB elevated with all lines intact, call button in reach and RN notified..    GOALS:   Multidisciplinary Problems      Physical Therapy Goals        Problem: Physical Therapy Goal    Goal Priority Disciplines Outcome Goal Variances Interventions   Physical Therapy Goal     PT, PT/OT Ongoing, Progressing     Description:  Goals to be met by: 2020     Patient will increase functional independence with mobility by performin. Supine to sit with Stand-by Assistance  2. Sit to stand transfer with Supervision  3. Gait  x 200 feet with Supervision using no AD   4. Lower extremity exercise program x10 reps per handout, with independence                      Time Tracking:     PT Received On: 20  PT Start Time: 1350     PT Stop Time: 1410  PT Total Time (min): 20 min     Billable Minutes: Gait Training 20 mins    Treatment Type: Treatment  PT/PTA: PT     PTA Visit Number: 0     Janneth Barone, PT  2020

## 2020-05-22 NOTE — PLAN OF CARE
"      SICU PLAN OF CARE NOTE    Dx: CAD (coronary artery disease)    Shift Events: Fem [place] d/c. Cardene turned off. Plueral CT removed.    Goals of Care: MAP >65 SBP<130 SAT>88    Neuro: AAO x4, Arouses to Voice, Follows Commands and Moves All Extremities    Vital Signs: /69 (BP Location: Right arm, Patient Position: Lying)   Pulse 95   Temp 98.6 °F (37 °C) (Oral)   Resp 18   Ht 5' 9" (1.753 m)   Wt 84.6 kg (186 lb 8.2 oz)   SpO2 (!) 94%   BMI 27.54 kg/m²     Respiratory: HFNC 15L    Diet: Cardiac Diet    Gtts: Lasix and MIVF    Urine Output: Urinary Catheter 150-325 cc/hour    Drains: Chest Tube, total output 40-50 cc /  4 hours     Labs/Accuchecks: Accuchecks AC/HS, Daily Labs.    Skin: skin free of new breakdown, turned q2h, bed plugged in and working properly, foams applied         "

## 2020-05-22 NOTE — PLAN OF CARE
Problem: Physical Therapy Goal  Goal: Physical Therapy Goal  Description  Goals to be met by: 2020     Patient will increase functional independence with mobility by performin. Supine to sit with Stand-by Assistance  2. Sit to stand transfer with Supervision  3. Gait  x 200 feet with Supervision using no AD   4. Lower extremity exercise program x10 reps per handout, with independence     Outcome: Ongoing, Progressing     Pt is progressing toward goals. All goals remain appropriate.    Janneth Barone, PT, DPT  2020  [phone]

## 2020-05-22 NOTE — CARE UPDATE
BG goal 140-180     Remains in ICU, NAEON. BG well controlled without insulin. 2 Days Post-Op       Plan:  BG monitoring ac/hs and low dose correction scale.   No history of DM. If no futher insulin/endocrine needs, will sign off soon.        Discharge planning: tbd - likely no needs      Endocrine to continue to follow     ** Please call Endocrine for any BG related issues **

## 2020-05-22 NOTE — PROGRESS NOTES
Cardiothoracic Surgery  Progress Note      Admit Date: 5/14/2020    Disease Etiology: Ischemic  Open Chest: no  Surgery Performed: 5/19/2020 2V CABG/AVR; 5/20/2020 mediastinal washout and closure    ASSESSMENT/PLAN:     I conducted multidisciplinary rounds in conjunction with the ICU/Critical Care attending staff and/or residents, with involvement of ancillary services as appropriate. The patient's general condition was reviewed, specifically including hemodynamic performance, dietary and nutritional status, pharmacy concerns, and status of expected transition to stepdown care. Plan has been discussed and agreed upon.    Plan:  Cardiac diet, 60 Imdur. Removing 32Fr L pleural chest tube.     For details see the critical care note.    Dodie Starkey MD  Cardiac Surgery Resident, PGY7  Cardiothoracic Surgery  [facility]

## 2020-05-22 NOTE — PROGRESS NOTES
[facility]  Critical Care - Surgery  Progress Note    Patient Name: Brad Kearns  MRN: [id number]  Admission Date: 5/14/2020  Hospital Length of Stay: 8 days  Code Status: Full Code  Attending Provider: Guicho Quezada MD  Primary Care Provider: Duke Bowden MD   Principal Problem: CAD (coronary artery disease)    Subjective:     Hospital/ICU Course:  No notes on file    Interval History/Significant Events:   Did well yesterday  Extubated  Diuresis  Give a diet today    Follow-up For: Procedure(s) (LRB):  EXPLORATION, WOUND- Chest washout (N/A)    Post-Operative Day: 2 Days Post-Op    Objective:     Vital Signs (Most Recent):  Temp: 98.8 °F (37.1 °C) (05/22/20 0701)  Pulse: 87 (05/22/20 1015)  Resp: 16 (05/22/20 0716)  BP: 111/62 (05/22/20 1000)  SpO2: (!) 92 % (05/22/20 1015) Vital Signs (24h Range):  Temp:  [98 °F (36.7 °C)-98.8 °F (37.1 °C)] 98.8 °F (37.1 °C)  Pulse:  [] 87  Resp:  [14-20] 16  SpO2:  [84 %-97 %] 92 %  BP: (106-137)/(55-73) 111/62  Arterial Line BP: (105-134)/(48-65) 105/55     Weight: 84.6 kg (186 lb 8.2 oz)  Body mass index is 27.54 kg/m².      Intake/Output Summary (Last 24 hours) at 5/22/2020 1043  Last data filed at 5/22/2020 1000  Gross per 24 hour   Intake 2110 ml   Output 4610 ml   Net -2500 ml       Physical Exam   Constitutional: He is oriented to person, place, and time. He appears well-developed and well-nourished.   HENT:   Head: Normocephalic and atraumatic.   Eyes: Right eye exhibits no discharge. Left eye exhibits no discharge.   Neck: Normal range of motion. Neck supple.   Cardiovascular: Normal rate and regular rhythm.   Pulmonary/Chest: Effort normal. No respiratory distress.   Sternotomy incision c/d/i  Chest tubes in place  Pacing wires in place   Musculoskeletal: Normal range of motion.   Neurological: He is alert and oriented to person, place, and time.   Skin: Skin is warm and dry.   Psychiatric: He has a normal mood and affect. His  behavior is normal.       Vents:  Vent Mode: Spont (05/21/20 1004)  Ventilator Initiated: Yes(chart correction) (05/19/20 2010)  Set Rate: 0 BPM (05/21/20 1004)  Vt Set: 420 mL (05/21/20 1004)  Pressure Support: 10 cmH20 (05/21/20 1004)  PEEP/CPAP: 5 cmH20 (05/21/20 1004)  Oxygen Concentration (%): 40 (05/21/20 1004)  Peak Airway Pressure: 16 cmH2O (05/21/20 1004)  Plateau Pressure: 22 cmH20 (05/21/20 1004)  Total Ve: 5.65 mL (05/21/20 1004)  F/VT Ratio<105 (RSBI): (!) 47.23 (05/20/20 1040)    Lines/Drains/Airways     Central Venous Catheter Line                 Percutaneous Central Line Insertion/Assessment - Quad lumen  05/19/20 0103 3 days    Trialysis (Dialysis) Catheter 05/19/20 1215 right internal jugular 2 days          Drain                 Chest Tube 05/19/20 1848 1 Left Pleural 19 Fr. 2 days         Chest Tube 05/20/20 0300 1 Left Pleural 32 Fr. 2 days         Urethral Catheter 05/19/20 1307 Non-latex;Straight-tip;Temperature probe 14 Fr. 2 days         Y Chest Tube 1 and 2 05/19/20 1847 1 Anterior Mediastinal 19 Fr. 2 Anterior Mediastinal 19 Fr. 2 days          Arterial Line                 Arterial Line 05/20/20 0008 Right Femoral 2 days          Line                 Pacer Wires 05/19/20 1500 2 days          Peripheral Intravenous Line                 Peripheral IV - Single Lumen 05/19/20 0623 20 G Right Forearm 3 days         Peripheral IV - Single Lumen 05/19/20 1310 16 G Right Hand 2 days         Peripheral IV - Single Lumen 05/20/20 0019 20 G Left Forearm 2 days                Significant Labs:    CBC/Anemia Profile:  Recent Labs   Lab 05/21/20  0333 05/21/20  0725 05/21/20  0930 05/22/20  0315   WBC 13.54*  --   --  19.01*   HGB 10.5*  --   --  10.8*   HCT 31.6* 30* 26* 33.8*   *  --   --  126*   MCV 90  --   --  92   RDW 16.9*  --   --  16.3*        Chemistries:  Recent Labs   Lab 05/21/20  0336 05/21/20  1533 05/21/20  2200 05/22/20  0315   NA  --  147* 144 144  144   K  --  3.7 3.8 4.1   4.1   CL  --  110 108 109  109   CO2  --  26 28 27  27   BUN  --  23 24* 24*  24*   CREATININE  --  1.3 1.2 1.2  1.2   CALCIUM  --  8.1* 8.1* 8.0*  8.0*   MG 2.4  --   --  2.3   PHOS 2.6*  --   --  2.0*         Significant Imaging:  I have reviewed and interpreted all pertinent imaging results/findings within the past 24 hours.    Assessment/Plan:     S/P CABG (coronary artery bypass graft)  Mr. Kearns is a 74-year-old man status post aortic valve replacement and CABG x2 on 5/19 for aortic regurgitation and NSTEMI.     Neuro:  Cathy, dilaudid     CV:  Status post aortic valve repair with bioprosthetic valve and CABG x2 on 05/19.    Had a bleed and went back on 5/20.  One small manubrial bleeder found but otherwise none.       Pulm:  Extubated, doing well, work with IS  Will d/c L CT today     Renal:  Good UOP  Cr slowly coming up  Lasix gtt with goal UOP near 100/hr     GI: Reg diet, replace lytes as needed     Id:  No antibiotics.     Heme:  He received no blood products in the operating room.  Only Cell Saver.       Endo:  Glucose is within acceptable limits.     Dispo: ICU care       Critical care was time spent personally by me on the following activities: development of treatment plan with patient or surrogate and bedside caregivers, discussions with consultants, evaluation of patient's response to treatment, examination of patient, ordering and performing treatments and interventions, ordering and review of laboratory studies, ordering and review of radiographic studies, pulse oximetry, re-evaluation of patient's condition.  This critical care time did not overlap with that of any other provider or involve time for any procedures.     Warren Wood MD  Critical Care - Surgery  [facility]

## 2020-05-22 NOTE — ASSESSMENT & PLAN NOTE
Mr. Kearns is a 74-year-old man status post aortic valve replacement and CABG x2 on 5/19 for aortic regurgitation and NSTEMI.     Neuro:  jeane Morgan     CV:  Status post aortic valve repair with bioprosthetic valve and CABG x2 on 05/19.    Had a bleed and went back on 5/20.  One small manubrial bleeder found but otherwise none.       Pulm:  Extubated, doing well, work with IS  Will d/c L CT today     Renal:  Good UOP  Cr slowly coming up  Lasix gtt with goal UOP near 100/hr     GI: Reg diet, replace lytes as needed     Id:  No antibiotics.     Heme:  He received no blood products in the operating room.  Only Cell Saver.       Endo:  Glucose is within acceptable limits.     Dispo: ICU care

## 2020-05-22 NOTE — PLAN OF CARE
Problem: Occupational Therapy Goal  Goal: Occupational Therapy Goal  Description  Goals to be met by: 5/31/20     Patient will increase functional independence with ADLs by performing:    UE Dressing with Supervision.  LE Dressing with Minimal Assistance.  Grooming while standing with Supervision.  Toileting from toilet with Minimal Assistance for hygiene and clothing management.   Toilet transfer to toilet with Stand-by Assistance.  Pt will recall 3/3 sternal precautions with independence.     Outcome: Ongoing, Progressing     Goals remain appropriate

## 2020-05-22 NOTE — PLAN OF CARE
Pt vss. HR 90s, NSR. MAP maintained>65, SBP<130. Pt on 15L high flow nasal cannula, sats>93%. Pt on cardene gtt @12.5 mg/hr, lasix gtt @5 mg/hr. -225cc/hr. CVP 6-7. CT ~150cc/shift, serosanguinous output. Pt AAOx4, follows commands, moves all extremities. No new skin breakdown noted. Midline incision and chest tube sites cleansed and redressed. Pt turned Q2hrs, foams in place. Pain medication given PRN per orders. Labs drawn and replaced per orders. Plan of care reviewed with patient, wife, and son, no questions/concerns at this time. Will continue to monitor.

## 2020-05-22 NOTE — PHYSICIAN QUERY
"PT Name: Brad Kearns  MR #: [id number]    Physician Query Form - Heart  Condition Clarification     CDS/: Ron Jeff Jr, RN               Contact information:[email or web address]  This form is a permanent document in the medical record.     Query Date: May 22, 2020    By submitting this query, we are merely seeking further clarification of documentation. Please utilize your independent clinical judgment when addressing the question(s) below.    The medical record contains the following   Indicators     Supporting Clinical Findings Location in Medical Record   x  5/14 Labs    EF     x Radiology findings Impression    1. No acute cardiopulmonary process.   5/14 Chest X Ray   x Echo Results   Mild left ventricular enlargement (LVEDD 63mm)    Preserved left ventricular systolic function with mild hypokinesis of the LV apex. The estimated ejection fraction is 55%.    Normal LV diastolic function.   5/15 Echo    5/15 Echo        5/15 Echo      "Ascites" documented     x "SOB" or "ANDINO" documented Respiratory: Negative for shortness of breath.     Patient remains free of chest pain or shortness of breath   5/14 ED Provider Note    5/18 Cardiothoracic Surgery Progress Note    "Hypoxia" documented     x Heart Failure documented Euvolemic on exam. No symptoms of CHF    Preoperative Diagnosis: Coronary Artery Disease, NSTEMI (121.4), acute on chronic diastolic heart failure    5/14 Cardiology H&P    5/19 Op Note   x "Edema" documented Musculoskeletal: Normal range of motion. He exhibits no edema or deformity   5/14 H&P   x Diuretics/Meds furosemide injection 20 mg  Once    furosemide (LASIX) 2 mg/mL  continuous infusion 5/20 MAR    5/21-5/22 MAR    Treatment:          Other:      Heart failure (HF) can be acute, chronic or both. It is generally further specificed as systolic, diastolic, or combined. Lastly, it is important to identify an underlying etiology if known or suspected.     Common clues to " acute exacerbation:  Rapidly progressive symptoms (w/in 2 weeks of presentation), using IV diuretics to treat, using supplemental O2, pulmonary edema on Xray, MI w/in 4 weeks, and/or BNP >500    Systolic Heart Failure: is defined as chart documentation of a left ventricular ejection fraction (LVEF) less than 40%     Diastolic Heart Failure: is defined as a left ventricular ejection fraction (LVEF) greater than 40%   +      Evidence of diastolic dysfunction on echocardiography OR    Right heart catheterization wedge pressure above 12 mm Hg OR    Left heart catheterization left ventricular end diastolic pressure 18 mm Hg or above.    References: *American Heart Association    The clinical guidelines noted below are only system guidelines, and do not replace the providers clinical judgment.     Provider, please specify the diagnosis associated with above clinical findings  Due to Documentation Conflict Please Clarify the Acuity of the Diastolic Heart Failure    [ x  ] Acute on Chronic Diastolic Heart Failure -    Pre-existing diastoic HF diagnosis.  EF > 40%  and acute HF symptoms documented         [   ] Chronic Diastolic Heart Failure - Pre-existing diastolic HF diagnosis.  EF > 40%  without  acute HF symptoms documented     [   ] Other Type of Diastolic Heart Failure Acuity (please specify Acuity):     [   ] Clinically Undetermined                             Please document in your progress notes daily for the duration of treatment until resolved and include in your discharge summary.

## 2020-05-22 NOTE — PT/OT/SLP PROGRESS
Occupational Therapy   Treatment    Name: Brad Kearns  MRN: [id number]  Admitting Diagnosis:  CAD (coronary artery disease)  2 Days Post-Op    Recommendations:     Discharge Recommendations: home health OT  Discharge Equipment Recommendations:  (TBD)      Assessment:     Brad Kearns is a 74 y.o. male with a medical diagnosis of CAD (coronary artery disease).  He presents with fair participation and motivation. Performance deficits affecting function are weakness, impaired endurance, impaired self care skills, impaired functional mobilty, impaired balance, decreased upper extremity function, decreased lower extremity function, impaired cardiopulmonary response to activity.     Rehab Prognosis:  Fair; patient would benefit from acute skilled OT services to address these deficits and reach maximum level of function.       Plan:     Patient to be seen 4 x/week to address the above listed problems via self-care/home management, therapeutic activities, therapeutic exercises  · Plan of Care Expires: 06/20/20  · Plan of Care Reviewed with: patient    Subjective     Pain/Comfort:  · Pain Rating 1: 0/10  · Pain Rating Post-Intervention 1: 0/10    Objective:     Communicated with: RN prior to session.  Patient found supine with arterial line, blood pressure cuff, pulse ox (continuous), telemetry, chest tube, oxygen upon OT entry to room.    General Precautions: Standard, fall, sternal   Orthopedic Precautions:N/A   Braces:       Occupational Performance:     Bed Mobility:    · Patient completed Scooting/Bridging with moderate assistance scooting forward on EOB  · Patient completed Supine to Sit with minimum assistance     Functional Mobility/Transfers:  · Patient completed Sit <> Stand Transfer with contact guard assistance  with  no assistive device from EOB  · Functional Mobility: CGA in room    Activities of Daily Living:  · Grooming: stand by assistance while seated EOB      [facility] 6 Click ADL: 13    Treatment &  Education:  Provided education regarding sternal precautions due to pt able to state 1/4 precautions.  Pt performed BUE AROM in 5 planes x 10 reps each seated EOB.  Pt had no further questions & when asked whether there were any concerns pt reported none.      Patient left supine with all lines intact, call button in reach and RN notifiedEducation:      GOALS:   Multidisciplinary Problems     Occupational Therapy Goals        Problem: Occupational Therapy Goal    Goal Priority Disciplines Outcome Interventions   Occupational Therapy Goal     OT, PT/OT Ongoing, Progressing    Description:  Goals to be met by: 5/31/20     Patient will increase functional independence with ADLs by performing:    UE Dressing with Supervision.  LE Dressing with Minimal Assistance.  Grooming while standing with Supervision.  Toileting from toilet with Minimal Assistance for hygiene and clothing management.   Toilet transfer to toilet with Stand-by Assistance.  Pt will recall 3/3 sternal precautions with independence.                      Time Tracking:     OT Date of Treatment: 05/22/20  OT Start Time: 1350  OT Stop Time: 1410  OT Total Time (min): 20 min    Billable Minutes:Therapeutic Exercise 20    SOBIA [place]  5/22/2020

## 2020-05-22 NOTE — PLAN OF CARE
CHITRA is following this Pt for DC planning needs. CHITRA will work with patient, family and insurance to achieve HH once orders are placed as patient approaches discharge.    CHITRA will continue to coordinate with patient, family, team and insurance to complete patient's discharge plan.       05/22/20 0949   Post-Acute Status   Post-Acute Authorization Home Health   Home Health Status Awaiting Orders for HH   Discharge Plan   Discharge Plan A Home Health       Fernanda Go LMSW   - Case Management

## 2020-05-22 NOTE — PHYSICIAN QUERY
PT Name: Brad Kearns  MR #: [id number]    Relationship to Procedure Clarification     CDS/: Ron Jeff Jr, RN               Contact information:[email or web address]     This form is a permanent document in the medical record.     Query Date: May 22, 2020    Dear Provider,  By submitting this query, we are merely seeking further clarification of documentation. Please utilize your independent clinical judgment when addressing the question(s) below.    The medical record contains the following:  Supporting Clinical Findings Location in Medical Record   Operation:  Aortic valve replacement with 29mm Medtronic Mosaic porcine bioprosthesis    Patient MAPs in 40s at this time, labs sent and ABG done per MD order. Patient further decline, with large amount of bright red drainage from chest tubes.    Then, a 32 [place] chest tube was placed into the left hemithorax such that it coursed laterally and superiorly.  A total of 450 cc of blood was returned    Operation:  mediastinal exploration with evacuation of hemopericardium and left hemothorax    Gross findings: After initial skin entry, a bleeding vessel was noted at the superior aspect of the manubrium which was cauterized.  Large amount of clot found in the pericardial space and left hemothorax.  This was evacuated.  No other sites of bleeding noted.   5/19 Op Note      5/19 Nursing Progress Note        5/20 Procedure Note      5/20 Op Note      5/20 Op Note       Please clarify if               Left Hemothorax            (as it relates to the   Aortic Valve Replacement Procedure   ) is:      [  ] Present, but not a complication of the procedure     [ x ] Complication of the procedure     [  ] Other (please specify): __________________     [  ] Clinically Undetermined       Please document in your progress notes daily for the duration of treatment until resolved and include in your discharge summary.

## 2020-05-22 NOTE — SUBJECTIVE & OBJECTIVE
Interval History/Significant Events:   Did well yesterday  Extubated  Diuresis  Give a diet today    Follow-up For: Procedure(s) (LRB):  EXPLORATION, WOUND- Chest washout (N/A)    Post-Operative Day: 2 Days Post-Op    Objective:     Vital Signs (Most Recent):  Temp: 98.8 °F (37.1 °C) (05/22/20 0701)  Pulse: 87 (05/22/20 1015)  Resp: 16 (05/22/20 0716)  BP: 111/62 (05/22/20 1000)  SpO2: (!) 92 % (05/22/20 1015) Vital Signs (24h Range):  Temp:  [98 °F (36.7 °C)-98.8 °F (37.1 °C)] 98.8 °F (37.1 °C)  Pulse:  [] 87  Resp:  [14-20] 16  SpO2:  [84 %-97 %] 92 %  BP: (106-137)/(55-73) 111/62  Arterial Line BP: (105-134)/(48-65) 105/55     Weight: 84.6 kg (186 lb 8.2 oz)  Body mass index is 27.54 kg/m².      Intake/Output Summary (Last 24 hours) at 5/22/2020 1043  Last data filed at 5/22/2020 1000  Gross per 24 hour   Intake 2110 ml   Output 4610 ml   Net -2500 ml       Physical Exam   Constitutional: He is oriented to person, place, and time. He appears well-developed and well-nourished.   HENT:   Head: Normocephalic and atraumatic.   Eyes: Right eye exhibits no discharge. Left eye exhibits no discharge.   Neck: Normal range of motion. Neck supple.   Cardiovascular: Normal rate and regular rhythm.   Pulmonary/Chest: Effort normal. No respiratory distress.   Sternotomy incision c/d/i  Chest tubes in place  Pacing wires in place   Musculoskeletal: Normal range of motion.   Neurological: He is alert and oriented to person, place, and time.   Skin: Skin is warm and dry.   Psychiatric: He has a normal mood and affect. His behavior is normal.       Vents:  Vent Mode: Spont (05/21/20 1004)  Ventilator Initiated: Yes(chart correction) (05/19/20 2010)  Set Rate: 0 BPM (05/21/20 1004)  Vt Set: 420 mL (05/21/20 1004)  Pressure Support: 10 cmH20 (05/21/20 1004)  PEEP/CPAP: 5 cmH20 (05/21/20 1004)  Oxygen Concentration (%): 40 (05/21/20 1004)  Peak Airway Pressure: 16 cmH2O (05/21/20 1004)  Plateau Pressure: 22 cmH20 (05/21/20  1004)  Total Ve: 5.65 mL (05/21/20 1004)  F/VT Ratio<105 (RSBI): (!) 47.23 (05/20/20 1040)    Lines/Drains/Airways     Central Venous Catheter Line                 Percutaneous Central Line Insertion/Assessment - Quad lumen  05/19/20 0103 3 days    Trialysis (Dialysis) Catheter 05/19/20 1215 right internal jugular 2 days          Drain                 Chest Tube 05/19/20 1848 1 Left Pleural 19 Fr. 2 days         Chest Tube 05/20/20 0300 1 Left Pleural 32 Fr. 2 days         Urethral Catheter 05/19/20 1307 Non-latex;Straight-tip;Temperature probe 14 Fr. 2 days         Y Chest Tube 1 and 2 05/19/20 1847 1 Anterior Mediastinal 19 Fr. 2 Anterior Mediastinal 19 Fr. 2 days          Arterial Line                 Arterial Line 05/20/20 0008 Right Femoral 2 days          Line                 Pacer Wires 05/19/20 1500 2 days          Peripheral Intravenous Line                 Peripheral IV - Single Lumen 05/19/20 0623 20 G Right Forearm 3 days         Peripheral IV - Single Lumen 05/19/20 1310 16 G Right Hand 2 days         Peripheral IV - Single Lumen 05/20/20 0019 20 G Left Forearm 2 days                Significant Labs:    CBC/Anemia Profile:  Recent Labs   Lab 05/21/20  0333 05/21/20  0725 05/21/20  0930 05/22/20  0315   WBC 13.54*  --   --  19.01*   HGB 10.5*  --   --  10.8*   HCT 31.6* 30* 26* 33.8*   *  --   --  126*   MCV 90  --   --  92   RDW 16.9*  --   --  16.3*        Chemistries:  Recent Labs   Lab 05/21/20  0336 05/21/20  1533 05/21/20  2200 05/22/20  0315   NA  --  147* 144 144  144   K  --  3.7 3.8 4.1  4.1   CL  --  110 108 109  109   CO2  --  26 28 27  27   BUN  --  23 24* 24*  24*   CREATININE  --  1.3 1.2 1.2  1.2   CALCIUM  --  8.1* 8.1* 8.0*  8.0*   MG 2.4  --   --  2.3   PHOS 2.6*  --   --  2.0*         Significant Imaging:  I have reviewed and interpreted all pertinent imaging results/findings within the past 24 hours.

## 2020-05-23 LAB
ALBUMIN SERPL BCP-MCNC: 3.2 G/DL (ref 3.5–5.2)
ALP SERPL-CCNC: 69 U/L (ref 55–135)
ALT SERPL W/O P-5'-P-CCNC: 18 U/L (ref 10–44)
ANION GAP SERPL CALC-SCNC: 10 MMOL/L (ref 8–16)
ANION GAP SERPL CALC-SCNC: 8 MMOL/L (ref 8–16)
ANION GAP SERPL CALC-SCNC: 9 MMOL/L (ref 8–16)
APTT BLDCRRT: 25 SEC (ref 21–32)
ASCENDING AORTA: 3.27 CM
AST SERPL-CCNC: 46 U/L (ref 10–40)
AV INDEX (PROSTH): 0.67
AV MEAN GRADIENT: 8 MMHG
AV PEAK GRADIENT: 13 MMHG
AV VALVE AREA: 4.4 CM2
AV VELOCITY RATIO: 0.71
BASOPHILS # BLD AUTO: 0.05 K/UL (ref 0–0.2)
BASOPHILS NFR BLD: 0.4 % (ref 0–1.9)
BILIRUB SERPL-MCNC: 1 MG/DL (ref 0.1–1)
BSA FOR ECHO PROCEDURE: 2.03 M2
BUN SERPL-MCNC: 26 MG/DL (ref 8–23)
BUN SERPL-MCNC: 29 MG/DL (ref 8–23)
BUN SERPL-MCNC: 34 MG/DL (ref 8–23)
CALCIUM SERPL-MCNC: 8.5 MG/DL (ref 8.7–10.5)
CALCIUM SERPL-MCNC: 8.6 MG/DL (ref 8.7–10.5)
CALCIUM SERPL-MCNC: 9.2 MG/DL (ref 8.7–10.5)
CHLORIDE SERPL-SCNC: 100 MMOL/L (ref 95–110)
CHLORIDE SERPL-SCNC: 101 MMOL/L (ref 95–110)
CHLORIDE SERPL-SCNC: 101 MMOL/L (ref 95–110)
CO2 SERPL-SCNC: 28 MMOL/L (ref 23–29)
CO2 SERPL-SCNC: 29 MMOL/L (ref 23–29)
CO2 SERPL-SCNC: 31 MMOL/L (ref 23–29)
CREAT SERPL-MCNC: 1.2 MG/DL (ref 0.5–1.4)
CREAT SERPL-MCNC: 1.3 MG/DL (ref 0.5–1.4)
CREAT SERPL-MCNC: 1.3 MG/DL (ref 0.5–1.4)
CV ECHO LV RWT: 0.38 CM
DIFFERENTIAL METHOD: ABNORMAL
DOP CALC AO PEAK VEL: 1.77 M/S
DOP CALC AO VTI: 22.33 CM
DOP CALC LVOT AREA: 6.6 CM2
DOP CALC LVOT DIAMETER: 2.89 CM
DOP CALC LVOT PEAK VEL: 1.26 M/S
DOP CALC LVOT STROKE VOLUME: 98.35 CM3
DOP CALCLVOT PEAK VEL VTI: 15 CM
E WAVE DECELERATION TIME: 124.75 MSEC
E/A RATIO: 3.65
E/E' RATIO: 14.13 M/S
ECHO LV POSTERIOR WALL: 0.92 CM (ref 0.6–1.1)
EOSINOPHIL # BLD AUTO: 0.2 K/UL (ref 0–0.5)
EOSINOPHIL NFR BLD: 1.2 % (ref 0–8)
ERYTHROCYTE [DISTWIDTH] IN BLOOD BY AUTOMATED COUNT: 15.8 % (ref 11.5–14.5)
EST. GFR  (AFRICAN AMERICAN): >60 ML/MIN/1.73 M^2
EST. GFR  (NON AFRICAN AMERICAN): 54.1 ML/MIN/1.73 M^2
EST. GFR  (NON AFRICAN AMERICAN): 54.1 ML/MIN/1.73 M^2
EST. GFR  (NON AFRICAN AMERICAN): 59.6 ML/MIN/1.73 M^2
FIBRINOGEN PPP-MCNC: 602 MG/DL (ref 182–366)
FRACTIONAL SHORTENING: 20 % (ref 28–44)
GLUCOSE SERPL-MCNC: 109 MG/DL (ref 70–110)
GLUCOSE SERPL-MCNC: 123 MG/DL (ref 70–110)
GLUCOSE SERPL-MCNC: 98 MG/DL (ref 70–110)
HCT VFR BLD AUTO: 35.1 % (ref 40–54)
HGB BLD-MCNC: 11.1 G/DL (ref 14–18)
IMM GRANULOCYTES # BLD AUTO: 0.12 K/UL (ref 0–0.04)
IMM GRANULOCYTES NFR BLD AUTO: 0.9 % (ref 0–0.5)
INR PPP: 0.9 (ref 0.8–1.2)
INTERVENTRICULAR SEPTUM: 0.9 CM (ref 0.6–1.1)
IVRT: 106.57 MSEC
LA MAJOR: 5.76 CM
LA MINOR: 5.97 CM
LA WIDTH: 3.02 CM
LEFT ATRIUM SIZE: 3.12 CM
LEFT ATRIUM VOLUME INDEX: 23.4 ML/M2
LEFT ATRIUM VOLUME: 46.96 CM3
LEFT INTERNAL DIMENSION IN SYSTOLE: 3.82 CM (ref 2.1–4)
LEFT VENTRICLE DIASTOLIC VOLUME INDEX: 53.29 ML/M2
LEFT VENTRICLE DIASTOLIC VOLUME: 106.74 ML
LEFT VENTRICLE MASS INDEX: 74 G/M2
LEFT VENTRICLE SYSTOLIC VOLUME INDEX: 31.3 ML/M2
LEFT VENTRICLE SYSTOLIC VOLUME: 62.71 ML
LEFT VENTRICULAR INTERNAL DIMENSION IN DIASTOLE: 4.78 CM (ref 3.5–6)
LEFT VENTRICULAR MASS: 148.93 G
LV LATERAL E/E' RATIO: 9.42 M/S
LV SEPTAL E/E' RATIO: 28.25 M/S
LYMPHOCYTES # BLD AUTO: 1.8 K/UL (ref 1–4.8)
LYMPHOCYTES NFR BLD: 13.1 % (ref 18–48)
MAGNESIUM SERPL-MCNC: 2.1 MG/DL (ref 1.6–2.6)
MCH RBC QN AUTO: 29.8 PG (ref 27–31)
MCHC RBC AUTO-ENTMCNC: 31.6 G/DL (ref 32–36)
MCV RBC AUTO: 94 FL (ref 82–98)
MONOCYTES # BLD AUTO: 0.8 K/UL (ref 0.3–1)
MONOCYTES NFR BLD: 6.1 % (ref 4–15)
MV PEAK A VEL: 0.31 M/S
MV PEAK E VEL: 1.13 M/S
NEUTROPHILS # BLD AUTO: 10.8 K/UL (ref 1.8–7.7)
NEUTROPHILS NFR BLD: 78.3 % (ref 38–73)
NRBC BLD-RTO: 0 /100 WBC
PHOSPHATE SERPL-MCNC: 2.6 MG/DL (ref 2.7–4.5)
PISA TR MAX VEL: 1.96 M/S
PLATELET # BLD AUTO: 149 K/UL (ref 150–350)
PMV BLD AUTO: 10.6 FL (ref 9.2–12.9)
POCT GLUCOSE: 104 MG/DL (ref 70–110)
POTASSIUM SERPL-SCNC: 3.4 MMOL/L (ref 3.5–5.1)
POTASSIUM SERPL-SCNC: 4.3 MMOL/L (ref 3.5–5.1)
POTASSIUM SERPL-SCNC: 5 MMOL/L (ref 3.5–5.1)
PROT SERPL-MCNC: 6 G/DL (ref 6–8.4)
PROTHROMBIN TIME: 9.8 SEC (ref 9–12.5)
RA MAJOR: 5.15 CM
RA PRESSURE: 3 MMHG
RA WIDTH: 3.5 CM
RBC # BLD AUTO: 3.73 M/UL (ref 4.6–6.2)
RV TISSUE DOPPLER FREE WALL SYSTOLIC VELOCITY 1 (APICAL 4 CHAMBER VIEW): 5.77 CM/S
SINUS: 4.3 CM
SODIUM SERPL-SCNC: 137 MMOL/L (ref 136–145)
SODIUM SERPL-SCNC: 139 MMOL/L (ref 136–145)
SODIUM SERPL-SCNC: 141 MMOL/L (ref 136–145)
STJ: 3.36 CM
TDI LATERAL: 0.12 M/S
TDI SEPTAL: 0.04 M/S
TDI: 0.08 M/S
TR MAX PG: 15 MMHG
TRICUSPID ANNULAR PLANE SYSTOLIC EXCURSION: 1.55 CM
TV REST PULMONARY ARTERY PRESSURE: 18 MMHG
WBC # BLD AUTO: 13.78 K/UL (ref 3.9–12.7)

## 2020-05-23 PROCEDURE — 63600175 PHARM REV CODE 636 W HCPCS: Performed by: THORACIC SURGERY (CARDIOTHORACIC VASCULAR SURGERY)

## 2020-05-23 PROCEDURE — 80048 BASIC METABOLIC PNL TOTAL CA: CPT

## 2020-05-23 PROCEDURE — 25000003 PHARM REV CODE 250: Performed by: THORACIC SURGERY (CARDIOTHORACIC VASCULAR SURGERY)

## 2020-05-23 PROCEDURE — 83735 ASSAY OF MAGNESIUM: CPT

## 2020-05-23 PROCEDURE — 25000003 PHARM REV CODE 250: Performed by: STUDENT IN AN ORGANIZED HEALTH CARE EDUCATION/TRAINING PROGRAM

## 2020-05-23 PROCEDURE — 63600175 PHARM REV CODE 636 W HCPCS: Performed by: STUDENT IN AN ORGANIZED HEALTH CARE EDUCATION/TRAINING PROGRAM

## 2020-05-23 PROCEDURE — 99233 SBSQ HOSP IP/OBS HIGH 50: CPT | Mod: GC,,, | Performed by: SURGERY

## 2020-05-23 PROCEDURE — 94761 N-INVAS EAR/PLS OXIMETRY MLT: CPT

## 2020-05-23 PROCEDURE — 85025 COMPLETE CBC W/AUTO DIFF WBC: CPT

## 2020-05-23 PROCEDURE — 85730 THROMBOPLASTIN TIME PARTIAL: CPT

## 2020-05-23 PROCEDURE — 99233 PR SUBSEQUENT HOSPITAL CARE,LEVL III: ICD-10-PCS | Mod: GC,,, | Performed by: SURGERY

## 2020-05-23 PROCEDURE — 99900035 HC TECH TIME PER 15 MIN (STAT)

## 2020-05-23 PROCEDURE — 20000000 HC ICU ROOM

## 2020-05-23 PROCEDURE — 84100 ASSAY OF PHOSPHORUS: CPT

## 2020-05-23 PROCEDURE — 80053 COMPREHEN METABOLIC PANEL: CPT

## 2020-05-23 PROCEDURE — C9113 INJ PANTOPRAZOLE SODIUM, VIA: HCPCS | Performed by: STUDENT IN AN ORGANIZED HEALTH CARE EDUCATION/TRAINING PROGRAM

## 2020-05-23 PROCEDURE — 85384 FIBRINOGEN ACTIVITY: CPT

## 2020-05-23 PROCEDURE — 85610 PROTHROMBIN TIME: CPT

## 2020-05-23 PROCEDURE — 63700000 PHARM REV CODE 250 ALT 637 W/O HCPCS: Performed by: STUDENT IN AN ORGANIZED HEALTH CARE EDUCATION/TRAINING PROGRAM

## 2020-05-23 RX ORDER — FUROSEMIDE 10 MG/ML
20 INJECTION INTRAMUSCULAR; INTRAVENOUS 2 TIMES DAILY
Status: DISCONTINUED | OUTPATIENT
Start: 2020-05-23 | End: 2020-05-24

## 2020-05-23 RX ORDER — WARFARIN SODIUM 5 MG/1
5 TABLET ORAL DAILY
Status: DISCONTINUED | OUTPATIENT
Start: 2020-05-23 | End: 2020-05-24

## 2020-05-23 RX ORDER — METOPROLOL SUCCINATE 100 MG/1
100 TABLET, EXTENDED RELEASE ORAL DAILY
Status: DISCONTINUED | OUTPATIENT
Start: 2020-05-23 | End: 2020-05-26 | Stop reason: HOSPADM

## 2020-05-23 RX ADMIN — ASPIRIN 325 MG ORAL TABLET 325 MG: 325 PILL ORAL at 08:05

## 2020-05-23 RX ADMIN — SODIUM PHOSPHATE, MONOBASIC, MONOHYDRATE 15 MMOL: 276; 142 INJECTION, SOLUTION INTRAVENOUS at 06:05

## 2020-05-23 RX ADMIN — ACETAMINOPHEN 1000 MG: 500 TABLET ORAL at 05:05

## 2020-05-23 RX ADMIN — WARFARIN SODIUM 5 MG: 5 TABLET ORAL at 05:05

## 2020-05-23 RX ADMIN — PRAVASTATIN SODIUM 40 MG: 40 TABLET ORAL at 08:05

## 2020-05-23 RX ADMIN — MUPIROCIN 1 G: 20 OINTMENT TOPICAL at 08:05

## 2020-05-23 RX ADMIN — ACETAMINOPHEN 1000 MG: 500 TABLET ORAL at 09:05

## 2020-05-23 RX ADMIN — PANTOPRAZOLE SODIUM 40 MG: 40 INJECTION, POWDER, LYOPHILIZED, FOR SOLUTION INTRAVENOUS at 08:05

## 2020-05-23 RX ADMIN — OXYCODONE HYDROCHLORIDE 10 MG: 10 TABLET ORAL at 09:05

## 2020-05-23 RX ADMIN — ACETAMINOPHEN 1000 MG: 500 TABLET ORAL at 02:05

## 2020-05-23 RX ADMIN — FUROSEMIDE 20 MG: 10 INJECTION, SOLUTION INTRAMUSCULAR; INTRAVENOUS at 05:05

## 2020-05-23 RX ADMIN — POTASSIUM CHLORIDE 40 MEQ: 20 SOLUTION ORAL at 08:05

## 2020-05-23 RX ADMIN — OXYCODONE HYDROCHLORIDE 10 MG: 10 TABLET ORAL at 12:05

## 2020-05-23 RX ADMIN — METOPROLOL SUCCINATE 100 MG: 100 TABLET, EXTENDED RELEASE ORAL at 08:05

## 2020-05-23 RX ADMIN — ISOSORBIDE MONONITRATE 60 MG: 30 TABLET, EXTENDED RELEASE ORAL at 08:05

## 2020-05-23 RX ADMIN — POLYETHYLENE GLYCOL 3350 17 G: 17 POWDER, FOR SOLUTION ORAL at 08:05

## 2020-05-23 RX ADMIN — FUROSEMIDE 20 MG: 10 INJECTION, SOLUTION INTRAMUSCULAR; INTRAVENOUS at 08:05

## 2020-05-23 RX ADMIN — POTASSIUM CHLORIDE 40 MEQ: 400 INJECTION, SOLUTION INTRAVENOUS at 04:05

## 2020-05-23 RX ADMIN — OXYCODONE HYDROCHLORIDE 5 MG: 5 TABLET ORAL at 02:05

## 2020-05-23 RX ADMIN — ONDANSETRON 4 MG: 2 INJECTION, SOLUTION INTRAMUSCULAR; INTRAVENOUS at 05:05

## 2020-05-23 NOTE — CARE UPDATE
BG goal 140-180    Remains in ICU, NAEON. BG remains well controlled without insulin. 3 Days Post-Op      Plan:  Discontinue BG monitoring.   No history of DM. No futher insulin/endocrine needs, will sign off.    Thank you for the consult. Please call/ re-consult if needed.

## 2020-05-23 NOTE — ASSESSMENT & PLAN NOTE
Mr. Kearns is a 74-year-old man status post aortic valve replacement and CABG x2 on 5/19 for aortic regurgitation and NSTEMI.     Neuro:  jeane Morgan     CV:  Status post aortic valve repair with bioprosthetic valve and CABG x2 on 05/19.    Had a bleed and went back on 5/20.  One small manubrial bleeder found but otherwise none.  Diuresing, pressures fine.       Pulm:  Extubated, doing well, work with IS  Will d/c meds today     Renal:  Good UOP  Cr 1.2 today  Lasix gtt with goal UOP near 100/hr     GI: Cardiac diet, replace lytes as needed     Id:  No antibiotics.     Heme:  He received no blood products in the operating room.  Only Cell Saver.       Endo:  Glucose is within acceptable limits.     Dispo: ICU care, can probably stepdown later today

## 2020-05-23 NOTE — PROGRESS NOTES
Cardiothoracic Surgery  Progress Note      Admit Date: 5/14/2020    Disease Etiology: Ischemic  Open Chest: no  Surgery Performed: 5/19/2020 2V CABG/AVR; 5/20/2020 mediastinal washout and closure    ASSESSMENT/PLAN:     I conducted multidisciplinary rounds in conjunction with the ICU/Critical Care attending staff and/or residents, with involvement of ancillary services as appropriate. The patient's general condition was reviewed, specifically including hemodynamic performance, dietary and nutritional status, pharmacy concerns, and status of expected transition to stepdown care. Plan has been discussed and agreed upon.    Plan:  Toprol 100 QD started, lasix 20 BID, removing blakes and wires with 1Hr post-pull CXR, ECHO ordered. Remain in ICU today, poss stepdown tomorrow.     For details see the critical care note.    Dodie Starkey MD  Cardiac Surgery Resident, PGY7  Cardiothoracic Surgery  [facility]

## 2020-05-23 NOTE — SUBJECTIVE & OBJECTIVE
Interval History/Significant Events:   Did well overnight.  Excellent UOP.  Cr okay this AM.  Minimal O2 needs.  Looks good for stepdown.    Follow-up For: Procedure(s) (LRB):  EXPLORATION, WOUND- Chest washout (N/A)    Post-Operative Day: 3 Days Post-Op    Objective:     Vital Signs (Most Recent):  Temp: 97.8 °F (36.6 °C) (05/23/20 0315)  Pulse: 95 (05/23/20 0600)  Resp: 15 (05/23/20 0600)  BP: 128/80 (05/23/20 0600)  SpO2: 97 % (05/23/20 0600) Vital Signs (24h Range):  Temp:  [97.8 °F (36.6 °C)-98.8 °F (37.1 °C)] 97.8 °F (36.6 °C)  Pulse:  [] 95  Resp:  [10-26] 15  SpO2:  [89 %-98 %] 97 %  BP: (102-133)/(57-84) 128/80  Arterial Line BP: (102-128)/(40-67) 102/40     Weight: 84.7 kg (186 lb 11.7 oz)  Body mass index is 27.58 kg/m².      Intake/Output Summary (Last 24 hours) at 5/23/2020 0641  Last data filed at 5/23/2020 0600  Gross per 24 hour   Intake 2179.21 ml   Output 5875 ml   Net -3695.79 ml       Physical Exam   Constitutional: He is oriented to person, place, and time. He appears well-developed and well-nourished.   HENT:   Head: Normocephalic and atraumatic.   Eyes: Right eye exhibits no discharge. Left eye exhibits no discharge.   Neck: Normal range of motion. Neck supple.   Cardiovascular: Normal rate and regular rhythm.   Pulmonary/Chest: Effort normal. No respiratory distress.   Sternotomy incision c/d/i  Chest tubes in place  Pacing wires in place   Musculoskeletal: Normal range of motion.   Neurological: He is alert and oriented to person, place, and time.   Skin: Skin is warm and dry.   Psychiatric: He has a normal mood and affect. His behavior is normal.   Vitals reviewed.      Vents:  Vent Mode: Spont (05/21/20 1004)  Ventilator Initiated: Yes(chart correction) (05/19/20 2010)  Set Rate: 0 BPM (05/21/20 1004)  Vt Set: 420 mL (05/21/20 1004)  Pressure Support: 10 cmH20 (05/21/20 1004)  PEEP/CPAP: 5 cmH20 (05/21/20 1004)  Oxygen Concentration (%): 40 (05/21/20 1004)  Peak Airway Pressure: 16  cmH2O (05/21/20 1004)  Plateau Pressure: 22 cmH20 (05/21/20 1004)  Total Ve: 5.65 mL (05/21/20 1004)  F/VT Ratio<105 (RSBI): (!) 47.23 (05/20/20 1040)    Lines/Drains/Airways     Central Venous Catheter Line                 Percutaneous Central Line Insertion/Assessment - Quad lumen  05/19/20 0103 4 days    Trialysis (Dialysis) Catheter 05/19/20 1215 right internal jugular 3 days          Drain                 Chest Tube 05/19/20 1848 1 Left Pleural 19 Fr. 3 days         Urethral Catheter 05/19/20 1307 Non-latex;Straight-tip;Temperature probe 14 Fr. 3 days         Y Chest Tube 1 and 2 05/19/20 1847 1 Anterior Mediastinal 19 Fr. 2 Anterior Mediastinal 19 Fr. 3 days          Line                 Pacer Wires 05/19/20 1500 3 days          Peripheral Intravenous Line                 Peripheral IV - Single Lumen 05/19/20 0623 20 G Right Forearm 4 days         Peripheral IV - Single Lumen 05/20/20 0019 20 G Left Forearm 3 days                Significant Labs:    CBC/Anemia Profile:  Recent Labs   Lab 05/21/20  0930 05/22/20  0315 05/23/20  0319   WBC  --  19.01* 13.78*   HGB  --  10.8* 11.1*   HCT 26* 33.8* 35.1*   PLT  --  126* 149*   MCV  --  92 94   RDW  --  16.3* 15.8*        Chemistries:  Recent Labs   Lab 05/22/20  0315 05/22/20  1419 05/22/20  2230 05/23/20  0319     144 143 141 141   K 4.1  4.1 3.5 4.0 3.4*     109 108 103 101   CO2 27  27 25 31* 31*   BUN 24*  24* 25* 26* 26*   CREATININE 1.2  1.2 1.3 1.2 1.2   CALCIUM 8.0*  8.0* 8.1* 8.4* 8.5*   ALBUMIN  --   --   --  3.2*   PROT  --   --   --  6.0   BILITOT  --   --   --  1.0   ALKPHOS  --   --   --  69   ALT  --   --   --  18   AST  --   --   --  46*   MG 2.3  --   --  2.1   PHOS 2.0*  --   --  2.6*       Significant Imaging:  I have reviewed and interpreted all pertinent imaging results/findings within the past 24 hours.

## 2020-05-23 NOTE — PROGRESS NOTES
[facility]  Critical Care - Surgery  Progress Note    Patient Name: Brad Kearns  MRN: [id number]  Admission Date: 5/14/2020  Hospital Length of Stay: 9 days  Code Status: Full Code  Attending Provider: Guicho Quezada MD  Primary Care Provider: Duke Bowden MD   Principal Problem: CAD (coronary artery disease)    Subjective:     Hospital/ICU Course:  No notes on file    Interval History/Significant Events:   Did well overnight.  Excellent UOP.  Cr okay this AM.  Minimal O2 needs.  Looks good for stepdown.    Follow-up For: Procedure(s) (LRB):  EXPLORATION, WOUND- Chest washout (N/A)    Post-Operative Day: 3 Days Post-Op    Objective:     Vital Signs (Most Recent):  Temp: 97.8 °F (36.6 °C) (05/23/20 0315)  Pulse: 95 (05/23/20 0600)  Resp: 15 (05/23/20 0600)  BP: 128/80 (05/23/20 0600)  SpO2: 97 % (05/23/20 0600) Vital Signs (24h Range):  Temp:  [97.8 °F (36.6 °C)-98.8 °F (37.1 °C)] 97.8 °F (36.6 °C)  Pulse:  [] 95  Resp:  [10-26] 15  SpO2:  [89 %-98 %] 97 %  BP: (102-133)/(57-84) 128/80  Arterial Line BP: (102-128)/(40-67) 102/40     Weight: 84.7 kg (186 lb 11.7 oz)  Body mass index is 27.58 kg/m².      Intake/Output Summary (Last 24 hours) at 5/23/2020 0641  Last data filed at 5/23/2020 0600  Gross per 24 hour   Intake 2179.21 ml   Output 5875 ml   Net -3695.79 ml       Physical Exam   Constitutional: He is oriented to person, place, and time. He appears well-developed and well-nourished.   HENT:   Head: Normocephalic and atraumatic.   Eyes: Right eye exhibits no discharge. Left eye exhibits no discharge.   Neck: Normal range of motion. Neck supple.   Cardiovascular: Normal rate and regular rhythm.   Pulmonary/Chest: Effort normal. No respiratory distress.   Sternotomy incision c/d/i  Chest tubes in place  Pacing wires in place   Musculoskeletal: Normal range of motion.   Neurological: He is alert and oriented to person, place, and time.   Skin: Skin is warm and dry.    Psychiatric: He has a normal mood and affect. His behavior is normal.   Vitals reviewed.      Vents:  Vent Mode: Spont (05/21/20 1004)  Ventilator Initiated: Yes(chart correction) (05/19/20 2010)  Set Rate: 0 BPM (05/21/20 1004)  Vt Set: 420 mL (05/21/20 1004)  Pressure Support: 10 cmH20 (05/21/20 1004)  PEEP/CPAP: 5 cmH20 (05/21/20 1004)  Oxygen Concentration (%): 40 (05/21/20 1004)  Peak Airway Pressure: 16 cmH2O (05/21/20 1004)  Plateau Pressure: 22 cmH20 (05/21/20 1004)  Total Ve: 5.65 mL (05/21/20 1004)  F/VT Ratio<105 (RSBI): (!) 47.23 (05/20/20 1040)    Lines/Drains/Airways     Central Venous Catheter Line                 Percutaneous Central Line Insertion/Assessment - Quad lumen  05/19/20 0103 4 days    Trialysis (Dialysis) Catheter 05/19/20 1215 right internal jugular 3 days          Drain                 Chest Tube 05/19/20 1848 1 Left Pleural 19 Fr. 3 days         Urethral Catheter 05/19/20 1307 Non-latex;Straight-tip;Temperature probe 14 Fr. 3 days         Y Chest Tube 1 and 2 05/19/20 1847 1 Anterior Mediastinal 19 Fr. 2 Anterior Mediastinal 19 Fr. 3 days          Line                 Pacer Wires 05/19/20 1500 3 days          Peripheral Intravenous Line                 Peripheral IV - Single Lumen 05/19/20 0623 20 G Right Forearm 4 days         Peripheral IV - Single Lumen 05/20/20 0019 20 G Left Forearm 3 days                Significant Labs:    CBC/Anemia Profile:  Recent Labs   Lab 05/21/20  0930 05/22/20  0315 05/23/20  0319   WBC  --  19.01* 13.78*   HGB  --  10.8* 11.1*   HCT 26* 33.8* 35.1*   PLT  --  126* 149*   MCV  --  92 94   RDW  --  16.3* 15.8*        Chemistries:  Recent Labs   Lab 05/22/20  0315 05/22/20  1419 05/22/20  2230 05/23/20  0319     144 143 141 141   K 4.1  4.1 3.5 4.0 3.4*     109 108 103 101   CO2 27  27 25 31* 31*   BUN 24*  24* 25* 26* 26*   CREATININE 1.2  1.2 1.3 1.2 1.2   CALCIUM 8.0*  8.0* 8.1* 8.4* 8.5*   ALBUMIN  --   --   --  3.2*   PROT  --    --   --  6.0   BILITOT  --   --   --  1.0   ALKPHOS  --   --   --  69   ALT  --   --   --  18   AST  --   --   --  46*   MG 2.3  --   --  2.1   PHOS 2.0*  --   --  2.6*       Significant Imaging:  I have reviewed and interpreted all pertinent imaging results/findings within the past 24 hours.    Assessment/Plan:     S/P CABG (coronary artery bypass graft)  Mr. Kearns is a 74-year-old man status post aortic valve replacement and CABG x2 on 5/19 for aortic regurgitation and NSTEMI.     Neuro:  Cathy, dilaudid     CV:  Status post aortic valve repair with bioprosthetic valve and CABG x2 on 05/19.    Had a bleed and went back on 5/20.  One small manubrial bleeder found but otherwise none.  Diuresing, pressures fine.       Pulm:  Extubated, doing well, work with IS  Will d/c meds today     Renal:  Good UOP  Cr 1.2 today  Lasix gtt with goal UOP near 100/hr     GI: Cardiac diet, replace lytes as needed     Id:  No antibiotics.     Heme:  He received no blood products in the operating room.  Only Cell Saver.       Endo:  Glucose is within acceptable limits.     Dispo: ICU care, can probably stepdown later today         Critical care was time spent personally by me on the following activities: development of treatment plan with patient or surrogate and bedside caregivers, discussions with consultants, evaluation of patient's response to treatment, examination of patient, ordering and performing treatments and interventions, ordering and review of laboratory studies, ordering and review of radiographic studies, pulse oximetry, re-evaluation of patient's condition.  This critical care time did not overlap with that of any other provider or involve time for any procedures.     Warren Wood MD  Critical Care - Surgery  [facility]

## 2020-05-23 NOTE — PLAN OF CARE
No acute events overnight. Alert and oriented when awake, slept between care. NSR on bedside monitor. MAP maintained > 65, SBP maintained < 130. SpO2 > 90% on10L high flow nasal cannula, able to wean down from 15L. CVP 4-6. Lasix gtt infusing for UOP > 200. Lasix was titrated from 10 mg/hr, to 7.5 mg/hr, now to 5 mg/hr 2/2 hourly -425. Tolerating diet and compliant with fluid restriction. Tried twice unsuccessfully to have a BM. Transfers well to bedside commode/chair. Compliant with sternal precautions. Understands and agrees with plan of care. Plan to continue breathing and mobility exercises, control pain, monitor remaining chest tubes and for potential stepdown soon. Bed in low position and brake set. Call light in reach. Plan to get up in chair this morning. See flowsheets for detailed assessment. Continuing to monitor.

## 2020-05-23 NOTE — PLAN OF CARE
"      SICU PLAN OF CARE NOTE    Dx: CAD (coronary artery disease)    Shift Events: CT removed, Lasix gtt off, Up in chair    Goals of Care: MAP>65 SBP<130 SAT>88    Neuro: AAO x4, Arouses to Voice, Follows Commands and Moves All Extremities    Vital Signs: /81   Pulse 96   Temp 98.8 °F (37.1 °C) (Oral)   Resp 12   Ht 5' 9" (1.753 m)   Wt 84.4 kg (186 lb)   SpO2 (!) 90%   BMI 27.47 kg/m²     Respiratory: Room Air    Diet: Cardiac Diet    Urine Output: Urinary Catheter  cc/hour    Labs/Accuchecks: Daily Labs.    Skin: skin free of breakdown, repostioned q2h, bed plugged in and working properly, foams applied       "

## 2020-05-24 PROBLEM — R07.9 CHEST PAIN: Status: ACTIVE | Noted: 2020-05-24

## 2020-05-24 LAB
ALBUMIN SERPL BCP-MCNC: 3.1 G/DL (ref 3.5–5.2)
ALP SERPL-CCNC: 67 U/L (ref 55–135)
ALT SERPL W/O P-5'-P-CCNC: 26 U/L (ref 10–44)
ANION GAP SERPL CALC-SCNC: 9 MMOL/L (ref 8–16)
APTT BLDCRRT: 24.6 SEC (ref 21–32)
AST SERPL-CCNC: 42 U/L (ref 10–40)
BASOPHILS # BLD AUTO: 0.11 K/UL (ref 0–0.2)
BASOPHILS NFR BLD: 1 % (ref 0–1.9)
BILIRUB SERPL-MCNC: 0.8 MG/DL (ref 0.1–1)
BUN SERPL-MCNC: 34 MG/DL (ref 8–23)
CALCIUM SERPL-MCNC: 8.6 MG/DL (ref 8.7–10.5)
CHLORIDE SERPL-SCNC: 104 MMOL/L (ref 95–110)
CO2 SERPL-SCNC: 28 MMOL/L (ref 23–29)
CREAT SERPL-MCNC: 1.2 MG/DL (ref 0.5–1.4)
DIFFERENTIAL METHOD: ABNORMAL
EOSINOPHIL # BLD AUTO: 0.4 K/UL (ref 0–0.5)
EOSINOPHIL NFR BLD: 3.8 % (ref 0–8)
ERYTHROCYTE [DISTWIDTH] IN BLOOD BY AUTOMATED COUNT: 15.3 % (ref 11.5–14.5)
EST. GFR  (AFRICAN AMERICAN): >60 ML/MIN/1.73 M^2
EST. GFR  (NON AFRICAN AMERICAN): 59.6 ML/MIN/1.73 M^2
FIBRINOGEN PPP-MCNC: 568 MG/DL (ref 182–366)
GLUCOSE SERPL-MCNC: 83 MG/DL (ref 70–110)
HCT VFR BLD AUTO: 36.7 % (ref 40–54)
HGB BLD-MCNC: 11.4 G/DL (ref 14–18)
IMM GRANULOCYTES # BLD AUTO: 0.36 K/UL (ref 0–0.04)
IMM GRANULOCYTES NFR BLD AUTO: 3.3 % (ref 0–0.5)
INR PPP: 1.1 (ref 0.8–1.2)
LYMPHOCYTES # BLD AUTO: 2.3 K/UL (ref 1–4.8)
LYMPHOCYTES NFR BLD: 21.2 % (ref 18–48)
MAGNESIUM SERPL-MCNC: 2.2 MG/DL (ref 1.6–2.6)
MCH RBC QN AUTO: 29.7 PG (ref 27–31)
MCHC RBC AUTO-ENTMCNC: 31.1 G/DL (ref 32–36)
MCV RBC AUTO: 96 FL (ref 82–98)
MONOCYTES # BLD AUTO: 1.1 K/UL (ref 0.3–1)
MONOCYTES NFR BLD: 10.4 % (ref 4–15)
NEUTROPHILS # BLD AUTO: 6.6 K/UL (ref 1.8–7.7)
NEUTROPHILS NFR BLD: 60.3 % (ref 38–73)
NRBC BLD-RTO: 0 /100 WBC
PHOSPHATE SERPL-MCNC: 3.1 MG/DL (ref 2.7–4.5)
PLATELET # BLD AUTO: 192 K/UL (ref 150–350)
PMV BLD AUTO: 10.6 FL (ref 9.2–12.9)
POTASSIUM SERPL-SCNC: 4.3 MMOL/L (ref 3.5–5.1)
PROT SERPL-MCNC: 6.1 G/DL (ref 6–8.4)
PROTHROMBIN TIME: 11.4 SEC (ref 9–12.5)
RBC # BLD AUTO: 3.84 M/UL (ref 4.6–6.2)
SODIUM SERPL-SCNC: 141 MMOL/L (ref 136–145)
WBC # BLD AUTO: 10.93 K/UL (ref 3.9–12.7)

## 2020-05-24 PROCEDURE — 80053 COMPREHEN METABOLIC PANEL: CPT

## 2020-05-24 PROCEDURE — 25000003 PHARM REV CODE 250: Performed by: THORACIC SURGERY (CARDIOTHORACIC VASCULAR SURGERY)

## 2020-05-24 PROCEDURE — 99233 SBSQ HOSP IP/OBS HIGH 50: CPT | Mod: GC,,, | Performed by: SURGERY

## 2020-05-24 PROCEDURE — 25000003 PHARM REV CODE 250: Performed by: STUDENT IN AN ORGANIZED HEALTH CARE EDUCATION/TRAINING PROGRAM

## 2020-05-24 PROCEDURE — 20000000 HC ICU ROOM

## 2020-05-24 PROCEDURE — 85730 THROMBOPLASTIN TIME PARTIAL: CPT

## 2020-05-24 PROCEDURE — 85025 COMPLETE CBC W/AUTO DIFF WBC: CPT

## 2020-05-24 PROCEDURE — 94761 N-INVAS EAR/PLS OXIMETRY MLT: CPT

## 2020-05-24 PROCEDURE — 83735 ASSAY OF MAGNESIUM: CPT

## 2020-05-24 PROCEDURE — 84100 ASSAY OF PHOSPHORUS: CPT

## 2020-05-24 PROCEDURE — 63700000 PHARM REV CODE 250 ALT 637 W/O HCPCS: Performed by: STUDENT IN AN ORGANIZED HEALTH CARE EDUCATION/TRAINING PROGRAM

## 2020-05-24 PROCEDURE — 63600175 PHARM REV CODE 636 W HCPCS: Performed by: THORACIC SURGERY (CARDIOTHORACIC VASCULAR SURGERY)

## 2020-05-24 PROCEDURE — 85610 PROTHROMBIN TIME: CPT

## 2020-05-24 PROCEDURE — 85384 FIBRINOGEN ACTIVITY: CPT

## 2020-05-24 PROCEDURE — 99233 PR SUBSEQUENT HOSPITAL CARE,LEVL III: ICD-10-PCS | Mod: GC,,, | Performed by: SURGERY

## 2020-05-24 RX ORDER — PANTOPRAZOLE SODIUM 40 MG/1
40 TABLET, DELAYED RELEASE ORAL DAILY
Status: DISCONTINUED | OUTPATIENT
Start: 2020-05-24 | End: 2020-05-26 | Stop reason: HOSPADM

## 2020-05-24 RX ORDER — FUROSEMIDE 10 MG/ML
40 INJECTION INTRAMUSCULAR; INTRAVENOUS 2 TIMES DAILY
Status: DISCONTINUED | OUTPATIENT
Start: 2020-05-24 | End: 2020-05-25

## 2020-05-24 RX ORDER — WARFARIN 4 MG/1
4 TABLET ORAL DAILY
Status: DISCONTINUED | OUTPATIENT
Start: 2020-05-24 | End: 2020-05-25

## 2020-05-24 RX ORDER — ASPIRIN 81 MG/1
81 TABLET ORAL DAILY
Status: DISCONTINUED | OUTPATIENT
Start: 2020-05-24 | End: 2020-05-26 | Stop reason: HOSPADM

## 2020-05-24 RX ADMIN — HUMAN ALBUMIN MICROSPHERES AND PERFLUTREN 0.66 MG: 10; .22 INJECTION, SOLUTION INTRAVENOUS at 01:05

## 2020-05-24 RX ADMIN — ACETAMINOPHEN 1000 MG: 500 TABLET ORAL at 05:05

## 2020-05-24 RX ADMIN — PRAVASTATIN SODIUM 40 MG: 40 TABLET ORAL at 08:05

## 2020-05-24 RX ADMIN — FUROSEMIDE 40 MG: 10 INJECTION, SOLUTION INTRAMUSCULAR; INTRAVENOUS at 08:05

## 2020-05-24 RX ADMIN — MUPIROCIN 1 G: 20 OINTMENT TOPICAL at 08:05

## 2020-05-24 RX ADMIN — WARFARIN SODIUM 4 MG: 4 TABLET ORAL at 05:05

## 2020-05-24 RX ADMIN — ISOSORBIDE MONONITRATE 60 MG: 30 TABLET, EXTENDED RELEASE ORAL at 08:05

## 2020-05-24 RX ADMIN — OXYCODONE HYDROCHLORIDE 10 MG: 10 TABLET ORAL at 10:05

## 2020-05-24 RX ADMIN — POTASSIUM CHLORIDE 40 MEQ: 20 SOLUTION ORAL at 08:05

## 2020-05-24 RX ADMIN — FUROSEMIDE 40 MG: 10 INJECTION, SOLUTION INTRAMUSCULAR; INTRAVENOUS at 06:05

## 2020-05-24 RX ADMIN — METOPROLOL SUCCINATE 100 MG: 100 TABLET, EXTENDED RELEASE ORAL at 08:05

## 2020-05-24 RX ADMIN — PANTOPRAZOLE SODIUM 40 MG: 40 TABLET, DELAYED RELEASE ORAL at 08:05

## 2020-05-24 RX ADMIN — ASPIRIN 81 MG: 81 TABLET, COATED ORAL at 08:05

## 2020-05-24 RX ADMIN — OXYCODONE HYDROCHLORIDE 5 MG: 5 TABLET ORAL at 06:05

## 2020-05-24 RX ADMIN — POTASSIUM CHLORIDE 40 MEQ: 20 SOLUTION ORAL at 09:05

## 2020-05-24 RX ADMIN — ACETAMINOPHEN 1000 MG: 500 TABLET ORAL at 02:05

## 2020-05-24 RX ADMIN — OXYCODONE HYDROCHLORIDE 5 MG: 5 TABLET ORAL at 05:05

## 2020-05-24 NOTE — PLAN OF CARE
"      SICU PLAN OF CARE NOTE    Dx: CAD (coronary artery disease)    Shift Events: Central line discontinued, Thomas discontinued, Transfer orders    Goals of Care: MAP>65 SBP<130    Neuro: AAO x4, Arouses to Voice, Follows Commands and Moves All Extremities    Vital Signs: /80 (BP Location: Right arm, Patient Position: Sitting)   Pulse 95   Temp 98 °F (36.7 °C) (Oral)   Resp 19   Ht 5' 9" (1.753 m)   Wt 84.4 kg (186 lb)   SpO2 (!) 94%   BMI 27.47 kg/m²     Respiratory: Room Air    Diet: Cardiac Diet    Urine Output: Voids Spontaneously 100-400 cc/4 hours    Labs/Accuchecks: Daily Labs.    Skin: skin free of breakdown, repositioned independently, bed plugged in and working properly, foams applied       "

## 2020-05-24 NOTE — PROGRESS NOTES
Pharmacist Intervention IV to PO Note    Brad Kearns is a 74 y.o. male being treated with IV medication pantoprazole    Patient Data:    Vital Signs (Most Recent):  Temp: 98.6 °F (37 °C) (05/24/20 0701)  Pulse: 97 (05/24/20 0730)  Resp: 15 (05/24/20 0730)  BP: 125/79 (05/24/20 0730)  SpO2: (!) 93 % (05/24/20 0730)   Vital Signs (72h Range):  Temp:  [97.8 °F (36.6 °C)-98.8 °F (37.1 °C)]   Pulse:  []   Resp:  [10-29]   BP: ()/(55-94)   SpO2:  [84 %-98 %]   Arterial Line BP: ()/(40-67)      CBC:  Recent Labs   Lab 05/22/20  0315 05/23/20  0319 05/24/20  0353   WBC 19.01* 13.78* 10.93   RBC 3.67* 3.73* 3.84*   HGB 10.8* 11.1* 11.4*   HCT 33.8* 35.1* 36.7*   * 149* 192   MCV 92 94 96   MCH 29.4 29.8 29.7   MCHC 32.0 31.6* 31.1*     CMP:     Recent Labs   Lab 05/20/20  0921  05/23/20  0319 05/23/20  1417 05/23/20  2140 05/24/20  0353   *   < > 123* 109 98 83   CALCIUM 7.5*   < > 8.5* 9.2 8.6* 8.6*   ALBUMIN 3.3*  --  3.2*  --   --  3.1*   PROT 4.7*  --  6.0  --   --  6.1   *   < > 141 139 137 141   K 4.3   < > 3.4* 4.3 5.0 4.3   CO2 23   < > 31* 29 28 28   *   < > 101 100 101 104   BUN 21   < > 26* 29* 34* 34*   CREATININE 1.2   < > 1.2 1.3 1.3 1.2   ALKPHOS 30*  --  69  --   --  67   ALT 22  --  18  --   --  26   AST 57*  --  46*  --   --  42*   BILITOT 1.6*  --  1.0  --   --  0.8    < > = values in this interval not displayed.       Dietary Orders:  Diet Orders            Diet Cardiac Fluid - 1500mL: Cardiac starting at 05/22 1000            Based on the following criteria, this patient qualifies for intravenous to oral conversion:  [x] The patients gastrointestinal tract is functioning (tolerating medications via oral or enteral route for 24 hours and tolerating food or enteral feeds for 24 hours).      IV medication pantoprazole 40 mg daily will be changed to oral medication pantoprazole 40 mg daily    Pharmacist's Name: Adelaida Lowe  Pharmacist's Extension:  [phone]

## 2020-05-24 NOTE — ASSESSMENT & PLAN NOTE
Mr. Kearns is a 74-year-old man status post aortic valve replacement and CABG x2 on 5/19 for aortic regurgitation and NSTEMI.     Neuro:  jeane Morgan     CV:  Status post aortic valve repair with bioprosthetic valve and CABG x2 on 05/19.    Had a bleed and went back on 5/20.  One small manubrial bleeder found but otherwise none.  Diuresing, pressures fine.  LV thrombus seen on echo  Coumadin started       Pulm:  Extubated, doing well, work with IS, on room air     Renal:  Good UOP  Cr 1.2 today  Lasix BID     GI: Cardiac diet, replace lytes as needed     Id:  No antibiotics.     Heme:  H/H stable     Endo:  Glucose is within acceptable limits.     Dispo: ICU care, can probably stepdown later today

## 2020-05-24 NOTE — PLAN OF CARE
No acute events overnight. Alert and oriented when awake, slept between care. NSR on bedside monitor. MAP maintained > 65, SBP maintained < 130. SpO2 > 90% on room air, added 2L O2 via nasal cannula while asleep for SpO2 88-89%. CVP 3-4. UOP 35-65 mL/hr. Tolerating diet and compliant with fluid restriction. Large BM overnight. Ambulates well to chair/toilet. Compliant with sternal precautions. Understands and agrees with plan of care. Plan to continue breathing and mobility exercises, control pain and for potential stepdown today. Bed in low position and brake set. Call light in reach. Plan to get up in chair this morning. See flowsheets for detailed assessment. Continuing to monitor.

## 2020-05-24 NOTE — SUBJECTIVE & OBJECTIVE
Interval History/Significant Events:   Did well overnight  Chest tubes out  LV thrombus seen on echo    Follow-up For: Procedure(s) (LRB):  EXPLORATION, WOUND- Chest washout (N/A)    Post-Operative Day: 4 Days Post-Op    Objective:     Vital Signs (Most Recent):  Temp: 98.8 °F (37.1 °C) (05/23/20 2315)  Pulse: 101 (05/24/20 0615)  Resp: 18 (05/24/20 0615)  BP: (!) 156/94 (05/24/20 0600)  SpO2: (!) 92 % (05/24/20 0615) Vital Signs (24h Range):  Temp:  [97.8 °F (36.6 °C)-98.8 °F (37.1 °C)] 98.8 °F (37.1 °C)  Pulse:  [] 101  Resp:  [12-29] 18  SpO2:  [89 %-97 %] 92 %  BP: (102-157)/(66-94) 156/94     Weight: 84.4 kg (186 lb)  Body mass index is 27.47 kg/m².      Intake/Output Summary (Last 24 hours) at 5/24/2020 0644  Last data filed at 5/24/2020 0500  Gross per 24 hour   Intake 150 ml   Output 2235 ml   Net -2085 ml       Physical Exam   Constitutional: He is oriented to person, place, and time. He appears well-developed and well-nourished.   HENT:   Head: Normocephalic and atraumatic.   Eyes: Right eye exhibits no discharge. Left eye exhibits no discharge.   Neck: Normal range of motion. Neck supple.   Cardiovascular: Normal rate and regular rhythm.   Pulmonary/Chest: Effort normal. No respiratory distress.   Sternotomy incision c/d/i     Musculoskeletal: Normal range of motion.   Neurological: He is alert and oriented to person, place, and time.   Skin: Skin is warm and dry.   Psychiatric: He has a normal mood and affect. His behavior is normal.   Vitals reviewed.      Vents:  Vent Mode: Spont (05/21/20 1004)  Ventilator Initiated: Yes(chart correction) (05/19/20 2010)  Set Rate: 0 BPM (05/21/20 1004)  Vt Set: 420 mL (05/21/20 1004)  Pressure Support: 10 cmH20 (05/21/20 1004)  PEEP/CPAP: 5 cmH20 (05/21/20 1004)  Oxygen Concentration (%): 40 (05/21/20 1004)  Peak Airway Pressure: 16 cmH2O (05/21/20 1004)  Plateau Pressure: 22 cmH20 (05/21/20 1004)  Total Ve: 5.65 mL (05/21/20 1004)  F/VT Ratio<105 (RSBI): (!)  47.23 (05/20/20 1040)    Lines/Drains/Airways     Central Venous Catheter Line                 Percutaneous Central Line Insertion/Assessment - Quad lumen  05/19/20 0103 5 days    Trialysis (Dialysis) Catheter 05/19/20 1215 right internal jugular 4 days          Drain                 Urethral Catheter 05/19/20 1307 Non-latex;Straight-tip;Temperature probe 14 Fr. 4 days          Peripheral Intravenous Line                 Peripheral IV - Single Lumen 05/20/20 0019 20 G Left Forearm 4 days         Peripheral IV - Single Lumen 05/24/20 0637 20 G Left Forearm less than 1 day                Significant Labs:    CBC/Anemia Profile:  Recent Labs   Lab 05/23/20 0319 05/24/20  0353   WBC 13.78* 10.93   HGB 11.1* 11.4*   HCT 35.1* 36.7*   * 192   MCV 94 96   RDW 15.8* 15.3*        Chemistries:  Recent Labs   Lab 05/23/20  0319 05/23/20  1417 05/23/20  2140 05/24/20  0353    139 137 141   K 3.4* 4.3 5.0 4.3    100 101 104   CO2 31* 29 28 28   BUN 26* 29* 34* 34*   CREATININE 1.2 1.3 1.3 1.2   CALCIUM 8.5* 9.2 8.6* 8.6*   ALBUMIN 3.2*  --   --  3.1*   PROT 6.0  --   --  6.1   BILITOT 1.0  --   --  0.8   ALKPHOS 69  --   --  67   ALT 18  --   --  26   AST 46*  --   --  42*   MG 2.1  --   --  2.2   PHOS 2.6*  --   --  3.1

## 2020-05-24 NOTE — PROGRESS NOTES
Cardiothoracic Surgery  Progress Note      Admit Date: 5/14/2020    Disease Etiology: Ischemic  Open Chest: no  Surgery Performed: 5/19/2020 2V CABG/AVR; 5/20/2020 mediastinal washout and closure    ASSESSMENT/PLAN:     I conducted multidisciplinary rounds in conjunction with the ICU/Critical Care attending staff and/or residents, with involvement of ancillary services as appropriate. The patient's general condition was reviewed, specifically including hemodynamic performance, dietary and nutritional status, pharmacy concerns, and status of expected transition to stepdown care. Plan has been discussed and agreed upon.    Plan:  Coumadin started for LV thrombus on ECHO, 4 daily starting today. Asa reduced to 81. Lasix 40 BID, stepdown to floor.     For details see the critical care note.    Dodie Starkey MD  Cardiac Surgery Resident, PGY7  Cardiothoracic Surgery  [facility]

## 2020-05-24 NOTE — PROGRESS NOTES
[facility]  Critical Care - Surgery  Progress Note    Patient Name: Brad Kearns  MRN: [id number]  Admission Date: 5/14/2020  Hospital Length of Stay: 10 days  Code Status: Full Code  Attending Provider: Guicho Quezada MD  Primary Care Provider: Duke Bowden MD   Principal Problem: CAD (coronary artery disease)    Subjective:     Hospital/ICU Course:  No notes on file    Interval History/Significant Events:   Did well overnight  Chest tubes out  LV thrombus seen on echo    Follow-up For: Procedure(s) (LRB):  EXPLORATION, WOUND- Chest washout (N/A)    Post-Operative Day: 4 Days Post-Op    Objective:     Vital Signs (Most Recent):  Temp: 98.8 °F (37.1 °C) (05/23/20 2315)  Pulse: 101 (05/24/20 0615)  Resp: 18 (05/24/20 0615)  BP: (!) 156/94 (05/24/20 0600)  SpO2: (!) 92 % (05/24/20 0615) Vital Signs (24h Range):  Temp:  [97.8 °F (36.6 °C)-98.8 °F (37.1 °C)] 98.8 °F (37.1 °C)  Pulse:  [] 101  Resp:  [12-29] 18  SpO2:  [89 %-97 %] 92 %  BP: (102-157)/(66-94) 156/94     Weight: 84.4 kg (186 lb)  Body mass index is 27.47 kg/m².      Intake/Output Summary (Last 24 hours) at 5/24/2020 0644  Last data filed at 5/24/2020 0500  Gross per 24 hour   Intake 150 ml   Output 2235 ml   Net -2085 ml       Physical Exam   Constitutional: He is oriented to person, place, and time. He appears well-developed and well-nourished.   HENT:   Head: Normocephalic and atraumatic.   Eyes: Right eye exhibits no discharge. Left eye exhibits no discharge.   Neck: Normal range of motion. Neck supple.   Cardiovascular: Normal rate and regular rhythm.   Pulmonary/Chest: Effort normal. No respiratory distress.   Sternotomy incision c/d/i     Musculoskeletal: Normal range of motion.   Neurological: He is alert and oriented to person, place, and time.   Skin: Skin is warm and dry.   Psychiatric: He has a normal mood and affect. His behavior is normal.   Vitals reviewed.      Vents:  Vent Mode: Spont (05/21/20  1004)  Ventilator Initiated: Yes(chart correction) (05/19/20 2010)  Set Rate: 0 BPM (05/21/20 1004)  Vt Set: 420 mL (05/21/20 1004)  Pressure Support: 10 cmH20 (05/21/20 1004)  PEEP/CPAP: 5 cmH20 (05/21/20 1004)  Oxygen Concentration (%): 40 (05/21/20 1004)  Peak Airway Pressure: 16 cmH2O (05/21/20 1004)  Plateau Pressure: 22 cmH20 (05/21/20 1004)  Total Ve: 5.65 mL (05/21/20 1004)  F/VT Ratio<105 (RSBI): (!) 47.23 (05/20/20 1040)    Lines/Drains/Airways     Central Venous Catheter Line                 Percutaneous Central Line Insertion/Assessment - Quad lumen  05/19/20 0103 5 days    Trialysis (Dialysis) Catheter 05/19/20 1215 right internal jugular 4 days          Drain                 Urethral Catheter 05/19/20 1307 Non-latex;Straight-tip;Temperature probe 14 Fr. 4 days          Peripheral Intravenous Line                 Peripheral IV - Single Lumen 05/20/20 0019 20 G Left Forearm 4 days         Peripheral IV - Single Lumen 05/24/20 0637 20 G Left Forearm less than 1 day                Significant Labs:    CBC/Anemia Profile:  Recent Labs   Lab 05/23/20 0319 05/24/20  0353   WBC 13.78* 10.93   HGB 11.1* 11.4*   HCT 35.1* 36.7*   * 192   MCV 94 96   RDW 15.8* 15.3*        Chemistries:  Recent Labs   Lab 05/23/20 0319 05/23/20  1417 05/23/20  2140 05/24/20  0353    139 137 141   K 3.4* 4.3 5.0 4.3    100 101 104   CO2 31* 29 28 28   BUN 26* 29* 34* 34*   CREATININE 1.2 1.3 1.3 1.2   CALCIUM 8.5* 9.2 8.6* 8.6*   ALBUMIN 3.2*  --   --  3.1*   PROT 6.0  --   --  6.1   BILITOT 1.0  --   --  0.8   ALKPHOS 69  --   --  67   ALT 18  --   --  26   AST 46*  --   --  42*   MG 2.1  --   --  2.2   PHOS 2.6*  --   --  3.1         Assessment/Plan:     S/P CABG (coronary artery bypass graft)  Mr. Kearns is a 74-year-old man status post aortic valve replacement and CABG x2 on 5/19 for aortic regurgitation and NSTEMI.     Neuro:  Cathy, dilaudid     CV:  Status post aortic valve repair with bioprosthetic  valve and CABG x2 on 05/19.    Had a bleed and went back on 5/20.  One small manubrial bleeder found but otherwise none.  Diuresing, pressures fine.  LV thrombus seen on echo  Coumadin started       Pulm:  Extubated, doing well, work with IS, on room air     Renal:  Good UOP  Cr 1.2 today  Lasix BID     GI: Cardiac diet, replace lytes as needed     Id:  No antibiotics.     Heme:  H/H stable     Endo:  Glucose is within acceptable limits.     Dispo: ICU care, can probably stepdown later today       Critical care was time spent personally by me on the following activities: development of treatment plan with patient or surrogate and bedside caregivers, discussions with consultants, evaluation of patient's response to treatment, examination of patient, ordering and performing treatments and interventions, ordering and review of laboratory studies, ordering and review of radiographic studies, pulse oximetry, re-evaluation of patient's condition.  This critical care time did not overlap with that of any other provider or involve time for any procedures.     Warren Wood MD  Critical Care - Surgery  [facility]

## 2020-05-25 LAB
ALBUMIN SERPL BCP-MCNC: 3.3 G/DL (ref 3.5–5.2)
ALP SERPL-CCNC: 89 U/L (ref 55–135)
ALT SERPL W/O P-5'-P-CCNC: 46 U/L (ref 10–44)
ANION GAP SERPL CALC-SCNC: 11 MMOL/L (ref 8–16)
APTT BLDCRRT: 26.1 SEC (ref 21–32)
AST SERPL-CCNC: 63 U/L (ref 10–40)
BASOPHILS # BLD AUTO: 0.11 K/UL (ref 0–0.2)
BASOPHILS NFR BLD: 0.8 % (ref 0–1.9)
BILIRUB SERPL-MCNC: 0.8 MG/DL (ref 0.1–1)
BUN SERPL-MCNC: 35 MG/DL (ref 8–23)
CALCIUM SERPL-MCNC: 9 MG/DL (ref 8.7–10.5)
CHLORIDE SERPL-SCNC: 105 MMOL/L (ref 95–110)
CO2 SERPL-SCNC: 25 MMOL/L (ref 23–29)
CREAT SERPL-MCNC: 1.2 MG/DL (ref 0.5–1.4)
DIFFERENTIAL METHOD: ABNORMAL
EOSINOPHIL # BLD AUTO: 0.4 K/UL (ref 0–0.5)
EOSINOPHIL NFR BLD: 3.1 % (ref 0–8)
ERYTHROCYTE [DISTWIDTH] IN BLOOD BY AUTOMATED COUNT: 14.9 % (ref 11.5–14.5)
EST. GFR  (AFRICAN AMERICAN): >60 ML/MIN/1.73 M^2
EST. GFR  (NON AFRICAN AMERICAN): 59.6 ML/MIN/1.73 M^2
GLUCOSE SERPL-MCNC: 88 MG/DL (ref 70–110)
HCT VFR BLD AUTO: 40.3 % (ref 40–54)
HGB BLD-MCNC: 12.6 G/DL (ref 14–18)
IMM GRANULOCYTES # BLD AUTO: 0.6 K/UL (ref 0–0.04)
IMM GRANULOCYTES NFR BLD AUTO: 4.6 % (ref 0–0.5)
INR PPP: 2.4 (ref 0.8–1.2)
LYMPHOCYTES # BLD AUTO: 2.7 K/UL (ref 1–4.8)
LYMPHOCYTES NFR BLD: 20.9 % (ref 18–48)
MAGNESIUM SERPL-MCNC: 2.2 MG/DL (ref 1.6–2.6)
MCH RBC QN AUTO: 29.9 PG (ref 27–31)
MCHC RBC AUTO-ENTMCNC: 31.3 G/DL (ref 32–36)
MCV RBC AUTO: 96 FL (ref 82–98)
MONOCYTES # BLD AUTO: 1.2 K/UL (ref 0.3–1)
MONOCYTES NFR BLD: 9.4 % (ref 4–15)
NEUTROPHILS # BLD AUTO: 8 K/UL (ref 1.8–7.7)
NEUTROPHILS NFR BLD: 61.2 % (ref 38–73)
NRBC BLD-RTO: 0 /100 WBC
PHOSPHATE SERPL-MCNC: 3.4 MG/DL (ref 2.7–4.5)
PLATELET # BLD AUTO: 225 K/UL (ref 150–350)
PMV BLD AUTO: 10.8 FL (ref 9.2–12.9)
POTASSIUM SERPL-SCNC: 4.5 MMOL/L (ref 3.5–5.1)
PROT SERPL-MCNC: 6.5 G/DL (ref 6–8.4)
PROTHROMBIN TIME: 22.5 SEC (ref 9–12.5)
RBC # BLD AUTO: 4.22 M/UL (ref 4.6–6.2)
SODIUM SERPL-SCNC: 141 MMOL/L (ref 136–145)
WBC # BLD AUTO: 13.11 K/UL (ref 3.9–12.7)

## 2020-05-25 PROCEDURE — 94761 N-INVAS EAR/PLS OXIMETRY MLT: CPT

## 2020-05-25 PROCEDURE — 63700000 PHARM REV CODE 250 ALT 637 W/O HCPCS: Performed by: STUDENT IN AN ORGANIZED HEALTH CARE EDUCATION/TRAINING PROGRAM

## 2020-05-25 PROCEDURE — 25000003 PHARM REV CODE 250: Performed by: THORACIC SURGERY (CARDIOTHORACIC VASCULAR SURGERY)

## 2020-05-25 PROCEDURE — 25000003 PHARM REV CODE 250: Performed by: STUDENT IN AN ORGANIZED HEALTH CARE EDUCATION/TRAINING PROGRAM

## 2020-05-25 PROCEDURE — 97530 THERAPEUTIC ACTIVITIES: CPT

## 2020-05-25 PROCEDURE — 99233 SBSQ HOSP IP/OBS HIGH 50: CPT | Mod: ,,, | Performed by: SURGERY

## 2020-05-25 PROCEDURE — 85610 PROTHROMBIN TIME: CPT

## 2020-05-25 PROCEDURE — 84100 ASSAY OF PHOSPHORUS: CPT

## 2020-05-25 PROCEDURE — 85025 COMPLETE CBC W/AUTO DIFF WBC: CPT

## 2020-05-25 PROCEDURE — 83735 ASSAY OF MAGNESIUM: CPT

## 2020-05-25 PROCEDURE — 99233 PR SUBSEQUENT HOSPITAL CARE,LEVL III: ICD-10-PCS | Mod: ,,, | Performed by: SURGERY

## 2020-05-25 PROCEDURE — 20600001 HC STEP DOWN PRIVATE ROOM

## 2020-05-25 PROCEDURE — 85730 THROMBOPLASTIN TIME PARTIAL: CPT

## 2020-05-25 PROCEDURE — 80053 COMPREHEN METABOLIC PANEL: CPT

## 2020-05-25 RX ORDER — FUROSEMIDE 40 MG/1
40 TABLET ORAL DAILY
Status: DISCONTINUED | OUTPATIENT
Start: 2020-05-25 | End: 2020-05-26 | Stop reason: HOSPADM

## 2020-05-25 RX ORDER — METOPROLOL SUCCINATE 50 MG/1
200 TABLET, EXTENDED RELEASE ORAL DAILY
Status: CANCELLED | OUTPATIENT
Start: 2020-05-26

## 2020-05-25 RX ORDER — WARFARIN 1 MG/1
1 TABLET ORAL DAILY
Status: DISCONTINUED | OUTPATIENT
Start: 2020-05-25 | End: 2020-05-26 | Stop reason: HOSPADM

## 2020-05-25 RX ADMIN — ASPIRIN 81 MG: 81 TABLET, COATED ORAL at 08:05

## 2020-05-25 RX ADMIN — POTASSIUM CHLORIDE 40 MEQ: 20 SOLUTION ORAL at 08:05

## 2020-05-25 RX ADMIN — OXYCODONE HYDROCHLORIDE 10 MG: 10 TABLET ORAL at 08:05

## 2020-05-25 RX ADMIN — WARFARIN SODIUM 1 MG: 1 TABLET ORAL at 04:05

## 2020-05-25 RX ADMIN — ACETAMINOPHEN 1000 MG: 500 TABLET ORAL at 01:05

## 2020-05-25 RX ADMIN — FUROSEMIDE 40 MG: 40 TABLET ORAL at 08:05

## 2020-05-25 RX ADMIN — OXYCODONE HYDROCHLORIDE 10 MG: 10 TABLET ORAL at 03:05

## 2020-05-25 RX ADMIN — ACETAMINOPHEN 1000 MG: 500 TABLET ORAL at 10:05

## 2020-05-25 RX ADMIN — METOPROLOL SUCCINATE 100 MG: 100 TABLET, EXTENDED RELEASE ORAL at 08:05

## 2020-05-25 RX ADMIN — ACETAMINOPHEN 1000 MG: 500 TABLET ORAL at 07:05

## 2020-05-25 RX ADMIN — PRAVASTATIN SODIUM 40 MG: 40 TABLET ORAL at 08:05

## 2020-05-25 RX ADMIN — PANTOPRAZOLE SODIUM 40 MG: 40 TABLET, DELAYED RELEASE ORAL at 08:05

## 2020-05-25 RX ADMIN — VALSARTAN 40 MG: 40 TABLET, FILM COATED ORAL at 08:05

## 2020-05-25 NOTE — PT/OT/SLP PROGRESS
Occupational Therapy   Treatment    Name: Brad Kearns  MRN: [id number]  Admitting Diagnosis:  CAD (coronary artery disease)  5 Days Post-Op   Procedure(s):  EXPLORATION, WOUND- Chest washout     Recommendations:     Discharge Recommendations: home health OT  Discharge Equipment Recommendations:  none  Barriers to discharge:  None    Assessment:     Brad Kearns is a 74 y.o. male with a medical diagnosis of CAD (coronary artery disease).  He presents with fatigue and discomfort in chest but agreeable to participate with therapy with min encouragement. Performance deficits affecting function are weakness, impaired endurance, impaired self care skills, impaired functional mobilty, gait instability, impaired balance, decreased upper extremity function, impaired cardiopulmonary response to activity.     Rehab Prognosis:  Good; patient would benefit from acute skilled OT services to address these deficits and reach maximum level of function.       Plan:     Patient to be seen 3 x/week to address the above listed problems via therapeutic activities, self-care/home management, therapeutic exercises  · Plan of Care Expires: 06/20/20  · Plan of Care Reviewed with: patient    Subjective     Pain/Comfort:  · Pain Rating 1: 2/10  · Location 1: chest  · Pain Addressed 1: Reposition, Distraction  · Pain Rating Post-Intervention 1: 2/10    Objective:     Communicated with: RN prior to session.  Patient found HOB elevated with telemetry(hep lock IV) upon OT entry to room.    General Precautions: Standard, fall, sternal   Orthopedic Precautions:N/A   Braces: N/A     Occupational Performance:     Bed Mobility:    · Patient completed Supine to Sit to L side EOB with supervision with adherence to sternal precautions  · Patient completed Scooting anteriorly to EOB for foot placement on floor with supervision with adherence to sternal precautions  · Patient completed Sit to Supine with supervision with adherence to sternal  "precautions    Functional Mobility/Transfers:  · Patient completed Sit <> Stand Transfer with supervision with no assistive device and adherence to sternal precautions  · Patient completed Toilet Transfer onto the toilet in the restroom via Step Transfer technique with supervision with  no AD and with adherence to sternal precautions  · Functional Mobility: Patient completed functional mobility ~220' with SBA and no AD. No LOB/SOB noted.    Activities of Daily Living:  · Denied need for ADLs at this time    [facility] 6 Click ADL: 20    Treatment & Education:  Role of OT/POC  Call button for assistance  Reviewed sternal precautions with patient recalling 1/3 - did state "the 3 P's" but only verbalized 1 - needs reinforcing    Patient left HOB elevated with all lines intact, call button in reach and RN notifiedEducation:      GOALS:   Multidisciplinary Problems     Occupational Therapy Goals        Problem: Occupational Therapy Goal    Goal Priority Disciplines Outcome Interventions   Occupational Therapy Goal     OT, PT/OT Ongoing, Progressing    Description:  Goals to be met by: 5/31/20     Patient will increase functional independence with ADLs by performing:    UE Dressing with Supervision.  LE Dressing with Minimal Assistance.  Grooming while standing with Supervision.  Toileting from toilet with Minimal Assistance for hygiene and clothing management.   Toilet transfer to toilet with Stand-by Assistance. -MET 5/25/20  Pt will recall 3/3 sternal precautions with independence.                       Time Tracking:     OT Date of Treatment: 05/25/20  OT Start Time: 1441  OT Stop Time: 1454  OT Total Time (min): 13 min    Billable Minutes:Therapeutic Activity 13 minutes    Jessica Jamison OT  5/25/2020    "

## 2020-05-25 NOTE — ASSESSMENT & PLAN NOTE
Mr. Kearns is a 74-year-old man status post aortic valve replacement and CABG x2 on 5/19 for aortic regurgitation and NSTEMI.     Neuro:  AAOx3  PRN Cathy / dilaudid     CV:  Status post aortic valve repair with bioprosthetic valve and CABG x2 on 05/19.    Had a bleed and went back on 5/20.  One small manubrial bleeder found but otherwise none.  Diuresing, pressures fine.  LV thrombus seen on echo  Coumadin started; INR-2.4 today       Pulm:  Extubated, doing well, work with IS, on room air     Renal:  Good UOP  Cr 1.2 today  Lasix BID     GI: Cardiac diet, 1500cc fluid restriction  replace lytes as needed     Id:  No antibiotics.     Heme:  H/H stable     Endo:  Glucose is within acceptable limits.     Dispo: Stepdown vs discharge today; orders for stepdown are in

## 2020-05-25 NOTE — PLAN OF CARE
Recommendations    Recommendation:   1. Continue cardiac diet, fluid restriction per MD, encourage PO intake as tolerable   2. Add optiosource daily to increase intake   3. Suggest MVI     RD to monitor and follow up     Goals: Patient to receive nutrition by RD follow-up  Nutrition Goal Status: goal met  Communication of RD Recs: reviewed with RN

## 2020-05-25 NOTE — PLAN OF CARE
No acute events overnight. Patient's INR this morning increased from 1.1 to 2.4, started on coumadin 5/23. Other AM labs stable. Alert and oriented when awake, slept between care. NSR on bedside monitor. MAP maintained > 65, SBP maintained < 130, increases to 140s with activity. SpO2 > 90% on room air. Voiding spontaneously and having BMs. Tolerating diet and compliant with fluid restriction. Ambulates well in room to chair/toilet. Compliant with sternal precautions. Understands and agrees with plan of care. Plan to continue breathing and mobility exercises and to control pain. Patient eager to be transferred/discharged. Stepdown orders in place. Bed in low position and brake set. Call light in reach. See flowsheets for detailed assessment. Continuing to monitor.

## 2020-05-25 NOTE — PLAN OF CARE
Intake and output measured with 1500mL FR encouraged. Sternal precautions and ambulation promoted. Tylenol administered for complains of 2/10 sore pain at MSI. INR 2.4, coumadin administered. Possible d/c tomorrow. VSS, AOX4, no complaints of SOB or occurrences of falls throughout shift. POC reviewed with pt and pt verbalizes understanding. Will continue to monitor.

## 2020-05-25 NOTE — PLAN OF CARE
Problem: Occupational Therapy Goal  Goal: Occupational Therapy Goal  Description  Goals to be met by: 5/31/20     Patient will increase functional independence with ADLs by performing:    UE Dressing with Supervision.  LE Dressing with Minimal Assistance.  Grooming while standing with Supervision.  Toileting from toilet with Minimal Assistance for hygiene and clothing management.   Toilet transfer to toilet with Stand-by Assistance. -MET 5/25/20  Pt will recall 3/3 sternal precautions with independence.      Outcome: Ongoing, Progressing    Progressing with POC.  Jessica Jamison OT  5/25/2020

## 2020-05-25 NOTE — PROGRESS NOTES
"[facility]  Adult Nutrition  Progress Note    SUMMARY       Recommendations    Recommendation:   1. Continue cardiac diet, fluid restriction per MD, encourage PO intake as tolerable   2. Add optiosource daily to increase intake   3. Suggest MVI     RD to monitor and follow up     Goals: Patient to receive nutrition by RD follow-up  Nutrition Goal Status: goal met  Communication of RD Recs: reviewed with RN    Reason for Assessment    Reason For Assessment: RD follow-up  Diagnosis: surgery/postoperative complications(s/p CABG & AVR 5/19)  Relevant Medical History: HTN  Interdisciplinary Rounds: did not attend  General Information Comments: Remote assessment completed. Pt s/p CABG and AVR. Extubated on 5/21, diet advanced to cardiac diet on 5/22. Pt tolerating well at this time, PO ~50-75% of meals per chart. No reported N/V/C/D documeneted. Wt remains stable since admit. No recent wt loss PTA per chart and good PO intake prior to surgery. No indications of malnutrition at this time. Due to recent hospital wide restrictions to limit the transfer of (COVID-19), we are not performing any physical exams at this time. All S/S will be observational; NFPE to be performed at a future date.  Nutrition Discharge Planning: adequate PO intake, optimize nutrition     Nutrition Risk Screen    Nutrition Risk Screen: no indicators present    Nutrition/Diet History    Spiritual, Cultural Beliefs, Hinduism Practices, Values that Affect Care: no  Food Allergies: NKFA  Factors Affecting Nutritional Intake: None identified at this time    Anthropometrics    Temp: 97.6 °F (36.4 °C)  Height Method: Stated  Height: 5' 9" (175.3 cm)  Height (inches): 69 in  Weight Method: Bed Scale  Weight: 84.1 kg (185 lb 6.5 oz)  Weight (lb): 185.41 lb  Ideal Body Weight (IBW), Male: 160 lb  % Ideal Body Weight, Male (lb): 116.25 %  BMI (Calculated): 27.4  BMI Grade: 25 - 29.9 - overweight       Lab/Procedures/Meds    Pertinent Labs " Reviewed: reviewed  Pertinent Labs Comments: BUN 35  Pertinent Medications Reviewed: reviewed  Pertinent Medications Comments: warfarin; statin; polyethylene glycol; pantoprazole; furosemide; aspirin    Estimated/Assessed Needs    Weight Used For Calorie Calculations: 84.1 kg (185 lb 6.5 oz)  Energy Calorie Requirements (kcal): 1970 kcal/d  Energy Need Method: [place] Jeor  Protein Requirements:  g/day   Weight Used For Protein Calculations: 84.1 kg (185 lb 6.5 oz)  Fluid Requirements (mL): 1 mL/kcal or per MD  Estimated Fluid Requirement Method: RDA Method  RDA Method (mL): 1970    Nutrition Prescription Ordered    Current Diet Order: Cardiac diet; 1500 mL FR    Evaluation of Received Nutrient/Fluid Intake    I/O: +.7 L since admit  Energy Calories Required: not meeting needs  Protein Required:not  meeting needs  Fluid Required: other (see comments)  Comments: LBM 5/24  Tolerance: tolerating  % Intake of Estimated Energy Needs: 50 - 75 %  % Meal Intake: 50 - 75 %    Nutrition Risk    Level of Risk/Frequency of Follow-up: low     Assessment and Plan  Nutrition Problem  Increased nutrient needs     Related to (etiology):   Physiological causes related to healing     Signs and Symptoms (as evidenced by):   S/p CABG & AVR 5/19      Interventions (treatment strategy):  Collaboration of nutrition care with other providers     Nutrition Diagnosis Status:   Continues     Monitor and Evaluation    Food and Nutrient Intake: energy intake, food and beverage intake  Food and Nutrient Adminstration: diet order  Anthropometric Measurements: weight, weight change  Biochemical Data, Medical Tests and Procedures: electrolyte and renal panel, lipid profile, gastrointestinal profile, glucose/endocrine profile, inflammatory profile  Nutrition-Focused Physical Findings: overall appearance     Nutrition Follow-Up    RD Follow-up?: Yes

## 2020-05-25 NOTE — PROGRESS NOTES
[facility]  Critical Care - Surgery  Progress Note    Patient Name: Brad Kearns  MRN: [id number]  Admission Date: 5/14/2020  Hospital Length of Stay: 11 days  Code Status: Full Code  Attending Provider: Guicho Quezada MD  Primary Care Provider: Duke Bowden MD   Principal Problem: CAD (coronary artery disease)    Subjective:     Hospital/ICU Course:  No notes on file    Interval History/Significant Events: NAEO.  Pt denies any complaints this morning.  Pain well controlled with current regimen    Follow-up For: Procedure(s) (LRB):  EXPLORATION, WOUND- Chest washout (N/A)    Post-Operative Day: 5 Days Post-Op    Objective:     Vital Signs (Most Recent):  Temp: 97.6 °F (36.4 °C) (05/25/20 0730)  Pulse: 103 (05/25/20 0730)  Resp: 19 (05/25/20 0730)  BP: (!) 137/91 (05/25/20 0700)  SpO2: (!) 94 % (05/25/20 0730) Vital Signs (24h Range):  Temp:  [97.6 °F (36.4 °C)-98.2 °F (36.8 °C)] 97.6 °F (36.4 °C)  Pulse:  [] 103  Resp:  [10-42] 19  SpO2:  [90 %-95 %] 94 %  BP: (103-150)/(68-97) 137/91     Weight: 84.1 kg (185 lb 6.5 oz)  Body mass index is 27.38 kg/m².      Intake/Output Summary (Last 24 hours) at 5/25/2020 0748  Last data filed at 5/25/2020 0400  Gross per 24 hour   Intake 950 ml   Output 2650 ml   Net -1700 ml       Physical Exam   Constitutional: He is oriented to person, place, and time. He appears well-developed and well-nourished.   HENT:   Head: Normocephalic and atraumatic.   Eyes: Pupils are equal, round, and reactive to light. EOM are normal.   Neck: Normal range of motion. No JVD present.   Cardiovascular: Normal rate and regular rhythm.   Pulmonary/Chest: Effort normal. He has no wheezes.   Abdominal: Soft. He exhibits no distension.   Musculoskeletal: Normal range of motion. He exhibits no deformity.   Neurological: He is alert and oriented to person, place, and time.   Skin: Skin is warm and dry.   Sternotomy incision c/d/i       Vents:  Vent Mode: Spont (05/21/20  1004)  Ventilator Initiated: Yes(chart correction) (05/19/20 2010)  Set Rate: 0 BPM (05/21/20 1004)  Vt Set: 420 mL (05/21/20 1004)  Pressure Support: 10 cmH20 (05/21/20 1004)  PEEP/CPAP: 5 cmH20 (05/21/20 1004)  Oxygen Concentration (%): 40 (05/21/20 1004)  Peak Airway Pressure: 16 cmH2O (05/21/20 1004)  Plateau Pressure: 22 cmH20 (05/21/20 1004)  Total Ve: 5.65 mL (05/21/20 1004)  F/VT Ratio<105 (RSBI): (!) 47.23 (05/20/20 1040)    Lines/Drains/Airways     Central Venous Catheter Line                 Percutaneous Central Line Insertion/Assessment - Quad lumen  05/19/20 0103 6 days          Peripheral Intravenous Line                 Peripheral IV - Single Lumen 05/20/20 0019 20 G Left Forearm 5 days         Peripheral IV - Single Lumen 05/24/20 0637 20 G Left Forearm 1 day                Significant Labs:    CBC/Anemia Profile:  Recent Labs   Lab 05/24/20  0353 05/25/20  0329   WBC 10.93 13.11*   HGB 11.4* 12.6*   HCT 36.7* 40.3    225   MCV 96 96   RDW 15.3* 14.9*        Chemistries:  Recent Labs   Lab 05/23/20  2140 05/24/20  0353 05/25/20  0329    141 141   K 5.0 4.3 4.5    104 105   CO2 28 28 25   BUN 34* 34* 35*   CREATININE 1.3 1.2 1.2   CALCIUM 8.6* 8.6* 9.0   ALBUMIN  --  3.1* 3.3*   PROT  --  6.1 6.5   BILITOT  --  0.8 0.8   ALKPHOS  --  67 89   ALT  --  26 46*   AST  --  42* 63*   MG  --  2.2 2.2   PHOS  --  3.1 3.4       All pertinent labs within the past 24 hours have been reviewed.    Significant Imaging:  I have reviewed all pertinent imaging results/findings within the past 24 hours.    Assessment/Plan:     S/P CABG (coronary artery bypass graft)  Mr. Brisset is a 74-year-old man status post aortic valve replacement and CABG x2 on 5/19 for aortic regurgitation and NSTEMI.     Neuro:  AAOx3  PRN Cathy / dilaudid     CV:  Status post aortic valve repair with bioprosthetic valve and CABG x2 on 05/19.    Had a bleed and went back on 5/20.  One small manubrial bleeder found but  otherwise none.  Diuresing, pressures fine.  LV thrombus seen on echo  Coumadin started; INR-2.4 today       Pulm:  Extubated, doing well, work with IS, on room air     Renal:  Good UOP  Cr 1.2 today  Lasix BID     GI: Cardiac diet, 1500cc fluid restriction  replace lytes as needed     Id:  No antibiotics.     Heme:  H/H stable     Endo:  Glucose is within acceptable limits.     Dispo: Stepdown vs discharge today; orders for stepdown are in         Mick Deluca MD

## 2020-05-25 NOTE — SUBJECTIVE & OBJECTIVE
Interval History/Significant Events: NAEO.  Pt denies any complaints this morning.  Pain well controlled with current regimen    Follow-up For: Procedure(s) (LRB):  EXPLORATION, WOUND- Chest washout (N/A)    Post-Operative Day: 5 Days Post-Op    Objective:     Vital Signs (Most Recent):  Temp: 97.6 °F (36.4 °C) (05/25/20 0730)  Pulse: 103 (05/25/20 0730)  Resp: 19 (05/25/20 0730)  BP: (!) 137/91 (05/25/20 0700)  SpO2: (!) 94 % (05/25/20 0730) Vital Signs (24h Range):  Temp:  [97.6 °F (36.4 °C)-98.2 °F (36.8 °C)] 97.6 °F (36.4 °C)  Pulse:  [] 103  Resp:  [10-42] 19  SpO2:  [90 %-95 %] 94 %  BP: (103-150)/(68-97) 137/91     Weight: 84.1 kg (185 lb 6.5 oz)  Body mass index is 27.38 kg/m².      Intake/Output Summary (Last 24 hours) at 5/25/2020 0748  Last data filed at 5/25/2020 0400  Gross per 24 hour   Intake 950 ml   Output 2650 ml   Net -1700 ml       Physical Exam   Constitutional: He is oriented to person, place, and time. He appears well-developed and well-nourished.   HENT:   Head: Normocephalic and atraumatic.   Eyes: Pupils are equal, round, and reactive to light. EOM are normal.   Neck: Normal range of motion. No JVD present.   Cardiovascular: Normal rate and regular rhythm.   Pulmonary/Chest: Effort normal. He has no wheezes.   Abdominal: Soft. He exhibits no distension.   Musculoskeletal: Normal range of motion. He exhibits no deformity.   Neurological: He is alert and oriented to person, place, and time.   Skin: Skin is warm and dry.   Sternotomy incision c/d/i       Vents:  Vent Mode: Spont (05/21/20 1004)  Ventilator Initiated: Yes(chart correction) (05/19/20 2010)  Set Rate: 0 BPM (05/21/20 1004)  Vt Set: 420 mL (05/21/20 1004)  Pressure Support: 10 cmH20 (05/21/20 1004)  PEEP/CPAP: 5 cmH20 (05/21/20 1004)  Oxygen Concentration (%): 40 (05/21/20 1004)  Peak Airway Pressure: 16 cmH2O (05/21/20 1004)  Plateau Pressure: 22 cmH20 (05/21/20 1004)  Total Ve: 5.65 mL (05/21/20 1004)  F/VT Ratio<105  (RSBI): (!) 47.23 (05/20/20 1040)    Lines/Drains/Airways     Central Venous Catheter Line                 Percutaneous Central Line Insertion/Assessment - Quad lumen  05/19/20 0103 6 days          Peripheral Intravenous Line                 Peripheral IV - Single Lumen 05/20/20 0019 20 G Left Forearm 5 days         Peripheral IV - Single Lumen 05/24/20 0637 20 G Left Forearm 1 day                Significant Labs:    CBC/Anemia Profile:  Recent Labs   Lab 05/24/20  0353 05/25/20  0329   WBC 10.93 13.11*   HGB 11.4* 12.6*   HCT 36.7* 40.3    225   MCV 96 96   RDW 15.3* 14.9*        Chemistries:  Recent Labs   Lab 05/23/20  2140 05/24/20  0353 05/25/20  0329    141 141   K 5.0 4.3 4.5    104 105   CO2 28 28 25   BUN 34* 34* 35*   CREATININE 1.3 1.2 1.2   CALCIUM 8.6* 8.6* 9.0   ALBUMIN  --  3.1* 3.3*   PROT  --  6.1 6.5   BILITOT  --  0.8 0.8   ALKPHOS  --  67 89   ALT  --  26 46*   AST  --  42* 63*   MG  --  2.2 2.2   PHOS  --  3.1 3.4       All pertinent labs within the past 24 hours have been reviewed.    Significant Imaging:  I have reviewed all pertinent imaging results/findings within the past 24 hours.

## 2020-05-25 NOTE — PLAN OF CARE
"      TRANSFER NOTE    Dx: CAD (coronary artery disease)    Vital Signs: /78 (BP Location: Right arm)   Pulse 99   Temp 97.8 °F (36.6 °C) (Oral)   Resp 15   Ht 5' 9" (1.753 m)   Wt 84.1 kg (185 lb 6.5 oz)   SpO2 (!) 94%   BMI 27.38 kg/m²     Neuro: AAO x4, Follows Commands and Moves All Extremities    Respiratory: Room Air    Cardiac:  NSR 90's - ST low 100's    Diet: Cardiac Diet 1500ml Fluid Restriction    Urine Output: Voids Spontaneously 450 cc/this morning    Transfer Report:  Report called to SUNNY Kimball. Pt being transferred to [facility] with portable telemetry and room air . VSS prior to transfer. Patient updated on POC and all questions and concerns addressed.     SKIN NOTE:  Skin:    Surgical site dressings CDI.   No signs of breakdown or pressure injury noted.    Skin precautions maintained including:  Sacrum and heels with foam dressing in place for pressure protection. Frequent weight shift encouraged; Patient repositions independently. Bed plugged in and mattress inflated. Adhesive use limited. Heels elevated off bed. Pressure points protected and positioning supports utilized.  Skin-to-device areas padded. Skin-to-skin areas padded          "

## 2020-05-26 ENCOUNTER — ANTI-COAG VISIT (OUTPATIENT)
Dept: CARDIOLOGY | Facility: CLINIC | Age: 75
End: 2020-05-26

## 2020-05-26 VITALS
HEART RATE: 94 BPM | HEIGHT: 69 IN | SYSTOLIC BLOOD PRESSURE: 132 MMHG | BODY MASS INDEX: 25.34 KG/M2 | TEMPERATURE: 98 F | WEIGHT: 171.06 LBS | DIASTOLIC BLOOD PRESSURE: 85 MMHG | RESPIRATION RATE: 17 BRPM | OXYGEN SATURATION: 90 %

## 2020-05-26 DIAGNOSIS — Z79.01 LONG TERM (CURRENT) USE OF ANTICOAGULANTS: ICD-10-CM

## 2020-05-26 DIAGNOSIS — I51.3 LEFT VENTRICULAR THROMBUS: ICD-10-CM

## 2020-05-26 DIAGNOSIS — I35.1 NONRHEUMATIC AORTIC VALVE INSUFFICIENCY: ICD-10-CM

## 2020-05-26 DIAGNOSIS — Z95.2 STATUS POST AORTIC VALVE REPLACEMENT: ICD-10-CM

## 2020-05-26 LAB
ALBUMIN SERPL BCP-MCNC: 3.1 G/DL (ref 3.5–5.2)
ALP SERPL-CCNC: 102 U/L (ref 55–135)
ALT SERPL W/O P-5'-P-CCNC: 51 U/L (ref 10–44)
ANION GAP SERPL CALC-SCNC: 9 MMOL/L (ref 8–16)
ANISOCYTOSIS BLD QL SMEAR: SLIGHT
APTT BLDCRRT: 32.1 SEC (ref 21–32)
AST SERPL-CCNC: 56 U/L (ref 10–40)
BASOPHILS # BLD AUTO: ABNORMAL K/UL (ref 0–0.2)
BASOPHILS NFR BLD: 0 % (ref 0–1.9)
BILIRUB SERPL-MCNC: 0.8 MG/DL (ref 0.1–1)
BUN SERPL-MCNC: 30 MG/DL (ref 8–23)
CALCIUM SERPL-MCNC: 8.7 MG/DL (ref 8.7–10.5)
CHLORIDE SERPL-SCNC: 106 MMOL/L (ref 95–110)
CO2 SERPL-SCNC: 25 MMOL/L (ref 23–29)
CREAT SERPL-MCNC: 1.1 MG/DL (ref 0.5–1.4)
DIFFERENTIAL METHOD: ABNORMAL
EOSINOPHIL # BLD AUTO: ABNORMAL K/UL (ref 0–0.5)
EOSINOPHIL NFR BLD: 5 % (ref 0–8)
ERYTHROCYTE [DISTWIDTH] IN BLOOD BY AUTOMATED COUNT: 15.1 % (ref 11.5–14.5)
EST. GFR  (AFRICAN AMERICAN): >60 ML/MIN/1.73 M^2
EST. GFR  (NON AFRICAN AMERICAN): >60 ML/MIN/1.73 M^2
GLUCOSE SERPL-MCNC: 83 MG/DL (ref 70–110)
HCT VFR BLD AUTO: 39.8 % (ref 40–54)
HGB BLD-MCNC: 12.4 G/DL (ref 14–18)
HYPOCHROMIA BLD QL SMEAR: ABNORMAL
IMM GRANULOCYTES # BLD AUTO: ABNORMAL K/UL (ref 0–0.04)
IMM GRANULOCYTES NFR BLD AUTO: ABNORMAL % (ref 0–0.5)
INR PPP: 3.4 (ref 0.8–1.2)
LYMPHOCYTES # BLD AUTO: ABNORMAL K/UL (ref 1–4.8)
LYMPHOCYTES NFR BLD: 20 % (ref 18–48)
MAGNESIUM SERPL-MCNC: 2.2 MG/DL (ref 1.6–2.6)
MCH RBC QN AUTO: 29.7 PG (ref 27–31)
MCHC RBC AUTO-ENTMCNC: 31.2 G/DL (ref 32–36)
MCV RBC AUTO: 95 FL (ref 82–98)
MONOCYTES # BLD AUTO: ABNORMAL K/UL (ref 0.3–1)
MONOCYTES NFR BLD: 11 % (ref 4–15)
MYELOCYTES NFR BLD MANUAL: 2 %
NEUTROPHILS NFR BLD: 62 % (ref 38–73)
NRBC BLD-RTO: 0 /100 WBC
OVALOCYTES BLD QL SMEAR: ABNORMAL
PHOSPHATE SERPL-MCNC: 3.3 MG/DL (ref 2.7–4.5)
PLATELET # BLD AUTO: 275 K/UL (ref 150–350)
PMV BLD AUTO: 10.5 FL (ref 9.2–12.9)
POIKILOCYTOSIS BLD QL SMEAR: SLIGHT
POLYCHROMASIA BLD QL SMEAR: ABNORMAL
POTASSIUM SERPL-SCNC: 4.7 MMOL/L (ref 3.5–5.1)
PROT SERPL-MCNC: 6.1 G/DL (ref 6–8.4)
PROTHROMBIN TIME: 32.2 SEC (ref 9–12.5)
RBC # BLD AUTO: 4.18 M/UL (ref 4.6–6.2)
SODIUM SERPL-SCNC: 140 MMOL/L (ref 136–145)
WBC # BLD AUTO: 11.43 K/UL (ref 3.9–12.7)

## 2020-05-26 PROCEDURE — 83735 ASSAY OF MAGNESIUM: CPT

## 2020-05-26 PROCEDURE — 85730 THROMBOPLASTIN TIME PARTIAL: CPT

## 2020-05-26 PROCEDURE — 63700000 PHARM REV CODE 250 ALT 637 W/O HCPCS: Performed by: STUDENT IN AN ORGANIZED HEALTH CARE EDUCATION/TRAINING PROGRAM

## 2020-05-26 PROCEDURE — 85027 COMPLETE CBC AUTOMATED: CPT

## 2020-05-26 PROCEDURE — 84100 ASSAY OF PHOSPHORUS: CPT

## 2020-05-26 PROCEDURE — 85610 PROTHROMBIN TIME: CPT

## 2020-05-26 PROCEDURE — 36415 COLL VENOUS BLD VENIPUNCTURE: CPT

## 2020-05-26 PROCEDURE — 80053 COMPREHEN METABOLIC PANEL: CPT

## 2020-05-26 PROCEDURE — 25000003 PHARM REV CODE 250: Performed by: THORACIC SURGERY (CARDIOTHORACIC VASCULAR SURGERY)

## 2020-05-26 PROCEDURE — 25000003 PHARM REV CODE 250: Performed by: STUDENT IN AN ORGANIZED HEALTH CARE EDUCATION/TRAINING PROGRAM

## 2020-05-26 PROCEDURE — 85007 BL SMEAR W/DIFF WBC COUNT: CPT

## 2020-05-26 RX ORDER — POTASSIUM CHLORIDE 20 MEQ/1
20 TABLET, EXTENDED RELEASE ORAL 2 TIMES DAILY
Qty: 60 TABLET | Refills: 1 | Status: SHIPPED | OUTPATIENT
Start: 2020-05-26 | End: 2020-09-11

## 2020-05-26 RX ORDER — VALSARTAN 40 MG/1
40 TABLET ORAL 2 TIMES DAILY
Qty: 180 TABLET | Refills: 3 | Status: SHIPPED | OUTPATIENT
Start: 2020-05-26 | End: 2021-03-03

## 2020-05-26 RX ORDER — PANTOPRAZOLE SODIUM 40 MG/1
40 TABLET, DELAYED RELEASE ORAL
Qty: 30 TABLET | Refills: 6 | Status: SHIPPED | OUTPATIENT
Start: 2020-05-26 | End: 2022-05-04 | Stop reason: SDUPTHER

## 2020-05-26 RX ORDER — WARFARIN 1 MG/1
1 TABLET ORAL DAILY
Qty: 30 TABLET | Refills: 11 | Status: SHIPPED | OUTPATIENT
Start: 2020-05-26 | End: 2020-06-22 | Stop reason: SDUPTHER

## 2020-05-26 RX ORDER — OXYCODONE HYDROCHLORIDE 5 MG/1
5 TABLET ORAL EVERY 4 HOURS PRN
Qty: 42 TABLET | Refills: 0 | Status: SHIPPED | OUTPATIENT
Start: 2020-05-26 | End: 2020-06-02

## 2020-05-26 RX ORDER — METOPROLOL SUCCINATE 100 MG/1
100 TABLET, EXTENDED RELEASE ORAL DAILY
Qty: 30 TABLET | Refills: 11 | Status: SHIPPED | OUTPATIENT
Start: 2020-05-27 | End: 2021-06-24 | Stop reason: SDUPTHER

## 2020-05-26 RX ORDER — FUROSEMIDE 20 MG/1
20 TABLET ORAL 2 TIMES DAILY
Qty: 60 TABLET | Refills: 11 | Status: SHIPPED | OUTPATIENT
Start: 2020-05-26 | End: 2020-09-11

## 2020-05-26 RX ADMIN — VALSARTAN 40 MG: 40 TABLET, FILM COATED ORAL at 08:05

## 2020-05-26 RX ADMIN — PRAVASTATIN SODIUM 40 MG: 40 TABLET ORAL at 08:05

## 2020-05-26 RX ADMIN — ASPIRIN 81 MG: 81 TABLET, COATED ORAL at 08:05

## 2020-05-26 RX ADMIN — FUROSEMIDE 40 MG: 40 TABLET ORAL at 08:05

## 2020-05-26 RX ADMIN — PANTOPRAZOLE SODIUM 40 MG: 40 TABLET, DELAYED RELEASE ORAL at 08:05

## 2020-05-26 RX ADMIN — ACETAMINOPHEN 1000 MG: 500 TABLET ORAL at 06:05

## 2020-05-26 RX ADMIN — METOPROLOL SUCCINATE 100 MG: 100 TABLET, EXTENDED RELEASE ORAL at 08:05

## 2020-05-26 RX ADMIN — POTASSIUM CHLORIDE 40 MEQ: 20 SOLUTION ORAL at 08:05

## 2020-05-26 NOTE — PLAN OF CARE
05/26/20 0701   Discharge Reassessment   Assessment Type Discharge Planning Reassessment   Discharge Plan A Home with family;Home Health

## 2020-05-26 NOTE — PLAN OF CARE
DX:CAD    Shift Events:Post op care      Plan of Care:Discharge home 5/26/20 monitor midsternal incision     Neuro: A/O x4      Respiratory:RA      Cardiac: N/S to S/T     Diet:cardiac with 1500 fr       Gtts:N/A      Skin:Midsternal incison

## 2020-05-26 NOTE — HOSPITAL COURSE
On 5/19/2020 the patient was taken to the Operating Room for the above stated procedure. Please see the previously dictated operative report for complete details. Postoperatively, the patient was taken from the  Operating Room to the ICU where the vital signs were monitored and pain was kept under control. The patient was weaned from the drips and extubated in the ICU per protocol. Once hemodynamically stable, the patient was transferred to the Cardiac Step-Down floor for continued strengthening and ambulation. On postoperative day 7, the patient was ready for discharge to home. At the time of discharge, the patient was ambulating unassisted. Pain was well controlled with oral analgesics and the patient was tolerating the diet.     MOBILITY AND ACTIVITY: As tolerated. Patient may shower. No heavy lifting of greater than 5 pounds and no driving.     DIET: An 1800-calorie ADA with a 1500 mL fluid restriction.     WOUND CARE INSTRUCTIONS: Check for redness, swelling and drainage around the  incision or wound. Patient is to call for any obvious bleeding, drainage, pus from the wound, unusual problems or difficulties or temperature of greater than 101   degrees.     FOLLOWUP: Follow up with  in approximately 3 weeks. Prior to this  appointment, the patient will have a chest x-ray and EKG.     Patient not placed on Ace-Inhibitor at the time of discharge due to potential for hypotension      DISCHARGE CONDITION: At the time of discharge, the patient was in sinus rhythm and afebrile with stable vital signs.

## 2020-05-26 NOTE — PROGRESS NOTES
Cardiothoracic Surgery  Progress Note      Admit Date: 5/14/2020    Disease Etiology: Ischemic  Open Chest: no  Surgery Performed: 5/19/2020 2V CABG/AVR; 5/20/2020 mediastinal washout and closure    ASSESSMENT/PLAN:     I conducted multidisciplinary rounds in conjunction with the ICU/Critical Care attending staff and/or residents, with involvement of ancillary services as appropriate. The patient's general condition was reviewed, specifically including hemodynamic performance, dietary and nutritional status, pharmacy concerns, and status of expected transition to stepdown care. Plan has been discussed and agreed upon.    Plan:  Valsartan 40 BID started today. Coumadin decreased to 1 QD. Stepdown.     For details see the critical care note.    Dodie Starkey MD  Cardiac Surgery Resident, PGY7  Cardiothoracic Surgery  [facility]

## 2020-05-26 NOTE — PROGRESS NOTES
73 y/o male who recently underwent CABGx2 and aortic valve replacement with porcine bioprosthesis. Other PMH includes CAD, HTN, GERD.    Calendar updated with inpatient warfarin doses/INRs. Will f/u with patient 5/27 to discuss OAC and schedule follow-up monitoring.

## 2020-05-26 NOTE — DISCHARGE SUMMARY
[facility]  Cardiothoracic Surgery  Discharge Summary      Patient Name: Brad Kearns  MRN: [id number]  Admission Date: 5/14/2020  Hospital Length of Stay: 12 days  Discharge Date and Time:  05/26/2020 12:53 PM  Attending Physician: Guicho Quezada MD   Discharging Provider: Manda Goodson NP  Primary Care Provider: Duke Bowden MD    HPI:   Mr. Kearns is a pleasant 74 year old male nonsmoker with known aortic regurgitation with bicuspid aortic valve, heart failure preserved ejection fraction, and hypertension who presented with NSTEMI (peak troponin 2.1) and heart failure symptoms, underwent coronary angiogram showing left main disease.  Angiogram details include 60% distal left main disease, 90% early mid LAD + 90% early distal LAD with moderate mid and distal LAD targets, moderate sized first diagonal artery with 90% distal lesion, 90% mid LCx lesion with moderate sized left posterolateral ventricular artery, 60% mid RCA and 50% distal RCA lesions.  The transthoracic echo from May 2020 shows 55% ejection fraction, LVEDD 6.4cm, severe aortic regurgitation with bicuspid aortic valve, Sinus of Valsalva 3.5cm, ST junction 3.6cm, Ascending Aorta 3.5cm.  The transthoracic echo from March 2020 shows Sinus of Valsalva 4.8cm, ST junction 3.9cm, Ascending Aorta 3.9cm.   CTA chest showed 4.1cm Sinus of Valsalva, 3.5cm ST junction, and 3.5cm Ascending Aorta. On May 19, patient underwent an aortic valve replacement with 29mm [employer] Mosaic porcine bioprosthesis  Double vessel coronary artery bypass grafting with left internal mammary artery (skeletonized) to the left anterior descending artery, saphenous vein graft to the first obtuse marginal artery (left posterolateral ventricular artery) and endoscopic saphenous vein harvest from the left lower extremity     Given the severity of disease and the symptoms, I recommend coronary artery bypass surgery x 2 ( skeletonized LIMA-mid or distal  LAD, SVG-LPL), aortic valve replacement (bioprosthetic), possible aortic root replacement, possible ascending aorta replacement, possible karon arch replacement under hypothermic circulatory arrest. The risks and benefits were explained and informed consent was obtained.     On May 20, 2020, the patient is a 74 year old male who underwent aortic valve replacement, double vessel coronary artery bypass surgery yesterday evening,  Overnight, chest tube output was low until around 5 hours postoperatively, the patient awoke and coughed vigorously.  Afterwards, chest tube output increased moderately.  An initial chest X ray showed minimal pleural effusion and blood products were given which he appeared to respond to well as the chest tube output decreased.  Repeat chest X ray this morning showed a hemothorax and a chest tube was placed.  Chest tube output afterwards had low to moderate output and a repeat chest X ray after chest tube insertion showed residual left hemothorax.  The decision was made to take him to the operating room for mediastinal exploration and drainage of left hemothorax.  The risks and benefits were explained to the patient's family and informed consent was obtained.        Procedure(s) (LRB):  EXPLORATION, WOUND- Chest washout (N/A)      Indwelling Lines/Drains at time of discharge:   Lines/Drains/Airways     Central Venous Catheter Line                 Percutaneous Central Line Insertion/Assessment - Quad lumen  05/19/20 0103 7 days              Hospital Course: On 5/19/2020 the patient was taken to the Operating Room for the above stated procedure. Please see the previously dictated operative report for complete details. Postoperatively, the patient was taken from the  Operating Room to the ICU where the vital signs were monitored and pain was kept under control. The patient was weaned from the drips and extubated in the ICU per protocol. Once hemodynamically stable, the patient was transferred to  the Cardiac Step-Down floor for continued strengthening and ambulation. On postoperative day 7, the patient was ready for discharge to home. At the time of discharge, the patient was ambulating unassisted. Pain was well controlled with oral analgesics and the patient was tolerating the diet.     MOBILITY AND ACTIVITY: As tolerated. Patient may shower. No heavy lifting of greater than 5 pounds and no driving.     DIET: An 1800-calorie ADA with a 1500 mL fluid restriction.     WOUND CARE INSTRUCTIONS: Check for redness, swelling and drainage around the  incision or wound. Patient is to call for any obvious bleeding, drainage, pus from the wound, unusual problems or difficulties or temperature of greater than 101   degrees.     FOLLOWUP: Follow up with  in approximately 3 weeks. Prior to this  appointment, the patient will have a chest x-ray and EKG.     Patient not placed on Ace-Inhibitor at the time of discharge due to potential for hypotension      DISCHARGE CONDITION: At the time of discharge, the patient was in sinus rhythm and afebrile with stable vital signs.    Consults (From admission, onward)        Status Ordering Provider     Consult to Endocrinology  Once     Provider:  (Not yet assigned)    Completed CRISTY MCNEIL     Inpatient consult to Cardiothoracic Surgery  Once     Provider:  (Not yet assigned)    Completed CANDELARIO CLARK     Inpatient consult to Interventional Cardiology  Once     Provider:  (Not yet assigned)    Completed MARLO FOSS     Inpatient consult to Registered Dietitian/Nutritionist  Once     Provider:  (Not yet assigned)    Completed CRISTY MCNEIL          Pending Diagnostic Studies:     Procedure Component Value Units Date/Time    Troponin I #2 [[id number]] Collected:  05/14/20 9000    Order Status:  Sent Lab Status:  In process Updated:  05/14/20 1810    Specimen:  Blood           No new Assessment & Plan notes have been filed under this hospital  service since the last note was generated.  Service: Cardiothoracic Surgery    Final Active Diagnoses:    Diagnosis Date Noted POA    PRINCIPAL PROBLEM:  CAD (coronary artery disease) [I25.10] 05/19/2020 Yes    ACS (acute coronary syndrome) [I24.9]  Yes    Chest pain [R07.9] 05/24/2020 Yes    Encounter for weaning from ventilator [Z99.11]  Not Applicable    Acute blood loss anemia [D62]  Yes    Acute pulmonary edema [J81.0]  Yes    S/P CABG (coronary artery bypass graft) [Z95.1] 05/20/2020 Not Applicable    Acute hyperglycemia [R73.9] 05/20/2020 Unknown    Status post aortic valve replacement [Z95.2] 05/19/2020 Not Applicable    Hypertension [I10] 03/13/2020 Yes      Problems Resolved During this Admission:    Diagnosis Date Noted Date Resolved POA    Aortic insufficiency [I35.1] 05/16/2020 05/19/2020 Yes    NSTEMI (non-ST elevated myocardial infarction) [I21.4] 05/14/2020 05/19/2020 Yes    Nonrheumatic aortic valve insufficiency [I35.1] 03/13/2020 05/19/2020 Yes      Discharged Condition: stable    Disposition: Home or Self Care    Follow Up:    Patient Instructions:      Ambulatory referral/consult to Home Health   Standing Status: Future   Referral Priority: Routine Referral Type: Home Health   Referral Reason: Specialty Services Required   Requested Specialty: Home Health Services   Number of Visits Requested: 1     Ambulatory referral/consult to Anticoagulation Monitoring   Standing Status: Future   Referral Priority: Routine Referral Type: Consultation   Referral Reason: Specialty Services Required   Requested Specialty: Cardiology   Number of Visits Requested: 1     Medications:  Reconciled Home Medications:      Medication List      START taking these medications    furosemide 20 MG tablet  Commonly known as:  LASIX  Take 1 tablet (20 mg total) by mouth 2 (two) times daily.     metoprolol succinate 100 MG 24 hr tablet  Commonly known as:  TOPROL-XL  Take 1 tablet (100 mg total) by mouth once  daily.  Start taking on:  May 27, 2020     oxyCODONE 5 MG immediate release tablet  Commonly known as:  ROXICODONE  Take 1 tablet (5 mg total) by mouth every 4 (four) hours as needed.     pantoprazole 40 MG tablet  Commonly known as:  PROTONIX  Take 1 tablet (40 mg total) by mouth before breakfast.     potassium chloride SA 20 MEQ tablet  Commonly known as:  K-DUR,KLOR-CON  Take 1 tablet (20 mEq total) by mouth 2 (two) times daily.     warfarin 1 MG tablet  Commonly known as:  COUMADIN  Take 1 tablet (1 mg total) by mouth Daily.        CHANGE how you take these medications    valsartan 40 MG tablet  Commonly known as:  DIOVAN  Take 1 tablet (40 mg total) by mouth 2 (two) times daily.  What changed:    · medication strength  · how much to take        CONTINUE taking these medications    aspirin 81 MG EC tablet  Commonly known as:  ECOTRIN  Take 81 mg by mouth every morning.     fish oil-omega-3 fatty acids 300-1,000 mg capsule  Take 1 g by mouth every morning.     multivitamin capsule  Take 1 capsule by mouth once daily.     pravastatin 40 MG tablet  Commonly known as:  PRAVACHOL  Take 1 tablet (40 mg total) by mouth once daily.     VITAMIN B-1 50 MG tablet  Generic drug:  thiamine  Take 50 mg by mouth once daily.     VITAMIN C 1000 MG tablet  Generic drug:  ascorbic acid (vitamin C)  Take 1,000 mg by mouth every morning.        STOP taking these medications    amLODIPine 5 MG tablet  Commonly known as:  NORVASC     esomeprazole 40 MG capsule  Commonly known as:  NEXIUM     NIFEdipine 60 MG (OSM) 24 hr tablet  Commonly known as:  PROCARDIA-XL          Time spent on the discharge of patient: 39 minutes    Manda Goodson NP  Cardiothoracic Surgery  [facility]

## 2020-05-26 NOTE — NURSING
Telemetry removed. PIV x2 removed. AVS provided and patient verbalized understanding. All questions answered at this time. Patient brought to ride via wheelchair.

## 2020-05-27 ENCOUNTER — PATIENT OUTREACH (OUTPATIENT)
Dept: ADMINISTRATIVE | Facility: CLINIC | Age: 75
End: 2020-05-27

## 2020-05-27 ENCOUNTER — DOCUMENTATION ONLY (OUTPATIENT)
Dept: CARDIOTHORACIC SURGERY | Facility: CLINIC | Age: 75
End: 2020-05-27

## 2020-05-27 DIAGNOSIS — Z95.1 S/P CABG X 3: Primary | ICD-10-CM

## 2020-05-27 PROBLEM — I51.3 LEFT VENTRICULAR THROMBUS: Status: ACTIVE | Noted: 2020-05-27

## 2020-05-27 NOTE — PROGRESS NOTES
C3 nurse attempted to contact patient. No answer. The following message was left for the patient to return the call:  Good afternoon, I am a nurse calling on behalf of [facility] from the [facility]ination [facility].  This is a Transitional Care Call for Brad Kearns. When you have a moment please contact us at [phone] or [phone] Monday through Friday, between the hours of 8 am to 4 pm. We look forward to speaking with you. On behalf of [facility] have a nice day.    The patient does not have a scheduled HOSFU appointment within 7-14 days post hospital discharge date 5/26/20. Non [facility] PCP

## 2020-05-27 NOTE — PROGRESS NOTES
Called pt following hospital discharge.  Reiterated the need for pt to clean his incision everyday with soap and water, and to walk as much as he can.  Reminded pt not to drive for the first 4 weeks after surgery, and to refrain from lifting, pushing, and pulling anything greater than 5 pounds for the first 6 weeks following his surgery.  Instructed pt to perform daily weights.  Pt instructed to notify the clinic if he has a weight gain greater than 3 pounds in one day.  Pt instructed to call the clinic with any questions or concerns.  Pt verbalized understanding.

## 2020-05-27 NOTE — PROGRESS NOTES
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 He is s/p CABG w/ AVR (bioprosthetic) 5/19 w/ complication of hemothorax and surgery for drainage on 5/20. He was d/c 5/26. INR was increasing. LMFCB at home and cell. LMFCB with  to coordinate INR.     S/w wife who confirmed d/c dose. She reported HH is admitting pt tomorrow. Will plan for INR tomorrow.

## 2020-05-28 ENCOUNTER — NURSE TRIAGE (OUTPATIENT)
Dept: ADMINISTRATIVE | Facility: CLINIC | Age: 75
End: 2020-05-28

## 2020-05-28 PROCEDURE — G0180 PR HOME HEALTH MD CERTIFICATION: ICD-10-PCS | Mod: ,,, | Performed by: THORACIC SURGERY (CARDIOTHORACIC VASCULAR SURGERY)

## 2020-05-28 PROCEDURE — G0180 MD CERTIFICATION HHA PATIENT: HCPCS | Mod: ,,, | Performed by: THORACIC SURGERY (CARDIOTHORACIC VASCULAR SURGERY)

## 2020-05-28 NOTE — TELEPHONE ENCOUNTER
Called patient on behalf of [facility] Post Procedural Symptom Tracker. Pt denied developing any fever, cough or shortness of breath since the procedure.

## 2020-05-28 NOTE — PATIENT INSTRUCTIONS
When to Call the Doctor After Bypass Surgery    Call your doctor if you have any of these symptoms:  · Angina or chest pain symptoms like those you felt before surgery (call 911)  · Fever  of 100.4?F (38?C) or higher, or as directed by your healthcare provider  · Unexplained chills or sweating  · Shortness of breath or trouble breathing  · Sharp pain in the chest on taking a deep breath  · Belly (abdominal) pain, nausea, or vomiting that doesn't go away  · Fast or irregular heartbeat  · Dizziness or fainting  · Increasing pain that doesn't get better after taking pain medicine  · Swelling, redness, oozing, or cloudy discharge at the incision sites  · Unexplained bruising or bleeding  · Continued sensation of motion or clicking sounds in your breastbone  · Sudden weight gain. This means 5 pounds or more in 1 week.  · Increased swelling of the legs, especially on the side where the vein was not removed    © 4244-1423 [employer]. [address] [place] [address], [place] [address]. All rights reserved. This information is not intended as a substitute for professional medical care. Always follow your healthcare professional's instructions.

## 2020-05-29 ENCOUNTER — ANTI-COAG VISIT (OUTPATIENT)
Dept: CARDIOLOGY | Facility: CLINIC | Age: 75
End: 2020-05-29
Payer: MEDICARE

## 2020-05-29 DIAGNOSIS — Z79.01 LONG TERM (CURRENT) USE OF ANTICOAGULANTS: ICD-10-CM

## 2020-05-29 DIAGNOSIS — I51.3 LEFT VENTRICULAR THROMBUS: ICD-10-CM

## 2020-05-29 DIAGNOSIS — Z95.2 STATUS POST AORTIC VALVE REPLACEMENT: ICD-10-CM

## 2020-05-29 LAB — INR PPP: 1.8

## 2020-05-29 PROCEDURE — 93793 ANTICOAG MGMT PT WARFARIN: CPT | Mod: S$GLB,,,

## 2020-05-29 PROCEDURE — 93793 PR ANTICOAGULANT MGMT FOR PT TAKING WARFARIN: ICD-10-PCS | Mod: S$GLB,,,

## 2020-05-29 NOTE — TELEPHONE ENCOUNTER
Pt contacted through Post Procedural Symptom Tracking. Denies any cough, fever, or difficulty breathing since procedure.  Pt instructed to call back or contact provider with any changes of condition or concerns.     Reason for Disposition   Health Information question, no triage required and triager able to answer question    Protocols used: INFORMATION ONLY CALL-A-AH

## 2020-05-29 NOTE — PROGRESS NOTES
Called [facility]  to check if INR was tested this week, spoke with Niurka and she will have nurse call back

## 2020-05-31 ENCOUNTER — TELEPHONE (OUTPATIENT)
Dept: CARDIOLOGY | Facility: CLINIC | Age: 75
End: 2020-05-31

## 2020-05-31 DIAGNOSIS — I51.3 LV (LEFT VENTRICULAR) MURAL THROMBUS: Primary | ICD-10-CM

## 2020-05-31 NOTE — TELEPHONE ENCOUNTER
----- Message from Lila Felipe RN sent at 5/26/2020 11:22 AM CDT -----  Mr Kearns will DC home today with new Coumadin. Dr Cuellar, will you follow his INR, please?

## 2020-05-31 NOTE — TELEPHONE ENCOUNTER
It looks like he has already been referred to coumadin clinic. Antoinette, will you please touch base with them and make sure?    Thanks.

## 2020-06-01 ENCOUNTER — ANTI-COAG VISIT (OUTPATIENT)
Dept: CARDIOLOGY | Facility: CLINIC | Age: 75
End: 2020-06-01
Payer: MEDICARE

## 2020-06-01 ENCOUNTER — NURSE TRIAGE (OUTPATIENT)
Dept: ADMINISTRATIVE | Facility: CLINIC | Age: 75
End: 2020-06-01

## 2020-06-01 DIAGNOSIS — Z79.01 LONG TERM (CURRENT) USE OF ANTICOAGULANTS: ICD-10-CM

## 2020-06-01 DIAGNOSIS — I51.3 LEFT VENTRICULAR THROMBUS: ICD-10-CM

## 2020-06-01 DIAGNOSIS — Z95.2 STATUS POST AORTIC VALVE REPLACEMENT: ICD-10-CM

## 2020-06-01 LAB — INR PPP: 2.2

## 2020-06-01 PROCEDURE — 93793 ANTICOAG MGMT PT WARFARIN: CPT | Mod: S$GLB,,,

## 2020-06-01 PROCEDURE — 93793 PR ANTICOAGULANT MGMT FOR PT TAKING WARFARIN: ICD-10-PCS | Mod: S$GLB,,,

## 2020-06-04 ENCOUNTER — ANTI-COAG VISIT (OUTPATIENT)
Dept: CARDIOLOGY | Facility: CLINIC | Age: 75
End: 2020-06-04
Payer: MEDICARE

## 2020-06-04 DIAGNOSIS — I51.3 LEFT VENTRICULAR THROMBUS: ICD-10-CM

## 2020-06-04 DIAGNOSIS — Z95.2 STATUS POST AORTIC VALVE REPLACEMENT: ICD-10-CM

## 2020-06-04 DIAGNOSIS — Z79.01 LONG TERM (CURRENT) USE OF ANTICOAGULANTS: ICD-10-CM

## 2020-06-04 LAB — INR PPP: 1.8

## 2020-06-04 PROCEDURE — 93793 ANTICOAG MGMT PT WARFARIN: CPT | Mod: S$GLB,,,

## 2020-06-04 PROCEDURE — 93793 PR ANTICOAGULANT MGMT FOR PT TAKING WARFARIN: ICD-10-PCS | Mod: S$GLB,,,

## 2020-06-05 ENCOUNTER — LAB VISIT (OUTPATIENT)
Dept: LAB | Facility: HOSPITAL | Age: 75
End: 2020-06-05
Attending: INTERNAL MEDICINE
Payer: MEDICARE

## 2020-06-05 ENCOUNTER — OFFICE VISIT (OUTPATIENT)
Dept: INTERNAL MEDICINE | Facility: CLINIC | Age: 75
End: 2020-06-05
Payer: MEDICARE

## 2020-06-05 VITALS
HEART RATE: 70 BPM | OXYGEN SATURATION: 99 % | DIASTOLIC BLOOD PRESSURE: 78 MMHG | BODY MASS INDEX: 25.18 KG/M2 | HEIGHT: 69 IN | WEIGHT: 170 LBS | SYSTOLIC BLOOD PRESSURE: 126 MMHG

## 2020-06-05 DIAGNOSIS — I25.10 CORONARY ARTERY DISEASE, ANGINA PRESENCE UNSPECIFIED, UNSPECIFIED VESSEL OR LESION TYPE, UNSPECIFIED WHETHER NATIVE OR TRANSPLANTED HEART: ICD-10-CM

## 2020-06-05 DIAGNOSIS — Z12.5 ENCOUNTER FOR SCREENING FOR MALIGNANT NEOPLASM OF PROSTATE: ICD-10-CM

## 2020-06-05 DIAGNOSIS — I25.10 CORONARY ARTERY DISEASE, ANGINA PRESENCE UNSPECIFIED, UNSPECIFIED VESSEL OR LESION TYPE, UNSPECIFIED WHETHER NATIVE OR TRANSPLANTED HEART: Primary | ICD-10-CM

## 2020-06-05 DIAGNOSIS — N40.0 BENIGN PROSTATIC HYPERPLASIA, UNSPECIFIED WHETHER LOWER URINARY TRACT SYMPTOMS PRESENT: ICD-10-CM

## 2020-06-05 LAB
ALBUMIN SERPL BCP-MCNC: 3.8 G/DL (ref 3.5–5.2)
ALP SERPL-CCNC: 100 U/L (ref 55–135)
ALT SERPL W/O P-5'-P-CCNC: 42 U/L (ref 10–44)
ANION GAP SERPL CALC-SCNC: 11 MMOL/L (ref 8–16)
AST SERPL-CCNC: 34 U/L (ref 10–40)
BASOPHILS # BLD AUTO: 0.08 K/UL (ref 0–0.2)
BASOPHILS NFR BLD: 0.8 % (ref 0–1.9)
BILIRUB SERPL-MCNC: 0.5 MG/DL (ref 0.1–1)
BNP SERPL-MCNC: 220 PG/ML (ref 0–99)
BUN SERPL-MCNC: 19 MG/DL (ref 8–23)
CALCIUM SERPL-MCNC: 9.9 MG/DL (ref 8.7–10.5)
CHLORIDE SERPL-SCNC: 105 MMOL/L (ref 95–110)
CO2 SERPL-SCNC: 25 MMOL/L (ref 23–29)
COMPLEXED PSA SERPL-MCNC: 8.5 NG/ML (ref 0–4)
CREAT SERPL-MCNC: 1.2 MG/DL (ref 0.5–1.4)
DIFFERENTIAL METHOD: ABNORMAL
EOSINOPHIL # BLD AUTO: 0.2 K/UL (ref 0–0.5)
EOSINOPHIL NFR BLD: 2.3 % (ref 0–8)
ERYTHROCYTE [DISTWIDTH] IN BLOOD BY AUTOMATED COUNT: 14.6 % (ref 11.5–14.5)
EST. GFR  (AFRICAN AMERICAN): >60 ML/MIN/1.73 M^2
EST. GFR  (NON AFRICAN AMERICAN): 59.2 ML/MIN/1.73 M^2
GLUCOSE SERPL-MCNC: 89 MG/DL (ref 70–110)
HCT VFR BLD AUTO: 42.8 % (ref 40–54)
HGB BLD-MCNC: 12.8 G/DL (ref 14–18)
IMM GRANULOCYTES # BLD AUTO: 0.05 K/UL (ref 0–0.04)
IMM GRANULOCYTES NFR BLD AUTO: 0.5 % (ref 0–0.5)
LYMPHOCYTES # BLD AUTO: 2.5 K/UL (ref 1–4.8)
LYMPHOCYTES NFR BLD: 26 % (ref 18–48)
MCH RBC QN AUTO: 29.2 PG (ref 27–31)
MCHC RBC AUTO-ENTMCNC: 29.9 G/DL (ref 32–36)
MCV RBC AUTO: 98 FL (ref 82–98)
MONOCYTES # BLD AUTO: 1 K/UL (ref 0.3–1)
MONOCYTES NFR BLD: 9.9 % (ref 4–15)
NEUTROPHILS # BLD AUTO: 5.8 K/UL (ref 1.8–7.7)
NEUTROPHILS NFR BLD: 60.5 % (ref 38–73)
NRBC BLD-RTO: 0 /100 WBC
PLATELET # BLD AUTO: 523 K/UL (ref 150–350)
PMV BLD AUTO: 10.6 FL (ref 9.2–12.9)
POTASSIUM SERPL-SCNC: 5.3 MMOL/L (ref 3.5–5.1)
PROT SERPL-MCNC: 7.5 G/DL (ref 6–8.4)
RBC # BLD AUTO: 4.38 M/UL (ref 4.6–6.2)
SODIUM SERPL-SCNC: 141 MMOL/L (ref 136–145)
WBC # BLD AUTO: 9.57 K/UL (ref 3.9–12.7)

## 2020-06-05 PROCEDURE — 36415 COLL VENOUS BLD VENIPUNCTURE: CPT

## 2020-06-05 PROCEDURE — 99213 OFFICE O/P EST LOW 20 MIN: CPT | Mod: S$GLB,,, | Performed by: INTERNAL MEDICINE

## 2020-06-05 PROCEDURE — 1126F PR PAIN SEVERITY QUANTIFIED, NO PAIN PRESENT: ICD-10-PCS | Mod: S$GLB,,, | Performed by: INTERNAL MEDICINE

## 2020-06-05 PROCEDURE — 99999 PR PBB SHADOW E&M-EST. PATIENT-LVL IV: ICD-10-PCS | Mod: PBBFAC,,, | Performed by: INTERNAL MEDICINE

## 2020-06-05 PROCEDURE — 1101F PR PT FALLS ASSESS DOC 0-1 FALLS W/OUT INJ PAST YR: ICD-10-PCS | Mod: CPTII,S$GLB,, | Performed by: INTERNAL MEDICINE

## 2020-06-05 PROCEDURE — 99999 PR PBB SHADOW E&M-EST. PATIENT-LVL IV: CPT | Mod: PBBFAC,,, | Performed by: INTERNAL MEDICINE

## 2020-06-05 PROCEDURE — 3078F DIAST BP <80 MM HG: CPT | Mod: CPTII,S$GLB,, | Performed by: INTERNAL MEDICINE

## 2020-06-05 PROCEDURE — 84153 ASSAY OF PSA TOTAL: CPT

## 2020-06-05 PROCEDURE — 1126F AMNT PAIN NOTED NONE PRSNT: CPT | Mod: S$GLB,,, | Performed by: INTERNAL MEDICINE

## 2020-06-05 PROCEDURE — 1159F MED LIST DOCD IN RCRD: CPT | Mod: S$GLB,,, | Performed by: INTERNAL MEDICINE

## 2020-06-05 PROCEDURE — 3074F SYST BP LT 130 MM HG: CPT | Mod: CPTII,S$GLB,, | Performed by: INTERNAL MEDICINE

## 2020-06-05 PROCEDURE — 3078F PR MOST RECENT DIASTOLIC BLOOD PRESSURE < 80 MM HG: ICD-10-PCS | Mod: CPTII,S$GLB,, | Performed by: INTERNAL MEDICINE

## 2020-06-05 PROCEDURE — 1101F PT FALLS ASSESS-DOCD LE1/YR: CPT | Mod: CPTII,S$GLB,, | Performed by: INTERNAL MEDICINE

## 2020-06-05 PROCEDURE — 83880 ASSAY OF NATRIURETIC PEPTIDE: CPT

## 2020-06-05 PROCEDURE — 3074F PR MOST RECENT SYSTOLIC BLOOD PRESSURE < 130 MM HG: ICD-10-PCS | Mod: CPTII,S$GLB,, | Performed by: INTERNAL MEDICINE

## 2020-06-05 PROCEDURE — 80053 COMPREHEN METABOLIC PANEL: CPT

## 2020-06-05 PROCEDURE — 85025 COMPLETE CBC W/AUTO DIFF WBC: CPT

## 2020-06-05 PROCEDURE — 99213 PR OFFICE/OUTPT VISIT, EST, LEVL III, 20-29 MIN: ICD-10-PCS | Mod: S$GLB,,, | Performed by: INTERNAL MEDICINE

## 2020-06-05 PROCEDURE — 1159F PR MEDICATION LIST DOCUMENTED IN MEDICAL RECORD: ICD-10-PCS | Mod: S$GLB,,, | Performed by: INTERNAL MEDICINE

## 2020-06-08 ENCOUNTER — ANTI-COAG VISIT (OUTPATIENT)
Dept: CARDIOLOGY | Facility: CLINIC | Age: 75
End: 2020-06-08
Payer: MEDICARE

## 2020-06-08 ENCOUNTER — DOCUMENT SCAN (OUTPATIENT)
Dept: HOME HEALTH SERVICES | Facility: HOSPITAL | Age: 75
End: 2020-06-08
Payer: MEDICARE

## 2020-06-08 DIAGNOSIS — Z95.2 STATUS POST AORTIC VALVE REPLACEMENT: ICD-10-CM

## 2020-06-08 DIAGNOSIS — I51.3 LEFT VENTRICULAR THROMBUS: ICD-10-CM

## 2020-06-08 DIAGNOSIS — Z79.01 LONG TERM (CURRENT) USE OF ANTICOAGULANTS: ICD-10-CM

## 2020-06-08 LAB — INR PPP: 1.8

## 2020-06-08 PROCEDURE — 93793 PR ANTICOAGULANT MGMT FOR PT TAKING WARFARIN: ICD-10-PCS | Mod: S$GLB,,,

## 2020-06-08 PROCEDURE — 93793 ANTICOAG MGMT PT WARFARIN: CPT | Mod: S$GLB,,,

## 2020-06-11 ENCOUNTER — DOCUMENT SCAN (OUTPATIENT)
Dept: HOME HEALTH SERVICES | Facility: HOSPITAL | Age: 75
End: 2020-06-11
Payer: MEDICARE

## 2020-06-11 ENCOUNTER — EXTERNAL HOME HEALTH (OUTPATIENT)
Dept: HOME HEALTH SERVICES | Facility: HOSPITAL | Age: 75
End: 2020-06-11
Payer: MEDICARE

## 2020-06-11 ENCOUNTER — ANTI-COAG VISIT (OUTPATIENT)
Dept: CARDIOLOGY | Facility: CLINIC | Age: 75
End: 2020-06-11
Payer: MEDICARE

## 2020-06-11 DIAGNOSIS — Z95.2 STATUS POST AORTIC VALVE REPLACEMENT: ICD-10-CM

## 2020-06-11 DIAGNOSIS — Z79.01 LONG TERM (CURRENT) USE OF ANTICOAGULANTS: ICD-10-CM

## 2020-06-11 DIAGNOSIS — I51.3 LEFT VENTRICULAR THROMBUS: ICD-10-CM

## 2020-06-11 LAB — INR PPP: 2

## 2020-06-11 PROCEDURE — 93793 ANTICOAG MGMT PT WARFARIN: CPT | Mod: S$GLB,,,

## 2020-06-11 PROCEDURE — 93793 PR ANTICOAGULANT MGMT FOR PT TAKING WARFARIN: ICD-10-PCS | Mod: S$GLB,,,

## 2020-06-11 NOTE — PROGRESS NOTES
INR just in range. Will increase dose a little more based on INR trend and expected health changes. Recheck INR in 1 week

## 2020-06-13 NOTE — PROGRESS NOTES
Subjective:       Patient ID: Brad Kearns is a 75 y.o. male.    Chief Complaint: Hospital Follow Up    HPIPt had CABG and aortic valve replacement 2 weeks ago - he is doing very well.  No CP or SOB.  No swelling.  His appetite is good.    Review of Systems   Respiratory: Negative for shortness of breath.    Cardiovascular: Negative for chest pain.   Gastrointestinal: Negative for abdominal pain, diarrhea, nausea and vomiting.   Genitourinary: Negative for dysuria.   Neurological: Negative for seizures, syncope and headaches.       Objective:      Physical Exam  Constitutional:       General: He is not in acute distress.     Appearance: He is well-developed.   Eyes:      Pupils: Pupils are equal, round, and reactive to light.   Neck:      Musculoskeletal: Neck supple.      Thyroid: No thyromegaly.      Vascular: No JVD.   Cardiovascular:      Rate and Rhythm: Normal rate and regular rhythm.      Heart sounds: Normal heart sounds. No murmur. No friction rub. No gallop.    Pulmonary:      Effort: Pulmonary effort is normal.      Breath sounds: Normal breath sounds. No wheezing or rales.   Abdominal:      General: Bowel sounds are normal. There is no distension.      Palpations: Abdomen is soft. There is no mass.      Tenderness: There is no abdominal tenderness. There is no guarding or rebound.   Lymphadenopathy:      Cervical: No cervical adenopathy.   Skin:     General: Skin is warm and dry.   Neurological:      Mental Status: He is alert and oriented to person, place, and time.   Psychiatric:         Behavior: Behavior normal.         Thought Content: Thought content normal.         Judgment: Judgment normal.         Assessment:       1. Coronary artery disease, angina presence unspecified, unspecified vessel or lesion type, unspecified whether native or transplanted heart    2. Benign prostatic hyperplasia, unspecified whether lower urinary tract symptoms present    3. Encounter for screening for malignant  neoplasm of prostate         Plan:   Coronary artery disease, angina presence unspecified, unspecified vessel or lesion type, unspecified whether native or transplanted heart  -     CBC auto differential; Future; Expected date: 06/05/2020  -     Comprehensive metabolic panel; Future; Expected date: 06/05/2020  -     Brain Natriuretic Peptide; Future; Expected date: 06/05/2020    Benign prostatic hyperplasia, unspecified whether lower urinary tract symptoms present  -     PSA, Screening; Future; Expected date: 06/05/2020    Encounter for screening for malignant neoplasm of prostate   -     PSA, Screening; Future; Expected date: 06/05/2020

## 2020-06-18 ENCOUNTER — LAB VISIT (OUTPATIENT)
Dept: LAB | Facility: HOSPITAL | Age: 75
End: 2020-06-18
Payer: MEDICARE

## 2020-06-18 ENCOUNTER — ANTI-COAG VISIT (OUTPATIENT)
Dept: CARDIOLOGY | Facility: CLINIC | Age: 75
End: 2020-06-18
Payer: MEDICARE

## 2020-06-18 DIAGNOSIS — I51.3 LEFT VENTRICULAR THROMBUS: ICD-10-CM

## 2020-06-18 DIAGNOSIS — Z79.01 LONG TERM (CURRENT) USE OF ANTICOAGULANTS: ICD-10-CM

## 2020-06-18 DIAGNOSIS — Z95.2 STATUS POST AORTIC VALVE REPLACEMENT: ICD-10-CM

## 2020-06-18 LAB
INR PPP: 2.2 (ref 0.8–1.2)
PROTHROMBIN TIME: 20.6 SEC (ref 9–12.5)

## 2020-06-18 PROCEDURE — 36415 COLL VENOUS BLD VENIPUNCTURE: CPT | Mod: PO

## 2020-06-18 PROCEDURE — 85610 PROTHROMBIN TIME: CPT

## 2020-06-18 PROCEDURE — 93793 ANTICOAG MGMT PT WARFARIN: CPT | Mod: S$GLB,,,

## 2020-06-18 PROCEDURE — 93793 PR ANTICOAGULANT MGMT FOR PT TAKING WARFARIN: ICD-10-PCS | Mod: S$GLB,,,

## 2020-06-18 NOTE — PROGRESS NOTES
INR good on new dose. Maintain dose. Recheck INR in 1 week    Chart says he has HH. Is HH no longer on service? If they are, then send order to  as planned 6/24. If not, then chart needs to be updated to reflect lab site change and INR 6/23 is fine.

## 2020-06-18 NOTE — PROGRESS NOTES
6/18/20 pt's wife wants to know if pt can have pt to do retest on 6/24 instead of 6/25? so that she does not have to take 2 days off pt has appt schedule with Dr. Quezada on 6/24

## 2020-06-22 RX ORDER — WARFARIN 1 MG/1
TABLET ORAL
Qty: 72 TABLET | Refills: 11 | Status: SHIPPED | OUTPATIENT
Start: 2020-06-22 | End: 2020-09-11

## 2020-06-24 ENCOUNTER — TELEPHONE (OUTPATIENT)
Dept: CARDIAC REHAB | Facility: CLINIC | Age: 75
End: 2020-06-24

## 2020-06-24 ENCOUNTER — DOCUMENTATION ONLY (OUTPATIENT)
Dept: CARDIOTHORACIC SURGERY | Facility: CLINIC | Age: 75
End: 2020-06-24

## 2020-06-24 ENCOUNTER — OFFICE VISIT (OUTPATIENT)
Dept: CARDIOTHORACIC SURGERY | Facility: CLINIC | Age: 75
End: 2020-06-24
Payer: MEDICARE

## 2020-06-24 ENCOUNTER — ANTI-COAG VISIT (OUTPATIENT)
Dept: CARDIOLOGY | Facility: CLINIC | Age: 75
End: 2020-06-24
Payer: MEDICARE

## 2020-06-24 ENCOUNTER — HOSPITAL ENCOUNTER (OUTPATIENT)
Dept: CARDIOLOGY | Facility: CLINIC | Age: 75
Discharge: HOME | End: 2020-06-24
Payer: MEDICARE

## 2020-06-24 VITALS
WEIGHT: 170.63 LBS | OXYGEN SATURATION: 98 % | HEART RATE: 89 BPM | TEMPERATURE: 98 F | BODY MASS INDEX: 25.27 KG/M2 | HEIGHT: 69 IN | DIASTOLIC BLOOD PRESSURE: 95 MMHG | SYSTOLIC BLOOD PRESSURE: 138 MMHG

## 2020-06-24 DIAGNOSIS — Z95.1 S/P CABG (CORONARY ARTERY BYPASS GRAFT): Primary | ICD-10-CM

## 2020-06-24 DIAGNOSIS — Z79.01 LONG TERM (CURRENT) USE OF ANTICOAGULANTS: ICD-10-CM

## 2020-06-24 DIAGNOSIS — Z95.1 S/P CABG X 3: ICD-10-CM

## 2020-06-24 DIAGNOSIS — Z95.2 STATUS POST AORTIC VALVE REPLACEMENT: ICD-10-CM

## 2020-06-24 DIAGNOSIS — I51.3 LEFT VENTRICULAR THROMBUS: ICD-10-CM

## 2020-06-24 PROCEDURE — 99999 PR PBB SHADOW E&M-EST. PATIENT-LVL III: ICD-10-PCS | Mod: PBBFAC,,, | Performed by: THORACIC SURGERY (CARDIOTHORACIC VASCULAR SURGERY)

## 2020-06-24 PROCEDURE — 93793 PR ANTICOAGULANT MGMT FOR PT TAKING WARFARIN: ICD-10-PCS | Mod: S$GLB,,,

## 2020-06-24 PROCEDURE — 93005 EKG 12-LEAD: ICD-10-PCS | Mod: S$GLB,,, | Performed by: THORACIC SURGERY (CARDIOTHORACIC VASCULAR SURGERY)

## 2020-06-24 PROCEDURE — 99024 PR POST-OP FOLLOW-UP VISIT: ICD-10-PCS | Mod: S$GLB,,, | Performed by: THORACIC SURGERY (CARDIOTHORACIC VASCULAR SURGERY)

## 2020-06-24 PROCEDURE — 93010 EKG 12-LEAD: ICD-10-PCS | Mod: S$GLB,,, | Performed by: INTERNAL MEDICINE

## 2020-06-24 PROCEDURE — 99999 PR PBB SHADOW E&M-EST. PATIENT-LVL III: CPT | Mod: PBBFAC,,, | Performed by: THORACIC SURGERY (CARDIOTHORACIC VASCULAR SURGERY)

## 2020-06-24 PROCEDURE — 93005 ELECTROCARDIOGRAM TRACING: CPT | Mod: S$GLB,,, | Performed by: THORACIC SURGERY (CARDIOTHORACIC VASCULAR SURGERY)

## 2020-06-24 PROCEDURE — 99024 POSTOP FOLLOW-UP VISIT: CPT | Mod: S$GLB,,, | Performed by: THORACIC SURGERY (CARDIOTHORACIC VASCULAR SURGERY)

## 2020-06-24 PROCEDURE — 93793 ANTICOAG MGMT PT WARFARIN: CPT | Mod: S$GLB,,,

## 2020-06-24 PROCEDURE — 93010 ELECTROCARDIOGRAM REPORT: CPT | Mod: S$GLB,,, | Performed by: INTERNAL MEDICINE

## 2020-06-24 RX ORDER — NIFEDIPINE 60 MG/1
TABLET, EXTENDED RELEASE ORAL
COMMUNITY
Start: 2020-05-11 | End: 2020-09-11

## 2020-06-24 RX ORDER — LISINOPRIL 20 MG/1
TABLET ORAL
COMMUNITY
Start: 2020-03-29 | End: 2020-09-11

## 2020-06-24 NOTE — PROGRESS NOTES
INR trending low again. Patient is continuing to have improvements in activity/health status. Will increase dose a little more to keep INR mid-range. Recheck INR in 1 week

## 2020-06-24 NOTE — PROGRESS NOTES
Pt presented for post-op visit.  Pt accepted cardiac rehab. Submitted referral for Cardiac rehab to [facility].  Pt is scheduled to follow-up with his cardiologist,  .  Pt reminded to continue with his 5 pound lifting, pushing, and pulling restrictions for two more weeks, and that he can then advance to lifting, pushing, and pulling up to 20 pounds for 6 weeks, for a total of 12 weeks of restrictions.  Pt reminded to continue washing his incisions everyday with soap and water.  Pt verbalized understanding to all instructions.

## 2020-06-24 NOTE — TELEPHONE ENCOUNTER
----- Message from Basilia Khoury RN sent at 6/24/2020 12:41 PM CDT -----  Regarding: Cardiac Rehab phase 2  Please contact above patient to set up Cardiac rehab.    Thank you,  Basilia Khoury, RN  [facility]  [phone]

## 2020-06-24 NOTE — LETTER
[facility] - Cardiovascular Surg  [address]  [facility] [address]  Phone: [phone] June 24, 2020      Marguerite Cuellar MD  [address]  [facility] [address]    Patient: Brad Kearns   MR Number: [id number]   YOB: 1945   Date of Visit: 6/24/2020     Dear Dr. Cuellar,     It was a pleasure seeing your patient, Mr. Kearns, in our follow-up clinic today after his double vessel coronary artery bypass surgery and aortic valve replacement on May 19, 2020.  His postoperative recovery was normal and he is doing excellent.  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  He is a very active individual who walks daily and has no respiratory issues.     On examination today, his vital signs are normal and EKG is sinus rhythm.   The incision has healed very well and the sternum is stable.  Lungs are clear bilaterally and there is no heart murmur.  There are no signs of heart failure or peripheral edema.  He is now discharged from our clinic but please do not hesitate to contact me if there is any problem.     Thank you for your referral and for your trust and confidence in our [facility].  For convenience, attached below is a copy of the operative report.     Kindest regards,    Guicho Quezada MD  Cardiothoracic Surgery  [facility]    SS/afw          [facility]  Cardiothoracic Surgery Operative Report     Patient Name:  Brad Kearns; [id number]     Preoperative Diagnosis: Coronary Artery Disease, NSTEMI (121.4), acute on chronic diastolic heart failure (150.33), aortic regurgitation     Postoperative Diagnosis:  Same     Date of Operation:  05/19/2020      Operation:  Aortic valve replacement with 29mm Medtronic Mosaic porcine  bioprosthesis  Double vessel coronary artery bypass grafting  ? Left internal mammary artery (skeletonized) to the left anterior descending artery  ? Saphenous vein graft to the first obtuse marginal artery (left posterolateral ventricular artery)  Endoscopic saphenous vein harvest from the left lower extremity     Surgeon:  Guicho Quezada MD     Assistant Surgeon:  none     Fellow:  Dodie Starkey MD     Anesthesiologist:  Donovan Albert MD; Joshua Mckay MD     ----------------------------------------------------------------------------------------------------------        Indications for surgery: Mr. Kearns is a pleasant 74 year old male nonsmoker with known aortic regurgitation with bicuspid aortic valve, heart failure preserved ejection fraction, and hypertension who presented with NSTEMI (peak troponin 2.1) and heart failure symptoms, underwent coronary angiogram showing left main disease.  Angiogram details include 60% distal left main disease, 90% early mid LAD + 90% early distal LAD with moderate mid and distal LAD targets, moderate sized first diagonal artery with 90% distal lesion, 90% mid LCx lesion with moderate sized left posterolateral ventricular artery, 60% mid RCA and 50% distal RCA lesions.  The transthoracic echo from May 2020 shows 55% ejection fraction, LVEDD 6.4cm, severe aortic regurgitation with bicuspid aortic valve, Sinus of Valsalva 3.5cm, ST junction 3.6cm, Ascending Aorta 3.5cm.  The transthoracic echo from March 2020 shows Sinus of Valsalva 4.8cm, ST junction 3.9cm, Ascending Aorta 3.9cm.   CTA chest showed 4.1cm Sinus of Valsalva, 3.5cm ST junction, and 3.5cm Ascending Aorta.     Given the severity of disease and the symptoms, I recommend coronary artery bypass surgery x 2 ( skeletonized LIMA-mid or distal LAD, SVG-LPL), aortic valve replacement (bioprosthetic), possible aortic root replacement, possible ascending aorta replacement, possible karon arch replacement under  hypothermic circulatory arrest. The risks and benefits were explained and informed consent was obtained.      Gross findings: No pericardial adhesions. The aortic valve was tricuspid with severe diffuse fenestrations in all leaflets.  The aortic root was mildly dilated with the right coronary cusp being more dilated than the left and noncoronary cusps.  The dilation of the right coronary cusp was mostly inferiorly near the annulus.  Large bites taken with the pledgetted mattress sutures around the annulus and the dilated portion of the right coronary cusp to plicate the mild aneurysmal portion.  Moderate sized distal LAD target with extensive calcifications, moderate sized LPL target.  Good biventricular function pre and post bypass.        Procedure:  The patient was brought to the holding area and the indications for surgery were reviewed.  He was placed supine on the operating room table and prepped and draped in the usual sterile fashion. A surgical time out was performed.     A midline incision was followed with division of the sternum. The left internal mammary artery was harvested in a skeletonized fashion. Simultaneously, one piece of saphenous vein was harvested endoscopically from the leg. ACT guided heparinization was administered and the ascending aorta and right atrium were cannulated.  Cardiopulmonary bypass was commenced.  The ascending aorta was cannulated for administration of cardioplegia and a retrograde cardioplegia catheter was placed in the coronary sinus.  A left ventricular vent was placed through the right superior pulmonary vein.  A cross-clamp was applied and 300cc of cold blood cardioplegia was administered antegrade (severe aortic regurgitation), followed by 1500cc of retrograde cardioplegia. Subsequent doses of retrograde cardioplegia were administered every 20 minutes.       Attention was first turned to the obtuse marginal artery (left posterolateral ventricular artery). The heart was  "positioned and the vessel was exposed. The artery was opened and the arteriotomy elongated with scissors. A segment of vein was prepared for use.  An end to side anastomosis was constructed with continuous 7-0 prolene. The vein was infused with 100cc of cardioplegia to confirm patency and hemostasis.      Attention was turned to the distal left anterior descending artery. The heart was repositioned and the LAD was identified. The vessel was opened and arteriotomy extended with scissors.  The LIMA was brought to the field and prepared for use.  The LIMA was sewn end to side to the LAD with continuous 7-0 prolene. The vessel was briefly unclamped to assess for hemostasis.      Attention was turned to the aortic valve.  The aorta was opened and retraction sutures were placed. The valve was examined and found to be tricuspid with severe diffuse fenestrations in all leaflets.  It was carefully resected. A rongeur was used to remove any additional calcium taking care to eliminate any loose fragments.  The LVOT was sized with a Medtronic Mosaic valve sizer and found to be 29mm.  The aortic root was noted to be mildly dilated with the right coronary cusp being more dilated than the left and noncoronary cusps.  The dilation of the right coronary cusp was mostly inferiorly near the annulus.  Large bites were taken with the pledgetted mattress sutures around the annulus and the dilated portion of the right coronary cusp to plicate the mild aneurysmal portion.  The remaining pledgetted mattress sutures were placed circumferentially through the LVOT and again through the sewing cuff of the valve. The valve was tied in place. The aortotomy was closed with two layers of continuous 4-0 prolene suture.     An aortotomy was made and the proximal anastomosis of the SVG-LPL graft was constructed with 6-0 prolene.  A dose of warm "hot shot" cardioplegia was given retrograde.  Air was evacuated and the cross-clamp was removed. All " anastomoses were checked for hemostasis and the patient was weaned from bypass on minimal inotropic support.  The total cardiopulmonary bypass time was 187 minutes and cross clamp time was 162 minutes.  The cannulae were removed and heparin was reversed.  Temporary ventricular pacing wires were placed on the heart.  Drainage tubes were placed behind the sternum and in the left pleural space. The chest was closed with steel wires and the soft tissue was closed with absorbable suture.  A sterile dressing was applied and the patient was brought to the ICU in stable condition.      I was present in the operating room for the entire operation and immediately available thereafter.    CC  Duke Bowden MD

## 2020-06-24 NOTE — PROGRESS NOTES
Patient seen and examined. Patient is progressively increasing activity. No significant complaints.       Sternum: stable, incision CDI  EKG: NSR     Assessment:  Patient underwent the following on 5/19/2020:   Aortic valve replacement with 29mm Medtronic Mosaic porcine bioprosthesis. Double vessel coronary artery bypass grafting    Left internal mammary artery (skeletonized) to the left anterior descending artery    Saphenous vein graft to the first obtuse marginal artery (left posterolateral ventricular artery)    Endoscopic saphenous vein harvest from the left lower extremity    Plan:  Can begin driving   Can begin cardiac rehab 6 weeks after operation   We will refer to cardiology to assume care   DC lasix and potassium   Continue Coumadin for 3 months but will defer final discission to primary cardiology      No scheduled appointment, RTC prn      I have seen the patient and reviewed the nurse practitioner's note above. I have personally interviewed and examined the patient at bedside and agree with the findings.     Mr. Kearns is doing excellent after his double vessel coronary artery bypass surgery and aortic valve replacement on May 19, 2020.  He is walking around and feeling stronger.  He has no complaints.  Due to thrombus noted in his left ventricular apex, we will continue warfarin until at least 3 months postoperatively.  He can see me in clinic as needed.      Guicho Quezada MD  Cardiothoracic Surgery  [facility]

## 2020-07-01 ENCOUNTER — ANTI-COAG VISIT (OUTPATIENT)
Dept: CARDIOLOGY | Facility: CLINIC | Age: 75
End: 2020-07-01
Payer: MEDICARE

## 2020-07-01 ENCOUNTER — LAB VISIT (OUTPATIENT)
Dept: LAB | Facility: HOSPITAL | Age: 75
End: 2020-07-01
Attending: INTERNAL MEDICINE
Payer: MEDICARE

## 2020-07-01 DIAGNOSIS — Z79.01 LONG TERM (CURRENT) USE OF ANTICOAGULANTS: ICD-10-CM

## 2020-07-01 DIAGNOSIS — Z95.2 STATUS POST AORTIC VALVE REPLACEMENT: ICD-10-CM

## 2020-07-01 DIAGNOSIS — I51.3 LEFT VENTRICULAR THROMBUS: ICD-10-CM

## 2020-07-01 LAB
INR PPP: 2.5 (ref 0.8–1.2)
PROTHROMBIN TIME: 23.7 SEC (ref 9–12.5)

## 2020-07-01 PROCEDURE — 36415 COLL VENOUS BLD VENIPUNCTURE: CPT | Mod: PO

## 2020-07-01 PROCEDURE — 93793 PR ANTICOAGULANT MGMT FOR PT TAKING WARFARIN: ICD-10-PCS | Mod: S$GLB,,,

## 2020-07-01 PROCEDURE — 93793 ANTICOAG MGMT PT WARFARIN: CPT | Mod: S$GLB,,,

## 2020-07-01 PROCEDURE — 85610 PROTHROMBIN TIME: CPT

## 2020-07-02 ENCOUNTER — TELEPHONE (OUTPATIENT)
Dept: CARDIAC REHAB | Facility: CLINIC | Age: 75
End: 2020-07-02

## 2020-07-02 ENCOUNTER — TELEPHONE (OUTPATIENT)
Dept: CARDIOTHORACIC SURGERY | Facility: CLINIC | Age: 75
End: 2020-07-02

## 2020-07-02 NOTE — TELEPHONE ENCOUNTER
Informed pt that Per ADEN Walters we can not refill the oxycodone and pt can take the extra strength tylenol. Pt wife verbalized understanding           ----- Message from Tank Overton sent at 7/2/2020  9:06 AM CDT -----  Pt wife is calling to speak with someone in regards to getting a refill sent to pharmacy for oxycodone. Please give pt a call back at [phone].

## 2020-07-08 ENCOUNTER — TELEPHONE (OUTPATIENT)
Dept: CARDIAC REHAB | Facility: CLINIC | Age: 75
End: 2020-07-08

## 2020-07-15 ENCOUNTER — LAB VISIT (OUTPATIENT)
Dept: LAB | Facility: HOSPITAL | Age: 75
End: 2020-07-15
Attending: INTERNAL MEDICINE
Payer: MEDICARE

## 2020-07-15 DIAGNOSIS — I51.3 LEFT VENTRICULAR THROMBUS: ICD-10-CM

## 2020-07-15 DIAGNOSIS — Z79.01 LONG TERM (CURRENT) USE OF ANTICOAGULANTS: ICD-10-CM

## 2020-07-15 DIAGNOSIS — Z95.2 STATUS POST AORTIC VALVE REPLACEMENT: ICD-10-CM

## 2020-07-15 LAB
INR PPP: 2.3 (ref 0.8–1.2)
PROTHROMBIN TIME: 22 SEC (ref 9–12.5)

## 2020-07-15 PROCEDURE — 36415 COLL VENOUS BLD VENIPUNCTURE: CPT | Mod: PO

## 2020-07-15 PROCEDURE — 85610 PROTHROMBIN TIME: CPT

## 2020-07-16 ENCOUNTER — ANTI-COAG VISIT (OUTPATIENT)
Dept: CARDIOLOGY | Facility: CLINIC | Age: 75
End: 2020-07-16
Payer: MEDICARE

## 2020-07-16 DIAGNOSIS — Z79.01 LONG TERM (CURRENT) USE OF ANTICOAGULANTS: ICD-10-CM

## 2020-07-16 DIAGNOSIS — I51.3 LEFT VENTRICULAR THROMBUS: ICD-10-CM

## 2020-07-16 DIAGNOSIS — Z95.2 STATUS POST AORTIC VALVE REPLACEMENT: ICD-10-CM

## 2020-07-16 PROCEDURE — 93793 PR ANTICOAGULANT MGMT FOR PT TAKING WARFARIN: ICD-10-PCS | Mod: S$GLB,,,

## 2020-07-16 PROCEDURE — 93793 ANTICOAG MGMT PT WARFARIN: CPT | Mod: S$GLB,,,

## 2020-08-05 ENCOUNTER — LAB VISIT (OUTPATIENT)
Dept: LAB | Facility: HOSPITAL | Age: 75
End: 2020-08-05
Attending: INTERNAL MEDICINE
Payer: MEDICARE

## 2020-08-05 DIAGNOSIS — Z79.01 LONG TERM (CURRENT) USE OF ANTICOAGULANTS: ICD-10-CM

## 2020-08-05 DIAGNOSIS — Z95.2 STATUS POST AORTIC VALVE REPLACEMENT: ICD-10-CM

## 2020-08-05 DIAGNOSIS — I51.3 LEFT VENTRICULAR THROMBUS: ICD-10-CM

## 2020-08-05 LAB
INR PPP: 2 (ref 0.8–1.2)
PROTHROMBIN TIME: 21.3 SEC (ref 9–12.5)

## 2020-08-05 PROCEDURE — 85610 PROTHROMBIN TIME: CPT

## 2020-08-05 PROCEDURE — 36415 COLL VENOUS BLD VENIPUNCTURE: CPT | Mod: PO

## 2020-08-06 ENCOUNTER — ANTI-COAG VISIT (OUTPATIENT)
Dept: CARDIOLOGY | Facility: CLINIC | Age: 75
End: 2020-08-06
Payer: MEDICARE

## 2020-08-06 DIAGNOSIS — Z79.01 LONG TERM (CURRENT) USE OF ANTICOAGULANTS: ICD-10-CM

## 2020-08-06 DIAGNOSIS — Z95.2 STATUS POST AORTIC VALVE REPLACEMENT: ICD-10-CM

## 2020-08-06 DIAGNOSIS — I51.3 LEFT VENTRICULAR THROMBUS: ICD-10-CM

## 2020-08-06 PROCEDURE — 93793 PR ANTICOAGULANT MGMT FOR PT TAKING WARFARIN: ICD-10-PCS | Mod: S$GLB,,,

## 2020-08-06 PROCEDURE — 93793 ANTICOAG MGMT PT WARFARIN: CPT | Mod: S$GLB,,,

## 2020-08-06 NOTE — PROGRESS NOTES
INR at goal. Medications and chart reviewed. No changes noted to necessitate adjustment of warfarin or follow-up plan. See calendar. Will keep at 3 weeks since INR on low end

## 2020-08-26 ENCOUNTER — LAB VISIT (OUTPATIENT)
Dept: LAB | Facility: HOSPITAL | Age: 75
End: 2020-08-26
Attending: INTERNAL MEDICINE
Payer: MEDICARE

## 2020-08-26 DIAGNOSIS — Z79.01 LONG TERM (CURRENT) USE OF ANTICOAGULANTS: ICD-10-CM

## 2020-08-26 DIAGNOSIS — I51.3 LEFT VENTRICULAR THROMBUS: ICD-10-CM

## 2020-08-26 DIAGNOSIS — Z95.2 STATUS POST AORTIC VALVE REPLACEMENT: ICD-10-CM

## 2020-08-26 LAB
INR PPP: 2.2 (ref 0.8–1.2)
PROTHROMBIN TIME: 23.2 SEC (ref 9–12.5)

## 2020-08-26 PROCEDURE — 36415 COLL VENOUS BLD VENIPUNCTURE: CPT | Mod: PO

## 2020-08-26 PROCEDURE — 85610 PROTHROMBIN TIME: CPT

## 2020-08-27 ENCOUNTER — ANTI-COAG VISIT (OUTPATIENT)
Dept: CARDIOLOGY | Facility: CLINIC | Age: 75
End: 2020-08-27
Payer: MEDICARE

## 2020-08-27 DIAGNOSIS — Z79.01 LONG TERM (CURRENT) USE OF ANTICOAGULANTS: ICD-10-CM

## 2020-08-27 DIAGNOSIS — I51.3 LEFT VENTRICULAR THROMBUS: ICD-10-CM

## 2020-08-27 DIAGNOSIS — Z95.2 STATUS POST AORTIC VALVE REPLACEMENT: ICD-10-CM

## 2020-08-27 PROCEDURE — 93793 PR ANTICOAGULANT MGMT FOR PT TAKING WARFARIN: ICD-10-PCS | Mod: S$GLB,,,

## 2020-08-27 PROCEDURE — 93793 ANTICOAG MGMT PT WARFARIN: CPT | Mod: S$GLB,,,

## 2020-08-27 NOTE — PROGRESS NOTES
INR at goal. Medications and chart reviewed. No changes noted to necessitate adjustment of warfarin or follow-up plan. See calendar.    We are at 3 months duration. Will check with Dr. NED Cuellar on plans for a/c duration. Message sent. We will maintain dose for now and plan INR next month. I will update chart if anything changes.

## 2020-08-27 NOTE — PROGRESS NOTES
Wife returned call and was given lab result, verified coumadin dosage correctly, reports no changes, wife was advised to have Patient continue same dose of coumadin and given next lab date, verbalized understanding

## 2020-08-28 ENCOUNTER — PATIENT OUTREACH (OUTPATIENT)
Dept: ADMINISTRATIVE | Facility: HOSPITAL | Age: 75
End: 2020-08-28

## 2020-08-28 DIAGNOSIS — Z11.59 NEED FOR HEPATITIS C SCREENING TEST: Primary | ICD-10-CM

## 2020-08-28 NOTE — PROGRESS NOTES
Received message from Dr. Cuellar that patient can stop coumadin after 3 months. Therefore, patient has completed intended duration and may now stop it. Please advise patient to stop coumadin, resume normal diet with greens, &  INR cancelled.

## 2020-09-02 ENCOUNTER — NURSE TRIAGE (OUTPATIENT)
Dept: ADMINISTRATIVE | Facility: CLINIC | Age: 75
End: 2020-09-02

## 2020-09-03 NOTE — TELEPHONE ENCOUNTER
"About 20 min after eating chinese food he started feeling burning in his chest and throat, 8/10. Patient states he thinks it is heartburn due to what he ate. Suggested tums, otc prevacid. Patient is already prescribed protonix and takes it daily. Care advice discussed, understanding verbalized. Please contact caller directly to discuss any further care advice.    Reason for Disposition   Chest pain(s) lasting a few seconds    Additional Information   Negative: Severe difficulty breathing (e.g., struggling for each breath, speaks in single words)   Negative: Difficult to awaken or acting confused (e.g., disoriented, slurred speech)   Negative: Shock suspected (e.g., cold/pale/clammy skin, too weak to stand, low BP, rapid pulse)   Negative: [1] Chest pain lasts > 5 minutes AND [2] age > 45   Negative: [1] Chest pain lasts > 5 minutes AND [2] age > 30 AND [3] one or more cardiac risk factors (e.g., diabetes, high blood pressure, high cholesterol, smoker, or strong family history of heart disease)   Negative: [1] Chest pain lasts > 5 minutes AND [2] history of heart disease (i.e., angina, heart attack, heart failure, bypass surgery, takes nitroglycerin)   Negative: [1] Chest pain lasts > 5 minutes AND [2] described as crushing, pressure-like, or heavy   Negative: Passed out (i.e., lost consciousness, collapsed and was not responding)   Negative: Heart beating < 50 beats per minute OR > 140 beats per minute   Negative: Visible sweat on face or sweat dripping down face   Negative: Sounds like a life-threatening emergency to the triager   Negative: Followed a chest injury   Negative: SEVERE chest pain   Negative: [1] Chest pain (or "angina") comes and goes AND [2] is happening more often (increasing in frequency) or getting worse (increasing in severity)   Negative: Pain also in shoulder(s) or arm(s) or jaw   Negative: Difficulty breathing   Negative: Dizziness or lightheadedness   Negative: Coughing up " "blood   Negative: Cocaine use within last 3 days   Negative: Major surgery in the past month   Negative: Hip or leg fracture (broken bone) in past month (or had cast on leg or ankle in past month)   Negative: Illness requiring prolonged bedrest in past month (e.g., immobilization, long hospital stay)   Negative: Long-distance travel in past month (e.g., car, bus, train, plane; with trip lasting 6 or more hours)   Negative: History of prior "blood clot" in leg or lungs (i.e., deep vein thrombosis, pulmonary embolism)   Negative: History of inherited increased risk of blood clots (e.g., Factor 5 Leiden, Anti-thrombin 3, Protein C or Protein S deficiency, Prothrombin mutation)   Negative: [1] Chest pain lasts > 5 minutes AND [2] occurred in past 3 days (72 hours)   Negative: Taking a deep breath makes pain worse   Negative: Patient sounds very sick or weak to the triager   Negative: Cancer treatment in the past two months (or has cancer now)   Negative: [1] Chest pain lasts > 5 minutes AND [2] occurred > 3 days ago (72 hours) AND [3] NO chest pain or cardiac symptoms now   Negative: [1] Chest pain lasting < 5 minutes AND [2] NO chest pain or cardiac symptoms (e.g., breathing difficulty, sweating) now   Negative: Fever > 100.4 F (38.0 C)   Negative: Rash in same area as pain (may be described as "small blisters")   Negative: [1] Chest pain lasting < 5 minutes AND [2] has not taken prescribed nitroglycerin   Negative: [1] Chest pain lasting < 5 minutes AND [2] completely gone after taking nitroglycerin   Negative: [1] Chest pain from known angina comes and goes AND [2] is NOT happening more often (increasing in frequency) or getting worse (increasing in severity)   Negative: [1] Chest pain(s) lasting a few seconds from coughing AND [2] persists > 3 days   Negative: [1] Chest pain(s) lasting a few seconds AND [2] persists > 3 days   Negative: [1] Patient says chest pain feels exactly the same as " "previously diagnosed "heartburn" AND [2] describes burning in chest AND [3] accompanying sour taste in mouth   Negative: Chest pain(s) lasting a few seconds from coughing    Protocols used: CHEST PAIN-A-AH      "

## 2020-09-11 ENCOUNTER — OFFICE VISIT (OUTPATIENT)
Dept: INTERNAL MEDICINE | Facility: CLINIC | Age: 75
End: 2020-09-11
Payer: MEDICARE

## 2020-09-11 ENCOUNTER — LAB VISIT (OUTPATIENT)
Dept: LAB | Facility: HOSPITAL | Age: 75
End: 2020-09-11
Attending: INTERNAL MEDICINE
Payer: MEDICARE

## 2020-09-11 VITALS
SYSTOLIC BLOOD PRESSURE: 136 MMHG | HEIGHT: 69 IN | WEIGHT: 183.88 LBS | OXYGEN SATURATION: 99 % | BODY MASS INDEX: 27.24 KG/M2 | HEART RATE: 66 BPM | DIASTOLIC BLOOD PRESSURE: 80 MMHG

## 2020-09-11 DIAGNOSIS — E55.9 MILD VITAMIN D DEFICIENCY: ICD-10-CM

## 2020-09-11 DIAGNOSIS — E78.5 HYPERLIPIDEMIA, UNSPECIFIED HYPERLIPIDEMIA TYPE: ICD-10-CM

## 2020-09-11 DIAGNOSIS — I25.10 CORONARY ARTERY DISEASE, ANGINA PRESENCE UNSPECIFIED, UNSPECIFIED VESSEL OR LESION TYPE, UNSPECIFIED WHETHER NATIVE OR TRANSPLANTED HEART: Primary | ICD-10-CM

## 2020-09-11 DIAGNOSIS — I25.10 CORONARY ARTERY DISEASE, ANGINA PRESENCE UNSPECIFIED, UNSPECIFIED VESSEL OR LESION TYPE, UNSPECIFIED WHETHER NATIVE OR TRANSPLANTED HEART: ICD-10-CM

## 2020-09-11 LAB
25(OH)D3+25(OH)D2 SERPL-MCNC: 61 NG/ML (ref 30–96)
ALBUMIN SERPL BCP-MCNC: 4 G/DL (ref 3.5–5.2)
ALP SERPL-CCNC: 71 U/L (ref 55–135)
ALT SERPL W/O P-5'-P-CCNC: 22 U/L (ref 10–44)
ANION GAP SERPL CALC-SCNC: 10 MMOL/L (ref 8–16)
AST SERPL-CCNC: 25 U/L (ref 10–40)
BASOPHILS # BLD AUTO: 0.07 K/UL (ref 0–0.2)
BASOPHILS NFR BLD: 0.8 % (ref 0–1.9)
BILIRUB SERPL-MCNC: 0.4 MG/DL (ref 0.1–1)
BUN SERPL-MCNC: 15 MG/DL (ref 8–23)
CALCIUM SERPL-MCNC: 9.9 MG/DL (ref 8.7–10.5)
CHLORIDE SERPL-SCNC: 106 MMOL/L (ref 95–110)
CHOLEST SERPL-MCNC: 148 MG/DL (ref 120–199)
CHOLEST/HDLC SERPL: 3.6 {RATIO} (ref 2–5)
CO2 SERPL-SCNC: 27 MMOL/L (ref 23–29)
CREAT SERPL-MCNC: 1.1 MG/DL (ref 0.5–1.4)
DIFFERENTIAL METHOD: ABNORMAL
EOSINOPHIL # BLD AUTO: 0.1 K/UL (ref 0–0.5)
EOSINOPHIL NFR BLD: 1.3 % (ref 0–8)
ERYTHROCYTE [DISTWIDTH] IN BLOOD BY AUTOMATED COUNT: 15.1 % (ref 11.5–14.5)
EST. GFR  (AFRICAN AMERICAN): >60 ML/MIN/1.73 M^2
EST. GFR  (NON AFRICAN AMERICAN): >60 ML/MIN/1.73 M^2
GLUCOSE SERPL-MCNC: 96 MG/DL (ref 70–110)
HCT VFR BLD AUTO: 46.7 % (ref 40–54)
HDLC SERPL-MCNC: 41 MG/DL (ref 40–75)
HDLC SERPL: 27.7 % (ref 20–50)
HGB BLD-MCNC: 14.4 G/DL (ref 14–18)
IMM GRANULOCYTES # BLD AUTO: 0.04 K/UL (ref 0–0.04)
IMM GRANULOCYTES NFR BLD AUTO: 0.5 % (ref 0–0.5)
LDLC SERPL CALC-MCNC: 88.4 MG/DL (ref 63–159)
LYMPHOCYTES # BLD AUTO: 2.9 K/UL (ref 1–4.8)
LYMPHOCYTES NFR BLD: 34.5 % (ref 18–48)
MCH RBC QN AUTO: 28.7 PG (ref 27–31)
MCHC RBC AUTO-ENTMCNC: 30.8 G/DL (ref 32–36)
MCV RBC AUTO: 93 FL (ref 82–98)
MONOCYTES # BLD AUTO: 0.6 K/UL (ref 0.3–1)
MONOCYTES NFR BLD: 7.7 % (ref 4–15)
NEUTROPHILS # BLD AUTO: 4.6 K/UL (ref 1.8–7.7)
NEUTROPHILS NFR BLD: 55.2 % (ref 38–73)
NONHDLC SERPL-MCNC: 107 MG/DL
NRBC BLD-RTO: 0 /100 WBC
PLATELET # BLD AUTO: 319 K/UL (ref 150–350)
PMV BLD AUTO: 11 FL (ref 9.2–12.9)
POTASSIUM SERPL-SCNC: 5 MMOL/L (ref 3.5–5.1)
PROT SERPL-MCNC: 7.8 G/DL (ref 6–8.4)
RBC # BLD AUTO: 5.01 M/UL (ref 4.6–6.2)
SODIUM SERPL-SCNC: 143 MMOL/L (ref 136–145)
TRIGL SERPL-MCNC: 93 MG/DL (ref 30–150)
WBC # BLD AUTO: 8.29 K/UL (ref 3.9–12.7)

## 2020-09-11 PROCEDURE — 3079F DIAST BP 80-89 MM HG: CPT | Mod: CPTII,S$GLB,, | Performed by: INTERNAL MEDICINE

## 2020-09-11 PROCEDURE — 80061 LIPID PANEL: CPT

## 2020-09-11 PROCEDURE — 36415 COLL VENOUS BLD VENIPUNCTURE: CPT

## 2020-09-11 PROCEDURE — 1159F MED LIST DOCD IN RCRD: CPT | Mod: S$GLB,,, | Performed by: INTERNAL MEDICINE

## 2020-09-11 PROCEDURE — 1101F PT FALLS ASSESS-DOCD LE1/YR: CPT | Mod: CPTII,S$GLB,, | Performed by: INTERNAL MEDICINE

## 2020-09-11 PROCEDURE — 99999 PR PBB SHADOW E&M-EST. PATIENT-LVL III: ICD-10-PCS | Mod: PBBFAC,,, | Performed by: INTERNAL MEDICINE

## 2020-09-11 PROCEDURE — 3075F PR MOST RECENT SYSTOLIC BLOOD PRESS GE 130-139MM HG: ICD-10-PCS | Mod: CPTII,S$GLB,, | Performed by: INTERNAL MEDICINE

## 2020-09-11 PROCEDURE — 1101F PR PT FALLS ASSESS DOC 0-1 FALLS W/OUT INJ PAST YR: ICD-10-PCS | Mod: CPTII,S$GLB,, | Performed by: INTERNAL MEDICINE

## 2020-09-11 PROCEDURE — 80053 COMPREHEN METABOLIC PANEL: CPT

## 2020-09-11 PROCEDURE — 85025 COMPLETE CBC W/AUTO DIFF WBC: CPT

## 2020-09-11 PROCEDURE — 1159F PR MEDICATION LIST DOCUMENTED IN MEDICAL RECORD: ICD-10-PCS | Mod: S$GLB,,, | Performed by: INTERNAL MEDICINE

## 2020-09-11 PROCEDURE — 3008F PR BODY MASS INDEX (BMI) DOCUMENTED: ICD-10-PCS | Mod: CPTII,S$GLB,, | Performed by: INTERNAL MEDICINE

## 2020-09-11 PROCEDURE — 99214 OFFICE O/P EST MOD 30 MIN: CPT | Mod: S$GLB,,, | Performed by: INTERNAL MEDICINE

## 2020-09-11 PROCEDURE — 1126F AMNT PAIN NOTED NONE PRSNT: CPT | Mod: S$GLB,,, | Performed by: INTERNAL MEDICINE

## 2020-09-11 PROCEDURE — 3075F SYST BP GE 130 - 139MM HG: CPT | Mod: CPTII,S$GLB,, | Performed by: INTERNAL MEDICINE

## 2020-09-11 PROCEDURE — 99999 PR PBB SHADOW E&M-EST. PATIENT-LVL III: CPT | Mod: PBBFAC,,, | Performed by: INTERNAL MEDICINE

## 2020-09-11 PROCEDURE — 99214 PR OFFICE/OUTPT VISIT, EST, LEVL IV, 30-39 MIN: ICD-10-PCS | Mod: S$GLB,,, | Performed by: INTERNAL MEDICINE

## 2020-09-11 PROCEDURE — 1126F PR PAIN SEVERITY QUANTIFIED, NO PAIN PRESENT: ICD-10-PCS | Mod: S$GLB,,, | Performed by: INTERNAL MEDICINE

## 2020-09-11 PROCEDURE — 82306 VITAMIN D 25 HYDROXY: CPT

## 2020-09-11 PROCEDURE — 3079F PR MOST RECENT DIASTOLIC BLOOD PRESSURE 80-89 MM HG: ICD-10-PCS | Mod: CPTII,S$GLB,, | Performed by: INTERNAL MEDICINE

## 2020-09-11 PROCEDURE — 3008F BODY MASS INDEX DOCD: CPT | Mod: CPTII,S$GLB,, | Performed by: INTERNAL MEDICINE

## 2020-09-11 RX ORDER — MONTELUKAST SODIUM 10 MG/1
10 TABLET ORAL NIGHTLY
Qty: 30 TABLET | Refills: 3 | Status: SHIPPED | OUTPATIENT
Start: 2020-09-11 | End: 2021-01-10

## 2020-09-11 NOTE — PROGRESS NOTES
Subjective:       Patient ID: Brad Kearns is a 75 y.o. male.    Chief Complaint: Follow-up (4 month f/u)    HPIPt is doing very well post CABG - he walks daily with his brother 2-3 miles at [facility].  No CP or SOB.  He does have some chronic allergy.  No GI issues. Saw his own urologist about his PSA.  Still some soreness in his chest wall incision.  Review of Systems   Constitutional: Negative for activity change and unexpected weight change.   HENT: Negative for hearing loss, rhinorrhea and trouble swallowing.    Eyes: Negative for discharge and visual disturbance.   Respiratory: Negative for chest tightness, shortness of breath and wheezing.    Cardiovascular: Negative for chest pain and palpitations.   Gastrointestinal: Negative for abdominal pain, blood in stool, constipation, diarrhea, nausea and vomiting.   Endocrine: Negative for polydipsia and polyuria.   Genitourinary: Negative for difficulty urinating, dysuria, hematuria and urgency.   Musculoskeletal: Negative for arthralgias, joint swelling and neck pain.   Neurological: Negative for seizures, syncope, weakness and headaches.   Psychiatric/Behavioral: Negative for confusion and dysphoric mood.       Objective:      Physical Exam  Constitutional:       General: He is not in acute distress.     Appearance: He is well-developed.   Neck:      Musculoskeletal: Neck supple.      Thyroid: No thyromegaly.      Vascular: No JVD.   Cardiovascular:      Rate and Rhythm: Normal rate and regular rhythm.      Heart sounds: Normal heart sounds. No murmur. No friction rub. No gallop.    Pulmonary:      Effort: Pulmonary effort is normal.      Breath sounds: Normal breath sounds. No wheezing or rales.   Abdominal:      General: Bowel sounds are normal. There is no distension.      Palpations: Abdomen is soft. There is no mass.      Tenderness: There is no abdominal tenderness. There is no guarding or rebound.   Lymphadenopathy:      Cervical: No cervical  adenopathy.   Skin:     General: Skin is warm and dry.   Neurological:      Mental Status: He is alert and oriented to person, place, and time.   Psychiatric:         Behavior: Behavior normal.         Thought Content: Thought content normal.         Judgment: Judgment normal.         Assessment:       1. Coronary artery disease, angina presence unspecified, unspecified vessel or lesion type, unspecified whether native or transplanted heart    2. Hyperlipidemia, unspecified hyperlipidemia type    3. Mild vitamin D deficiency        Plan:   Coronary artery disease, angina presence unspecified, unspecified vessel or lesion type, unspecified whether native or transplanted heart  -     CBC auto differential; Future; Expected date: 09/11/2020  -     Comprehensive metabolic panel; Future; Expected date: 09/11/2020    Hyperlipidemia, unspecified hyperlipidemia type  -     Lipid Panel; Future; Expected date: 09/11/2020    Mild vitamin D deficiency  -     Vitamin D; Future; Expected date: 09/11/2020    Other orders  -     montelukast (SINGULAIR) 10 mg tablet; Take 1 tablet (10 mg total) by mouth every evening.  Dispense: 30 tablet; Refill: 3      He refuses any vaccines  Answers for HPI/ROS submitted by the patient on 9/10/2020   activity change: No  unexpected weight change: No  neck pain: No  hearing loss: No  rhinorrhea: No  trouble swallowing: No  eye discharge: No  visual disturbance: No  chest tightness: No  wheezing: No  chest pain: No  palpitations: No  blood in stool: No  constipation: No  vomiting: No  diarrhea: No  polydipsia: No  polyuria: No  difficulty urinating: No  urgency: No  hematuria: No  joint swelling: No  arthralgias: No  headaches: No  weakness: No  confusion: No  dysphoric mood: No

## 2020-11-11 ENCOUNTER — OFFICE VISIT (OUTPATIENT)
Dept: CARDIOLOGY | Facility: CLINIC | Age: 75
End: 2020-11-11
Payer: MEDICARE

## 2020-11-11 VITALS
BODY MASS INDEX: 27.75 KG/M2 | HEIGHT: 69 IN | WEIGHT: 187.38 LBS | HEART RATE: 68 BPM | DIASTOLIC BLOOD PRESSURE: 89 MMHG | SYSTOLIC BLOOD PRESSURE: 167 MMHG

## 2020-11-11 DIAGNOSIS — I25.10 CORONARY ARTERY DISEASE INVOLVING NATIVE CORONARY ARTERY OF NATIVE HEART WITHOUT ANGINA PECTORIS: ICD-10-CM

## 2020-11-11 DIAGNOSIS — I51.3 LEFT VENTRICULAR THROMBUS: ICD-10-CM

## 2020-11-11 DIAGNOSIS — I10 ESSENTIAL HYPERTENSION: Primary | ICD-10-CM

## 2020-11-11 DIAGNOSIS — Z95.1 S/P CABG (CORONARY ARTERY BYPASS GRAFT): ICD-10-CM

## 2020-11-11 DIAGNOSIS — Z95.2 STATUS POST AORTIC VALVE REPLACEMENT: ICD-10-CM

## 2020-11-11 DIAGNOSIS — I42.8 OTHER CARDIOMYOPATHY: ICD-10-CM

## 2020-11-11 PROBLEM — R07.9 CHEST PAIN: Status: RESOLVED | Noted: 2020-05-24 | Resolved: 2020-11-11

## 2020-11-11 PROBLEM — Z79.01 LONG TERM (CURRENT) USE OF ANTICOAGULANTS: Status: RESOLVED | Noted: 2020-05-26 | Resolved: 2020-11-11

## 2020-11-11 PROCEDURE — 99499 RISK ADDL DX/OHS AUDIT: ICD-10-PCS | Mod: S$GLB,,, | Performed by: INTERNAL MEDICINE

## 2020-11-11 PROCEDURE — 1101F PR PT FALLS ASSESS DOC 0-1 FALLS W/OUT INJ PAST YR: ICD-10-PCS | Mod: CPTII,S$GLB,, | Performed by: INTERNAL MEDICINE

## 2020-11-11 PROCEDURE — 3079F DIAST BP 80-89 MM HG: CPT | Mod: CPTII,S$GLB,, | Performed by: INTERNAL MEDICINE

## 2020-11-11 PROCEDURE — 1159F PR MEDICATION LIST DOCUMENTED IN MEDICAL RECORD: ICD-10-PCS | Mod: S$GLB,,, | Performed by: INTERNAL MEDICINE

## 2020-11-11 PROCEDURE — 99499 UNLISTED E&M SERVICE: CPT | Mod: S$GLB,,, | Performed by: INTERNAL MEDICINE

## 2020-11-11 PROCEDURE — 1126F AMNT PAIN NOTED NONE PRSNT: CPT | Mod: S$GLB,,, | Performed by: INTERNAL MEDICINE

## 2020-11-11 PROCEDURE — 3008F PR BODY MASS INDEX (BMI) DOCUMENTED: ICD-10-PCS | Mod: CPTII,S$GLB,, | Performed by: INTERNAL MEDICINE

## 2020-11-11 PROCEDURE — 1159F MED LIST DOCD IN RCRD: CPT | Mod: S$GLB,,, | Performed by: INTERNAL MEDICINE

## 2020-11-11 PROCEDURE — 99999 PR PBB SHADOW E&M-EST. PATIENT-LVL IV: ICD-10-PCS | Mod: PBBFAC,,, | Performed by: INTERNAL MEDICINE

## 2020-11-11 PROCEDURE — 1126F PR PAIN SEVERITY QUANTIFIED, NO PAIN PRESENT: ICD-10-PCS | Mod: S$GLB,,, | Performed by: INTERNAL MEDICINE

## 2020-11-11 PROCEDURE — 3077F PR MOST RECENT SYSTOLIC BLOOD PRESSURE >= 140 MM HG: ICD-10-PCS | Mod: CPTII,S$GLB,, | Performed by: INTERNAL MEDICINE

## 2020-11-11 PROCEDURE — 99214 PR OFFICE/OUTPT VISIT, EST, LEVL IV, 30-39 MIN: ICD-10-PCS | Mod: S$GLB,,, | Performed by: INTERNAL MEDICINE

## 2020-11-11 PROCEDURE — 1101F PT FALLS ASSESS-DOCD LE1/YR: CPT | Mod: CPTII,S$GLB,, | Performed by: INTERNAL MEDICINE

## 2020-11-11 PROCEDURE — 3008F BODY MASS INDEX DOCD: CPT | Mod: CPTII,S$GLB,, | Performed by: INTERNAL MEDICINE

## 2020-11-11 PROCEDURE — 3077F SYST BP >= 140 MM HG: CPT | Mod: CPTII,S$GLB,, | Performed by: INTERNAL MEDICINE

## 2020-11-11 PROCEDURE — 99999 PR PBB SHADOW E&M-EST. PATIENT-LVL IV: CPT | Mod: PBBFAC,,, | Performed by: INTERNAL MEDICINE

## 2020-11-11 PROCEDURE — 99214 OFFICE O/P EST MOD 30 MIN: CPT | Mod: S$GLB,,, | Performed by: INTERNAL MEDICINE

## 2020-11-11 PROCEDURE — 3079F PR MOST RECENT DIASTOLIC BLOOD PRESSURE 80-89 MM HG: ICD-10-PCS | Mod: CPTII,S$GLB,, | Performed by: INTERNAL MEDICINE

## 2020-11-11 RX ORDER — AMOXICILLIN 500 MG/1
CAPSULE ORAL
Qty: 4 CAPSULE | Refills: 0 | Status: SHIPPED | OUTPATIENT
Start: 2020-11-11 | End: 2021-06-14

## 2020-11-11 RX ORDER — ATORVASTATIN CALCIUM 40 MG/1
40 TABLET, FILM COATED ORAL DAILY
Qty: 30 TABLET | Refills: 11 | Status: SHIPPED | OUTPATIENT
Start: 2020-11-11 | End: 2020-12-23

## 2020-11-11 NOTE — PROGRESS NOTES
Subjective:   Patient ID:  Brad Kearns is a 75 y.o. male who presents for follow-up of Essential hypertension (8 month f/u )      HPI:  He was admitted in May with some chest discomfort and a non STEMI with a peak troponin of 2.1.  He underwent an urgent catheterization at that time which showed multivessel coronary artery disease including left main disease.  He subsequently underwent a double-vessel bypass with LIMA to LAD and SVG to circumflex as well as aortic valve replacement with a 29 mm Medtronic bioprosthetic valve.  His follow-up echocardiogram showed ejection fraction of 35-40% with apical hypokinesis and LV thrombus.  He has done well since his surgery.  He did not formally participate in cardiac rehab.  He is however walking approximately 3 miles per day.Brad Kearns denies any chest pain, shortness of breath, PND, orthopnea, palpitations, leg edema, or syncope.    ECG 06/24/2020:  Normal sinus rhythm 94 beats per minute with inferolateral T-wave inversions    Past Medical History:   Diagnosis Date    Bicuspid aortic valve     Cardiomyopathy     EF 35-40% post AVR    Coronary artery disease     GERD (gastroesophageal reflux disease)     Hypertension     Nonrheumatic aortic valve insufficiency 3/13/2020    Valvular regurgitation     bicuspid valve, AI       Past Surgical History:   Procedure Laterality Date    AORTIC VALVE REPLACEMENT N/A 5/19/2020    Procedure: REPLACEMENT, AORTIC VALVE;  Surgeon: Guicho Quezada MD;  Location: [facility] OR [address] [place] [address];  Service: Cardiovascular;  Laterality: N/A;    AORTOGRAPHY N/A 5/15/2020    Procedure: Aortogram;  Surgeon: Ben Butler MD;  Location: [facility] CATH LAB;  Service: Cardiology;  Laterality: N/A;    APPENDECTOMY      CARDIAC VALVE SURGERY  05/2020    29 mm Medtronic porcine valve aortic position    CORONARY ANGIOGRAPHY N/A 5/15/2020    Procedure: ANGIOGRAM, CORONARY ARTERY;  Surgeon: Ben Butler MD;  Location: [facility] CATH LAB;   Service: Cardiology;  Laterality: N/A;    CORONARY ARTERY BYPASS GRAFT  05/2020    LIMA to LAD, SVG OM    CORONARY ARTERY BYPASS GRAFT (CABG) N/A 5/19/2020    Procedure: CORONARY ARTERY BYPASS GRAFT (CABG) x2;  Surgeon: Guicho Quezada MD;  Location: [address];  Service: Cardiovascular;  Laterality: N/A;  CORONARY ARTERY BYPASS GRAFT (CABG) x2    ENDOSCOPIC HARVEST OF VEIN Left 5/19/2020    Procedure: SURGICAL PROCUREMENT, VEIN, ENDOSCOPIC;  Surgeon: Guicho Quezada MD;  Location: [address];  Service: Cardiovascular;  Laterality: Left;  [employer] start: 1402  [employer] stop: 1428  Prep start: 1432  Prep stop: 1452    INGUINAL HERNIA REPAIR      LEFT HEART CATHETERIZATION Left 5/15/2020    Procedure: Left heart cath;  Surgeon: Ben Butler MD;  Location: [facility] CATH LAB;  Service: Cardiology;  Laterality: Left;    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    Right knee arthroscopy      WOUND EXPLORATION N/A 5/20/2020    Procedure: EXPLORATION, WOUND- Chest washout;  Surgeon: Guicho Quezada MD;  Location: [address];  Service: Cardiovascular;  Laterality: N/A;       Social History     Socioeconomic History    Marital status:      Spouse name: Not on file    Number of children: Not on file    Years of education: Not on file    Highest education level: Not on file   Occupational History    Not on file   Social Needs    Financial resource strain: Not hard at all    Food insecurity     Worry: Never true     Inability: Never true    Transportation needs     Medical: No     Non-medical: No   Tobacco Use    Smoking status: Never Smoker    Smokeless tobacco: Never Used   Substance and Sexual Activity    Alcohol use: Yes     Frequency: Monthly or less     Drinks per session: Patient refused     Binge frequency: Never     Comment: Rarely    Drug use: Not on file    Sexual activity: Not on file   Lifestyle    Physical activity     Days per week: 0 days      Minutes per session: 0 min    Stress: Not at all   Relationships    Social connections     Talks on phone: More than three times a week     Gets together: Once a week     Attends Taoist service: Not on file     Active member of club or organization: No     Attends meetings of clubs or organizations: Never     Relationship status:    Other Topics Concern    Not on file   Social History Narrative    Not on file       Family History   Problem Relation Age of Onset    Stroke Mother     Heart attack Father     Heart disease Brother     Rheum arthritis Brother     Heart attack Son     Heart disease Son     Heart disease Brother     Cancer Sister 80        colon cancer    Hyperlipidemia Sister     Cancer Brother 74        colon    Hyperlipidemia Sister     Cancer Brother        Patient's Medications   New Prescriptions    AMOXICILLIN (AMOXIL) 500 MG CAPSULE    Take 1000 mg 30-60 minutes prior to your dental procedure    ATORVASTATIN (LIPITOR) 40 MG TABLET    Take 1 tablet (40 mg total) by mouth once daily.   Previous Medications    ASCORBIC ACID (VITAMIN C) 1000 MG TABLET    Take 1,000 mg by mouth every morning.    ASPIRIN (ECOTRIN) 81 MG EC TABLET    Take 81 mg by mouth every morning.    FISH OIL-OMEGA-3 FATTY ACIDS 300-1,000 MG CAPSULE    Take 1 g by mouth every morning.    METOPROLOL SUCCINATE (TOPROL-XL) 100 MG 24 HR TABLET    Take 1 tablet (100 mg total) by mouth once daily.    MULTIVITAMIN CAPSULE    Take 1 capsule by mouth once daily.    PANTOPRAZOLE (PROTONIX) 40 MG TABLET    Take 1 tablet (40 mg total) by mouth before breakfast.    THIAMINE (VITAMIN B-1) 50 MG TABLET    Take 50 mg by mouth once daily.    VALSARTAN (DIOVAN) 40 MG TABLET    Take 1 tablet (40 mg total) by mouth 2 (two) times daily.   Modified Medications    No medications on file   Discontinued Medications    PRAVASTATIN (PRAVACHOL) 40 MG TABLET    Take 1 tablet (40 mg total) by mouth once daily.       Review of Systems  "  Constitution: Negative for malaise/fatigue and weight gain.   HENT: Negative for hearing loss.    Eyes: Negative for visual disturbance.   Cardiovascular: Negative for chest pain, claudication, dyspnea on exertion, leg swelling, near-syncope, orthopnea, palpitations, paroxysmal nocturnal dyspnea and syncope.   Respiratory: Negative for cough, shortness of breath, sleep disturbances due to breathing, snoring and wheezing.    Endocrine: Negative for cold intolerance, heat intolerance, polydipsia, polyphagia and polyuria.   Hematologic/Lymphatic: Negative for bleeding problem. Does not bruise/bleed easily.   Skin: Negative for rash and suspicious lesions.   Musculoskeletal: Negative for arthritis, falls, joint pain, muscle weakness and myalgias.   Gastrointestinal: Negative for abdominal pain, change in bowel habit, constipation, diarrhea, heartburn, hematochezia, melena and nausea.   Genitourinary: Negative for hematuria and nocturia.   Neurological: Negative for excessive daytime sleepiness, dizziness, headaches, light-headedness, loss of balance and weakness.   Psychiatric/Behavioral: Negative for depression. The patient is not nervous/anxious.    Allergic/Immunologic: Negative for environmental allergies.       BP (!) 167/89 (BP Location: Left arm, Patient Position: Sitting, BP Method: Medium (Automatic))   Pulse 68   Ht 5' 9" (1.753 m)   Wt 85 kg (187 lb 6.3 oz)   BMI 27.67 kg/m²     Objective:   Physical Exam   Constitutional: He is oriented to person, place, and time. He appears well-developed and well-nourished.        HENT:   Head: Normocephalic and atraumatic.   Mouth/Throat: Oropharynx is clear and moist.   Eyes: Pupils are equal, round, and reactive to light. Conjunctivae and EOM are normal. No scleral icterus.   Neck: Normal range of motion. Neck supple. No hepatojugular reflux and no JVD present. No tracheal deviation present. No thyromegaly present.   Cardiovascular: Normal rate, regular rhythm, " normal heart sounds and intact distal pulses. PMI is not displaced.   Pulses:       Carotid pulses are 2+ on the right side and 2+ on the left side.       Radial pulses are 2+ on the right side and 2+ on the left side.        Dorsalis pedis pulses are 2+ on the right side and 2+ on the left side.        Posterior tibial pulses are 2+ on the right side and 2+ on the left side.   Pulmonary/Chest: Effort normal and breath sounds normal.   Abdominal: Soft. Bowel sounds are normal. He exhibits no distension and no mass. There is no hepatosplenomegaly. There is no abdominal tenderness.   Musculoskeletal:         General: No tenderness or edema.   Lymphadenopathy:     He has no cervical adenopathy.   Neurological: He is alert and oriented to person, place, and time.   Skin: Skin is warm and dry. No rash noted. No cyanosis or erythema. Nails show no clubbing.   Psychiatric: He has a normal mood and affect. His speech is normal and behavior is normal.       Lab Results   Component Value Date     09/11/2020    K 5.0 09/11/2020     09/11/2020    CO2 27 09/11/2020    BUN 15 09/11/2020    CREATININE 1.1 09/11/2020    GLU 96 09/11/2020    HGBA1C 5.1 05/14/2020    MG 2.2 05/26/2020    AST 25 09/11/2020    ALT 22 09/11/2020    ALBUMIN 4.0 09/11/2020    PROT 7.8 09/11/2020    BILITOT 0.4 09/11/2020    WBC 8.29 09/11/2020    HGB 14.4 09/11/2020    HCT 46.7 09/11/2020    HCT 26 (L) 05/21/2020    MCV 93 09/11/2020     09/11/2020    INR 2.2 (H) 08/26/2020    INR 2.0 06/11/2020    TSH 1.054 03/20/2020    CHOL 148 09/11/2020    HDL 41 09/11/2020    LDLCALC 88.4 09/11/2020    TRIG 93 09/11/2020     (H) 06/05/2020       Assessment:     1. Essential hypertension :  His blood pressure was elevated today.  He states his home readings are usually quite lower.  I have enrolled him in the digital hypertension program.   2. Coronary artery disease involving native coronary artery of native heart without angina pectoris :   He is status post CABG x2.  Continue aspirin and statin therapy.  I have changed his pravastatin to atorvastatin 40 mg daily for high-intensity therapy.  He did have a non STEMI with a peak troponin of 2.  He had been on anticoagulation for LV thrombus in addition to his aspirin until recently.  I am repeating his echocardiogram to reassess his LV function as well as his apex to see if thrombus is still present. If he is able to remain off of anticoagulation I will order Plavix 75 mg daily to complete 1 year of dual anti-platelet therapy.   3. Left ventricular thrombus    4. S/P CABG (coronary artery bypass graft)    5. Status post aortic valve replacement :  Secondary to severe aortic insufficiency due to bicuspid aortic valve.  We did discuss the need for any 1st degree relatives to be screened for bicuspid valve.SBE prophylaxis is recommended prior to dental procedures.   6. Other cardiomyopathy :  His postoperative ejection fraction was mild to moderately reduced at 35-40%.  He is on GDMT with metoprolol and valsartan.  I have ordered a follow-up echo to reassess his LV function.       Plan:     Brad was seen today for essential hypertension.    Diagnoses and all orders for this visit:    Essential hypertension  -     [facility] En[facility] Order  -     Hypertension Digital Medicine ([facility]): Assign Onboarding Questionnaires  -     Lipid Panel; Future  -     Hepatic Function Panel; Future  -     Echo Color Flow Doppler? Yes    Coronary artery disease involving native coronary artery of native heart without angina pectoris    Left ventricular thrombus    S/P CABG (coronary artery bypass graft)    Status post aortic valve replacement    Other cardiomyopathy    Other orders  -     atorvastatin (LIPITOR) 40 MG tablet; Take 1 tablet (40 mg total) by mouth once daily.  -     amoxicillin (AMOXIL) 500 MG capsule; Take 1000 mg 30-60 minutes prior to your dental procedure        Thank you for  allowing me to participate in this patient's care. Please do not hesitate to contact me with any questions or concerns.

## 2020-11-11 NOTE — PATIENT INSTRUCTIONS
Goal BP < 130/80.  Eating Heart-Healthy Food: Using the DASH Plan    Eating for your heart doesnt have to be hard or boring. You just need to know how to make healthier choices. The DASH eating plan has been developed to help you do just that. DASH stands for Dietary Approaches to Stop Hypertension. It is a plan that has been proven to be healthier for your heart and to lower your risk for high blood pressure. It can also help lower your risk for cancer, heart disease, osteoporosis, and diabetes.  Choosing from each food group  Choose foods from each of the food groups below each day. Try to get the recommended number of servings for each food group. The serving numbers are based on a diet of 2,000 calories a day. Talk to your doctor if youre unsure about your calorie needs. Along with getting the correct servings, the DASH plan also recommends a sodium intake less than 2,300 mg per day.        Grains  Servings: 6 to 8 a day  A serving is:  · 1 slice bread  · 1 ounce dry cereal  · Half a cup cooked rice, pasta or cereal  Best choices: Whole grains and any grains high in fiber. Vegetables  Servings: 4 to 5 a day  A serving is:  · 1 cup raw leafy vegetable  · Half a cup cut-up raw or cooked vegetable  · Half a cup vegetable juice  Best choices: Fresh or frozen vegetables prepared without added salt or fat.   Fruits  Servings: 4 to 5 a day  A serving is:  · 1 medium fruit  · One-quarter cup dried fruit  · Half a cup fresh, frozen, or canned fruit  · Half a cup of 100% fruit juices  Best choices: A variety of fresh fruits of different colors. Whole fruits are a better choice than fruit juices. Low-fat or fat-free dairy  Servings: 2 to 3 a day  A serving is:  · 1 cup milk  · 1 cup yogurt  · One and a half ounces cheese  Best choices: Skim or 1% milk, low-fat or fat-free yogurt or buttermilk, and low-fat cheeses.         Lean meats, poultry, fish  Servings: 6 or fewer a day  A serving is:  · 1 ounce cooked meats,  poultry, or fish  · 1 egg  Best choices: Lean poultry and fish. Trim away visible fat. Broil, grill, roast, or boil instead of frying. Remove skin from poultry before eating. Limit how much red meat you eat.  Nuts, seeds, beans  Servings: 4 to 5 a week  A serving is:  · One-third cup nuts (one and a half ounces)  · 2 tablespoons nut butter or seeds  · Half a cup cooked dry beans or legumes  Best choices: Dry roasted nuts with no salt added, lentils, kidney beans, garbanzo beans, and whole julien beans.   Fats and oils  Servings: 2 to 3 a day  A serving is:  · 1 teaspoon vegetable oil  · 1 teaspoon soft margarine  · 1 tablespoon mayonnaise  · 2 tablespoons salad dressing  Best choices: Nut and vegetable oils (nontropical vegetable oils), such as olive and canola oil. Sweets  Servings: 5 a week or fewer  A serving is:  · 1 tablespoon sugar, maple syrup, or honey  · 1 tablespoon jam or jelly  · 1 half-ounce jelly beans (about 15)  · 1 cup lemonade  Best choices: Dried fruit can be a satisfying sweet. Choose low-fat sweets. And watch your serving sizes!      For more on the DASH eating plan, visit:  www.nhlbi.nih.gov/health/health-topics/topics/dash   Date Last Reviewed: 6/1/2016  © 5397-6821 [employer]. [address] [place] [address], [place] [address]. All rights reserved. This information is not intended as a substitute for professional medical care. Always follow your healthcare professional's instructions.      Replace pravastatin with atorvastatin 40 mg daily.  Fasting labs in 6 weeks.    Screen family members with an echo to look for a bicuspid aortic valve.

## 2020-11-20 ENCOUNTER — HOSPITAL ENCOUNTER (OUTPATIENT)
Dept: CARDIOLOGY | Facility: HOSPITAL | Age: 75
Discharge: HOME | End: 2020-11-20
Attending: INTERNAL MEDICINE
Payer: MEDICARE

## 2020-11-20 VITALS
DIASTOLIC BLOOD PRESSURE: 84 MMHG | SYSTOLIC BLOOD PRESSURE: 144 MMHG | WEIGHT: 187 LBS | HEART RATE: 65 BPM | BODY MASS INDEX: 27.7 KG/M2 | HEIGHT: 69 IN

## 2020-11-20 LAB
ASCENDING AORTA: 3.83 CM
AV INDEX (PROSTH): 0.39
AV MEAN GRADIENT: 19 MMHG
AV PEAK GRADIENT: 33 MMHG
AV VALVE AREA: 1.5 CM2
AV VELOCITY RATIO: 0.38
BSA FOR ECHO PROCEDURE: 2.03 M2
CV ECHO LV RWT: 0.38 CM
DOP CALC AO PEAK VEL: 2.86 M/S
DOP CALC AO VTI: 63.71 CM
DOP CALC LVOT AREA: 3.8 CM2
DOP CALC LVOT DIAMETER: 2.2 CM
DOP CALC LVOT PEAK VEL: 1.09 M/S
DOP CALC LVOT STROKE VOLUME: 95.44 CM3
DOP CALCLVOT PEAK VEL VTI: 25.12 CM
E WAVE DECELERATION TIME: 184.72 MSEC
E/A RATIO: 0.8
E/E' RATIO: 15.33 M/S
ECHO LV POSTERIOR WALL: 0.89 CM (ref 0.6–1.1)
FRACTIONAL SHORTENING: 32 % (ref 28–44)
INTERVENTRICULAR SEPTUM: 0.83 CM (ref 0.6–1.1)
IVRT: 111.32 MSEC
LA MAJOR: 5.4 CM
LA MINOR: 5.34 CM
LA WIDTH: 4.35 CM
LEFT ATRIUM SIZE: 4.29 CM
LEFT ATRIUM VOLUME INDEX: 42.4 ML/M2
LEFT ATRIUM VOLUME: 85.18 CM3
LEFT INTERNAL DIMENSION IN SYSTOLE: 3.2 CM (ref 2.1–4)
LEFT VENTRICLE DIASTOLIC VOLUME INDEX: 49.32 ML/M2
LEFT VENTRICLE DIASTOLIC VOLUME: 99 ML
LEFT VENTRICLE MASS INDEX: 67 G/M2
LEFT VENTRICLE SYSTOLIC VOLUME INDEX: 20.5 ML/M2
LEFT VENTRICLE SYSTOLIC VOLUME: 41.1 ML
LEFT VENTRICULAR INTERNAL DIMENSION IN DIASTOLE: 4.7 CM (ref 3.5–6)
LEFT VENTRICULAR MASS: 134.37 G
LV LATERAL E/E' RATIO: 11.5 M/S
LV SEPTAL E/E' RATIO: 23 M/S
MV PEAK A VEL: 1.15 M/S
MV PEAK E VEL: 0.92 M/S
MV STENOSIS PRESSURE HALF TIME: 53.57 MS
MV VALVE AREA P 1/2 METHOD: 4.11 CM2
PISA TR MAX VEL: 2.42 M/S
PULM VEIN S/D RATIO: 1.16
PV PEAK D VEL: 0.44 M/S
PV PEAK S VEL: 0.51 M/S
RA MAJOR: 5.26 CM
RA PRESSURE: 3 MMHG
RA WIDTH: 3.86 CM
RIGHT VENTRICULAR END-DIASTOLIC DIMENSION: 2.91 CM
SINUS: 4.5 CM
STJ: 3.6 CM
TDI LATERAL: 0.08 M/S
TDI SEPTAL: 0.04 M/S
TDI: 0.06 M/S
TR MAX PG: 23 MMHG
TRICUSPID ANNULAR PLANE SYSTOLIC EXCURSION: 0.94 CM
TV REST PULMONARY ARTERY PRESSURE: 26 MMHG

## 2020-11-20 PROCEDURE — 93306 TTE W/DOPPLER COMPLETE: CPT

## 2020-11-20 PROCEDURE — 93306 ECHO (CUPID ONLY): ICD-10-PCS | Mod: 26,,, | Performed by: INTERNAL MEDICINE

## 2020-11-20 PROCEDURE — 93306 TTE W/DOPPLER COMPLETE: CPT | Mod: 26,,, | Performed by: INTERNAL MEDICINE

## 2020-11-23 ENCOUNTER — PATIENT MESSAGE (OUTPATIENT)
Dept: CARDIOLOGY | Facility: CLINIC | Age: 75
End: 2020-11-23

## 2020-12-15 ENCOUNTER — PATIENT MESSAGE (OUTPATIENT)
Dept: ADMINISTRATIVE | Facility: OTHER | Age: 75
End: 2020-12-15

## 2020-12-23 ENCOUNTER — TELEPHONE (OUTPATIENT)
Dept: CARDIOLOGY | Facility: CLINIC | Age: 75
End: 2020-12-23

## 2020-12-23 ENCOUNTER — PATIENT MESSAGE (OUTPATIENT)
Dept: CARDIOLOGY | Facility: CLINIC | Age: 75
End: 2020-12-23

## 2020-12-23 ENCOUNTER — LAB VISIT (OUTPATIENT)
Dept: LAB | Facility: HOSPITAL | Age: 75
End: 2020-12-23
Attending: INTERNAL MEDICINE
Payer: MEDICARE

## 2020-12-23 DIAGNOSIS — I10 ESSENTIAL HYPERTENSION: ICD-10-CM

## 2020-12-23 DIAGNOSIS — I25.10 CORONARY ARTERY DISEASE INVOLVING NATIVE CORONARY ARTERY OF NATIVE HEART WITHOUT ANGINA PECTORIS: Primary | ICD-10-CM

## 2020-12-23 LAB
ALBUMIN SERPL BCP-MCNC: 4.2 G/DL (ref 3.5–5.2)
ALP SERPL-CCNC: 50 U/L (ref 55–135)
ALT SERPL W/O P-5'-P-CCNC: 21 U/L (ref 10–44)
AST SERPL-CCNC: 28 U/L (ref 10–40)
BILIRUB DIRECT SERPL-MCNC: 0.3 MG/DL (ref 0.1–0.3)
BILIRUB SERPL-MCNC: 0.6 MG/DL (ref 0.1–1)
CHOLEST SERPL-MCNC: 124 MG/DL (ref 120–199)
CHOLEST/HDLC SERPL: 3.2 {RATIO} (ref 2–5)
HDLC SERPL-MCNC: 39 MG/DL (ref 40–75)
HDLC SERPL: 31.5 % (ref 20–50)
LDLC SERPL CALC-MCNC: 72 MG/DL (ref 63–159)
NONHDLC SERPL-MCNC: 85 MG/DL
PROT SERPL-MCNC: 7.4 G/DL (ref 6–8.4)
TRIGL SERPL-MCNC: 65 MG/DL (ref 30–150)

## 2020-12-23 PROCEDURE — 80076 HEPATIC FUNCTION PANEL: CPT | Mod: HCNC

## 2020-12-23 PROCEDURE — 36415 COLL VENOUS BLD VENIPUNCTURE: CPT | Mod: HCNC,PO

## 2020-12-23 PROCEDURE — 80061 LIPID PANEL: CPT | Mod: HCNC

## 2020-12-23 RX ORDER — ATORVASTATIN CALCIUM 80 MG/1
80 TABLET, FILM COATED ORAL DAILY
Qty: 30 TABLET | Refills: 11 | Status: SHIPPED | OUTPATIENT
Start: 2020-12-23 | End: 2022-01-31

## 2020-12-30 ENCOUNTER — PATIENT OUTREACH (OUTPATIENT)
Dept: ADMINISTRATIVE | Facility: HOSPITAL | Age: 75
End: 2020-12-30

## 2021-01-12 ENCOUNTER — OFFICE VISIT (OUTPATIENT)
Dept: INTERNAL MEDICINE | Facility: CLINIC | Age: 76
End: 2021-01-12
Payer: MEDICARE

## 2021-01-12 VITALS
OXYGEN SATURATION: 98 % | DIASTOLIC BLOOD PRESSURE: 72 MMHG | BODY MASS INDEX: 27.4 KG/M2 | SYSTOLIC BLOOD PRESSURE: 130 MMHG | HEART RATE: 70 BPM | HEIGHT: 69 IN | WEIGHT: 185 LBS

## 2021-01-12 DIAGNOSIS — E55.9 MILD VITAMIN D DEFICIENCY: ICD-10-CM

## 2021-01-12 DIAGNOSIS — R73.9 HYPERGLYCEMIA: ICD-10-CM

## 2021-01-12 DIAGNOSIS — Z12.5 ENCOUNTER FOR SCREENING FOR MALIGNANT NEOPLASM OF PROSTATE: ICD-10-CM

## 2021-01-12 DIAGNOSIS — N40.0 BENIGN PROSTATIC HYPERPLASIA, UNSPECIFIED WHETHER LOWER URINARY TRACT SYMPTOMS PRESENT: ICD-10-CM

## 2021-01-12 DIAGNOSIS — I10 ESSENTIAL HYPERTENSION: Primary | ICD-10-CM

## 2021-01-12 PROCEDURE — 3075F PR MOST RECENT SYSTOLIC BLOOD PRESS GE 130-139MM HG: ICD-10-PCS | Mod: HCNC,CPTII,S$GLB, | Performed by: INTERNAL MEDICINE

## 2021-01-12 PROCEDURE — 99213 OFFICE O/P EST LOW 20 MIN: CPT | Mod: HCNC,S$GLB,, | Performed by: INTERNAL MEDICINE

## 2021-01-12 PROCEDURE — 3078F PR MOST RECENT DIASTOLIC BLOOD PRESSURE < 80 MM HG: ICD-10-PCS | Mod: HCNC,CPTII,S$GLB, | Performed by: INTERNAL MEDICINE

## 2021-01-12 PROCEDURE — 1101F PT FALLS ASSESS-DOCD LE1/YR: CPT | Mod: HCNC,CPTII,S$GLB, | Performed by: INTERNAL MEDICINE

## 2021-01-12 PROCEDURE — 3288F FALL RISK ASSESSMENT DOCD: CPT | Mod: HCNC,CPTII,S$GLB, | Performed by: INTERNAL MEDICINE

## 2021-01-12 PROCEDURE — 3008F PR BODY MASS INDEX (BMI) DOCUMENTED: ICD-10-PCS | Mod: HCNC,CPTII,S$GLB, | Performed by: INTERNAL MEDICINE

## 2021-01-12 PROCEDURE — 3288F PR FALLS RISK ASSESSMENT DOCUMENTED: ICD-10-PCS | Mod: HCNC,CPTII,S$GLB, | Performed by: INTERNAL MEDICINE

## 2021-01-12 PROCEDURE — 3008F BODY MASS INDEX DOCD: CPT | Mod: HCNC,CPTII,S$GLB, | Performed by: INTERNAL MEDICINE

## 2021-01-12 PROCEDURE — 1159F PR MEDICATION LIST DOCUMENTED IN MEDICAL RECORD: ICD-10-PCS | Mod: HCNC,S$GLB,, | Performed by: INTERNAL MEDICINE

## 2021-01-12 PROCEDURE — 3075F SYST BP GE 130 - 139MM HG: CPT | Mod: HCNC,CPTII,S$GLB, | Performed by: INTERNAL MEDICINE

## 2021-01-12 PROCEDURE — 99999 PR PBB SHADOW E&M-EST. PATIENT-LVL IV: ICD-10-PCS | Mod: PBBFAC,HCNC,, | Performed by: INTERNAL MEDICINE

## 2021-01-12 PROCEDURE — 99999 PR PBB SHADOW E&M-EST. PATIENT-LVL IV: CPT | Mod: PBBFAC,HCNC,, | Performed by: INTERNAL MEDICINE

## 2021-01-12 PROCEDURE — 1101F PR PT FALLS ASSESS DOC 0-1 FALLS W/OUT INJ PAST YR: ICD-10-PCS | Mod: HCNC,CPTII,S$GLB, | Performed by: INTERNAL MEDICINE

## 2021-01-12 PROCEDURE — 99213 PR OFFICE/OUTPT VISIT, EST, LEVL III, 20-29 MIN: ICD-10-PCS | Mod: HCNC,S$GLB,, | Performed by: INTERNAL MEDICINE

## 2021-01-12 PROCEDURE — 1159F MED LIST DOCD IN RCRD: CPT | Mod: HCNC,S$GLB,, | Performed by: INTERNAL MEDICINE

## 2021-01-12 PROCEDURE — 3078F DIAST BP <80 MM HG: CPT | Mod: HCNC,CPTII,S$GLB, | Performed by: INTERNAL MEDICINE

## 2021-01-29 ENCOUNTER — PATIENT MESSAGE (OUTPATIENT)
Dept: ADMINISTRATIVE | Facility: HOSPITAL | Age: 76
End: 2021-01-29

## 2021-03-01 ENCOUNTER — PATIENT OUTREACH (OUTPATIENT)
Dept: ADMINISTRATIVE | Facility: OTHER | Age: 76
End: 2021-03-01

## 2021-03-03 ENCOUNTER — OFFICE VISIT (OUTPATIENT)
Dept: CARDIOLOGY | Facility: CLINIC | Age: 76
End: 2021-03-03
Payer: MEDICARE

## 2021-03-03 ENCOUNTER — LAB VISIT (OUTPATIENT)
Dept: LAB | Facility: HOSPITAL | Age: 76
End: 2021-03-03
Attending: INTERNAL MEDICINE
Payer: MEDICARE

## 2021-03-03 VITALS
HEIGHT: 69 IN | DIASTOLIC BLOOD PRESSURE: 92 MMHG | BODY MASS INDEX: 28.18 KG/M2 | WEIGHT: 190.25 LBS | SYSTOLIC BLOOD PRESSURE: 163 MMHG | HEART RATE: 69 BPM

## 2021-03-03 DIAGNOSIS — I25.10 CORONARY ARTERY DISEASE INVOLVING NATIVE CORONARY ARTERY OF NATIVE HEART WITHOUT ANGINA PECTORIS: Primary | ICD-10-CM

## 2021-03-03 DIAGNOSIS — E78.00 PURE HYPERCHOLESTEROLEMIA: ICD-10-CM

## 2021-03-03 DIAGNOSIS — I10 ESSENTIAL HYPERTENSION: ICD-10-CM

## 2021-03-03 DIAGNOSIS — Z95.2 STATUS POST AORTIC VALVE REPLACEMENT: ICD-10-CM

## 2021-03-03 DIAGNOSIS — Z95.1 S/P CABG (CORONARY ARTERY BYPASS GRAFT): ICD-10-CM

## 2021-03-03 DIAGNOSIS — I25.10 CORONARY ARTERY DISEASE INVOLVING NATIVE CORONARY ARTERY OF NATIVE HEART WITHOUT ANGINA PECTORIS: ICD-10-CM

## 2021-03-03 PROBLEM — I51.3 LEFT VENTRICULAR THROMBUS: Status: RESOLVED | Noted: 2020-05-27 | Resolved: 2021-03-03

## 2021-03-03 LAB
ALBUMIN SERPL BCP-MCNC: 4.2 G/DL (ref 3.5–5.2)
ALP SERPL-CCNC: 56 U/L (ref 55–135)
ALT SERPL W/O P-5'-P-CCNC: 29 U/L (ref 10–44)
AST SERPL-CCNC: 27 U/L (ref 10–40)
BILIRUB DIRECT SERPL-MCNC: 0.3 MG/DL (ref 0.1–0.3)
BILIRUB SERPL-MCNC: 0.7 MG/DL (ref 0.1–1)
CHOLEST SERPL-MCNC: 113 MG/DL (ref 120–199)
CHOLEST/HDLC SERPL: 3.3 {RATIO} (ref 2–5)
HDLC SERPL-MCNC: 34 MG/DL (ref 40–75)
HDLC SERPL: 30.1 % (ref 20–50)
LDLC SERPL CALC-MCNC: 62 MG/DL (ref 63–159)
NONHDLC SERPL-MCNC: 79 MG/DL
PROT SERPL-MCNC: 7.4 G/DL (ref 6–8.4)
TRIGL SERPL-MCNC: 85 MG/DL (ref 30–150)

## 2021-03-03 PROCEDURE — 3080F PR MOST RECENT DIASTOLIC BLOOD PRESSURE >= 90 MM HG: ICD-10-PCS | Mod: CPTII,S$GLB,, | Performed by: INTERNAL MEDICINE

## 2021-03-03 PROCEDURE — 99999 PR PBB SHADOW E&M-EST. PATIENT-LVL IV: ICD-10-PCS | Mod: PBBFAC,,, | Performed by: INTERNAL MEDICINE

## 2021-03-03 PROCEDURE — 1126F PR PAIN SEVERITY QUANTIFIED, NO PAIN PRESENT: ICD-10-PCS | Mod: S$GLB,,, | Performed by: INTERNAL MEDICINE

## 2021-03-03 PROCEDURE — 1159F MED LIST DOCD IN RCRD: CPT | Mod: S$GLB,,, | Performed by: INTERNAL MEDICINE

## 2021-03-03 PROCEDURE — 99214 OFFICE O/P EST MOD 30 MIN: CPT | Mod: S$GLB,,, | Performed by: INTERNAL MEDICINE

## 2021-03-03 PROCEDURE — 99999 PR PBB SHADOW E&M-EST. PATIENT-LVL IV: CPT | Mod: PBBFAC,,, | Performed by: INTERNAL MEDICINE

## 2021-03-03 PROCEDURE — 1159F PR MEDICATION LIST DOCUMENTED IN MEDICAL RECORD: ICD-10-PCS | Mod: S$GLB,,, | Performed by: INTERNAL MEDICINE

## 2021-03-03 PROCEDURE — 1126F AMNT PAIN NOTED NONE PRSNT: CPT | Mod: S$GLB,,, | Performed by: INTERNAL MEDICINE

## 2021-03-03 PROCEDURE — 3008F BODY MASS INDEX DOCD: CPT | Mod: CPTII,S$GLB,, | Performed by: INTERNAL MEDICINE

## 2021-03-03 PROCEDURE — 3008F PR BODY MASS INDEX (BMI) DOCUMENTED: ICD-10-PCS | Mod: CPTII,S$GLB,, | Performed by: INTERNAL MEDICINE

## 2021-03-03 PROCEDURE — 3080F DIAST BP >= 90 MM HG: CPT | Mod: CPTII,S$GLB,, | Performed by: INTERNAL MEDICINE

## 2021-03-03 PROCEDURE — 3077F SYST BP >= 140 MM HG: CPT | Mod: CPTII,S$GLB,, | Performed by: INTERNAL MEDICINE

## 2021-03-03 PROCEDURE — 99214 PR OFFICE/OUTPT VISIT, EST, LEVL IV, 30-39 MIN: ICD-10-PCS | Mod: S$GLB,,, | Performed by: INTERNAL MEDICINE

## 2021-03-03 PROCEDURE — 36415 COLL VENOUS BLD VENIPUNCTURE: CPT

## 2021-03-03 PROCEDURE — 80076 HEPATIC FUNCTION PANEL: CPT

## 2021-03-03 PROCEDURE — 3077F PR MOST RECENT SYSTOLIC BLOOD PRESSURE >= 140 MM HG: ICD-10-PCS | Mod: CPTII,S$GLB,, | Performed by: INTERNAL MEDICINE

## 2021-03-03 PROCEDURE — 80061 LIPID PANEL: CPT

## 2021-03-03 RX ORDER — VALSARTAN 80 MG/1
80 TABLET ORAL 2 TIMES DAILY
Qty: 180 TABLET | Refills: 3 | Status: SHIPPED | OUTPATIENT
Start: 2021-03-03 | End: 2022-02-01

## 2021-03-05 ENCOUNTER — PATIENT MESSAGE (OUTPATIENT)
Dept: CARDIOLOGY | Facility: CLINIC | Age: 76
End: 2021-03-05

## 2021-06-07 ENCOUNTER — LAB VISIT (OUTPATIENT)
Dept: LAB | Facility: HOSPITAL | Age: 76
End: 2021-06-07
Attending: INTERNAL MEDICINE
Payer: MEDICARE

## 2021-06-07 DIAGNOSIS — R73.9 HYPERGLYCEMIA: ICD-10-CM

## 2021-06-07 DIAGNOSIS — I10 ESSENTIAL HYPERTENSION: ICD-10-CM

## 2021-06-07 DIAGNOSIS — E55.9 MILD VITAMIN D DEFICIENCY: ICD-10-CM

## 2021-06-07 DIAGNOSIS — N40.0 BENIGN PROSTATIC HYPERPLASIA, UNSPECIFIED WHETHER LOWER URINARY TRACT SYMPTOMS PRESENT: ICD-10-CM

## 2021-06-07 LAB
25(OH)D3+25(OH)D2 SERPL-MCNC: 65 NG/ML (ref 30–96)
BASOPHILS # BLD AUTO: 0.07 K/UL (ref 0–0.2)
BASOPHILS NFR BLD: 0.9 % (ref 0–1.9)
COMPLEXED PSA SERPL-MCNC: 4.5 NG/ML (ref 0–4)
DIFFERENTIAL METHOD: ABNORMAL
EOSINOPHIL # BLD AUTO: 0.2 K/UL (ref 0–0.5)
EOSINOPHIL NFR BLD: 2.8 % (ref 0–8)
ERYTHROCYTE [DISTWIDTH] IN BLOOD BY AUTOMATED COUNT: 14.1 % (ref 11.5–14.5)
ESTIMATED AVG GLUCOSE: 103 MG/DL (ref 68–131)
HBA1C MFR BLD: 5.2 % (ref 4–5.6)
HCT VFR BLD AUTO: 45.8 % (ref 40–54)
HGB BLD-MCNC: 15 G/DL (ref 14–18)
IMM GRANULOCYTES # BLD AUTO: 0.04 K/UL (ref 0–0.04)
IMM GRANULOCYTES NFR BLD AUTO: 0.5 % (ref 0–0.5)
LYMPHOCYTES # BLD AUTO: 3 K/UL (ref 1–4.8)
LYMPHOCYTES NFR BLD: 38.6 % (ref 18–48)
MCH RBC QN AUTO: 31.3 PG (ref 27–31)
MCHC RBC AUTO-ENTMCNC: 32.8 G/DL (ref 32–36)
MCV RBC AUTO: 95 FL (ref 82–98)
MONOCYTES # BLD AUTO: 0.6 K/UL (ref 0.3–1)
MONOCYTES NFR BLD: 7.9 % (ref 4–15)
NEUTROPHILS # BLD AUTO: 3.8 K/UL (ref 1.8–7.7)
NEUTROPHILS NFR BLD: 49.3 % (ref 38–73)
NRBC BLD-RTO: 0 /100 WBC
PLATELET # BLD AUTO: 240 K/UL (ref 150–450)
PMV BLD AUTO: 11.3 FL (ref 9.2–12.9)
RBC # BLD AUTO: 4.8 M/UL (ref 4.6–6.2)
TSH SERPL DL<=0.005 MIU/L-ACNC: 1.81 UIU/ML (ref 0.4–4)
WBC # BLD AUTO: 7.8 K/UL (ref 3.9–12.7)

## 2021-06-07 PROCEDURE — 84153 ASSAY OF PSA TOTAL: CPT | Performed by: INTERNAL MEDICINE

## 2021-06-07 PROCEDURE — 36415 COLL VENOUS BLD VENIPUNCTURE: CPT | Mod: PO | Performed by: INTERNAL MEDICINE

## 2021-06-07 PROCEDURE — 84443 ASSAY THYROID STIM HORMONE: CPT | Performed by: INTERNAL MEDICINE

## 2021-06-07 PROCEDURE — 85025 COMPLETE CBC W/AUTO DIFF WBC: CPT | Performed by: INTERNAL MEDICINE

## 2021-06-07 PROCEDURE — 83036 HEMOGLOBIN GLYCOSYLATED A1C: CPT | Performed by: INTERNAL MEDICINE

## 2021-06-07 PROCEDURE — 82306 VITAMIN D 25 HYDROXY: CPT | Performed by: INTERNAL MEDICINE

## 2021-06-14 ENCOUNTER — OFFICE VISIT (OUTPATIENT)
Dept: INTERNAL MEDICINE | Facility: CLINIC | Age: 76
End: 2021-06-14
Payer: MEDICARE

## 2021-06-14 VITALS
OXYGEN SATURATION: 99 % | BODY MASS INDEX: 27.49 KG/M2 | DIASTOLIC BLOOD PRESSURE: 74 MMHG | SYSTOLIC BLOOD PRESSURE: 132 MMHG | HEART RATE: 60 BPM | WEIGHT: 185.63 LBS | TEMPERATURE: 98 F | HEIGHT: 69 IN

## 2021-06-14 DIAGNOSIS — I10 ESSENTIAL HYPERTENSION: Primary | ICD-10-CM

## 2021-06-14 PROCEDURE — 3078F DIAST BP <80 MM HG: CPT | Mod: CPTII,S$GLB,, | Performed by: INTERNAL MEDICINE

## 2021-06-14 PROCEDURE — 3288F PR FALLS RISK ASSESSMENT DOCUMENTED: ICD-10-PCS | Mod: CPTII,S$GLB,, | Performed by: INTERNAL MEDICINE

## 2021-06-14 PROCEDURE — 99214 PR OFFICE/OUTPT VISIT, EST, LEVL IV, 30-39 MIN: ICD-10-PCS | Mod: S$GLB,,, | Performed by: INTERNAL MEDICINE

## 2021-06-14 PROCEDURE — 1159F PR MEDICATION LIST DOCUMENTED IN MEDICAL RECORD: ICD-10-PCS | Mod: S$GLB,,, | Performed by: INTERNAL MEDICINE

## 2021-06-14 PROCEDURE — 3075F PR MOST RECENT SYSTOLIC BLOOD PRESS GE 130-139MM HG: ICD-10-PCS | Mod: CPTII,S$GLB,, | Performed by: INTERNAL MEDICINE

## 2021-06-14 PROCEDURE — 1101F PR PT FALLS ASSESS DOC 0-1 FALLS W/OUT INJ PAST YR: ICD-10-PCS | Mod: CPTII,S$GLB,, | Performed by: INTERNAL MEDICINE

## 2021-06-14 PROCEDURE — 99999 PR PBB SHADOW E&M-EST. PATIENT-LVL IV: CPT | Mod: PBBFAC,,, | Performed by: INTERNAL MEDICINE

## 2021-06-14 PROCEDURE — 1159F MED LIST DOCD IN RCRD: CPT | Mod: S$GLB,,, | Performed by: INTERNAL MEDICINE

## 2021-06-14 PROCEDURE — 1126F AMNT PAIN NOTED NONE PRSNT: CPT | Mod: S$GLB,,, | Performed by: INTERNAL MEDICINE

## 2021-06-14 PROCEDURE — 1101F PT FALLS ASSESS-DOCD LE1/YR: CPT | Mod: CPTII,S$GLB,, | Performed by: INTERNAL MEDICINE

## 2021-06-14 PROCEDURE — 99499 UNLISTED E&M SERVICE: CPT | Mod: S$GLB,,, | Performed by: INTERNAL MEDICINE

## 2021-06-14 PROCEDURE — 99999 PR PBB SHADOW E&M-EST. PATIENT-LVL IV: ICD-10-PCS | Mod: PBBFAC,,, | Performed by: INTERNAL MEDICINE

## 2021-06-14 PROCEDURE — 3075F SYST BP GE 130 - 139MM HG: CPT | Mod: CPTII,S$GLB,, | Performed by: INTERNAL MEDICINE

## 2021-06-14 PROCEDURE — 1126F PR PAIN SEVERITY QUANTIFIED, NO PAIN PRESENT: ICD-10-PCS | Mod: S$GLB,,, | Performed by: INTERNAL MEDICINE

## 2021-06-14 PROCEDURE — 99214 OFFICE O/P EST MOD 30 MIN: CPT | Mod: S$GLB,,, | Performed by: INTERNAL MEDICINE

## 2021-06-14 PROCEDURE — 99499 RISK ADDL DX/OHS AUDIT: ICD-10-PCS | Mod: S$GLB,,, | Performed by: INTERNAL MEDICINE

## 2021-06-14 PROCEDURE — 3078F PR MOST RECENT DIASTOLIC BLOOD PRESSURE < 80 MM HG: ICD-10-PCS | Mod: CPTII,S$GLB,, | Performed by: INTERNAL MEDICINE

## 2021-06-14 PROCEDURE — 3288F FALL RISK ASSESSMENT DOCD: CPT | Mod: CPTII,S$GLB,, | Performed by: INTERNAL MEDICINE

## 2021-06-14 RX ORDER — TERBINAFINE HYDROCHLORIDE 250 MG/1
250 TABLET ORAL DAILY
Qty: 30 TABLET | Refills: 2 | Status: SHIPPED | OUTPATIENT
Start: 2021-06-14 | End: 2021-07-14

## 2021-06-14 RX ORDER — AMOXICILLIN 500 MG/1
CAPSULE ORAL
Qty: 4 CAPSULE | Refills: 3 | Status: SHIPPED | OUTPATIENT
Start: 2021-06-14 | End: 2022-02-01

## 2021-06-15 ENCOUNTER — PATIENT OUTREACH (OUTPATIENT)
Dept: ADMINISTRATIVE | Facility: HOSPITAL | Age: 76
End: 2021-06-15

## 2021-06-15 DIAGNOSIS — Z11.59 NEED FOR HEPATITIS C SCREENING TEST: Primary | ICD-10-CM

## 2021-06-24 ENCOUNTER — PATIENT MESSAGE (OUTPATIENT)
Dept: CARDIOLOGY | Facility: CLINIC | Age: 76
End: 2021-06-24

## 2021-06-24 RX ORDER — METOPROLOL SUCCINATE 100 MG/1
100 TABLET, EXTENDED RELEASE ORAL DAILY
Qty: 30 TABLET | Refills: 11 | Status: SHIPPED | OUTPATIENT
Start: 2021-06-24 | End: 2022-07-27

## 2021-12-16 ENCOUNTER — LAB VISIT (OUTPATIENT)
Dept: LAB | Facility: HOSPITAL | Age: 76
End: 2021-12-16
Attending: INTERNAL MEDICINE
Payer: MEDICARE

## 2021-12-16 ENCOUNTER — OFFICE VISIT (OUTPATIENT)
Dept: INTERNAL MEDICINE | Facility: CLINIC | Age: 76
End: 2021-12-16
Payer: MEDICARE

## 2021-12-16 VITALS
HEART RATE: 72 BPM | WEIGHT: 186.94 LBS | BODY MASS INDEX: 27.69 KG/M2 | SYSTOLIC BLOOD PRESSURE: 150 MMHG | TEMPERATURE: 98 F | HEIGHT: 69 IN | OXYGEN SATURATION: 97 % | DIASTOLIC BLOOD PRESSURE: 84 MMHG

## 2021-12-16 DIAGNOSIS — Z11.59 NEED FOR HEPATITIS C SCREENING TEST: ICD-10-CM

## 2021-12-16 DIAGNOSIS — E78.5 HYPERLIPIDEMIA, UNSPECIFIED HYPERLIPIDEMIA TYPE: ICD-10-CM

## 2021-12-16 DIAGNOSIS — I10 ESSENTIAL HYPERTENSION: ICD-10-CM

## 2021-12-16 DIAGNOSIS — I10 ESSENTIAL HYPERTENSION: Primary | ICD-10-CM

## 2021-12-16 DIAGNOSIS — Z11.59 ENCOUNTER FOR HEPATITIS C SCREENING TEST FOR LOW RISK PATIENT: ICD-10-CM

## 2021-12-16 DIAGNOSIS — I25.10 CORONARY ARTERY DISEASE INVOLVING NATIVE CORONARY ARTERY OF NATIVE HEART WITHOUT ANGINA PECTORIS: ICD-10-CM

## 2021-12-16 LAB
ALBUMIN SERPL BCP-MCNC: 4.1 G/DL (ref 3.5–5.2)
ALP SERPL-CCNC: 59 U/L (ref 55–135)
ALT SERPL W/O P-5'-P-CCNC: 36 U/L (ref 10–44)
ANION GAP SERPL CALC-SCNC: 11 MMOL/L (ref 8–16)
AST SERPL-CCNC: 25 U/L (ref 10–40)
BASOPHILS # BLD AUTO: 0.06 K/UL (ref 0–0.2)
BASOPHILS NFR BLD: 0.5 % (ref 0–1.9)
BILIRUB SERPL-MCNC: 1.1 MG/DL (ref 0.1–1)
BUN SERPL-MCNC: 19 MG/DL (ref 8–23)
CALCIUM SERPL-MCNC: 9.3 MG/DL (ref 8.7–10.5)
CHLORIDE SERPL-SCNC: 106 MMOL/L (ref 95–110)
CHOLEST SERPL-MCNC: 122 MG/DL (ref 120–199)
CHOLEST/HDLC SERPL: 3.1 {RATIO} (ref 2–5)
CO2 SERPL-SCNC: 26 MMOL/L (ref 23–29)
CREAT SERPL-MCNC: 1 MG/DL (ref 0.5–1.4)
DIFFERENTIAL METHOD: ABNORMAL
EOSINOPHIL # BLD AUTO: 0.2 K/UL (ref 0–0.5)
EOSINOPHIL NFR BLD: 1.5 % (ref 0–8)
ERYTHROCYTE [DISTWIDTH] IN BLOOD BY AUTOMATED COUNT: 13.6 % (ref 11.5–14.5)
EST. GFR  (AFRICAN AMERICAN): >60 ML/MIN/1.73 M^2
EST. GFR  (NON AFRICAN AMERICAN): >60 ML/MIN/1.73 M^2
GLUCOSE SERPL-MCNC: 95 MG/DL (ref 70–110)
HCT VFR BLD AUTO: 48.1 % (ref 40–54)
HDLC SERPL-MCNC: 39 MG/DL (ref 40–75)
HDLC SERPL: 32 % (ref 20–50)
HGB BLD-MCNC: 15.4 G/DL (ref 14–18)
IMM GRANULOCYTES # BLD AUTO: 0.15 K/UL (ref 0–0.04)
IMM GRANULOCYTES NFR BLD AUTO: 1.2 % (ref 0–0.5)
LDLC SERPL CALC-MCNC: 65.2 MG/DL (ref 63–159)
LYMPHOCYTES # BLD AUTO: 3.8 K/UL (ref 1–4.8)
LYMPHOCYTES NFR BLD: 30.6 % (ref 18–48)
MCH RBC QN AUTO: 31.8 PG (ref 27–31)
MCHC RBC AUTO-ENTMCNC: 32 G/DL (ref 32–36)
MCV RBC AUTO: 99 FL (ref 82–98)
MONOCYTES # BLD AUTO: 0.8 K/UL (ref 0.3–1)
MONOCYTES NFR BLD: 6.5 % (ref 4–15)
NEUTROPHILS # BLD AUTO: 7.3 K/UL (ref 1.8–7.7)
NEUTROPHILS NFR BLD: 59.7 % (ref 38–73)
NONHDLC SERPL-MCNC: 83 MG/DL
NRBC BLD-RTO: 0 /100 WBC
PLATELET # BLD AUTO: 276 K/UL (ref 150–450)
PMV BLD AUTO: 10.9 FL (ref 9.2–12.9)
POTASSIUM SERPL-SCNC: 4.6 MMOL/L (ref 3.5–5.1)
PROT SERPL-MCNC: 7.2 G/DL (ref 6–8.4)
RBC # BLD AUTO: 4.84 M/UL (ref 4.6–6.2)
SARS-COV-2 IGG SERPL IA-ACNC: <50 AU/ML
SARS-COV-2 IGG SERPL QL IA: NEGATIVE
SODIUM SERPL-SCNC: 143 MMOL/L (ref 136–145)
TRIGL SERPL-MCNC: 89 MG/DL (ref 30–150)
WBC # BLD AUTO: 12.31 K/UL (ref 3.9–12.7)

## 2021-12-16 PROCEDURE — 99214 OFFICE O/P EST MOD 30 MIN: CPT | Mod: HCNC,S$GLB,, | Performed by: INTERNAL MEDICINE

## 2021-12-16 PROCEDURE — 1101F PT FALLS ASSESS-DOCD LE1/YR: CPT | Mod: HCNC,CPTII,S$GLB, | Performed by: INTERNAL MEDICINE

## 2021-12-16 PROCEDURE — 80061 LIPID PANEL: CPT | Mod: HCNC | Performed by: INTERNAL MEDICINE

## 2021-12-16 PROCEDURE — 3079F PR MOST RECENT DIASTOLIC BLOOD PRESSURE 80-89 MM HG: ICD-10-PCS | Mod: HCNC,CPTII,S$GLB, | Performed by: INTERNAL MEDICINE

## 2021-12-16 PROCEDURE — 1101F PR PT FALLS ASSESS DOC 0-1 FALLS W/OUT INJ PAST YR: ICD-10-PCS | Mod: HCNC,CPTII,S$GLB, | Performed by: INTERNAL MEDICINE

## 2021-12-16 PROCEDURE — 3077F PR MOST RECENT SYSTOLIC BLOOD PRESSURE >= 140 MM HG: ICD-10-PCS | Mod: HCNC,CPTII,S$GLB, | Performed by: INTERNAL MEDICINE

## 2021-12-16 PROCEDURE — 99999 PR PBB SHADOW E&M-EST. PATIENT-LVL V: ICD-10-PCS | Mod: PBBFAC,HCNC,, | Performed by: INTERNAL MEDICINE

## 2021-12-16 PROCEDURE — 99999 PR PBB SHADOW E&M-EST. PATIENT-LVL V: CPT | Mod: PBBFAC,HCNC,, | Performed by: INTERNAL MEDICINE

## 2021-12-16 PROCEDURE — 3288F FALL RISK ASSESSMENT DOCD: CPT | Mod: HCNC,CPTII,S$GLB, | Performed by: INTERNAL MEDICINE

## 2021-12-16 PROCEDURE — 85025 COMPLETE CBC W/AUTO DIFF WBC: CPT | Mod: HCNC | Performed by: INTERNAL MEDICINE

## 2021-12-16 PROCEDURE — 1159F MED LIST DOCD IN RCRD: CPT | Mod: HCNC,CPTII,S$GLB, | Performed by: INTERNAL MEDICINE

## 2021-12-16 PROCEDURE — 80053 COMPREHEN METABOLIC PANEL: CPT | Mod: HCNC | Performed by: INTERNAL MEDICINE

## 2021-12-16 PROCEDURE — 3288F PR FALLS RISK ASSESSMENT DOCUMENTED: ICD-10-PCS | Mod: HCNC,CPTII,S$GLB, | Performed by: INTERNAL MEDICINE

## 2021-12-16 PROCEDURE — 1126F PR PAIN SEVERITY QUANTIFIED, NO PAIN PRESENT: ICD-10-PCS | Mod: HCNC,CPTII,S$GLB, | Performed by: INTERNAL MEDICINE

## 2021-12-16 PROCEDURE — 86769 SARS-COV-2 COVID-19 ANTIBODY: CPT | Mod: HCNC | Performed by: INTERNAL MEDICINE

## 2021-12-16 PROCEDURE — 36415 COLL VENOUS BLD VENIPUNCTURE: CPT | Mod: HCNC | Performed by: INTERNAL MEDICINE

## 2021-12-16 PROCEDURE — 1159F PR MEDICATION LIST DOCUMENTED IN MEDICAL RECORD: ICD-10-PCS | Mod: HCNC,CPTII,S$GLB, | Performed by: INTERNAL MEDICINE

## 2021-12-16 PROCEDURE — 99214 PR OFFICE/OUTPT VISIT, EST, LEVL IV, 30-39 MIN: ICD-10-PCS | Mod: HCNC,S$GLB,, | Performed by: INTERNAL MEDICINE

## 2021-12-16 PROCEDURE — 3077F SYST BP >= 140 MM HG: CPT | Mod: HCNC,CPTII,S$GLB, | Performed by: INTERNAL MEDICINE

## 2021-12-16 PROCEDURE — 1126F AMNT PAIN NOTED NONE PRSNT: CPT | Mod: HCNC,CPTII,S$GLB, | Performed by: INTERNAL MEDICINE

## 2021-12-16 PROCEDURE — 3079F DIAST BP 80-89 MM HG: CPT | Mod: HCNC,CPTII,S$GLB, | Performed by: INTERNAL MEDICINE

## 2021-12-16 PROCEDURE — 86803 HEPATITIS C AB TEST: CPT | Mod: HCNC | Performed by: INTERNAL MEDICINE

## 2021-12-16 RX ORDER — AMOXICILLIN 875 MG/1
875 TABLET, FILM COATED ORAL EVERY 12 HOURS
Qty: 20 TABLET | Refills: 0 | Status: SHIPPED | OUTPATIENT
Start: 2021-12-16 | End: 2022-02-01

## 2021-12-16 RX ORDER — VALSARTAN 320 MG/1
320 TABLET ORAL DAILY
Qty: 90 TABLET | Refills: 3 | Status: SHIPPED | OUTPATIENT
Start: 2021-12-16 | End: 2022-12-23

## 2021-12-17 ENCOUNTER — PATIENT MESSAGE (OUTPATIENT)
Dept: INTERNAL MEDICINE | Facility: CLINIC | Age: 76
End: 2021-12-17
Payer: MEDICARE

## 2021-12-17 LAB
HCV AB SERPL QL IA: NEGATIVE

## 2021-12-27 RX ORDER — MONTELUKAST SODIUM 10 MG/1
TABLET ORAL
Qty: 90 TABLET | Refills: 3 | Status: SHIPPED | OUTPATIENT
Start: 2021-12-27 | End: 2022-09-26 | Stop reason: SDUPTHER

## 2021-12-29 ENCOUNTER — PATIENT MESSAGE (OUTPATIENT)
Dept: INTERNAL MEDICINE | Facility: CLINIC | Age: 76
End: 2021-12-29
Payer: MEDICARE

## 2021-12-30 ENCOUNTER — CLINICAL SUPPORT (OUTPATIENT)
Dept: INTERNAL MEDICINE | Facility: CLINIC | Age: 76
End: 2021-12-30
Payer: MEDICARE

## 2021-12-30 ENCOUNTER — TELEPHONE (OUTPATIENT)
Dept: INTERNAL MEDICINE | Facility: CLINIC | Age: 76
End: 2021-12-30

## 2021-12-30 VITALS — DIASTOLIC BLOOD PRESSURE: 88 MMHG | SYSTOLIC BLOOD PRESSURE: 136 MMHG | HEART RATE: 70 BPM

## 2021-12-30 PROCEDURE — 99999 PR PBB SHADOW E&M-EST. PATIENT-LVL I: CPT | Mod: PBBFAC,HCNC,,

## 2021-12-30 PROCEDURE — 99999 PR PBB SHADOW E&M-EST. PATIENT-LVL I: ICD-10-PCS | Mod: PBBFAC,HCNC,,

## 2022-01-28 NOTE — PROGRESS NOTES
Subjective:   Patient ID:  Brad Kearns is a 76 y.o. male who presents for follow-up of Hypertension and Coronary Artery Disease    PROBLEM LIST:  CAD s/p CABG 5/20 (LIMA to LAD, SVG to OM)  Bicuspid AV with AI s/p AVR 5/20 (29 mm Medtronic bioprosthetic valve)  Cardiomyopathy (LVEF 35-40% post AVR, recovered)  Enlarged Ascending Aorta (4.5 cm SOV)  HTN  HLD    HPi 11/2/20:  He was admitted in May with some chest discomfort and a non STEMI with a peak troponin of 2.1.  He underwent an urgent catheterization at that time which showed multivessel coronary artery disease including left main disease.  He subsequently underwent a double-vessel bypass with LIMA to LAD and SVG to circumflex as well as aortic valve replacement with a 29 mm Medtronic bioprosthetic valve.  His follow-up echocardiogram showed ejection fraction of 35-40% with apical hypokinesis and LV thrombus.  He has done well since his surgery.  He did not formally participate in cardiac rehab.  He is however walking approximately 3 miles per day.Brad Kearns denies any chest pain, shortness of breath, PND, orthopnea, palpitations, leg edema, or syncope.    Interval history:  He had his valsartan increased from 80 mg twice a day to 320 mg daily back in December.  He states that after increasing the dose he was feeling lightheaded and faint.  He did state however he had not stopped taking the valsartan 80 mg twice daily when he started the 320 mg daily and was thus overdosing himself.  He otherwise feels well.  He denies any chest pain, shortness of breath, PND, orthopnea, or heart palpitations.  He denies any claudication or leg edema.  He walks about 2 miles a day 3 times a week with his son.    ECG 06/24/2020:  Normal sinus rhythm 94 beats per minute with inferolateral T-wave inversions    Echo 11/20/20:  Summary    · The sinuses of Valsalva is moderately dilated measuring 4.5 cm.  · Moderate left atrial enlargement.  · The left ventricle is normal  in size with normal systolic function. The estimated ejection fraction is 55%.  · Grade I diastolic dysfunction.  · Normal right ventricular size with normal right ventricular systolic function.  · There is a 29 mm Medtronic Mosaic porcine bioprosthetic aortic valve present. There is no aortic aortic insufficiency present.  · Aortic valve area is 1.50 cm2; peak velocity is 2.86 m/s; mean gradient is 19 mm Hg.  · Mild mitral regurgitation.  · Normal central venous pressure (3 mmHg).  · The estimated PA systolic pressure is 26 mmHg.    [facility] 5/20:  Findings:     1. Coronary dominance: Right  2. The left main coronary artery (LMCA) has a 60% distal stenosis. It gives origin to the left anterior descending (LAD) and left circumflex (LCx) arteries. There is no ramus intermedius. There is LORENA 3 flow.  3. The LAD is diffusely diseased, it has a 90% stenosis in its proximal segment and a 90% stenosis in its mid segment. It gives origin to one large diagonal branch(es). There is LORENA 3 flow.  4. The LCx has a proximal 90% stenosis. It gives origin to one large obtuse marginal branch(es). There is LORENA 3 flow.  5. The RCA is dominant. It as a distal 50% stenosis. It gives origin to the posterior descending artery and the posterolateral branch.     6. LVEDP 15 mmHg  7. LVEF 40%  8. Severe aortic insufficiency in bicuspid aortic valve (4+)        Past Medical History:   Diagnosis Date    Ascending aorta dilatation 2/1/2022    SOV 4.5 cm    Bicuspid aortic valve     Cardiomyopathy     EF 35-40% post AVR    Coronary artery disease     GERD (gastroesophageal reflux disease)     Hyperlipidemia     Hypertension     Nonrheumatic aortic valve insufficiency 3/13/2020    Valvular regurgitation     bicuspid valve, AI       Past Surgical History:   Procedure Laterality Date    AORTIC VALVE REPLACEMENT N/A 5/19/2020    Procedure: REPLACEMENT, AORTIC VALVE;  Surgeon: Guicho Quezada MD;  Location: [facility] OR [address] [place] [address];  Service:  Cardiovascular;  Laterality: N/A;    AORTOGRAPHY N/A 5/15/2020    Procedure: Aortogram;  Surgeon: Ben Butler MD;  Location: [facility] CATH LAB;  Service: Cardiology;  Laterality: N/A;    APPENDECTOMY      CARDIAC CATHETERIZATION  05/2020    LM 60%,LAD 90% prox and mid, CX 90%, RCA 50%    CARDIAC VALVE SURGERY  05/2020    29 mm Medtronic porcine valve aortic position    CORONARY ANGIOGRAPHY N/A 5/15/2020    Procedure: ANGIOGRAM, CORONARY ARTERY;  Surgeon: Ben Butler MD;  Location: [facility] CATH LAB;  Service: Cardiology;  Laterality: N/A;    CORONARY ARTERY BYPASS GRAFT  05/2020    LIMA to LAD, SVG OM    CORONARY ARTERY BYPASS GRAFT (CABG) N/A 5/19/2020    Procedure: CORONARY ARTERY BYPASS GRAFT (CABG) x2;  Surgeon: Guicho Quezada MD;  Location: [facility] OR [address] [place] [address];  Service: Cardiovascular;  Laterality: N/A;  CORONARY ARTERY BYPASS GRAFT (CABG) x2    ENDOSCOPIC HARVEST OF VEIN Left 5/19/2020    Procedure: SURGICAL PROCUREMENT, VEIN, ENDOSCOPIC;  Surgeon: Guicho Quezada MD;  [facility];  Service: Cardiovascular;  Laterality: Left;  [place] start: 1402  [place] stop: 1428  Prep start: 1432  Prep stop: 1452    INGUINAL HERNIA REPAIR      LEFT HEART CATHETERIZATION Left 5/15/2020    Procedure: Left heart cath;  Surgeon: Ben Butler MD;  Location: [facility] CATH LAB;  Service: Cardiology;  Laterality: Left;    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    Right knee arthroscopy      WOUND EXPLORATION N/A 5/20/2020    Procedure: EXPLORATION, WOUND- Chest washout;  Surgeon: Guicho Quezada MD;  Location: [facility] OR [address] [place] [address];  Service: Cardiovascular;  Laterality: N/A;       Social History     Socioeconomic History    Marital status:    Tobacco Use    Smoking status: Never Smoker    Smokeless tobacco: Never Used   Substance and Sexual Activity    Alcohol use: Yes     Comment: Rarely     Social Determinants of Health     Financial Resource Strain: Low Risk      Difficulty of Paying Living Expenses: Not very hard   Food Insecurity: No Food Insecurity    Worried About Running Out of Food in the Last Year: Never true    Ran Out of Food in the Last Year: Never true   Transportation Needs: No Transportation Needs    Lack of Transportation (Medical): No    Lack of Transportation (Non-Medical): No   Physical Activity: Insufficiently Active    Days of Exercise per Week: 2 days    Minutes of Exercise per Session: 60 min   Stress: No Stress Concern Present    Feeling of Stress : Not at all   Social Connections: Unknown    Frequency of Communication with Friends and Family: More than three times a week    Frequency of Social Gatherings with Friends and Family: Once a week    Active Member of Clubs or Organizations: No    Attends Club or Organization Meetings: Never    Marital Status:    Housing Stability: Low Risk     Unable to Pay for Housing in the Last Year: No    Number of Places Lived in the Last Year: 1    Unstable Housing in the Last Year: No       Family History   Problem Relation Age of Onset    Stroke Mother     Heart attack Father     Heart disease Brother     Rheum arthritis Brother     Heart attack Son     Heart disease Son     Heart disease Brother     Hyperlipidemia Sister     Colon cancer Sister 80    Colon cancer Brother 74    Hyperlipidemia Sister     Cancer Brother        Patient's Medications   New Prescriptions    No medications on file   Previous Medications    ASCORBIC ACID, VITAMIN C, (VITAMIN C) 1000 MG TABLET    Take 1,000 mg by mouth every morning.    ASPIRIN (ECOTRIN) 81 MG EC TABLET    Take 81 mg by mouth every morning.    ATORVASTATIN (LIPITOR) 80 MG TABLET    TAKE 1 TABLET BY MOUTH EVERY DAY    FISH OIL-OMEGA-3 FATTY ACIDS 300-1,000 MG CAPSULE    Take 1 g by mouth every morning.    METOPROLOL SUCCINATE (TOPROL-XL) 100 MG 24 HR TABLET    Take 1 tablet (100 mg total) by mouth once daily.    MONTELUKAST (SINGULAIR) 10  MG TABLET    TAKE 1 TABLET BY MOUTH EVERY DAY IN THE EVENING    MULTIVITAMIN CAPSULE    Take 1 capsule by mouth once daily.    PANTOPRAZOLE (PROTONIX) 40 MG TABLET    Take 1 tablet (40 mg total) by mouth before breakfast.    THIAMINE (VITAMIN B-1) 50 MG TABLET    Take 50 mg by mouth once daily.    VALSARTAN (DIOVAN) 320 MG TABLET    Take 1 tablet (320 mg total) by mouth once daily.   Modified Medications    No medications on file   Discontinued Medications    AMOXICILLIN (AMOXIL) 500 MG CAPSULE    Take 1000 mg 30-60 minutes prior to your dental procedure    AMOXICILLIN (AMOXIL) 875 MG TABLET    Take 1 tablet (875 mg total) by mouth every 12 (twelve) hours.    VALSARTAN (DIOVAN) 80 MG TABLET    Take 1 tablet (80 mg total) by mouth 2 (two) times daily.       Review of Systems   Constitutional: Negative for malaise/fatigue and weight gain.   HENT: Negative for hearing loss.    Eyes: Negative for visual disturbance.   Cardiovascular: Negative for chest pain, claudication, dyspnea on exertion, leg swelling, near-syncope, orthopnea, palpitations, paroxysmal nocturnal dyspnea and syncope.   Respiratory: Negative for cough, shortness of breath, sleep disturbances due to breathing, snoring and wheezing.    Endocrine: Negative for cold intolerance, heat intolerance, polydipsia, polyphagia and polyuria.   Hematologic/Lymphatic: Negative for bleeding problem. Does not bruise/bleed easily.   Skin: Negative for rash and suspicious lesions.   Musculoskeletal: Negative for arthritis, falls, joint pain, muscle weakness and myalgias.   Gastrointestinal: Negative for abdominal pain, change in bowel habit, constipation, diarrhea, heartburn, hematochezia, melena and nausea.   Genitourinary: Negative for hematuria and nocturia.   Neurological: Negative for excessive daytime sleepiness, dizziness, headaches, light-headedness, loss of balance and weakness.   Psychiatric/Behavioral: Negative for depression. The patient is not  "nervous/anxious.    Allergic/Immunologic: Negative for environmental allergies.       BP (!) 160/90   Pulse 68   Ht 5' 9" (1.753 m)   Wt 84.1 kg (185 lb 6.5 oz)   SpO2 98%   BMI 27.38 kg/m²     Objective:   Physical Exam  Constitutional:       Appearance: He is well-developed.      Comments:      HENT:      Head: Normocephalic and atraumatic.   Eyes:      General: No scleral icterus.     Conjunctiva/sclera: Conjunctivae normal.      Pupils: Pupils are equal, round, and reactive to light.   Neck:      Thyroid: No thyromegaly.      Vascular: No hepatojugular reflux or JVD.      Trachea: No tracheal deviation.   Cardiovascular:      Rate and Rhythm: Normal rate and regular rhythm.      Chest Wall: PMI is not displaced.      Pulses: Intact distal pulses.           Carotid pulses are 2+ on the right side and 2+ on the left side.       Radial pulses are 2+ on the right side and 2+ on the left side.        Dorsalis pedis pulses are 1+ on the right side and 1+ on the left side.        Posterior tibial pulses are 2+ on the right side and 2+ on the left side.      Heart sounds: Murmur heard.    Harsh midsystolic murmur is present with a grade of 1/6 at the upper right sternal border.      Pulmonary:      Effort: Pulmonary effort is normal.      Breath sounds: Normal breath sounds.   Abdominal:      General: Bowel sounds are normal. There is no distension.      Palpations: Abdomen is soft. There is no mass.      Tenderness: There is no abdominal tenderness.   Musculoskeletal:         General: No tenderness.      Cervical back: Normal range of motion and neck supple.   Lymphadenopathy:      Cervical: No cervical adenopathy.   Skin:     General: Skin is warm and dry.      Findings: No erythema or rash.      Nails: There is no clubbing.   Neurological:      Mental Status: He is alert and oriented to person, place, and time.   Psychiatric:         Speech: Speech normal.         Behavior: Behavior normal.         Lab Results "   Component Value Date     12/16/2021     12/16/2021     12/16/2021    K 4.6 12/16/2021    K 4.6 12/16/2021    K 4.6 12/16/2021     12/16/2021     12/16/2021     12/16/2021    CO2 26 12/16/2021    CO2 26 12/16/2021    CO2 26 12/16/2021    BUN 19 12/16/2021    BUN 19 12/16/2021    BUN 19 12/16/2021    CREATININE 1.0 12/16/2021    CREATININE 1.0 12/16/2021    CREATININE 1.0 12/16/2021    GLU 95 12/16/2021    GLU 95 12/16/2021    GLU 95 12/16/2021    HGBA1C 5.2 06/07/2021    MG 2.2 05/26/2020    AST 25 12/16/2021    AST 25 12/16/2021    AST 25 12/16/2021    ALT 36 12/16/2021    ALT 36 12/16/2021    ALT 36 12/16/2021    ALBUMIN 4.1 12/16/2021    ALBUMIN 4.1 12/16/2021    ALBUMIN 4.1 12/16/2021    PROT 7.2 12/16/2021    PROT 7.2 12/16/2021    PROT 7.2 12/16/2021    BILITOT 1.1 (H) 12/16/2021    BILITOT 1.1 (H) 12/16/2021    BILITOT 1.1 (H) 12/16/2021    WBC 12.31 12/16/2021    WBC 12.31 12/16/2021    WBC 12.31 12/16/2021    HGB 15.4 12/16/2021    HGB 15.4 12/16/2021    HGB 15.4 12/16/2021    HCT 48.1 12/16/2021    HCT 48.1 12/16/2021    HCT 48.1 12/16/2021    HCT 26 (L) 05/21/2020    MCV 99 (H) 12/16/2021    MCV 99 (H) 12/16/2021    MCV 99 (H) 12/16/2021     12/16/2021     12/16/2021     12/16/2021    INR 2.2 (H) 08/26/2020    INR 2.0 06/11/2020    TSH 1.815 06/07/2021    CHOL 122 12/16/2021    HDL 39 (L) 12/16/2021    LDLCALC 65.2 12/16/2021    TRIG 89 12/16/2021     (H) 06/05/2020       Assessment:     1. Essential hypertension :  His blood pressure was elevated today.  We discussed stopping valsartan 80 mg twice daily and in its place start valsartan 320 mg once daily. We discussed keeping a log of home blood pressures and notifying the office if it is consistently running above 130/80 mmHg. I have asked him/her to send me his/her readings via [facility] in 1-2 weeks.Limit sodium intake to < 1500mg daily and follow the DASH diet plan.   2. Coronary artery  disease involving native coronary artery of native heart without angina pectoris :  He is status post CABG x2.  Continue aspirin and statin therapy.  He is asymptomatic with recovery of his LV function.   3. Left ventricular thrombus ; Resolved with normal LV function on last echo.   4. S/P CABG (coronary artery bypass graft)    5. Status post aortic valve replacement :  Secondary to severe aortic insufficiency due to bicuspid aortic valve.  We did discuss the need for any 1st degree relatives to be screened for bicuspid valve.SBE prophylaxis is recommended prior to dental procedures.   6. Other cardiomyopathy :  He has had recovery of his left ventricular ejection fraction on guideline directed medical therapy with valsartan and metoprolol.   7.      Enlarged ascending aorta:  His sinus of Valsalva measured 4.5 cm on his last echo.  We discussed repeating this in 1 year to ensure stability.    Plan:     Brad was seen today for hypertension and coronary artery disease.    Diagnoses and all orders for this visit:    Coronary artery disease involving native coronary artery of native heart without angina pectoris  -     Ambulatory referral/consult to Cardiology  -     Echo; Future  -     EKG 12-lead; Future    S/P CABG (coronary artery bypass graft)  -     Echo; Future  -     EKG 12-lead; Future    Status post aortic valve replacement  -     Echo; Future  -     EKG 12-lead; Future    Primary hypertension    Pure hypercholesterolemia    Lightheadedness    Ascending aorta dilatation  -     Echo; Future  -     EKG 12-lead; Future        Thank you for allowing me to participate in this patient's care. Please do not hesitate to contact me with any questions or concerns.

## 2022-01-31 RX ORDER — ATORVASTATIN CALCIUM 80 MG/1
TABLET, FILM COATED ORAL
Qty: 90 TABLET | Refills: 3 | Status: SHIPPED | OUTPATIENT
Start: 2022-01-31 | End: 2023-02-09 | Stop reason: SDUPTHER

## 2022-02-01 ENCOUNTER — OFFICE VISIT (OUTPATIENT)
Dept: CARDIOLOGY | Facility: CLINIC | Age: 77
End: 2022-02-01
Payer: MEDICARE

## 2022-02-01 VITALS
DIASTOLIC BLOOD PRESSURE: 90 MMHG | OXYGEN SATURATION: 98 % | WEIGHT: 185.44 LBS | HEIGHT: 69 IN | HEART RATE: 68 BPM | SYSTOLIC BLOOD PRESSURE: 160 MMHG | BODY MASS INDEX: 27.46 KG/M2

## 2022-02-01 DIAGNOSIS — I10 PRIMARY HYPERTENSION: ICD-10-CM

## 2022-02-01 DIAGNOSIS — Z95.2 STATUS POST AORTIC VALVE REPLACEMENT: ICD-10-CM

## 2022-02-01 DIAGNOSIS — I77.810 ASCENDING AORTA DILATATION: ICD-10-CM

## 2022-02-01 DIAGNOSIS — R42 LIGHTHEADEDNESS: ICD-10-CM

## 2022-02-01 DIAGNOSIS — Z95.1 S/P CABG (CORONARY ARTERY BYPASS GRAFT): ICD-10-CM

## 2022-02-01 DIAGNOSIS — I25.10 CORONARY ARTERY DISEASE INVOLVING NATIVE CORONARY ARTERY OF NATIVE HEART WITHOUT ANGINA PECTORIS: Primary | ICD-10-CM

## 2022-02-01 DIAGNOSIS — E78.00 PURE HYPERCHOLESTEROLEMIA: ICD-10-CM

## 2022-02-01 PROCEDURE — 99999 PR PBB SHADOW E&M-EST. PATIENT-LVL V: ICD-10-PCS | Mod: PBBFAC,HCNC,, | Performed by: INTERNAL MEDICINE

## 2022-02-01 PROCEDURE — 99499 UNLISTED E&M SERVICE: CPT | Mod: S$GLB,,, | Performed by: INTERNAL MEDICINE

## 2022-02-01 PROCEDURE — 3077F PR MOST RECENT SYSTOLIC BLOOD PRESSURE >= 140 MM HG: ICD-10-PCS | Mod: HCNC,CPTII,S$GLB, | Performed by: INTERNAL MEDICINE

## 2022-02-01 PROCEDURE — 1126F PR PAIN SEVERITY QUANTIFIED, NO PAIN PRESENT: ICD-10-PCS | Mod: HCNC,CPTII,S$GLB, | Performed by: INTERNAL MEDICINE

## 2022-02-01 PROCEDURE — 99499 RISK ADDL DX/OHS AUDIT: ICD-10-PCS | Mod: S$GLB,,, | Performed by: INTERNAL MEDICINE

## 2022-02-01 PROCEDURE — 99214 OFFICE O/P EST MOD 30 MIN: CPT | Mod: HCNC,S$GLB,, | Performed by: INTERNAL MEDICINE

## 2022-02-01 PROCEDURE — 3288F PR FALLS RISK ASSESSMENT DOCUMENTED: ICD-10-PCS | Mod: HCNC,CPTII,S$GLB, | Performed by: INTERNAL MEDICINE

## 2022-02-01 PROCEDURE — 3288F FALL RISK ASSESSMENT DOCD: CPT | Mod: HCNC,CPTII,S$GLB, | Performed by: INTERNAL MEDICINE

## 2022-02-01 PROCEDURE — 1101F PT FALLS ASSESS-DOCD LE1/YR: CPT | Mod: HCNC,CPTII,S$GLB, | Performed by: INTERNAL MEDICINE

## 2022-02-01 PROCEDURE — 99999 PR PBB SHADOW E&M-EST. PATIENT-LVL V: CPT | Mod: PBBFAC,HCNC,, | Performed by: INTERNAL MEDICINE

## 2022-02-01 PROCEDURE — 1160F RVW MEDS BY RX/DR IN RCRD: CPT | Mod: HCNC,CPTII,S$GLB, | Performed by: INTERNAL MEDICINE

## 2022-02-01 PROCEDURE — 1160F PR REVIEW ALL MEDS BY PRESCRIBER/CLIN PHARMACIST DOCUMENTED: ICD-10-PCS | Mod: HCNC,CPTII,S$GLB, | Performed by: INTERNAL MEDICINE

## 2022-02-01 PROCEDURE — 1159F MED LIST DOCD IN RCRD: CPT | Mod: HCNC,CPTII,S$GLB, | Performed by: INTERNAL MEDICINE

## 2022-02-01 PROCEDURE — 1126F AMNT PAIN NOTED NONE PRSNT: CPT | Mod: HCNC,CPTII,S$GLB, | Performed by: INTERNAL MEDICINE

## 2022-02-01 PROCEDURE — 1159F PR MEDICATION LIST DOCUMENTED IN MEDICAL RECORD: ICD-10-PCS | Mod: HCNC,CPTII,S$GLB, | Performed by: INTERNAL MEDICINE

## 2022-02-01 PROCEDURE — 3077F SYST BP >= 140 MM HG: CPT | Mod: HCNC,CPTII,S$GLB, | Performed by: INTERNAL MEDICINE

## 2022-02-01 PROCEDURE — 3080F PR MOST RECENT DIASTOLIC BLOOD PRESSURE >= 90 MM HG: ICD-10-PCS | Mod: HCNC,CPTII,S$GLB, | Performed by: INTERNAL MEDICINE

## 2022-02-01 PROCEDURE — 1101F PR PT FALLS ASSESS DOC 0-1 FALLS W/OUT INJ PAST YR: ICD-10-PCS | Mod: HCNC,CPTII,S$GLB, | Performed by: INTERNAL MEDICINE

## 2022-02-01 PROCEDURE — 3080F DIAST BP >= 90 MM HG: CPT | Mod: HCNC,CPTII,S$GLB, | Performed by: INTERNAL MEDICINE

## 2022-02-01 PROCEDURE — 99214 PR OFFICE/OUTPT VISIT, EST, LEVL IV, 30-39 MIN: ICD-10-PCS | Mod: HCNC,S$GLB,, | Performed by: INTERNAL MEDICINE

## 2022-02-01 NOTE — PATIENT INSTRUCTIONS
Stop taking valsartan 80 mg twice daily.    Replace it with valsartan 320 mg once daily.    We discussed keeping a log of home blood pressures and notifying the office if it is consistently running above 130/80 mmHg. I have asked him/her to send me his/her readings via [facility] in 1-2 weeks.    Antibiotics are recommended prior to dental procedures.

## 2022-04-12 ENCOUNTER — PATIENT MESSAGE (OUTPATIENT)
Dept: INTERNAL MEDICINE | Facility: CLINIC | Age: 77
End: 2022-04-12
Payer: MEDICARE

## 2022-04-13 ENCOUNTER — PATIENT MESSAGE (OUTPATIENT)
Dept: INTERNAL MEDICINE | Facility: CLINIC | Age: 77
End: 2022-04-13
Payer: MEDICARE

## 2022-04-15 ENCOUNTER — PATIENT MESSAGE (OUTPATIENT)
Dept: INTERNAL MEDICINE | Facility: CLINIC | Age: 77
End: 2022-04-15
Payer: MEDICARE

## 2022-04-18 ENCOUNTER — TELEPHONE (OUTPATIENT)
Dept: INTERNAL MEDICINE | Facility: CLINIC | Age: 77
End: 2022-04-18

## 2022-04-18 RX ORDER — MIRTAZAPINE 7.5 MG/1
7.5 TABLET, FILM COATED ORAL NIGHTLY
Qty: 30 TABLET | Refills: 3 | Status: SHIPPED | OUTPATIENT
Start: 2022-04-18 | End: 2022-07-17

## 2022-05-04 ENCOUNTER — PATIENT MESSAGE (OUTPATIENT)
Dept: INTERNAL MEDICINE | Facility: CLINIC | Age: 77
End: 2022-05-04
Payer: MEDICARE

## 2022-05-04 NOTE — TELEPHONE ENCOUNTER
No new care gaps identified.  [facility] Embedded Care Gaps. Reference number: [id number]. 5/04/2022   1:35:19 PM CDT

## 2022-05-05 RX ORDER — PANTOPRAZOLE SODIUM 40 MG/1
40 TABLET, DELAYED RELEASE ORAL
Qty: 90 TABLET | Refills: 3 | Status: SHIPPED | OUTPATIENT
Start: 2022-05-05 | End: 2023-02-09 | Stop reason: SDUPTHER

## 2023-02-07 DIAGNOSIS — Z00.00 ENCOUNTER FOR MEDICARE ANNUAL WELLNESS EXAM: ICD-10-CM

## 2023-02-09 ENCOUNTER — LAB VISIT (OUTPATIENT)
Dept: LAB | Facility: HOSPITAL | Age: 78
End: 2023-02-09
Attending: INTERNAL MEDICINE
Payer: MEDICARE

## 2023-02-09 ENCOUNTER — OFFICE VISIT (OUTPATIENT)
Dept: INTERNAL MEDICINE | Facility: CLINIC | Age: 78
End: 2023-02-09
Payer: MEDICARE

## 2023-02-09 VITALS
DIASTOLIC BLOOD PRESSURE: 84 MMHG | HEIGHT: 69 IN | BODY MASS INDEX: 26.38 KG/M2 | OXYGEN SATURATION: 98 % | HEART RATE: 64 BPM | WEIGHT: 178.13 LBS | SYSTOLIC BLOOD PRESSURE: 136 MMHG

## 2023-02-09 DIAGNOSIS — Z00.00 ENCOUNTER FOR MEDICARE ANNUAL WELLNESS EXAM: ICD-10-CM

## 2023-02-09 DIAGNOSIS — I10 PRIMARY HYPERTENSION: ICD-10-CM

## 2023-02-09 DIAGNOSIS — I25.10 CORONARY ARTERY DISEASE INVOLVING NATIVE CORONARY ARTERY OF NATIVE HEART WITHOUT ANGINA PECTORIS: ICD-10-CM

## 2023-02-09 DIAGNOSIS — E78.00 PURE HYPERCHOLESTEROLEMIA: Primary | ICD-10-CM

## 2023-02-09 DIAGNOSIS — M72.0 DUPUYTREN CONTRACTURE: ICD-10-CM

## 2023-02-09 DIAGNOSIS — Z12.5 ENCOUNTER FOR SCREENING FOR MALIGNANT NEOPLASM OF PROSTATE: ICD-10-CM

## 2023-02-09 DIAGNOSIS — E55.9 MILD VITAMIN D DEFICIENCY: ICD-10-CM

## 2023-02-09 DIAGNOSIS — E53.8 B12 DEFICIENCY: ICD-10-CM

## 2023-02-09 DIAGNOSIS — N40.0 BENIGN PROSTATIC HYPERPLASIA, UNSPECIFIED WHETHER LOWER URINARY TRACT SYMPTOMS PRESENT: ICD-10-CM

## 2023-02-09 DIAGNOSIS — N20.0 NEPHROLITHIASIS: ICD-10-CM

## 2023-02-09 DIAGNOSIS — Z95.2 STATUS POST AORTIC VALVE REPLACEMENT: ICD-10-CM

## 2023-02-09 DIAGNOSIS — R73.9 ACUTE HYPERGLYCEMIA: ICD-10-CM

## 2023-02-09 PROCEDURE — 3079F DIAST BP 80-89 MM HG: CPT | Mod: HCNC,CPTII,S$GLB, | Performed by: INTERNAL MEDICINE

## 2023-02-09 PROCEDURE — 1126F PR PAIN SEVERITY QUANTIFIED, NO PAIN PRESENT: ICD-10-PCS | Mod: HCNC,CPTII,S$GLB, | Performed by: INTERNAL MEDICINE

## 2023-02-09 PROCEDURE — 3288F FALL RISK ASSESSMENT DOCD: CPT | Mod: HCNC,CPTII,S$GLB, | Performed by: INTERNAL MEDICINE

## 2023-02-09 PROCEDURE — 3075F SYST BP GE 130 - 139MM HG: CPT | Mod: HCNC,CPTII,S$GLB, | Performed by: INTERNAL MEDICINE

## 2023-02-09 PROCEDURE — 99999 PR PBB SHADOW E&M-EST. PATIENT-LVL V: CPT | Mod: PBBFAC,HCNC,, | Performed by: INTERNAL MEDICINE

## 2023-02-09 PROCEDURE — 1126F AMNT PAIN NOTED NONE PRSNT: CPT | Mod: HCNC,CPTII,S$GLB, | Performed by: INTERNAL MEDICINE

## 2023-02-09 PROCEDURE — 82570 ASSAY OF URINE CREATININE: CPT | Mod: HCNC | Performed by: INTERNAL MEDICINE

## 2023-02-09 PROCEDURE — 1101F PR PT FALLS ASSESS DOC 0-1 FALLS W/OUT INJ PAST YR: ICD-10-PCS | Mod: HCNC,CPTII,S$GLB, | Performed by: INTERNAL MEDICINE

## 2023-02-09 PROCEDURE — 1159F MED LIST DOCD IN RCRD: CPT | Mod: HCNC,CPTII,S$GLB, | Performed by: INTERNAL MEDICINE

## 2023-02-09 PROCEDURE — 1159F PR MEDICATION LIST DOCUMENTED IN MEDICAL RECORD: ICD-10-PCS | Mod: HCNC,CPTII,S$GLB, | Performed by: INTERNAL MEDICINE

## 2023-02-09 PROCEDURE — 1101F PT FALLS ASSESS-DOCD LE1/YR: CPT | Mod: HCNC,CPTII,S$GLB, | Performed by: INTERNAL MEDICINE

## 2023-02-09 PROCEDURE — 99214 PR OFFICE/OUTPT VISIT, EST, LEVL IV, 30-39 MIN: ICD-10-PCS | Mod: HCNC,S$GLB,, | Performed by: INTERNAL MEDICINE

## 2023-02-09 PROCEDURE — 3075F PR MOST RECENT SYSTOLIC BLOOD PRESS GE 130-139MM HG: ICD-10-PCS | Mod: HCNC,CPTII,S$GLB, | Performed by: INTERNAL MEDICINE

## 2023-02-09 PROCEDURE — 3079F PR MOST RECENT DIASTOLIC BLOOD PRESSURE 80-89 MM HG: ICD-10-PCS | Mod: HCNC,CPTII,S$GLB, | Performed by: INTERNAL MEDICINE

## 2023-02-09 PROCEDURE — 99999 PR PBB SHADOW E&M-EST. PATIENT-LVL V: ICD-10-PCS | Mod: PBBFAC,HCNC,, | Performed by: INTERNAL MEDICINE

## 2023-02-09 PROCEDURE — 99214 OFFICE O/P EST MOD 30 MIN: CPT | Mod: HCNC,S$GLB,, | Performed by: INTERNAL MEDICINE

## 2023-02-09 PROCEDURE — 3288F PR FALLS RISK ASSESSMENT DOCUMENTED: ICD-10-PCS | Mod: HCNC,CPTII,S$GLB, | Performed by: INTERNAL MEDICINE

## 2023-02-09 PROCEDURE — 81003 URINALYSIS AUTO W/O SCOPE: CPT | Mod: HCNC | Performed by: INTERNAL MEDICINE

## 2023-02-09 RX ORDER — METOPROLOL SUCCINATE 100 MG/1
100 TABLET, EXTENDED RELEASE ORAL DAILY
Qty: 90 TABLET | Refills: 3 | Status: SHIPPED | OUTPATIENT
Start: 2023-02-09 | End: 2024-03-04

## 2023-02-09 RX ORDER — ATORVASTATIN CALCIUM 80 MG/1
80 TABLET, FILM COATED ORAL DAILY
Qty: 90 TABLET | Refills: 3 | Status: SHIPPED | OUTPATIENT
Start: 2023-02-09 | End: 2024-03-04

## 2023-02-09 RX ORDER — PANTOPRAZOLE SODIUM 40 MG/1
40 TABLET, DELAYED RELEASE ORAL
Qty: 90 TABLET | Refills: 3 | Status: SHIPPED | OUTPATIENT
Start: 2023-02-09 | End: 2024-03-07 | Stop reason: SDUPTHER

## 2023-02-09 NOTE — PROGRESS NOTES
Subjective:       Patient ID: Brad Kearns is a 77 y.o. male.    Chief Complaint: Annual Exam    HPIMr. Urmila is doing very well - No CP or SOB. He is very active and has lost a bit of weight.   Review of Systems   Constitutional:  Negative for activity change and unexpected weight change.   HENT:  Negative for hearing loss, rhinorrhea and trouble swallowing.    Eyes:  Negative for discharge and visual disturbance.   Respiratory:  Negative for chest tightness, shortness of breath and wheezing.    Cardiovascular:  Negative for chest pain and palpitations.   Gastrointestinal:  Negative for abdominal pain, blood in stool, constipation, diarrhea, nausea and vomiting.   Endocrine: Negative for polydipsia and polyuria.   Genitourinary:  Negative for difficulty urinating, dysuria, hematuria and urgency.   Musculoskeletal:  Negative for arthralgias, joint swelling and neck pain.   Neurological:  Negative for seizures, syncope, weakness and headaches.   Psychiatric/Behavioral:  Negative for confusion and dysphoric mood.      Objective:      Physical Exam  Constitutional:       General: He is not in acute distress.     Appearance: He is well-developed.   Eyes:      Pupils: Pupils are equal, round, and reactive to light.   Neck:      Thyroid: No thyromegaly.      Vascular: No JVD.   Cardiovascular:      Rate and Rhythm: Normal rate and regular rhythm.      Heart sounds: Normal heart sounds. No murmur heard.    No friction rub. No gallop.   Pulmonary:      Effort: Pulmonary effort is normal.      Breath sounds: Normal breath sounds. No wheezing or rales.   Abdominal:      General: Bowel sounds are normal. There is no distension.      Palpations: Abdomen is soft. There is no mass.      Tenderness: There is no abdominal tenderness. There is no guarding or rebound.   Genitourinary:     Comments: Sees his own urologist outside of [facility]  Musculoskeletal:      Cervical back: Neck supple.      Comments: Has dupuytren's  contracture   Lymphadenopathy:      Cervical: No cervical adenopathy.   Skin:     General: Skin is warm and dry.   Neurological:      Mental Status: He is alert and oriented to person, place, and time.   Psychiatric:         Behavior: Behavior normal.         Thought Content: Thought content normal.         Judgment: Judgment normal.       Assessment:       1. Pure hypercholesterolemia    2. Nephrolithiasis    3. Acute hyperglycemia    4. Coronary artery disease involving native coronary artery of native heart without angina pectoris    5. Primary hypertension    6. Dupuytren contracture    7. Mild vitamin D deficiency    8. Benign prostatic hyperplasia, unspecified whether lower urinary tract symptoms present    9. B12 deficiency    10. Status post aortic valve replacement    11. Encounter for screening for malignant neoplasm of prostate          Plan:   Pure hypercholesterolemia  -     Lipid Panel; Future; Expected date: 02/09/2023    Nephrolithiasis  No recent symptoms  Acute hyperglycemia  -     Hemoglobin A1C; Future; Expected date: 02/09/2023    Coronary artery disease involving native coronary artery of native heart without angina pectoris  -     Ambulatory referral/consult to Cardiology; Future; Expected date: 02/16/2023    Primary hypertension  -     CBC Auto Differential; Future; Expected date: 02/09/2023  -     Comprehensive Metabolic Panel; Future; Expected date: 02/09/2023  -     TSH; Future; Expected date: 02/09/2023  -     Urinalysis; Future; Expected date: 02/09/2023  -     Microalbumin/Creatinine Ratio, Urine; Future; Expected date: 02/09/2023  Controlled - continue current meds    Dupuytren contracture  -     Ambulatory referral/consult to Orthopedics; Future; Expected date: 02/16/2023    Mild vitamin D deficiency  -     Vitamin D; Future; Expected date: 02/09/2023    Benign prostatic hyperplasia, unspecified whether lower urinary tract symptoms present  -     PSA, Screening; Future; Expected date:  02/09/2023    B12 deficiency  -     Vitamin B12; Future; Expected date: 03/09/2023  -     Methylmalonic Acid, Serum; Future; Expected date: 02/09/2023    Status post aortic valve replacement  -     Echo    Encounter for screening for malignant neoplasm of prostate  -     PSA, Screening; Future; Expected date: 02/09/2023    Other orders  -     atorvastatin (LIPITOR) 80 MG tablet; Take 1 tablet (80 mg total) by mouth once daily.  Dispense: 90 tablet; Refill: 3  -     metoprolol succinate (TOPROL-XL) 100 MG 24 hr tablet; Take 1 tablet (100 mg total) by mouth once daily.  Dispense: 90 tablet; Refill: 3  -     pantoprazole (PROTONIX) 40 MG tablet; Take 1 tablet (40 mg total) by mouth before breakfast.  Dispense: 90 tablet; Refill: 3

## 2023-02-10 ENCOUNTER — LAB VISIT (OUTPATIENT)
Dept: LAB | Facility: HOSPITAL | Age: 78
End: 2023-02-10
Attending: INTERNAL MEDICINE
Payer: MEDICARE

## 2023-02-10 DIAGNOSIS — N40.0 BENIGN PROSTATIC HYPERPLASIA, UNSPECIFIED WHETHER LOWER URINARY TRACT SYMPTOMS PRESENT: ICD-10-CM

## 2023-02-10 DIAGNOSIS — E78.00 PURE HYPERCHOLESTEROLEMIA: ICD-10-CM

## 2023-02-10 DIAGNOSIS — I10 PRIMARY HYPERTENSION: ICD-10-CM

## 2023-02-10 DIAGNOSIS — R73.9 ACUTE HYPERGLYCEMIA: ICD-10-CM

## 2023-02-10 DIAGNOSIS — E55.9 MILD VITAMIN D DEFICIENCY: ICD-10-CM

## 2023-02-10 DIAGNOSIS — E53.8 B12 DEFICIENCY: ICD-10-CM

## 2023-02-10 DIAGNOSIS — Z12.5 ENCOUNTER FOR SCREENING FOR MALIGNANT NEOPLASM OF PROSTATE: ICD-10-CM

## 2023-02-10 LAB
25(OH)D3+25(OH)D2 SERPL-MCNC: 66 NG/ML (ref 30–96)
ALBUMIN SERPL BCP-MCNC: 4.2 G/DL (ref 3.5–5.2)
ALBUMIN/CREAT UR: 6 UG/MG (ref 0–30)
ALP SERPL-CCNC: 60 U/L (ref 55–135)
ALT SERPL W/O P-5'-P-CCNC: 38 U/L (ref 10–44)
ANION GAP SERPL CALC-SCNC: 14 MMOL/L (ref 8–16)
AST SERPL-CCNC: 35 U/L (ref 10–40)
BASOPHILS # BLD AUTO: 0.06 K/UL (ref 0–0.2)
BASOPHILS NFR BLD: 0.8 % (ref 0–1.9)
BILIRUB SERPL-MCNC: 0.6 MG/DL (ref 0.1–1)
BILIRUB UR QL STRIP: NEGATIVE
BUN SERPL-MCNC: 15 MG/DL (ref 8–23)
CALCIUM SERPL-MCNC: 9.9 MG/DL (ref 8.7–10.5)
CHLORIDE SERPL-SCNC: 110 MMOL/L (ref 95–110)
CHOLEST SERPL-MCNC: 111 MG/DL (ref 120–199)
CHOLEST/HDLC SERPL: 3.2 {RATIO} (ref 2–5)
CLARITY UR REFRACT.AUTO: CLEAR
CO2 SERPL-SCNC: 22 MMOL/L (ref 23–29)
COLOR UR AUTO: YELLOW
COMPLEXED PSA SERPL-MCNC: 5.2 NG/ML (ref 0–4)
CREAT SERPL-MCNC: 1.2 MG/DL (ref 0.5–1.4)
CREAT UR-MCNC: 117 MG/DL (ref 23–375)
DIFFERENTIAL METHOD: ABNORMAL
EOSINOPHIL # BLD AUTO: 0.2 K/UL (ref 0–0.5)
EOSINOPHIL NFR BLD: 2.3 % (ref 0–8)
ERYTHROCYTE [DISTWIDTH] IN BLOOD BY AUTOMATED COUNT: 14 % (ref 11.5–14.5)
EST. GFR  (NO RACE VARIABLE): >60 ML/MIN/1.73 M^2
ESTIMATED AVG GLUCOSE: 108 MG/DL (ref 68–131)
GLUCOSE SERPL-MCNC: 94 MG/DL (ref 70–110)
GLUCOSE UR QL STRIP: NEGATIVE
HBA1C MFR BLD: 5.4 % (ref 4–5.6)
HCT VFR BLD AUTO: 46.2 % (ref 40–54)
HDLC SERPL-MCNC: 35 MG/DL (ref 40–75)
HDLC SERPL: 31.5 % (ref 20–50)
HGB BLD-MCNC: 14.6 G/DL (ref 14–18)
HGB UR QL STRIP: NEGATIVE
IMM GRANULOCYTES # BLD AUTO: 0.01 K/UL (ref 0–0.04)
IMM GRANULOCYTES NFR BLD AUTO: 0.1 % (ref 0–0.5)
KETONES UR QL STRIP: NEGATIVE
LDLC SERPL CALC-MCNC: 60.4 MG/DL (ref 63–159)
LEUKOCYTE ESTERASE UR QL STRIP: NEGATIVE
LYMPHOCYTES # BLD AUTO: 3.7 K/UL (ref 1–4.8)
LYMPHOCYTES NFR BLD: 49.1 % (ref 18–48)
MCH RBC QN AUTO: 31.1 PG (ref 27–31)
MCHC RBC AUTO-ENTMCNC: 31.6 G/DL (ref 32–36)
MCV RBC AUTO: 99 FL (ref 82–98)
MICROALBUMIN UR DL<=1MG/L-MCNC: 7 UG/ML
MONOCYTES # BLD AUTO: 0.6 K/UL (ref 0.3–1)
MONOCYTES NFR BLD: 8.1 % (ref 4–15)
NEUTROPHILS # BLD AUTO: 3 K/UL (ref 1.8–7.7)
NEUTROPHILS NFR BLD: 39.6 % (ref 38–73)
NITRITE UR QL STRIP: NEGATIVE
NONHDLC SERPL-MCNC: 76 MG/DL
NRBC BLD-RTO: 0 /100 WBC
PH UR STRIP: 6 [PH] (ref 5–8)
PLATELET # BLD AUTO: 246 K/UL (ref 150–450)
PMV BLD AUTO: 11.2 FL (ref 9.2–12.9)
POTASSIUM SERPL-SCNC: 5.2 MMOL/L (ref 3.5–5.1)
PROT SERPL-MCNC: 7.3 G/DL (ref 6–8.4)
PROT UR QL STRIP: NEGATIVE
RBC # BLD AUTO: 4.69 M/UL (ref 4.6–6.2)
SODIUM SERPL-SCNC: 146 MMOL/L (ref 136–145)
SP GR UR STRIP: 1.02 (ref 1–1.03)
TRIGL SERPL-MCNC: 78 MG/DL (ref 30–150)
TSH SERPL DL<=0.005 MIU/L-ACNC: 1.5 UIU/ML (ref 0.4–4)
URN SPEC COLLECT METH UR: NORMAL
VIT B12 SERPL-MCNC: 861 PG/ML (ref 210–950)
WBC # BLD AUTO: 7.52 K/UL (ref 3.9–12.7)

## 2023-02-10 PROCEDURE — 36415 COLL VENOUS BLD VENIPUNCTURE: CPT | Mod: HCNC | Performed by: INTERNAL MEDICINE

## 2023-02-10 PROCEDURE — 83921 ORGANIC ACID SINGLE QUANT: CPT | Mod: HCNC | Performed by: INTERNAL MEDICINE

## 2023-02-10 PROCEDURE — 85025 COMPLETE CBC W/AUTO DIFF WBC: CPT | Mod: HCNC | Performed by: INTERNAL MEDICINE

## 2023-02-10 PROCEDURE — 84153 ASSAY OF PSA TOTAL: CPT | Mod: HCNC | Performed by: INTERNAL MEDICINE

## 2023-02-10 PROCEDURE — 82607 VITAMIN B-12: CPT | Mod: HCNC | Performed by: INTERNAL MEDICINE

## 2023-02-10 PROCEDURE — 83036 HEMOGLOBIN GLYCOSYLATED A1C: CPT | Mod: HCNC | Performed by: INTERNAL MEDICINE

## 2023-02-10 PROCEDURE — 80061 LIPID PANEL: CPT | Mod: HCNC | Performed by: INTERNAL MEDICINE

## 2023-02-10 PROCEDURE — 84443 ASSAY THYROID STIM HORMONE: CPT | Mod: HCNC | Performed by: INTERNAL MEDICINE

## 2023-02-10 PROCEDURE — 82306 VITAMIN D 25 HYDROXY: CPT | Mod: HCNC | Performed by: INTERNAL MEDICINE

## 2023-02-10 PROCEDURE — 80053 COMPREHEN METABOLIC PANEL: CPT | Mod: HCNC | Performed by: INTERNAL MEDICINE

## 2023-02-14 LAB — METHYLMALONATE SERPL-SCNC: 0.19 UMOL/L

## 2023-03-01 ENCOUNTER — PES CALL (OUTPATIENT)
Dept: ADMINISTRATIVE | Facility: CLINIC | Age: 78
End: 2023-03-01
Payer: MEDICARE

## 2023-03-01 PROBLEM — I77.810 ASCENDING AORTA DILATATION: Status: RESOLVED | Noted: 2022-02-01 | Resolved: 2023-03-01

## 2023-03-06 ENCOUNTER — PATIENT MESSAGE (OUTPATIENT)
Dept: INTERNAL MEDICINE | Facility: CLINIC | Age: 78
End: 2023-03-06
Payer: MEDICARE

## 2023-10-20 RX ORDER — MONTELUKAST SODIUM 10 MG/1
10 TABLET ORAL NIGHTLY
Qty: 90 TABLET | Refills: 1 | Status: SHIPPED | OUTPATIENT
Start: 2023-10-20 | End: 2024-03-04

## 2023-10-20 NOTE — TELEPHONE ENCOUNTER
No care due was identified.  [facility] Embedded Care Due Messages. Reference number: [id number].   10/20/2023 1:25:17 AM CDT

## 2023-10-20 NOTE — TELEPHONE ENCOUNTER
Refill Decision Note   Brad Garcíaamber  is requesting a refill authorization.  Brief Assessment and Rationale for Refill:  Approve     Medication Therapy Plan:         Comments:     Note composed:12:16 PM 10/20/2023

## 2023-10-27 RX ORDER — VALSARTAN 320 MG/1
320 TABLET ORAL
Qty: 90 TABLET | Refills: 1 | Status: SHIPPED | OUTPATIENT
Start: 2023-10-27 | End: 2024-03-04

## 2023-10-27 NOTE — TELEPHONE ENCOUNTER
Refill Routing Note   Medication(s) are not appropriate for processing by [facility] for the following reason(s):      Required labs abnormal    [facility] action(s):  Defer Care Due:  None identified              Appointments  past 12m or future 3m with PCP    Date Provider   Last Visit   2/9/2023 Kyung Hood MD   Next Visit   Visit date not found Kyung Hood MD   ED visits in past 90 days: 0        Note composed:11:01 AM 10/27/2023

## 2023-10-27 NOTE — TELEPHONE ENCOUNTER
No care due was identified.  [facility] Embedded Care Due Messages. Reference number: [id number].   10/27/2023 1:12:45 AM CDT

## 2024-03-04 RX ORDER — VALSARTAN 320 MG/1
320 TABLET ORAL
Qty: 90 TABLET | Refills: 1 | Status: SHIPPED | OUTPATIENT
Start: 2024-03-04 | End: 2024-05-03 | Stop reason: SDUPTHER

## 2024-03-04 RX ORDER — MONTELUKAST SODIUM 10 MG/1
10 TABLET ORAL NIGHTLY
Qty: 90 TABLET | Refills: 0 | Status: SHIPPED | OUTPATIENT
Start: 2024-03-04 | End: 2024-06-04

## 2024-03-04 RX ORDER — ATORVASTATIN CALCIUM 80 MG/1
80 TABLET, FILM COATED ORAL DAILY
Qty: 90 TABLET | Refills: 2 | Status: SHIPPED | OUTPATIENT
Start: 2024-03-04

## 2024-03-04 RX ORDER — METOPROLOL SUCCINATE 100 MG/1
100 TABLET, EXTENDED RELEASE ORAL
Qty: 90 TABLET | Refills: 2 | Status: SHIPPED | OUTPATIENT
Start: 2024-03-04 | End: 2024-05-03

## 2024-03-04 NOTE — TELEPHONE ENCOUNTER
Care Due:                  Date            Visit Type   Department     Provider  --------------------------------------------------------------------------------                                MYCHART                              FOLLOWUP/OF  [facility] PRIMARY  Last Visit: 02-      FICE VISIT   CARE           [facility]ung Hood                              [facility]                              ANNUAL                              CHECKUP/Y  [facility] INTERNAL  Next Visit: 03-      S            [facility]       Kyung Hood                                                            Last  Test          Frequency    Reason                     Performed    Due Date  --------------------------------------------------------------------------------    CBC.........  12 months..  mirtazapine..............  02-   02-    CMP.........  12 months..  atorvastatin,              02-   02-                             mirtazapine, valsartan...    Lipid Panel.  12 months..  atorvastatin.............  02-   02-    [facility] Embedded Care Due Messages. Reference number: [id number].   3/04/2024 12:12:26 AM CST

## 2024-03-04 NOTE — TELEPHONE ENCOUNTER
Refill Decision Note   Brad Kearns  is requesting a refill authorization.  Brief Assessment and Rationale for Refill:  Approve     Medication Therapy Plan:       Medication Reconciliation Completed: No   Comments:     No Care Gaps recommended.     Note composed:2:46 PM 03/04/2024

## 2024-03-04 NOTE — TELEPHONE ENCOUNTER
Refill Routing Note   Medication(s) are not appropriate for processing by [facility] for the following reason(s):        Required labs outdated  Required vitals outdated    [facility] action(s):  Defer     Requires labs : Yes             Appointments  past 12m or future 3m with PCP    Date Provider   Last Visit   2/9/2023 Kyung Hood MD   Next Visit   3/7/2024 Kyung Hood MD   ED visits in past 90 days: 0        Note composed:3:32 AM 03/04/2024

## 2024-03-04 NOTE — TELEPHONE ENCOUNTER
No care due was identified.  [facility] Embedded Care Due Messages. Reference number: [id number].   3/04/2024 8:31:37 AM CST

## 2024-03-07 ENCOUNTER — OFFICE VISIT (OUTPATIENT)
Dept: INTERNAL MEDICINE | Facility: CLINIC | Age: 79
End: 2024-03-07
Payer: MEDICARE

## 2024-03-07 ENCOUNTER — LAB VISIT (OUTPATIENT)
Dept: LAB | Facility: HOSPITAL | Age: 79
End: 2024-03-07
Attending: INTERNAL MEDICINE
Payer: MEDICARE

## 2024-03-07 VITALS
DIASTOLIC BLOOD PRESSURE: 76 MMHG | SYSTOLIC BLOOD PRESSURE: 136 MMHG | BODY MASS INDEX: 26.32 KG/M2 | HEART RATE: 62 BPM | WEIGHT: 177.69 LBS | OXYGEN SATURATION: 97 % | HEIGHT: 69 IN

## 2024-03-07 DIAGNOSIS — R73.9 HYPERGLYCEMIA: ICD-10-CM

## 2024-03-07 DIAGNOSIS — E55.9 MILD VITAMIN D DEFICIENCY: ICD-10-CM

## 2024-03-07 DIAGNOSIS — I25.10 CORONARY ARTERY DISEASE INVOLVING NATIVE CORONARY ARTERY OF NATIVE HEART WITHOUT ANGINA PECTORIS: Primary | ICD-10-CM

## 2024-03-07 DIAGNOSIS — N40.0 BENIGN PROSTATIC HYPERPLASIA, UNSPECIFIED WHETHER LOWER URINARY TRACT SYMPTOMS PRESENT: ICD-10-CM

## 2024-03-07 DIAGNOSIS — E78.5 HYPERLIPIDEMIA, UNSPECIFIED HYPERLIPIDEMIA TYPE: ICD-10-CM

## 2024-03-07 DIAGNOSIS — Z12.11 SCREENING FOR COLON CANCER: ICD-10-CM

## 2024-03-07 DIAGNOSIS — I10 PRIMARY HYPERTENSION: ICD-10-CM

## 2024-03-07 DIAGNOSIS — Z95.2 STATUS POST AORTIC VALVE REPLACEMENT: ICD-10-CM

## 2024-03-07 LAB
25(OH)D3+25(OH)D2 SERPL-MCNC: 77 NG/ML (ref 30–96)
ALBUMIN SERPL BCP-MCNC: 4.2 G/DL (ref 3.5–5.2)
ALP SERPL-CCNC: 52 U/L (ref 55–135)
ALT SERPL W/O P-5'-P-CCNC: 29 U/L (ref 10–44)
ANION GAP SERPL CALC-SCNC: 9 MMOL/L (ref 8–16)
AST SERPL-CCNC: 33 U/L (ref 10–40)
BASOPHILS # BLD AUTO: 0.05 K/UL (ref 0–0.2)
BASOPHILS NFR BLD: 0.5 % (ref 0–1.9)
BILIRUB SERPL-MCNC: 1.1 MG/DL (ref 0.1–1)
BUN SERPL-MCNC: 20 MG/DL (ref 8–23)
CALCIUM SERPL-MCNC: 10 MG/DL (ref 8.7–10.5)
CHLORIDE SERPL-SCNC: 112 MMOL/L (ref 95–110)
CHOLEST SERPL-MCNC: 113 MG/DL (ref 120–199)
CHOLEST/HDLC SERPL: 3.1 {RATIO} (ref 2–5)
CO2 SERPL-SCNC: 22 MMOL/L (ref 23–29)
COMPLEXED PSA SERPL-MCNC: 5.7 NG/ML (ref 0–4)
CREAT SERPL-MCNC: 1.3 MG/DL (ref 0.5–1.4)
DIFFERENTIAL METHOD BLD: ABNORMAL
EOSINOPHIL # BLD AUTO: 0.1 K/UL (ref 0–0.5)
EOSINOPHIL NFR BLD: 0.9 % (ref 0–8)
ERYTHROCYTE [DISTWIDTH] IN BLOOD BY AUTOMATED COUNT: 14.1 % (ref 11.5–14.5)
EST. GFR  (NO RACE VARIABLE): 56.6 ML/MIN/1.73 M^2
ESTIMATED AVG GLUCOSE: 105 MG/DL (ref 68–131)
GLUCOSE SERPL-MCNC: 82 MG/DL (ref 70–110)
HBA1C MFR BLD: 5.3 % (ref 4–5.6)
HCT VFR BLD AUTO: 41.7 % (ref 40–54)
HDLC SERPL-MCNC: 36 MG/DL (ref 40–75)
HDLC SERPL: 31.9 % (ref 20–50)
HGB BLD-MCNC: 13.6 G/DL (ref 14–18)
IMM GRANULOCYTES # BLD AUTO: 0.04 K/UL (ref 0–0.04)
IMM GRANULOCYTES NFR BLD AUTO: 0.4 % (ref 0–0.5)
LDLC SERPL CALC-MCNC: 64.4 MG/DL (ref 63–159)
LYMPHOCYTES # BLD AUTO: 4.2 K/UL (ref 1–4.8)
LYMPHOCYTES NFR BLD: 40.2 % (ref 18–48)
MCH RBC QN AUTO: 31.6 PG (ref 27–31)
MCHC RBC AUTO-ENTMCNC: 32.6 G/DL (ref 32–36)
MCV RBC AUTO: 97 FL (ref 82–98)
MONOCYTES # BLD AUTO: 0.9 K/UL (ref 0.3–1)
MONOCYTES NFR BLD: 8.8 % (ref 4–15)
NEUTROPHILS # BLD AUTO: 5.1 K/UL (ref 1.8–7.7)
NEUTROPHILS NFR BLD: 49.2 % (ref 38–73)
NONHDLC SERPL-MCNC: 77 MG/DL
NRBC BLD-RTO: 0 /100 WBC
PLATELET # BLD AUTO: 237 K/UL (ref 150–450)
PMV BLD AUTO: 10.7 FL (ref 9.2–12.9)
POTASSIUM SERPL-SCNC: 4.7 MMOL/L (ref 3.5–5.1)
PROT SERPL-MCNC: 7.2 G/DL (ref 6–8.4)
RBC # BLD AUTO: 4.31 M/UL (ref 4.6–6.2)
SODIUM SERPL-SCNC: 143 MMOL/L (ref 136–145)
TRIGL SERPL-MCNC: 63 MG/DL (ref 30–150)
TSH SERPL DL<=0.005 MIU/L-ACNC: 0.78 UIU/ML (ref 0.4–4)
WBC # BLD AUTO: 10.36 K/UL (ref 3.9–12.7)

## 2024-03-07 PROCEDURE — 84443 ASSAY THYROID STIM HORMONE: CPT | Performed by: INTERNAL MEDICINE

## 2024-03-07 PROCEDURE — 83036 HEMOGLOBIN GLYCOSYLATED A1C: CPT | Performed by: INTERNAL MEDICINE

## 2024-03-07 PROCEDURE — 85025 COMPLETE CBC W/AUTO DIFF WBC: CPT | Performed by: INTERNAL MEDICINE

## 2024-03-07 PROCEDURE — 82306 VITAMIN D 25 HYDROXY: CPT | Performed by: INTERNAL MEDICINE

## 2024-03-07 PROCEDURE — 99999 PR PBB SHADOW E&M-EST. PATIENT-LVL IV: CPT | Mod: PBBFAC,,, | Performed by: INTERNAL MEDICINE

## 2024-03-07 PROCEDURE — 84153 ASSAY OF PSA TOTAL: CPT | Performed by: INTERNAL MEDICINE

## 2024-03-07 PROCEDURE — 36415 COLL VENOUS BLD VENIPUNCTURE: CPT | Mod: PO | Performed by: INTERNAL MEDICINE

## 2024-03-07 PROCEDURE — 80053 COMPREHEN METABOLIC PANEL: CPT | Performed by: INTERNAL MEDICINE

## 2024-03-07 PROCEDURE — 99214 OFFICE O/P EST MOD 30 MIN: CPT | Mod: S$GLB,,, | Performed by: INTERNAL MEDICINE

## 2024-03-07 PROCEDURE — 80061 LIPID PANEL: CPT | Performed by: INTERNAL MEDICINE

## 2024-03-07 RX ORDER — TERBINAFINE HYDROCHLORIDE 250 MG/1
250 TABLET ORAL DAILY
Qty: 90 TABLET | Refills: 0 | Status: SHIPPED | OUTPATIENT
Start: 2024-03-07 | End: 2024-04-06

## 2024-03-07 RX ORDER — PANTOPRAZOLE SODIUM 40 MG/1
40 TABLET, DELAYED RELEASE ORAL
Qty: 90 TABLET | Refills: 3 | Status: SHIPPED | OUTPATIENT
Start: 2024-03-07 | End: 2025-03-07

## 2024-03-07 NOTE — PROGRESS NOTES
Subjective:       Patient ID: Brad Kearns is a 78 y.o. male.    Chief Complaint: Annual Exam    HPIPt is feeling well - No CP or SOB. He is very active.    Review of Systems   Respiratory:  Negative for shortness of breath.    Cardiovascular:  Negative for chest pain.   Gastrointestinal:  Negative for abdominal pain, diarrhea, nausea and vomiting.   Genitourinary:  Negative for dysuria.   Neurological:  Negative for seizures, syncope and headaches.       Objective:      Physical Exam  Constitutional:       General: He is not in acute distress.     Appearance: He is well-developed.   Eyes:      Pupils: Pupils are equal, round, and reactive to light.   Neck:      Thyroid: No thyromegaly.      Vascular: No JVD.   Cardiovascular:      Rate and Rhythm: Normal rate and regular rhythm.      Heart sounds: Normal heart sounds. No murmur heard.     No friction rub. No gallop.   Pulmonary:      Effort: Pulmonary effort is normal.      Breath sounds: Normal breath sounds. No wheezing or rales.   Abdominal:      General: Bowel sounds are normal. There is no distension.      Palpations: Abdomen is soft. There is no mass.      Tenderness: There is no abdominal tenderness. There is no guarding or rebound.   Genitourinary:     Comments: He sees outside Urologist.  Musculoskeletal:      Cervical back: Neck supple.   Lymphadenopathy:      Cervical: No cervical adenopathy.   Skin:     General: Skin is warm and dry.   Neurological:      Mental Status: He is alert and oriented to person, place, and time.   Psychiatric:         Behavior: Behavior normal.         Thought Content: Thought content normal.         Judgment: Judgment normal.         Assessment:       1. Coronary artery disease involving native coronary artery of native heart without angina pectoris    2. Primary hypertension    3. Hyperlipidemia, unspecified hyperlipidemia type    4. Hyperglycemia    5. Mild vitamin D deficiency    6. Benign prostatic hyperplasia,  unspecified whether lower urinary tract symptoms present    7. Status post aortic valve replacement        Plan:   Coronary artery disease involving native coronary artery of native heart without angina pectoris  -     Ambulatory referral/consult to Cardiology; Future; Expected date: 03/14/2024    Primary hypertension  -     CBC Auto Differential; Future; Expected date: 03/07/2024  -     Comprehensive Metabolic Panel; Future; Expected date: 03/07/2024  -     TSH; Future; Expected date: 03/07/2024  -     Urinalysis; Future; Expected date: 03/07/2024  -     Microalbumin/Creatinine Ratio, Urine; Future; Expected date: 03/07/2024    Hyperlipidemia, unspecified hyperlipidemia type  -     Lipid Panel; Future; Expected date: 03/07/2024    Hyperglycemia  -     Hemoglobin A1C; Future; Expected date: 03/07/2024    Mild vitamin D deficiency  -     Vitamin D; Future; Expected date: 03/07/2024    Benign prostatic hyperplasia, unspecified whether lower urinary tract symptoms present  -     PSA, Screening; Future; Expected date: 03/07/2024    Status post aortic valve replacement  -     Echo Saline Bubble? No; Future    Other orders  -     pantoprazole (PROTONIX) 40 MG tablet; Take 1 tablet (40 mg total) by mouth before breakfast.  Dispense: 90 tablet; Refill: 3  -     terbinafine HCL (LAMISIL) 250 mg tablet; Take 1 tablet (250 mg total) by mouth once daily.  Dispense: 90 tablet; Refill: 0      Received colonoscopy report from 2011 - he is due for a repeat.

## 2024-03-08 ENCOUNTER — TELEPHONE (OUTPATIENT)
Dept: ORTHOPEDICS | Facility: CLINIC | Age: 79
End: 2024-03-08
Payer: MEDICARE

## 2024-03-08 DIAGNOSIS — M79.641 RIGHT HAND PAIN: Primary | ICD-10-CM

## 2024-03-11 ENCOUNTER — OFFICE VISIT (OUTPATIENT)
Dept: ORTHOPEDICS | Facility: CLINIC | Age: 79
End: 2024-03-11
Payer: MEDICARE

## 2024-03-11 ENCOUNTER — HOSPITAL ENCOUNTER (OUTPATIENT)
Dept: CARDIOLOGY | Facility: CLINIC | Age: 79
Discharge: HOME | End: 2024-03-11
Payer: MEDICARE

## 2024-03-11 ENCOUNTER — HOSPITAL ENCOUNTER (OUTPATIENT)
Dept: RADIOLOGY | Facility: HOSPITAL | Age: 79
Discharge: HOME | End: 2024-03-11
Attending: ORTHOPAEDIC SURGERY
Payer: MEDICARE

## 2024-03-11 VITALS — WEIGHT: 177 LBS | BODY MASS INDEX: 26.22 KG/M2 | HEIGHT: 69 IN

## 2024-03-11 DIAGNOSIS — M18.11 PRIMARY OSTEOARTHRITIS OF FIRST CARPOMETACARPAL JOINT OF RIGHT HAND: ICD-10-CM

## 2024-03-11 DIAGNOSIS — Z01.818 PRE-OP TESTING: ICD-10-CM

## 2024-03-11 DIAGNOSIS — M72.0 DUPUYTREN'S CONTRACTURE OF RIGHT HAND: Primary | ICD-10-CM

## 2024-03-11 DIAGNOSIS — M79.641 RIGHT HAND PAIN: ICD-10-CM

## 2024-03-11 PROCEDURE — 73130 X-RAY EXAM OF HAND: CPT | Mod: TC,PO,RT

## 2024-03-11 PROCEDURE — 99204 OFFICE O/P NEW MOD 45 MIN: CPT | Mod: S$GLB,,, | Performed by: ORTHOPAEDIC SURGERY

## 2024-03-11 PROCEDURE — 73130 X-RAY EXAM OF HAND: CPT | Mod: 26,RT,, | Performed by: RADIOLOGY

## 2024-03-11 PROCEDURE — 93005 ELECTROCARDIOGRAM TRACING: CPT | Mod: S$GLB,,, | Performed by: ORTHOPAEDIC SURGERY

## 2024-03-11 PROCEDURE — 99999 PR PBB SHADOW E&M-EST. PATIENT-LVL III: CPT | Mod: PBBFAC,,, | Performed by: ORTHOPAEDIC SURGERY

## 2024-03-11 PROCEDURE — 93010 ELECTROCARDIOGRAM REPORT: CPT | Mod: S$GLB,,, | Performed by: INTERNAL MEDICINE

## 2024-03-11 NOTE — LETTER
[facility] - Orthopedics  [address]  [place] [address]  Phone: [phone]   I am involved the care of out mutual patient Brad Kearns.  They have elected to go forward with surgery to address right hand dupuytrents contracture on 3.19.24. I request your assistance with pre-operative evaluation and determination if any optimization and/or testing is needed beyond standard pre-operative labs and EKG.    I appreciate you assistance and please don't hesitate to call or reach out with questions/concerns.    Sincerely,        Shivani Bland MD  Hand & Wrist Orthopaedic Surgery  03/11/2024  P: [phone]  F: [phone]

## 2024-03-11 NOTE — PROGRESS NOTES
"  [facility]  History & Physical  Orthopedics    SUBJECTIVE:      Chief Complaint:   Chief Complaint   Patient presents with    Right Hand - Pain       Referring Provider: {referringprovider:53072}     History of Present Illness:  History of Present Illness    Patient is a 78 y.o. {handedness:24148} male who presents today with complaints of ***.     The patient is a/an ***.    Onset of symptoms/DOI was ***.    Symptoms are aggravated by {aggravatin}.    Symptoms are alleviated by {alleviatin}.    Symptoms consist of {symptoms:42156}.    The patient rates their pain as a {pain scale:44353}.    Attempted treatment(s) and/or interventions include {treatments:06993}.     The patient denies any fevers, chills, N/V, D/C and presents for evaluation.     Vitals:    24 1416   Weight: 80.3 kg (177 lb)   Height: 5' 9" (1.753 m)   PainSc:   8   PainLoc: Hand       Past Medical History:   Diagnosis Date    Ascending aorta dilatation 2022    SOV 4.5 cm    Bicuspid aortic valve     Cardiomyopathy     EF 35-40% post AVR    Coronary artery disease     GERD (gastroesophageal reflux disease)     Hyperlipidemia     Hypertension     Nonrheumatic aortic valve insufficiency 3/13/2020    Valvular regurgitation     bicuspid valve, AI     Past Surgical History:   Procedure Laterality Date    AORTIC VALVE REPLACEMENT N/A 2020    Procedure: REPLACEMENT, AORTIC VALVE;  Surgeon: Guicho Quezada MD;  Location: [facility] OR [address] [place] [address];  Service: Cardiovascular;  Laterality: N/A;    AORTOGRAPHY N/A 5/15/2020    Procedure: Aortogram;  Surgeon: Ben Butler MD;  Location: [facility] CATH LAB;  Service: Cardiology;  Laterality: N/A;    APPENDECTOMY      CARDIAC CATHETERIZATION  2020    LM 60%,LAD 90% prox and mid, CX 90%, RCA 50%    CARDIAC VALVE SURGERY  2020    29 mm Medtronic porcine valve aortic position    CORONARY ANGIOGRAPHY N/A 5/15/2020    Procedure: ANGIOGRAM, CORONARY ARTERY;  Surgeon: " Ben Butler MD;  Location: [facility] CATH LAB;  Service: Cardiology;  Laterality: N/A;    CORONARY ARTERY BYPASS GRAFT  05/2020    LIMA to LAD, SVG OM    CORONARY ARTERY BYPASS GRAFT (CABG) N/A 5/19/2020    Procedure: CORONARY ARTERY BYPASS GRAFT (CABG) x2;  Surgeon: Guicho Quezada MD;  [facility];  Service: Cardiovascular;  Laterality: N/A;  CORONARY ARTERY BYPASS GRAFT (CABG) x2    ENDOSCOPIC HARVEST OF VEIN Left 5/19/2020    Procedure: SURGICAL PROCUREMENT, VEIN, ENDOSCOPIC;  Surgeon: Guicho Quezada MD;  [facility];  Service: Cardiovascular;  Laterality: Left;  [place] start: 1402  [place] stop: 1428  Prep start: 1432  Prep stop: 1452    INGUINAL HERNIA REPAIR      LEFT HEART CATHETERIZATION Left 5/15/2020    Procedure: Left heart cath;  Surgeon: Ben Butler MD;  Location: [facility] CATH LAB;  Service: Cardiology;  Laterality: Left;    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    Right knee arthroscopy      WOUND EXPLORATION N/A 5/20/2020    Procedure: EXPLORATION, WOUND- Chest washout;  Surgeon: Guicho Quezada MD;  Location: [facility] OR [address] [place] [address];  Service: Cardiovascular;  Laterality: N/A;     Review of patient's allergies indicates:   Allergen Reactions    Demerol [meperidine] Hives     tachycardia    Lisinopril      cough     Social History     Social History Narrative    Not on file     Family History   Problem Relation Age of Onset    Stroke Mother     Heart attack Father     Heart disease Brother     Rheum arthritis Brother     Heart attack Son     Heart disease Son     Heart disease Brother     Hyperlipidemia Sister     Colon cancer Sister 80    Colon cancer Brother 74    Hyperlipidemia Sister     Cancer Brother          Current Outpatient Medications:     ascorbic acid, vitamin C, (VITAMIN C) 1000 MG tablet, Take 1,000 mg by mouth every morning., Disp: , Rfl:     aspirin (ECOTRIN) 81 MG EC tablet, Take 81 mg by mouth every morning., Disp: , Rfl:  "    atorvastatin (LIPITOR) 80 MG tablet, TAKE 1 TABLET (80 MG TOTAL) BY MOUTH ONCE DAILY., Disp: 90 tablet, Rfl: 2    fish oil-omega-3 fatty acids 300-1,000 mg capsule, Take 1 g by mouth every morning., Disp: , Rfl:     metoprolol succinate (TOPROL-XL) 100 MG 24 hr tablet, TAKE 1 TABLET BY MOUTH EVERY DAY, Disp: 90 tablet, Rfl: 2    montelukast (SINGULAIR) 10 mg tablet, TAKE 1 TABLET BY MOUTH EVERY DAY IN THE EVENING, Disp: 90 tablet, Rfl: 0    multivitamin capsule, Take 1 capsule by mouth once daily., Disp: , Rfl:     pantoprazole (PROTONIX) 40 MG tablet, Take 1 tablet (40 mg total) by mouth before breakfast., Disp: 90 tablet, Rfl: 3    terbinafine HCL (LAMISIL) 250 mg tablet, Take 1 tablet (250 mg total) by mouth once daily., Disp: 90 tablet, Rfl: 0    thiamine (VITAMIN B-1) 50 MG tablet, Take 50 mg by mouth once daily., Disp: , Rfl:     valsartan (DIOVAN) 320 MG tablet, TAKE 1 TABLET BY MOUTH EVERY DAY, Disp: 90 tablet, Rfl: 1    ROS    Review of Systems:  Constitutional: no fever or chills  Eyes: no visual changes  ENT: no nasal congestion or sore throat  Respiratory: no cough or shortness of breath  Cardiovascular: no chest pain  Gastrointestinal: no nausea or vomiting, tolerating diet  Musculoskeletal: {MSKROS:01592::"pain","soreness"}    OBJECTIVE:      Vital Signs (Most Recent):  Vitals:    03/11/24 1416   Weight: 80.3 kg (177 lb)   Height: 5' 9" (1.753 m)     Body mass index is 26.14 kg/m².    Physical Exam    Physical Exam:  Constitutional: The patient appears well-developed and well-nourished. No distress.   Head: Normocephalic and atraumatic.   Nose: Nose normal.   Eyes: Conjunctivae and EOM are normal.   Neck: No tracheal deviation present.   Cardiovascular: Normal rate and intact distal pulses.    Pulmonary/Chest: Effort normal. No respiratory distress.   Abdominal: There is no guarding.   Lymphatic: Negative for adenopathy   Neurological: The patient is alert.   Psychiatric: The patient has a normal " mood and affect.     Bilateral Hand/Wrist Examination:  Physical Exam      Observation/Inspection:  Swelling  none    Deformity  none  Discoloration  none     Scars   none    Atrophy  none    HAND/WRIST EXAMINATION:  Finkelstein's Test   Neg  WHAT Test    Neg  Snuff box tenderness   Neg  Sprague's Test    Neg  Hook of [place]ate Tenderness  Neg  CMC grind    Neg  Circumduction test   Neg  TFCC Compression Test  Neg    Physical Exam     ROM hand/wrist/elbow full    Neurovascular Exam:  Digits WWP, brisk CR < 3s throughout  NVI motor/LTS to M/R/U nerves, radial pulse 2+  2+ biceps and brachioradialis reflexes  Tinel's Test - Carpal Tunnel  Neg  Tinel's Test - Cubital Tunnel  Neg  Phalen's Test    Neg  Median Nerve Compression Test Neg      Diagnostic Results:  Results     X-rays AP, lateral and oblique right hand taken today are independently reviewed by me and shows scapholunate widening right wrist, contracture right small finger Eaton stage IV basilar thumb arthritis.       ASSESSMENT/PLAN:    78 y.o. yo male with   Encounter Diagnoses   Name Primary?    Primary osteoarthritis of first carpometacarpal joint of right hand     Dupuytren's contracture of right hand Yes      Plan: APSEC@     The patient's pathophysiology was explained in detail with reference to x-rays, models, other visual aids as appropriate.  Treatment options were discussed in detail.  Questions were invited and answered to the patient's satisfaction. I reviewed Primary care , and other specialty's notes to better coordinate patient's care.          Shivani Bland MD    Please be aware that this note has been generated with the assistance of [employer] voice-to-text.  Please excuse any spelling or grammatical errors.  This note was generated with the assistance of ambient listening technology. Verbal consent was obtained by the patient and accompanying visitor(s) for the recording of patient appointment to facilitate this note. I attest to having reviewed  and edited the generated note for accuracy, though some syntax or spelling errors may persist. Please contact the author of this note for any clarification.

## 2024-03-11 NOTE — PROGRESS NOTES
"  [facility]  History & Physical  Orthopedics    SUBJECTIVE:        Chief Complaint:   Chief Complaint   Patient presents with    Right Hand - Pain       Referring Provider: none    History of Present Illness:  Patient is a 78 y.o. right hand dominant male who presents today with complaints of right palmar cord and dupuytrens contracture of the right small finger PIP and MP. Patient states that he noticed this about 1 year ago and that is is affecting his activities of daily living and  strength.     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    PMH: CAD s/p CABG 5/20     The patient denies any fevers, chills, N/V, D/C and presents for evaluation.     Vitals:    03/11/24 1416   Weight: 80.3 kg (177 lb)   Height: 5' 9" (1.753 m)   PainSc:   8   PainLoc: Hand       Past Medical History:   Diagnosis Date    Ascending aorta dilatation 2/1/2022    SOV 4.5 cm    Bicuspid aortic valve     Cardiomyopathy     EF 35-40% post AVR    Coronary artery disease     GERD (gastroesophageal reflux disease)     Hyperlipidemia     Hypertension     Nonrheumatic aortic valve insufficiency 3/13/2020    Valvular regurgitation     bicuspid valve, AI     Past Surgical History:   Procedure Laterality Date    AORTIC VALVE REPLACEMENT N/A 5/19/2020    Procedure: REPLACEMENT, AORTIC VALVE;  Surgeon: Guicho Quezada MD;  Location: [facility] OR [address] [place] [address];  Service: Cardiovascular;  Laterality: N/A;    AORTOGRAPHY N/A 5/15/2020    Procedure: Aortogram;  Surgeon: Ben Butler MD;  Location: [facility] CATH LAB;  Service: Cardiology;  Laterality: N/A;    APPENDECTOMY      CARDIAC CATHETERIZATION  05/2020    LM 60%,LAD 90% prox and mid, CX 90%, RCA 50%    CARDIAC VALVE SURGERY  05/2020    29 mm Medtronic porcine valve aortic position    CORONARY ANGIOGRAPHY N/A 5/15/2020    Procedure: ANGIOGRAM, CORONARY ARTERY;  Surgeon: Ben Butler MD;  Location: [facility] CATH LAB;  Service: " Cardiology;  Laterality: N/A;    CORONARY ARTERY BYPASS GRAFT  05/2020    LIMA to LAD, SVG OM    CORONARY ARTERY BYPASS GRAFT (CABG) N/A 5/19/2020    Procedure: CORONARY ARTERY BYPASS GRAFT (CABG) x2;  Surgeon: Guicho Quezada MD;  Location: [facility] OR [address] [place] [address];  Service: Cardiovascular;  Laterality: N/A;  CORONARY ARTERY BYPASS GRAFT (CABG) x2    ENDOSCOPIC HARVEST OF VEIN Left 5/19/2020    Procedure: SURGICAL PROCUREMENT, VEIN, ENDOSCOPIC;  Surgeon: Guicho Quezada MD;  Location: [facility] OR [address] [place] [address];  Service: Cardiovascular;  Laterality: Left;  [place] start: 1402  [place] stop: 1428  Prep start: 1432  Prep stop: 1452    INGUINAL HERNIA REPAIR      LEFT HEART CATHETERIZATION Left 5/15/2020    Procedure: Left heart cath;  Surgeon: Ben Butler MD;  Location: [facility] CATH LAB;  Service: Cardiology;  Laterality: Left;    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    Right knee arthroscopy      WOUND EXPLORATION N/A 5/20/2020    Procedure: EXPLORATION, WOUND- Chest washout;  Surgeon: Guicho Quezada MD;  Location: [facility] OR [address] [place] [address];  Service: Cardiovascular;  Laterality: N/A;     Review of patient's allergies indicates:   Allergen Reactions    Demerol [meperidine] Hives     tachycardia    Lisinopril      cough     Social History     Social History Narrative    Not on file     Family History   Problem Relation Age of Onset    Stroke Mother     Heart attack Father     Heart disease Brother     Rheum arthritis Brother     Heart attack Son     Heart disease Son     Heart disease Brother     Hyperlipidemia Sister     Colon cancer Sister 80    Colon cancer Brother 74    Hyperlipidemia Sister     Cancer Brother          Current Outpatient Medications:     ascorbic acid, vitamin C, (VITAMIN C) 1000 MG tablet, Take 1,000 mg by mouth every morning., Disp: , Rfl:     aspirin (ECOTRIN) 81 MG EC tablet, Take 81 mg by mouth every morning., Disp: , Rfl:     atorvastatin (LIPITOR) 80 MG tablet, TAKE 1 TABLET (80 MG  "TOTAL) BY MOUTH ONCE DAILY., Disp: 90 tablet, Rfl: 2    fish oil-omega-3 fatty acids 300-1,000 mg capsule, Take 1 g by mouth every morning., Disp: , Rfl:     metoprolol succinate (TOPROL-XL) 100 MG 24 hr tablet, TAKE 1 TABLET BY MOUTH EVERY DAY, Disp: 90 tablet, Rfl: 2    montelukast (SINGULAIR) 10 mg tablet, TAKE 1 TABLET BY MOUTH EVERY DAY IN THE EVENING, Disp: 90 tablet, Rfl: 0    multivitamin capsule, Take 1 capsule by mouth once daily., Disp: , Rfl:     pantoprazole (PROTONIX) 40 MG tablet, Take 1 tablet (40 mg total) by mouth before breakfast., Disp: 90 tablet, Rfl: 3    terbinafine HCL (LAMISIL) 250 mg tablet, Take 1 tablet (250 mg total) by mouth once daily., Disp: 90 tablet, Rfl: 0    thiamine (VITAMIN B-1) 50 MG tablet, Take 50 mg by mouth once daily., Disp: , Rfl:     valsartan (DIOVAN) 320 MG tablet, TAKE 1 TABLET BY MOUTH EVERY DAY, Disp: 90 tablet, Rfl: 1    ROS    Review of Systems:  Constitutional: no fever or chills  Eyes: no visual changes  ENT: no nasal congestion or sore throat  Respiratory: no cough or shortness of breath  Cardiovascular: no chest pain  Gastrointestinal: no nausea or vomiting, tolerating diet  Musculoskeletal: pain, soreness, and decreased ROM    OBJECTIVE:      Vital Signs (Most Recent):  Vitals:    03/11/24 1416   Weight: 80.3 kg (177 lb)   Height: 5' 9" (1.753 m)     Body mass index is 26.14 kg/m².    Physical Exam    Physical Exam:  Constitutional: The patient appears well-developed and well-nourished. No distress.   Head: Normocephalic and atraumatic.   Nose: Nose normal.   Eyes: Conjunctivae and EOM are normal.   Neck: No tracheal deviation present.   Cardiovascular: Normal rate and intact distal pulses.    Pulmonary/Chest: Effort normal. No respiratory distress.   Abdominal: There is no guarding.   Lymphatic: Negative for adenopathy   Neurological: The patient is alert.   Psychiatric: The patient has a normal mood and affect.     Bilateral Hand/Wrist " Examination:    Observation/Inspection:  Swelling  none    Deformity  Enlargement right basilar thumb joint  Discoloration  none     Scars   none    Atrophy  none    HAND/WRIST EXAMINATION:  Physical Exam    contracture of the right Little finger with Dupuytren's cord, 45 degree contracture of MP and PIP joints small finger   left palm nodules; decreased ROM right basilar thumb joint, nontender to palpation    otherwise full range of motion hands, wrists and elbows.    Neurovascular Exam:  Digits WWP, brisk CR < 3s throughout  NVI motor/LTS to M/R/U nerves, radial pulse 2+  2+ biceps and brachioradialis reflexes    Diagnostic studies and other clinical records review:     X-rays AP, lateral and oblique right hand taken today are independently reviewed by me and shows scapholunate widening right wrist, contracture right small finger Eaton stage IV basilar thumb arthritis.         ASSESSMENT/PLAN:    78 y.o. yo male with   Encounter Diagnoses   Name Primary?    Primary osteoarthritis of first carpometacarpal joint of right hand     Dupuytren's contracture of right hand Yes    Pre-op testing       Plan: Plan: We have discussed the natural history of Dupuytren's disease as well as treatment options including observation, Xiaflex injections and surgical partial fasciectomy.  The contracture on the right hand is causing functional problem during his daily activities. The patient would like to think about surgery on the right hand.     We have discussed conservative vs. surgical treatment as well as risks, benefits and alternatives for right small finger Dupuytren's contracture.  Conservative measures have been exhausted and he would like to proceed with surgery. Surgery would include right small finger fasciectomy and contracture release. Consent signed today in clinic. Light use of the hand will be indicated for the first 4-6 weeks.     We have discussed risks of hand surgery which include but are not limited to blood  clots in the legs that can travel to the lungs (pulmonary embolism). Pulmonary embolism can cause shortness of breath, chest pain, and even shock. Other risks include urinary tract infection, nausea and vomiting (usually related to pain medication), chronic pain, bleeding, nerve damage, blood vessel injury, scarring and infection of the hand which can require re-operation. Furthermore, the risks of anesthesia include potential heart, lung, kidney, and liver damage.  Informed consent was obtained.  The patient understands and would like to proceed with surgery in the near future.     The patient's pathophysiology was explained in detail with reference to x-rays, models, other visual aids as appropriate.  Treatment options were discussed in detail.  Questions were invited and answered to the patient's satisfaction. I reviewed Primary care , and other specialty's notes to better coordinate patient's care.          Shivani Bland MD    Please be aware that this note has been generated with the assistance of [employer] voice-to-text.  Please excuse any spelling or grammatical errors.  This note was generated with the assistance of ambient listening technology. Verbal consent was obtained by the patient and accompanying visitor(s) for the recording of patient appointment to facilitate this note. I attest to having reviewed and edited the generated note for accuracy, though some syntax or spelling errors may persist. Please contact the author of this note for any clarification.

## 2024-03-12 LAB
OHS QRS DURATION: 120 MS
OHS QTC CALCULATION: 428 MS

## 2024-03-17 RX ORDER — CEFAZOLIN SODIUM 2 G/50ML
2 SOLUTION INTRAVENOUS
Status: CANCELLED | OUTPATIENT
Start: 2024-03-17

## 2024-03-17 RX ORDER — MUPIROCIN 20 MG/G
OINTMENT TOPICAL
Status: CANCELLED | OUTPATIENT
Start: 2024-03-17

## 2024-03-18 ENCOUNTER — TELEPHONE (OUTPATIENT)
Dept: ORTHOPEDICS | Facility: CLINIC | Age: 79
End: 2024-03-18
Payer: MEDICARE

## 2024-03-19 ENCOUNTER — TELEPHONE (OUTPATIENT)
Dept: PREADMISSION TESTING | Facility: HOSPITAL | Age: 79
End: 2024-03-19
Payer: MEDICARE

## 2024-03-19 ENCOUNTER — TELEPHONE (OUTPATIENT)
Dept: INTERNAL MEDICINE | Facility: CLINIC | Age: 79
End: 2024-03-19

## 2024-03-19 DIAGNOSIS — Z98.890: Primary | ICD-10-CM

## 2024-03-19 DIAGNOSIS — Z98.890 HX OF COLONOSCOPY: ICD-10-CM

## 2024-03-23 NOTE — PROGRESS NOTES
Normal lab except for elevated PSA - please follow up with your Urologist.      I got the records from your scopes.  The last colonoscopy was 2011 with no polyps but a recommendation for a repeat in 10 years.  I am placing the order - please call [phone] to schedule.

## 2024-04-03 ENCOUNTER — TELEPHONE (OUTPATIENT)
Dept: PREADMISSION TESTING | Facility: HOSPITAL | Age: 79
End: 2024-04-03
Payer: MEDICARE

## 2024-04-04 ENCOUNTER — OFFICE VISIT (OUTPATIENT)
Dept: INTERNAL MEDICINE | Facility: CLINIC | Age: 79
End: 2024-04-04
Payer: MEDICARE

## 2024-04-04 VITALS
HEIGHT: 69 IN | TEMPERATURE: 98 F | RESPIRATION RATE: 16 BRPM | DIASTOLIC BLOOD PRESSURE: 82 MMHG | HEART RATE: 67 BPM | WEIGHT: 177 LBS | BODY MASS INDEX: 26.22 KG/M2 | OXYGEN SATURATION: 98 % | SYSTOLIC BLOOD PRESSURE: 149 MMHG

## 2024-04-04 DIAGNOSIS — Z95.2 STATUS POST AORTIC VALVE REPLACEMENT: ICD-10-CM

## 2024-04-04 DIAGNOSIS — R06.83 SNORING: ICD-10-CM

## 2024-04-04 DIAGNOSIS — Z01.818 PREOP EXAMINATION: Primary | ICD-10-CM

## 2024-04-04 DIAGNOSIS — I25.10 CORONARY ARTERY DISEASE INVOLVING NATIVE CORONARY ARTERY OF NATIVE HEART WITHOUT ANGINA PECTORIS: ICD-10-CM

## 2024-04-04 DIAGNOSIS — N20.0 NEPHROLITHIASIS: ICD-10-CM

## 2024-04-04 DIAGNOSIS — B35.1 ONYCHOMYCOSIS: ICD-10-CM

## 2024-04-04 DIAGNOSIS — Z90.5 HISTORY OF NEPHRECTOMY: ICD-10-CM

## 2024-04-04 DIAGNOSIS — K21.9 GASTROESOPHAGEAL REFLUX DISEASE, UNSPECIFIED WHETHER ESOPHAGITIS PRESENT: ICD-10-CM

## 2024-04-04 DIAGNOSIS — R17 ELEVATED BILIRUBIN: ICD-10-CM

## 2024-04-04 DIAGNOSIS — T78.40XD ALLERGY, SUBSEQUENT ENCOUNTER: ICD-10-CM

## 2024-04-04 DIAGNOSIS — Z95.1 S/P CABG (CORONARY ARTERY BYPASS GRAFT): ICD-10-CM

## 2024-04-04 DIAGNOSIS — I10 PRIMARY HYPERTENSION: ICD-10-CM

## 2024-04-04 PROBLEM — T78.40XA ALLERGIES: Status: ACTIVE | Noted: 2024-04-04

## 2024-04-04 PROBLEM — R42 LIGHTHEADEDNESS: Status: RESOLVED | Noted: 2022-02-01 | Resolved: 2024-04-04

## 2024-04-04 PROBLEM — R73.9 ACUTE HYPERGLYCEMIA: Status: RESOLVED | Noted: 2020-05-20 | Resolved: 2024-04-04

## 2024-04-04 PROCEDURE — 99999 PR PBB SHADOW E&M-EST. PATIENT-LVL IV: CPT | Mod: PBBFAC,,, | Performed by: HOSPITALIST

## 2024-04-04 PROCEDURE — 1101F PT FALLS ASSESS-DOCD LE1/YR: CPT | Mod: CPTII,S$GLB,, | Performed by: HOSPITALIST

## 2024-04-04 PROCEDURE — 3077F SYST BP >= 140 MM HG: CPT | Mod: CPTII,S$GLB,, | Performed by: HOSPITALIST

## 2024-04-04 PROCEDURE — 3288F FALL RISK ASSESSMENT DOCD: CPT | Mod: CPTII,S$GLB,, | Performed by: HOSPITALIST

## 2024-04-04 PROCEDURE — 99215 OFFICE O/P EST HI 40 MIN: CPT | Mod: S$GLB,,, | Performed by: HOSPITALIST

## 2024-04-04 PROCEDURE — 1159F MED LIST DOCD IN RCRD: CPT | Mod: CPTII,S$GLB,, | Performed by: HOSPITALIST

## 2024-04-04 PROCEDURE — 3079F DIAST BP 80-89 MM HG: CPT | Mod: CPTII,S$GLB,, | Performed by: HOSPITALIST

## 2024-04-04 RX ORDER — ACETAMINOPHEN 500 MG
500 TABLET ORAL DAILY
COMMUNITY

## 2024-04-04 NOTE — ASSESSMENT & PLAN NOTE
Bicuspid AV with AI s/p AVR 5/20 (29 mm Medtronic bioprosthetic valve)    Nov 2020          Enlarged Ascending Aorta   Not known to have Marfan syndrome\  I suggested follow up

## 2024-04-04 NOTE — PROGRESS NOTES
[facility]  Progress Note    Patient Name: Brad Kearns  MRN: [id number]  Date of Evaluation- 04/04/2024  PCP- Kyung Hood MD        HPI:  History of present illness- I had the pleasure of meeting this pleasant 78 y.o. gentleman in the pre op clinic prior to his elective Orthopedic surgery. The patient is new to me .Ms Salinas was accompanied by wife Ms Mariscal.    I have obtained the history by speaking to the patient and by reviewing the electronic health records.    Events leading up to surgery / History of presenting illness -    Right small finger Dupuytren's contracture.  Plan is for  right small finger fasciectomy and contracture release    I have obtained the information by speaking to him who is very informed about his health and his caring wife in the office  He has been having this problem for about 1 years time  He does not seem to be troubled a lot with pain but he has a deformity where the right little finger does not straighten all the way back    He has been troubled with  mild  Rt base of right little finger into the right hand pain for 1 year .    He was able to straighten his little finger out in the past but lately has been having  problems with straightening it    Relevant health conditions of significance for the perioperative period/ History of presenting illness -    Subjectively describes health as good     Health conditions of significance for the perioperative period         CAD s/p CABG 5/20 (LIMA to LAD, SVG to OM)  Bicuspid AV with AI s/p AVR 5/20 (29 mm Medtronic bioprosthetic valve)  Cardiomyopathy   Enlarged Ascending Aorta   HTN  HLD  Nephrectomy age 25 Y    Not known to have  , Diabetes Mellitus, Lung disease , thyroid   problem, DVT, PE, COVID    He lives with his wife in a single-level house   They have 3 children, 2 daughters and a son and 8 grandchildren  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       Review of Systems   Constitutional:  Negative for chills and fever.        No unusual weight changes     HENT:          STOPBANG score  / 8    Loud Snoring   Elevated BP  Age over 50 years  Neck size over 40 CM  Male gender   Eyes:         No unusual vision changes   Respiratory:          No cough , phlegm    No Hemoptysis   Cardiovascular:         As noted   Gastrointestinal:         Bowels- Regular   No overt GI/ blood losses   Endocrine:        Prednisone use > 20 mg daily for 3 weeks- None   Genitourinary:  Negative for dysuria.        No urinary hesitancy    Musculoskeletal:         As above      Skin:  Negative for rash.   Neurological:  Negative for syncope.        No unilateral weakness   Hematological:         Current use of Anticoagulants  None   Psychiatric/Behavioral:          No Depression,Anxiety     No vascular stenting    No past medical history pertinent negatives.        No anesthesia, bleeding, cardiac problems, PONV with previous surgeries/procedures.  Medications and Allergies reviewed in epic.     FH- No anesthesia,bleeding / venous thrombosis , in family      Physical Exam  Blood pressure (!) 149/82, pulse 67, temperature 98.3 °F (36.8 °C), temperature source Oral, resp. rate 16, height 5' 9" (1.753 m), weight 80.3 kg (177 lb), SpO2 98 %.      Physical Exam  Constitutional- Vitals - Body mass index is 26.14 kg/m².,   Vitals:    04/04/24 1507   BP: (!) 149/82   Pulse: 67 "   Resp: 16   Temp: 98.3 °F (36.8 °C)     General appearance-Conscious,Coherent  Eyes- No conjunctival icterus,pupils  round  and reactive to light   ENT-Oral cavity- moist  and  upper partial denture    , Hearing grossly normal   Neck- No thyromegaly ,Trachea -central, No jugular venous distension,   No Carotid Bruit   Cardiovascular -Heart Sounds- sitting, reclining,  Normal ,  systolic murmur aortic area ,  systolic murmur pulmonary area , and systolic murmur tricuspid area   , No gallop rhythm   Respiratory - Normal Respiratory Effort, Normal breath sounds, crepitations bases,  no wheeze , and  no forced expiratory wheeze    Peripheral pitting pedal edema-- none , no calf pain   Gastrointestinal -Soft abdomen, No palpable masses, Non Tender,Liver,Spleen not palpable. No-- free fluid and shifting dullness  Musculoskeletal- No finger Clubbing. Strength grossly normal   Lymphatic-No Palpable cervical, axillary,Inguinal lymphadenopathy   Psychiatric - normal effect,Orientation  Rt Dorsalis pedis pulses-palpable    Lt Dorsalis pedis pulses- palpable   Rt Posterior tibial pulses -palpable   Left posterior tibial pulses -palpable   Miscellaneous -  no asterixis,  Surgical scarsternum , and  no renal bruit   Investigations  Lab and Imaging have been reviewed in [facility].    Review of Medicine tests    EKG- I had independently reviewed the EKG from--3/12/2024  It was reported to be showing       Sinus rhythm with 1st degree A-V block   Nonspecific intraventricular conduction delay   Borderline Abnormal ECG   When compared with ECG of 24-JUN-2020 08:35,   UT interval has increased   Vent. rate has decreased BY  34 BPM   T wave inversion no longer evident in Inferior leads   T wave inversion no longer evident in Lateral leads   May 2020    1 - 39% stenosis of the right internal carotid artery.     1 - 39% stenosis of the left internal carotid artery.       Review of clinical lab tests:  Lab Results   Component Value Date     CREATININE 1.3 03/07/2024    HGB 13.6 (L) 03/07/2024     03/07/2024           Review of old records- Was done and information gathered regards to events leading to surgery and health conditions of significance in the perioperative period.        Preoperative cardiac risk assessment-  The patient does not have any active cardiac conditions . Revised cardiac risk index predictors- 1---.Functional capacity is more than 4 Mets. He will be undergoing a Orthopedic procedure that carries a Moderate Risk risk     Risk of a major Cardiac event ( Defined as death, myocardial infarction, or cardiac arrest at 30 days after noncardiac surgery), based on RCRI score     -6.0%         No further cardiac work up is indicated prior to proceeding with the surgery          American Society of Anesthesiologists Physical status classification ( ASA ) class: 2     Postoperative pulmonary complication risk assessment:      ARISCAT ( Canet) risk index- risk class -  Low, if duration of surgery is under 3 hours, intermediate, if duration of surgery is over 3 hours       Assessment/Plan:     CAD (coronary artery disease)  CAD s/p CABG 5/20 (LIMA to LAD, SVG to OM)   On ASA, Lipitor   He is doing good from a heart standpoint  He currently does not have any chest pain or has had no history of chest pain in the past    Risk factors for heart disease    Hypertension  Cholesterol  Family history of heart disease    Secondary prevention    Aspirin  Statin  Metoprolol    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 I suggest continuation of ---Metoprolol ----- during the entire perioperative period. I suggest holding -Valsartan------- on the morning of the surgery and can continue that  post operatively under blood pressure, electrolyte and renal function monitoring as long as they are acceptable.I suggest addressing pain control as uncontrolled pain can increased blood pressure      S/P CABG (coronary artery bypass graft)  May 2020      Status post aortic valve replacement  Bicuspid AV with AI s/p AVR 5/20 (29 mm Medtronic bioprosthetic valve)    Nov 2020          Enlarged Ascending Aorta   Not known to have Marfan syndrome\  I suggested follow up        Nephrolithiasis  He had a kidney stone problem a few years ago and staying hydrated  He has not aware of the nature of the kidney stone  He has not known to have parathyroid problem or high calcium    History of nephrectomy  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  From what I gather from his wife and him who are very knowledgeable it appears that he may have had a postrenal renal impairment with flow problem in the urine      I have discussed with him about his slightly increasing trend in his creatinine over the past few years    I suggested care with intravenous contrast use    We discussed hydration and blood pressure control-I suggested watching his blood pressure    Deleterious effects NSAID's , Beneficial effects of Hydration discussed   Tylenol as needed for pain     I  suggest monitoring renal function, in put and out put status julia-operatively. I  suggest avoiding nephrotoxic medication including NSAIDs, COX2 inhibitors, intravenous contrast agent,avoiding hypotension to prevent further renal impairment.      Allergies  He is doing good with his allergies taking Singulair at nighttime and Allegra during the  daytime  Has no asthma or eczema    Acid reflux    Does not sound Cardiac    He seems to be doing good from a reflux standpoint and had not had any problem recently    GERD-  I suggest continuation of the Proton pump inhibitor in the perioperative period . I suggest aspiration precautions     Onychomycosis  He is currently taking Lamisil for fungal toenail infection on has a both great toes.  He is about 3 weeks into the treatment and is finding that to be very effective    No history  of cirrhosis of liver or suggestions of Liver  decompensation   No Jaundice , dark urine , pale stool   No lab , imaging suggestions of advanced liver disease or liver decompensation     Snoring   Informed the risk of worsening sleep apnea in the perioperative period     Possible sleep apnea- I suggested follow up  and suggest caution with usage of medication that can cause respiratory suppression in the perioperative period  potential ramifications of untreated sleep apnea, which could include daytime sleepiness, hypertension, heart disease and stroke were discussed    Avoidance of  supine sleep, weight gain , alcoholic beverages , care with , sedative , CNS depressant use indicated  since all of these can worsen BARBARA      They deferred   sleep evaluation after to after surgery    Elevated bilirubin  He has not known to have liver problems and has no gallstone history or pain in the right upper quadrant of the abdomen  He has not known to have hemolysis  He has not known to have low zinc or thyroid problems       No history  of cirrhosis of liver or suggestions of Liver  decompensation   No Jaundice , dark urine , pale stool         Preventive perioperative care    Thromboembolic prophylaxis:  His risk factors for thrombosis include surgical procedure and age.I suggest  thromboembolic prophylaxis ( mechanical/pharmacological, weighing the risk benefits of pharmacological agent use considering julia procedural bleeding )  during the  perioperative period.I suggested being active in the post operative period.      Postoperative pulmonary complication prophylaxis-Risk factors for post operative pulmonary complications include age over 65 years- I suggest incentive spirometry use, early ambulation, and end tidal carbon dioxide monitoring  , oral care , head end of bed elevation      Renal complication prophylaxis-Risk factors for renal complications include pre-existing renal disease and hypertension . I suggest keeping him well hydrated and avoidance/ minimizing the use of  NSAID's,GRIFFIN 2 Inhibitors ,IV contrast if possible in the perioperative period      Surgical site Infection Prophylaxis-I  suggest appropriate antibiotic for Prophylaxis against Surgical site infections  No reported Staph infection  Skin antibacterial discussed       Delirium prophylaxis-Risk factors - Advanced Age - I suggest avoidance / minimizing the use of  Benzodiazepines ( unless the patient has been taking it on a regular basis ),Anticholinergic medication,Antihistamines ( like  Benadryl).I suggest minimizing the use of opioid medication and use of IV tylenol,if it is appropriate. I suggest using the lowest possible dose of opioids for the shortest duration possible in the perioperative period. I suggest to Keep shades/blinds open during the day, lights off and shades closed at night to encourage normal sleep/wake cycle.I encourage the presence of the family member with the patient at all times, if at all possible as mental status changes can be picked up early by the family members and they help with reorientation. I encouraged the presence of family to help with orientation in the perioperative period. Benadryl avoidance suggested      In view of age  the patient  is at risk of postoperative urinary retention.  I suggest avoidance / minimizing the of  Benzodiazepines,Anticholinergic medication,antihistamines ( Benadryl) , if possible in the perioperative period. I  suggest using the minimum possible use of opioids for the minimum period of time in the perioperative period. Benadryl avoidance suggested      This visit was focused on Preoperative evaluation, Perioperative Medical management, complication reduction plans. I suggest that the patient follows up with primary care or relevant sub specialists for ongoing health care.    I appreciate the opportunity to be involved in this patients care. Please feel free to contact me if there were any questions about this consultation.    Patient is optimized    Patient/ care giver/ Family member was instructed to call and update me about any changes to health,  medication, office visits ,testing out side of the [facility] operative care center , hospitalizations between now and surgery      Bhavya Morales MD  Internal Medicine  [facility]   Cell Phone- [phone]    History of COVID -  /no   COVID vaccination status -None    COVID screening     No fever   No cough   No SOB  No sore throat   No loss of taste or smell   No muscle aches   No nausea, vomiting , diarrhea -    Checked for over-the-counter medication  He has an upcoming cardiac evaluation May 3, 2024    I have spent ---90--- minutes of time which includes, time spent to prepare to see the patient , obtaining history ,performing examination, counseling/Educating the patient , Documenting clinical information in the record    --  4/9/2024- 17-04     Corresponded with the surgeon and the plan is for him to continue staying on aspirin 81 mg by mouth once a day  --  5/9/2024- 8 55  Had cardiac evaluation  Echo scheduled for May 14th  --  5/17/2024- 13 18     Echo 5/14     Left Ventricle: The left ventricle is normal in size. Ventricular mass is normal. Normal wall thickness. Regional wall motion abnormalities present. See diagram for wall motion findings. There is normal systolic function with a visually estimated ejection fraction of 55 - 60%. Ejection  fraction by visual approximation is 58%. There is indeterminate diastolic function.    Right Ventricle: Normal right ventricular cavity size. Wall thickness is normal. Systolic function is normal.    Left Atrium: Left atrium is severely dilated.    Right Atrium: Right atrium is dilated.    Aortic Valve: There is a porcine prosthetic valve in the aortic position. Aortic valve area by VTI is 2.71 cm². Aortic valve peak velocity is 3.20 m/s. Mean gradient is 24 mmHg. The dimensionless index is 0.66.    Mitral Valve: There is mild regurgitation.    Tricuspid Valve: There is mild regurgitation.    Pulmonary Artery: The estimated pulmonary artery systolic pressure is 30 mmHg.    IVC/SVC: Normal venous pressure at 3 mmHg.    Ascending Aorta Aortic root is normal in size measuring 4.52 cm. Ascending aorta is normal measuring 4.11 cm.     ---  5/21/2024- 13 24     Called to follow up , to address any concerns with the up coming surgery or any questions on Medication instructions   Unable to speak   Left a message to call, if needed , if has any concerns with the up coming surgery or any questions on Medication instructions , if had changes to medication or health , since my evaluation

## 2024-04-04 NOTE — ASSESSMENT & PLAN NOTE
He is taking metoprolol, valsartan  Home BP readings -None  Recent BP readings in the record-fluctuating    Suggested watching the salt intake    Hypertension-  Blood pressure is acceptable . I suggest continuation of ---Metoprolol ----- during the entire perioperative period. I suggest holding -Valsartan------- on the morning of the surgery and can continue that  post operatively under blood pressure, electrolyte and renal function monitoring as long as they are acceptable.I suggest addressing pain control as uncontrolled pain can increased blood pressure

## 2024-04-04 NOTE — ASSESSMENT & PLAN NOTE
He has not known to have liver problems and has no gallstone history or pain in the right upper quadrant of the abdomen  He has not known to have hemolysis  He has not known to have low zinc or thyroid problems       No history  of cirrhosis of liver or suggestions of Liver  decompensation   No Jaundice , dark urine , pale stool

## 2024-04-04 NOTE — HPI
History of present illness- I had the pleasure of meeting this pleasant 78 y.o. gentleman in the pre op clinic prior to his elective Orthopedic surgery. The patient is new to me .Ms Salinas was accompanied by wife Ms Mariscal.    I have obtained the history by speaking to the patient and by reviewing the electronic health records.    Events leading up to surgery / History of presenting illness -    Right small finger Dupuytren's contracture.  Plan is for  right small finger fasciectomy and contracture release    I have obtained the information by speaking to him who is very informed about his health and his caring wife in the office  He has been having this problem for about 1 years time  He does not seem to be troubled a lot with pain but he has a deformity where the right little finger does not straighten all the way back    He has been troubled with  mild  Rt base of right little finger into the right hand pain for 1 year .    He was able to straighten his little finger out in the past but lately has been having  problems with straightening it    Relevant health conditions of significance for the perioperative period/ History of presenting illness -    Subjectively describes health as good     Health conditions of significance for the perioperative period         CAD s/p CABG 5/20 (LIMA to LAD, SVG to OM)  Bicuspid AV with AI s/p AVR 5/20 (29 mm Medtronic bioprosthetic valve)  Cardiomyopathy   Enlarged Ascending Aorta   HTN  HLD  Nephrectomy age 25 Y    Not known to have  , Diabetes Mellitus, Lung disease , thyroid   problem, DVT, PE, COVID    He lives with his wife in a single-level house   They have 3 children, 2 daughters and a son and 8 grandchildren  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

## 2024-04-04 NOTE — ASSESSMENT & PLAN NOTE
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

## 2024-04-04 NOTE — ASSESSMENT & PLAN NOTE
He had a kidney stone problem a few years ago and staying hydrated  He has not aware of the nature of the kidney stone  He has not known to have parathyroid problem or high calcium

## 2024-04-04 NOTE — ASSESSMENT & PLAN NOTE
He is doing good with his allergies taking Singulair at nighttime and Allegra during the daytime  Has no asthma or eczema

## 2024-04-04 NOTE — OUTPATIENT SUBJECTIVE & OBJECTIVE
"Outpatient Subjective & Objective     Chief complaint-Preoperative evaluation, Perioperative Medical management, complication reduction plan     Active cardiac conditions- none    Revised cardiac risk index predictors- coronary artery disease    Functional capacity -Examples of physical activity  can take 1 flight of stairs----- He can undertake all the above activities without  chest pain,chest tightness, Shortness of breath ,dizziness,lightheadedness making his exercise tolerance more,  than 4 Mets.       Review of Systems   Constitutional:  Negative for chills and fever.        No unusual weight changes     HENT:          STOPBANG score  / 8    Loud Snoring   Elevated BP  Age over 50 years  Neck size over 40 CM  Male gender   Eyes:         No unusual vision changes   Respiratory:          No cough , phlegm    No Hemoptysis   Cardiovascular:         As noted   Gastrointestinal:         Bowels- Regular   No overt GI/ blood losses   Endocrine:        Prednisone use > 20 mg daily for 3 weeks- None   Genitourinary:  Negative for dysuria.        No urinary hesitancy    Musculoskeletal:         As above      Skin:  Negative for rash.   Neurological:  Negative for syncope.        No unilateral weakness   Hematological:         Current use of Anticoagulants  None   Psychiatric/Behavioral:          No Depression,Anxiety     No vascular stenting    No past medical history pertinent negatives.        No anesthesia, bleeding, cardiac problems, PONV with previous surgeries/procedures.  Medications and Allergies reviewed in epic.     FH- No anesthesia,bleeding / venous thrombosis , in family      Physical Exam  Blood pressure (!) 149/82, pulse 67, temperature 98.3 °F (36.8 °C), temperature source Oral, resp. rate 16, height 5' 9" (1.753 m), weight 80.3 kg (177 lb), SpO2 98 %.      Physical Exam  Constitutional- Vitals - Body mass index is 26.14 kg/m².,   Vitals:    04/04/24 1507   BP: (!) 149/82   Pulse: 67   Resp: 16   Temp: " 98.3 °F (36.8 °C)     General appearance-Conscious,Coherent  Eyes- No conjunctival icterus,pupils  round  and reactive to light   ENT-Oral cavity- moist  and  upper partial denture    , Hearing grossly normal   Neck- No thyromegaly ,Trachea -central, No jugular venous distension,   No Carotid Bruit   Cardiovascular -Heart Sounds- sitting, reclining,  Normal ,  systolic murmur aortic area ,  systolic murmur pulmonary area , and systolic murmur tricuspid area   , No gallop rhythm   Respiratory - Normal Respiratory Effort, Normal breath sounds, crepitations bases,  no wheeze , and  no forced expiratory wheeze    Peripheral pitting pedal edema-- none , no calf pain   Gastrointestinal -Soft abdomen, No palpable masses, Non Tender,Liver,Spleen not palpable. No-- free fluid and shifting dullness  Musculoskeletal- No finger Clubbing. Strength grossly normal   Lymphatic-No Palpable cervical, axillary,Inguinal lymphadenopathy   Psychiatric - normal effect,Orientation  Rt Dorsalis pedis pulses-palpable    Lt Dorsalis pedis pulses- palpable   Rt Posterior tibial pulses -palpable   Left posterior tibial pulses -palpable   Miscellaneous -  no asterixis,  Surgical scarsternum , and  no renal bruit   Investigations  Lab and Imaging have been reviewed in [facility].    Review of Medicine tests    EKG- I had independently reviewed the EKG from--3/12/2024  It was reported to be showing       Sinus rhythm with 1st degree A-V block   Nonspecific intraventricular conduction delay   Borderline Abnormal ECG   When compared with ECG of 24-JUN-2020 08:35,   AK interval has increased   Vent. rate has decreased BY  34 BPM   T wave inversion no longer evident in Inferior leads   T wave inversion no longer evident in Lateral leads   May 2020    1 - 39% stenosis of the right internal carotid artery.     1 - 39% stenosis of the left internal carotid artery.       Review of clinical lab tests:  Lab Results   Component Value Date    CREATININE 1.3  03/07/2024    HGB 13.6 (L) 03/07/2024     03/07/2024           Review of old records- Was done and information gathered regards to events leading to surgery and health conditions of significance in the perioperative period.    Outpatient Subjective & Objective

## 2024-04-04 NOTE — ASSESSMENT & PLAN NOTE
Does not sound Cardiac    He seems to be doing good from a reflux standpoint and had not had any problem recently    GERD-  I suggest continuation of the Proton pump inhibitor in the perioperative period . I suggest aspiration precautions

## 2024-04-04 NOTE — ASSESSMENT & PLAN NOTE
He is currently taking Lamisil for fungal toenail infection on has a both great toes.  He is about 3 weeks into the treatment and is finding that to be very effective    No history  of cirrhosis of liver or suggestions of Liver  decompensation   No Jaundice , dark urine , pale stool   No lab , imaging suggestions of advanced liver disease or liver decompensation

## 2024-04-04 NOTE — ASSESSMENT & PLAN NOTE
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 I  suggest avoiding nephrotoxic medication including NSAIDs, COX2 inhibitors, intravenous contrast agent,avoiding hypotension to prevent further renal impairment.

## 2024-04-04 NOTE — ASSESSMENT & PLAN NOTE
Informed the risk of worsening sleep apnea in the perioperative period     Possible sleep apnea- I suggested follow up  and suggest caution with usage of medication that can cause respiratory suppression in the perioperative period  potential ramifications of untreated sleep apnea, which could include daytime sleepiness, hypertension, heart disease and stroke were discussed    Avoidance of  supine sleep, weight gain , alcoholic beverages , care with , sedative , CNS depressant use indicated  since all of these can worsen BARBARA      They deferred   sleep evaluation after to after surgery

## 2024-04-09 ENCOUNTER — PATIENT MESSAGE (OUTPATIENT)
Dept: INTERNAL MEDICINE | Facility: CLINIC | Age: 79
End: 2024-04-09
Payer: MEDICARE

## 2024-04-19 ENCOUNTER — PATIENT MESSAGE (OUTPATIENT)
Dept: ORTHOPEDICS | Facility: CLINIC | Age: 79
End: 2024-04-19
Payer: MEDICARE

## 2024-04-24 ENCOUNTER — PATIENT MESSAGE (OUTPATIENT)
Dept: ORTHOPEDICS | Facility: CLINIC | Age: 79
End: 2024-04-24
Payer: MEDICARE

## 2024-04-26 ENCOUNTER — TELEPHONE (OUTPATIENT)
Dept: ORTHOPEDICS | Facility: CLINIC | Age: 79
End: 2024-04-26
Payer: MEDICARE

## 2024-04-26 DIAGNOSIS — M72.0 DUPUYTREN'S CONTRACTURE OF RIGHT HAND: Primary | ICD-10-CM

## 2024-04-26 NOTE — PATIENT INSTRUCTIONS
[facility]  HAND CLINIC SURGERY INSTRUCTIONS  Shivani Bland MD  Date of Surgery: 5.23.24    Location of Surgery:      [x] [place]    [address] [place] [address]    [] [facility]  [address] [place] [address]    [] [facility]  [address] [place] [address]    PRE-OPERATIVE INSTRUCTIONS:    Dr. Bland's clinic staff will call you THE DAY BEFORE SURGERY with your arrival time. You will be notified in the afternoon between 3:00p-5:00pm. We will attempt to provide your arrival time earlier and will call you if this is at all possible.     DO NOT eat or drink after midnight, this includes gum/hard candy, coffee.   You may have ONLY SMALL sips of WATER the morning of surgery to take your medication, NOTHING ELSE.    You ARE NOT to drive or take an Uber/Lyft/taxi home from surgery. Please arrange a friend/family member to accompany you at the surgery center and drive you home.  Transportation: TBD    PLEASE REMOVE nail polish and/or artificial nails prior to your surgery date if applicable. [] NA    PRE-OPERATIVE MEDICATION INSTRUCTIONS:    If you are diabetic, DO NOT take any diabetic medication the night before surgery or morning of surgery. If you are type I diabetic on an insulin pump, please bring your monitor the morning of surgery.   [] NA    MUST HOLD SEMAGLUTIDE MEDICATIONS 7 DAYS PRIOR TO SURGERY, examples: Mounjaro, Ozempic, Trulicity.   [] NA    If you have high blood pressure please take your medication in the morning like normal with a SIP OF WATER; with the exception of any Angiotensin receptor blocker (end in sartan) and ACE inhibitors (end in pril).  [x] metoprolol succinate (TOPROL-XL) 100 MG  dose as directed am of sx with a sip of water    If you are taking blood thinners (Aspirin, Eliquis, Lovenox, Coumadin, etc), our office will contact the  prescribing physician for guidance on when you are to stop/re-start your blood thinner medication.   [x] ASA - please dose as normal am of sx with a sip of water    Other medications to dose with a SIP OF WATER AM of surgery:   [] NA    Other Important Medication Instructions:   [] NA    Please HOLD Vitamins 1-7 days prior to surgery, CONTINUE Vitamin D up until the AM of your surgery.    Avoid anti-inflammatory medications 5-7 days before your surgery. This includes Aleve/Naproxen, Celebrex, Meloxicam/Mobic, Voltaren/Diclofenac.   You may dose ibuprofen/Advil/Motrin up until the day before your surgery.  You may take extra strength Tylenol/Acetaminophen.    HOLD ALL OTHER MEDICATIONS AM OF SURGERY THAT ARE NOT DISCUSSED ABOVE        POST-OPERATIVE MEDICATION INSTRUCTIONS:    Your post-operative pain medication will be DELIVERED BEDSIDE to you at the surgery center by the [facility]. You DO NOT need to pick this medication up from the [facility].     TAKE post-operative pain medication as directed.   You may also take over-the-counter extra strength Tylenol/acetaminophen as directed on the bottle for breakthrough pain between dosed of prescribed pain medication.   Please note, some pain medications contain Tylenol/acetaminophen.   DO NOT exceed 3000mg of Tylenol/acetaminophen in 1 day.  You may also use an over-the-counter anti-inflammatory medication also known as an NSAID,  (Aleve/Naproxen) as directed on the bottle for breakthrough pain between doses of prescribed pain medication.  DO NOT take NSAIDs if you have been previously instructed not to by a physician.     POST-OPERATIVE INSTRUCTIONS:    DO NOT let your operative area become wet, Your dressings/bandages/splints must remain dry.   Recommend waterproof arm cast cover to be worn when showering and bathing and can be purchased on [employer]. Garbage bags, plastic shopping bags, or umbrella bags can also be used instead.    DO NOT remove any  post-operative dressings/bandages/splints until you are seen by Dr. Watters or Occupational Therapy   These dressings/bandages/splints are in place to keep the operative site clean, dry, and in optimal healing position     ELEVATE your hand/arm above the level of your heart as much as possible to decrease post-operative swelling for first 48 hours.  Swelling after surgery is TO BE EXPECTED.      ICE the operative site following surgery for 20-30 minutes, 4-5 times per day.  Be sure to have a towel between your dressings/bandages/splint to keep from getting wet.    MOVE the parts of your hand/arm that are not immobilized in a sling or splint.   Make a gentle fist with your fingers, bend/straighten your elbow, etc.   DO NOT attempt any strengthening exercises (hand putty, exercise balls, etc) on your operative side as this can increase swelling.     *CALL the [facility] at [phone]*  If at any time following surgery your dressings/bandages/splints: get wet, come loose, feels tight, feels uncomfortable.  Or if you are concerned about possible infection, worsening pain, worsening swelling or have any other concerns regarding your surgery.   Signs of infection:  fever (over 100.3), chills, body aches, increased warmth, redness, significant increase in swelling & pain at surgical site.     OUTPATIENT FOLLOW UP:  YOU WILL BE SEEN FIRST BY []  DR. CAMRON WATTERS  [x] OCCUPATIONAL THERAPY 3-5 DAYS AFTER YOUR SURGERY. Your splint / soft dressing will be removed and your sutures removed if applicable.  YOU WILL BE SEEN BY DR. WATTERS IN CLINIC 2 WEEKS AFTER SURGERY.    FMLA or Disability paperwork can be sent to [facility]  *Only needed if you are going to be out of work 2 weeks or more*  (P) [phone] (F) [phone] or email [email or web address]

## 2024-05-01 NOTE — PROGRESS NOTES
Subjective:   Patient ID:  Brad Kearns is a 78 y.o. male who presents for follow-up of Carotid Artery Disease    PROBLEM LIST:  CAD s/p CABG 5/20 (LIMA to LAD, SVG to OM)  Bicuspid AV with AI s/p AVR 5/20 (29 mm Medtronic bioprosthetic valve)  Cardiomyopathy (LVEF 35-40% post AVR, recovered)  Enlarged Ascending Aorta (4.5 cm SOV)  HTN  HLD    HPI 11/2/20:  He was admitted in May with some chest discomfort and a non STEMI with a peak troponin of 2.1.  He underwent an urgent catheterization at that time which showed multivessel coronary artery disease including left main disease.  He subsequently underwent a double-vessel bypass with LIMA to LAD and SVG to circumflex as well as aortic valve replacement with a 29 mm Medtronic bioprosthetic valve.  His follow-up echocardiogram showed ejection fraction of 35-40% with apical hypokinesis and LV thrombus.  He has done well since his surgery.  He did not formally participate in cardiac rehab.  He is however walking approximately 3 miles per day.Brad Kearns denies any chest pain, shortness of breath, PND, orthopnea, palpitations, leg edema, or syncope.    Interval history:  He's been doing well since his last visit. He remains active at work. Brad Kearns denies any chest pain, shortness of breath, PND, orthopnea, palpitations, leg edema, or syncope. Not checking BP at home.    ECG 11-MAR-2024 16:09:22: Personally reviewed by me shows Normal sinus rhythm with 1st degree AV block, IVCD    Echo 11/20/20:  Summary    The sinuses of Valsalva is moderately dilated measuring 4.5 cm.  Moderate left atrial enlargement.  The left ventricle is normal in size with normal systolic function. The estimated ejection fraction is 55%.  Grade I diastolic dysfunction.  Normal right ventricular size with normal right ventricular systolic function.  There is a 29 mm Medtronic Mosaic porcine bioprosthetic aortic valve present. There is no aortic aortic insufficiency present.  Aortic  valve area is 1.50 cm2; peak velocity is 2.86 m/s; mean gradient is 19 mm Hg.  Mild mitral regurgitation.  Normal central venous pressure (3 mmHg).  The estimated PA systolic pressure is 26 mmHg.    [facility] 5/20:  Findings:     Coronary dominance: Right  The left main coronary artery (LMCA) has a 60% distal stenosis. It gives origin to the left anterior descending (LAD) and left circumflex (LCx) arteries. There is no ramus intermedius. There is LORENA 3 flow.  The LAD is diffusely diseased, it has a 90% stenosis in its proximal segment and a 90% stenosis in its mid segment. It gives origin to one large diagonal branch(es). There is LORENA 3 flow.  The LCx has a proximal 90% stenosis. It gives origin to one large obtuse marginal branch(es). There is LORENA 3 flow.  The RCA is dominant. It as a distal 50% stenosis. It gives origin to the posterior descending artery and the posterolateral branch.     LVEDP 15 mmHg  LVEF 40%  Severe aortic insufficiency in bicuspid aortic valve (4+)        Past Medical History:   Diagnosis Date    Ascending aorta dilatation 02/01/2022    SOV 4.5 cm    Bicuspid aortic valve     Cardiomyopathy     EF 35-40% post AVR    Coronary artery disease     GERD (gastroesophageal reflux disease)     Hyperlipidemia     Hypertension     Nonrheumatic aortic valve insufficiency 03/13/2020    Valvular regurgitation     bicuspid valve, AI       Past Surgical History:   Procedure Laterality Date    AORTIC VALVE REPLACEMENT N/A 5/19/2020    Procedure: REPLACEMENT, AORTIC VALVE;  Surgeon: Guicho Quezada MD;  Location: [facility] OR [address] [place] [address];  Service: Cardiovascular;  Laterality: N/A;    AORTOGRAPHY N/A 5/15/2020    Procedure: Aortogram;  Surgeon: Ben Butler MD;  Location: [facility] CATH LAB;  Service: Cardiology;  Laterality: N/A;    APPENDECTOMY      CARDIAC CATHETERIZATION  05/2020    LM 60%,LAD 90% prox and mid, CX 90%, RCA 50%    CARDIAC VALVE SURGERY  05/2020    29 mm Medtronic porcine valve aortic position     CORONARY ANGIOGRAPHY N/A 5/15/2020    Procedure: ANGIOGRAM, CORONARY ARTERY;  Surgeon: Ben Butler MD;  Location: [facility] CATH LAB;  Service: Cardiology;  Laterality: N/A;    CORONARY ARTERY BYPASS GRAFT  05/2020    LIMA to LAD, SVG OM    CORONARY ARTERY BYPASS GRAFT (CABG) N/A 5/19/2020    Procedure: CORONARY ARTERY BYPASS GRAFT (CABG) x2;  Surgeon: Guicho Quezada MD;  Location: [facility] OR [address] [place] [address];  Service: Cardiovascular;  Laterality: N/A;  CORONARY ARTERY BYPASS GRAFT (CABG) x2    ENDOSCOPIC HARVEST OF VEIN Left 5/19/2020    Procedure: SURGICAL PROCUREMENT, VEIN, ENDOSCOPIC;  Surgeon: Guicho Quezada MD;  [facility];  Service: Cardiovascular;  Laterality: Left;  [place] start: 1402  [place] stop: 1428  Prep start: 1432  Prep stop: 1452    INGUINAL HERNIA REPAIR      LEFT HEART CATHETERIZATION Left 5/15/2020    Procedure: Left heart cath;  Surgeon: Ben Butler MD;  Location: [facility] CATH LAB;  Service: Cardiology;  Laterality: Left;    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    Right knee arthroscopy      WOUND EXPLORATION N/A 5/20/2020    Procedure: EXPLORATION, WOUND- Chest washout;  Surgeon: Guicho Quezada MD;  Location: [facility] OR [address] [place] [address];  Service: Cardiovascular;  Laterality: N/A;       Social History     Socioeconomic History    Marital status:    Tobacco Use    Smoking status: Never    Smokeless tobacco: Never   Substance and Sexual Activity    Alcohol use: Yes     Comment: Rarely    Drug use: Never     Social Determinants of Health     Financial Resource Strain: Low Risk  (4/4/2024)    Overall Financial Resource Strain (CARDIA)     Difficulty of Paying Living Expenses: Not hard at all   Food Insecurity: No Food Insecurity (4/4/2024)    Hunger Vital Sign     Worried About Running Out of Food in the Last Year: Never true     Ran Out of Food in the Last Year: Never true   Transportation Needs: No Transportation Needs (4/4/2024)    PRAPARE -  Transportation     Lack of Transportation (Medical): No     Lack of Transportation (Non-Medical): No   Physical Activity: Sufficiently Active (4/4/2024)    Exercise Vital Sign     Days of Exercise per Week: 4 days     Minutes of Exercise per Session: 60 min   Stress: No Stress Concern Present (4/4/2024)    [employer] [place] [employer] - Occupational Stress Questionnaire     Feeling of Stress : Not at all   Housing Stability: Low Risk  (4/4/2024)    Housing Stability Vital Sign     Unable to Pay for Housing in the Last Year: No     Number of Places Lived in the Last Year: 1     Unstable Housing in the Last Year: No       Family History   Problem Relation Name Age of Onset    Stroke Mother 92     Heart attack Father 65     Heart disease Brother      Rheum arthritis Brother      Heart attack Son      Heart disease Son      Heart disease Brother      Hyperlipidemia Sister      Colon cancer Sister  80    Colon cancer Brother  74    Hyperlipidemia Sister      Cancer Brother         Patient's Medications   New Prescriptions    CARVEDILOL (COREG) 25 MG TABLET    Take 1 tablet (25 mg total) by mouth 2 (two) times daily with meals.   Previous Medications    ACETAMINOPHEN (TYLENOL) 500 MG TABLET    Take 500 mg by mouth once daily.    ASCORBIC ACID, VITAMIN C, (VITAMIN C) 1000 MG TABLET    Take 1,000 mg by mouth every morning.    ASPIRIN (ECOTRIN) 81 MG EC TABLET    Take 81 mg by mouth every morning.    ATORVASTATIN (LIPITOR) 80 MG TABLET    TAKE 1 TABLET (80 MG TOTAL) BY MOUTH ONCE DAILY.    FEXOFENADINE HCL (ALLEGRA ORAL)    Take by mouth once daily.    FISH OIL-OMEGA-3 FATTY ACIDS 300-1,000 MG CAPSULE    Take 1 g by mouth every morning.    MONTELUKAST (SINGULAIR) 10 MG TABLET    TAKE 1 TABLET BY MOUTH EVERY DAY IN THE EVENING    MULTIVITAMIN CAPSULE    Take 1 capsule by mouth once daily.    PANTOPRAZOLE (PROTONIX) 40 MG TABLET    Take 1 tablet (40 mg total) by mouth before breakfast.    THIAMINE (VITAMIN B-1) 50  "MG TABLET    Take 50 mg by mouth once daily.   Modified Medications    Modified Medication Previous Medication    VALSARTAN (DIOVAN) 320 MG TABLET valsartan (DIOVAN) 320 MG tablet       Take 1 tablet (320 mg total) by mouth once daily.    TAKE 1 TABLET BY MOUTH EVERY DAY   Discontinued Medications    METOPROLOL SUCCINATE (TOPROL-XL) 100 MG 24 HR TABLET    TAKE 1 TABLET BY MOUTH EVERY DAY       Review of Systems   Constitutional: Negative for malaise/fatigue and weight gain.   HENT:  Negative for hearing loss.    Eyes:  Negative for visual disturbance.   Cardiovascular:  Negative for chest pain, claudication, dyspnea on exertion, leg swelling, near-syncope, orthopnea, palpitations, paroxysmal nocturnal dyspnea and syncope.   Respiratory:  Negative for cough, shortness of breath, sleep disturbances due to breathing, snoring and wheezing.    Endocrine: Negative for cold intolerance, heat intolerance, polydipsia, polyphagia and polyuria.   Hematologic/Lymphatic: Negative for bleeding problem. Does not bruise/bleed easily.   Skin:  Negative for rash and suspicious lesions.   Musculoskeletal:  Negative for arthritis, falls, joint pain, muscle weakness and myalgias.   Gastrointestinal:  Negative for abdominal pain, change in bowel habit, constipation, diarrhea, heartburn, hematochezia, melena and nausea.   Genitourinary:  Negative for hematuria and nocturia.   Neurological:  Negative for excessive daytime sleepiness, dizziness, headaches, light-headedness, loss of balance and weakness.   Psychiatric/Behavioral:  Negative for depression. The patient is not nervous/anxious.    Allergic/Immunologic: Negative for environmental allergies.       BP (!) 141/84   Pulse 68   Ht 5' 9" (1.753 m)   Wt 80.2 kg (176 lb 12.9 oz)   SpO2 99%   BMI 26.11 kg/m²     Objective:   Physical Exam  Constitutional:       Appearance: He is well-developed.      Comments:      HENT:      Head: Normocephalic and atraumatic.   Eyes:      General: " No scleral icterus.     Conjunctiva/sclera: Conjunctivae normal.      Pupils: Pupils are equal, round, and reactive to light.   Neck:      Thyroid: No thyromegaly.      Vascular: No hepatojugular reflux or JVD.      Trachea: No tracheal deviation.   Cardiovascular:      Rate and Rhythm: Normal rate and regular rhythm.      Chest Wall: PMI is not displaced.      Pulses: Intact distal pulses.           Carotid pulses are 2+ on the right side and 2+ on the left side.       Radial pulses are 2+ on the right side and 2+ on the left side.        Dorsalis pedis pulses are 1+ on the right side and 1+ on the left side.        Posterior tibial pulses are 2+ on the right side and 2+ on the left side.      Heart sounds: Murmur heard.      Harsh midsystolic murmur is present with a grade of 1/6 at the upper right sternal border.   Pulmonary:      Effort: Pulmonary effort is normal.      Breath sounds: Normal breath sounds.   Abdominal:      General: Bowel sounds are normal. There is no distension.      Palpations: Abdomen is soft. There is no mass.      Tenderness: There is no abdominal tenderness.   Musculoskeletal:         General: No tenderness.      Cervical back: Normal range of motion and neck supple.   Lymphadenopathy:      Cervical: No cervical adenopathy.   Skin:     General: Skin is warm and dry.      Findings: No erythema or rash.      Nails: There is no clubbing.   Neurological:      Mental Status: He is alert and oriented to person, place, and time.   Psychiatric:         Speech: Speech normal.         Behavior: Behavior normal.         Lab Results   Component Value Date     03/07/2024    K 4.7 03/07/2024     (H) 03/07/2024    CO2 22 (L) 03/07/2024    BUN 20 03/07/2024    CREATININE 1.3 03/07/2024    GLU 82 03/07/2024    HGBA1C 5.3 03/07/2024    MG 2.2 05/26/2020    AST 33 03/07/2024    ALT 29 03/07/2024    ALBUMIN 4.2 03/07/2024    PROT 7.2 03/07/2024    BILITOT 1.1 (H) 03/07/2024    WBC 10.36  03/07/2024    HGB 13.6 (L) 03/07/2024    HCT 41.7 03/07/2024    HCT 26 (L) 05/21/2020    MCV 97 03/07/2024     03/07/2024    INR 2.2 (H) 08/26/2020    INR 2.0 06/11/2020    TSH 0.783 03/07/2024    CHOL 113 (L) 03/07/2024    HDL 36 (L) 03/07/2024    LDLCALC 64.4 03/07/2024    TRIG 63 03/07/2024     (H) 06/05/2020       Assessment:     1. Essential hypertension :  His last two BP readings have been elevated. Change metoprolol to carvedilol 25 mg bid. If BP remains > 130/80, we can add low dose amlodipine. We discussed keeping a log of home blood pressures and notifying the office if it is consistently running above 130/80 mmHg. I have asked him/her to send me his/her readings via [email or web address] in 1-2 weeks.Limit sodium intake to < 1500mg daily and follow the DASH diet plan.   2. Coronary artery disease involving native coronary artery of native heart without angina pectoris :  He is status post CABG x2.  Continue aspirin and statin therapy.  He is asymptomatic with recovery of his LV function. Following a heart health diet such as the Mediterranean Diet is recommended in addition to 150 minutes a week of moderate intensity exercise to lower cholesterol, maintain a healthy body weight, and improve overall cardiovascular health.   3. Left ventricular thrombus ; Resolved with normal LV function on last echo.   4. S/P CABG (coronary artery bypass graft)    5. Status post aortic valve replacement :  Secondary to severe aortic insufficiency due to bicuspid aortic valve.  We did discuss the need for any 1st degree relatives to be screened for bicuspid valve.SBE prophylaxis is recommended prior to dental procedures. Follow up echo ordered.   6. Other cardiomyopathy :  He has had recovery of his left ventricular ejection fraction on guideline directed medical therapy with valsartan and metoprolol. Follow up echo ordered.   7.      Enlarged ascending aorta:  His sinus of Valsalva measured 4.5 cm on his last echo.   Follow up echo ordered.    Plan:     Brad was seen today for carotid artery disease.    Diagnoses and all orders for this visit:    Coronary artery disease involving native coronary artery of native heart without angina pectoris  -     Ambulatory referral/consult to Cardiology    Primary hypertension    Pure hypercholesterolemia    S/P CABG (coronary artery bypass graft)  -     Echo; Future    Status post aortic valve replacement  -     Echo; Future    Other orders  -     valsartan (DIOVAN) 320 MG tablet; Take 1 tablet (320 mg total) by mouth once daily.  -     carvediloL (COREG) 25 MG tablet; Take 1 tablet (25 mg total) by mouth 2 (two) times daily with meals.        Thank you for allowing me to participate in this patient's care. Please do not hesitate to contact me with any questions or concerns.

## 2024-05-02 ENCOUNTER — TELEPHONE (OUTPATIENT)
Dept: CARDIOLOGY | Facility: CLINIC | Age: 79
End: 2024-05-02
Payer: MEDICARE

## 2024-05-02 DIAGNOSIS — I25.10 CORONARY ARTERY DISEASE, UNSPECIFIED VESSEL OR LESION TYPE, UNSPECIFIED WHETHER ANGINA PRESENT, UNSPECIFIED WHETHER NATIVE OR TRANSPLANTED HEART: Primary | ICD-10-CM

## 2024-05-03 ENCOUNTER — OFFICE VISIT (OUTPATIENT)
Dept: CARDIOLOGY | Facility: CLINIC | Age: 79
End: 2024-05-03
Payer: MEDICARE

## 2024-05-03 VITALS
SYSTOLIC BLOOD PRESSURE: 141 MMHG | OXYGEN SATURATION: 99 % | BODY MASS INDEX: 26.19 KG/M2 | DIASTOLIC BLOOD PRESSURE: 84 MMHG | WEIGHT: 176.81 LBS | HEART RATE: 68 BPM | HEIGHT: 69 IN

## 2024-05-03 DIAGNOSIS — E78.00 PURE HYPERCHOLESTEROLEMIA: ICD-10-CM

## 2024-05-03 DIAGNOSIS — Z95.1 S/P CABG (CORONARY ARTERY BYPASS GRAFT): ICD-10-CM

## 2024-05-03 DIAGNOSIS — I10 PRIMARY HYPERTENSION: ICD-10-CM

## 2024-05-03 DIAGNOSIS — Z95.2 STATUS POST AORTIC VALVE REPLACEMENT: ICD-10-CM

## 2024-05-03 DIAGNOSIS — I25.10 CORONARY ARTERY DISEASE INVOLVING NATIVE CORONARY ARTERY OF NATIVE HEART WITHOUT ANGINA PECTORIS: Primary | ICD-10-CM

## 2024-05-03 PROCEDURE — 3288F FALL RISK ASSESSMENT DOCD: CPT | Mod: CPTII,S$GLB,, | Performed by: INTERNAL MEDICINE

## 2024-05-03 PROCEDURE — 1159F MED LIST DOCD IN RCRD: CPT | Mod: CPTII,S$GLB,, | Performed by: INTERNAL MEDICINE

## 2024-05-03 PROCEDURE — 99214 OFFICE O/P EST MOD 30 MIN: CPT | Mod: S$GLB,,, | Performed by: INTERNAL MEDICINE

## 2024-05-03 PROCEDURE — 1126F AMNT PAIN NOTED NONE PRSNT: CPT | Mod: CPTII,S$GLB,, | Performed by: INTERNAL MEDICINE

## 2024-05-03 PROCEDURE — 99999 PR PBB SHADOW E&M-EST. PATIENT-LVL V: CPT | Mod: PBBFAC,,, | Performed by: INTERNAL MEDICINE

## 2024-05-03 PROCEDURE — 1101F PT FALLS ASSESS-DOCD LE1/YR: CPT | Mod: CPTII,S$GLB,, | Performed by: INTERNAL MEDICINE

## 2024-05-03 PROCEDURE — 1160F RVW MEDS BY RX/DR IN RCRD: CPT | Mod: CPTII,S$GLB,, | Performed by: INTERNAL MEDICINE

## 2024-05-03 PROCEDURE — 3079F DIAST BP 80-89 MM HG: CPT | Mod: CPTII,S$GLB,, | Performed by: INTERNAL MEDICINE

## 2024-05-03 PROCEDURE — 3077F SYST BP >= 140 MM HG: CPT | Mod: CPTII,S$GLB,, | Performed by: INTERNAL MEDICINE

## 2024-05-03 RX ORDER — VALSARTAN 320 MG/1
320 TABLET ORAL DAILY
Qty: 90 TABLET | Refills: 3 | Status: SHIPPED | OUTPATIENT
Start: 2024-05-03

## 2024-05-03 RX ORDER — CARVEDILOL 25 MG/1
25 TABLET ORAL 2 TIMES DAILY WITH MEALS
Qty: 180 TABLET | Refills: 3 | Status: SHIPPED | OUTPATIENT
Start: 2024-05-03 | End: 2025-05-03

## 2024-05-13 ENCOUNTER — ANESTHESIA EVENT (OUTPATIENT)
Dept: SURGERY | Facility: HOSPITAL | Age: 79
End: 2024-05-13
Payer: MEDICARE

## 2024-05-14 ENCOUNTER — HOSPITAL ENCOUNTER (OUTPATIENT)
Dept: CARDIOLOGY | Facility: HOSPITAL | Age: 79
Discharge: HOME | End: 2024-05-14
Attending: INTERNAL MEDICINE
Payer: MEDICARE

## 2024-05-14 VITALS
DIASTOLIC BLOOD PRESSURE: 60 MMHG | WEIGHT: 176 LBS | BODY MASS INDEX: 26.07 KG/M2 | SYSTOLIC BLOOD PRESSURE: 140 MMHG | HEART RATE: 78 BPM | HEIGHT: 69 IN

## 2024-05-14 DIAGNOSIS — Z95.2 STATUS POST AORTIC VALVE REPLACEMENT: ICD-10-CM

## 2024-05-14 DIAGNOSIS — Z95.1 S/P CABG (CORONARY ARTERY BYPASS GRAFT): ICD-10-CM

## 2024-05-14 LAB
ASCENDING AORTA: 4.11 CM
AV INDEX (PROSTH): 0.66
AV MEAN GRADIENT: 24 MMHG
AV PEAK GRADIENT: 41 MMHG
AV VALVE AREA BY VELOCITY RATIO: 2.5 CM²
AV VALVE AREA: 2.71 CM²
AV VELOCITY RATIO: 0.61
BSA FOR ECHO PROCEDURE: 1.97 M2
CV ECHO LV RWT: 0.38 CM
DOP CALC AO PEAK VEL: 3.2 M/S
DOP CALC AO VTI: 77.26 CM
DOP CALC LVOT AREA: 4.1 CM2
DOP CALC LVOT DIAMETER: 2.29 CM
DOP CALC LVOT PEAK VEL: 1.94 M/S
DOP CALC LVOT STROKE VOLUME: 209.41 CM3
DOP CALC MV VTI: 30.98 CM
DOP CALC RVOT PEAK VEL: 0.98 M/S
DOP CALC RVOT VTI: 21.28 CM
DOP CALCLVOT PEAK VEL VTI: 50.87 CM
E WAVE DECELERATION TIME: 168.08 MSEC
E/A RATIO: 0.91
E/E' RATIO: 16.17 M/S
ECHO LV POSTERIOR WALL: 0.78 CM (ref 0.6–1.1)
EJECTION FRACTION: 58 %
FRACTIONAL SHORTENING: 30 % (ref 28–44)
INTERVENTRICULAR SEPTUM: 0.78 CM (ref 0.6–1.1)
LA MAJOR: 6.52 CM
LA MINOR: 6.75 CM
LA WIDTH: 4.79 CM
LEFT ATRIUM SIZE: 5.82 CM
LEFT ATRIUM VOLUME INDEX MOD: 29.7 ML/M2
LEFT ATRIUM VOLUME INDEX: 80.2 ML/M2
LEFT ATRIUM VOLUME MOD: 58.17 CM3
LEFT ATRIUM VOLUME: 157.18 CM3
LEFT INTERNAL DIMENSION IN SYSTOLE: 2.86 CM (ref 2.1–4)
LEFT VENTRICLE DIASTOLIC VOLUME INDEX: 37.99 ML/M2
LEFT VENTRICLE DIASTOLIC VOLUME: 74.46 ML
LEFT VENTRICLE MASS INDEX: 48 G/M2
LEFT VENTRICLE SYSTOLIC VOLUME INDEX: 15.9 ML/M2
LEFT VENTRICLE SYSTOLIC VOLUME: 31.23 ML
LEFT VENTRICULAR INTERNAL DIMENSION IN DIASTOLE: 4.11 CM (ref 3.5–6)
LEFT VENTRICULAR MASS: 94.5 G
LV LATERAL E/E' RATIO: 12.13 M/S
LV SEPTAL E/E' RATIO: 24.25 M/S
MV A" WAVE DURATION": 17.13 MSEC
MV MEAN GRADIENT: 2 MMHG
MV PEAK A VEL: 1.07 M/S
MV PEAK E VEL: 0.97 M/S
MV PEAK GRADIENT: 4 MMHG
MV STENOSIS PRESSURE HALF TIME: 48.74 MS
MV VALVE AREA BY CONTINUITY EQUATION: 6.76 CM2
MV VALVE AREA P 1/2 METHOD: 4.51 CM2
OHS CV RV/LV RATIO: 0.63 CM
PISA TR MAX VEL: 2.62 M/S
PULM VEIN S/D RATIO: 1.2
PV MEAN GRADIENT: 2 MMHG
PV PEAK D VEL: 0.55 M/S
PV PEAK GRADIENT: 6
PV PEAK S VEL: 0.66 M/S
PV PEAK VELOCITY: 1.22 M/S
RA MAJOR: 5.08 CM
RA PRESSURE ESTIMATED: 3 MMHG
RA WIDTH: 3.56 CM
RIGHT VENTRICULAR END-DIASTOLIC DIMENSION: 2.59 CM
RV TB RVSP: 6 MMHG
SINUS: 4.52 CM
STJ: 3.64 CM
TDI LATERAL: 0.08 M/S
TDI SEPTAL: 0.04 M/S
TDI: 0.06 M/S
TR MAX PG: 27 MMHG
TRICUSPID ANNULAR PLANE SYSTOLIC EXCURSION: 2.19 CM
TV REST PULMONARY ARTERY PRESSURE: 30 MMHG
Z-SCORE OF LEFT VENTRICULAR DIMENSION IN END DIASTOLE: -3.11
Z-SCORE OF LEFT VENTRICULAR DIMENSION IN END SYSTOLE: -1.48

## 2024-05-14 PROCEDURE — 93306 TTE W/DOPPLER COMPLETE: CPT | Mod: 26,,, | Performed by: INTERNAL MEDICINE

## 2024-05-14 PROCEDURE — C8929 TTE W OR WO FOL WCON,DOPPLER: HCPCS | Mod: PO

## 2024-05-22 ENCOUNTER — TELEPHONE (OUTPATIENT)
Dept: ORTHOPEDICS | Facility: CLINIC | Age: 79
End: 2024-05-22
Payer: MEDICARE

## 2024-05-22 ENCOUNTER — PATIENT MESSAGE (OUTPATIENT)
Dept: ORTHOPEDICS | Facility: CLINIC | Age: 79
End: 2024-05-22
Payer: MEDICARE

## 2024-05-22 DIAGNOSIS — M72.0 DUPUYTREN'S CONTRACTURE OF RIGHT HAND: Primary | ICD-10-CM

## 2024-05-22 RX ORDER — ONDANSETRON HYDROCHLORIDE 8 MG/1
8 TABLET, FILM COATED ORAL EVERY 8 HOURS PRN
Qty: 30 TABLET | Refills: 0 | Status: SHIPPED | OUTPATIENT
Start: 2024-05-22

## 2024-05-22 RX ORDER — DOCUSATE SODIUM 100 MG/1
100 CAPSULE, LIQUID FILLED ORAL 2 TIMES DAILY
Qty: 30 CAPSULE | Refills: 1 | Status: SHIPPED | OUTPATIENT
Start: 2024-05-22

## 2024-05-22 RX ORDER — HYDROCODONE BITARTRATE AND ACETAMINOPHEN 5; 325 MG/1; MG/1
1 TABLET ORAL EVERY 6 HOURS PRN
Qty: 30 TABLET | Refills: 0 | Status: SHIPPED | OUTPATIENT
Start: 2024-05-22

## 2024-05-22 NOTE — ANESTHESIA PREPROCEDURE EVALUATION
05/22/2024  Brad Kearns is a 78 y.o., male.  Patient Active Problem List   Diagnosis    Hypertension    CAD (coronary artery disease)    Status post aortic valve replacement    S/P CABG (coronary artery bypass graft)    Nonrheumatic aortic valve insufficiency    Pure hypercholesterolemia    Nephrolithiasis    Hx of endoscopy    Hx of colonoscopy    History of nephrectomy    Allergies    Acid reflux    Onychomycosis    Snoring    Elevated bilirubin       gregorio  2024       Left Ventricle: The left ventricle is normal in size. Ventricular mass is normal. Normal wall thickness. Regional wall motion abnormalities present. See diagram for wall motion findings. There is normal systolic function with a visually estimated ejection fraction of 55 - 60%. Ejection fraction by visual approximation is 58%. There is indeterminate diastolic function.    Right Ventricle: Normal right ventricular cavity size. Wall thickness is normal. Systolic function is normal.    Left Atrium: Left atrium is severely dilated.    Right Atrium: Right atrium is dilated.    Aortic Valve: There is a porcine prosthetic valve in the aortic position. Aortic valve area by VTI is 2.71 cm². Aortic valve peak velocity is 3.20 m/s. Mean gradient is 24 mmHg. The dimensionless index is 0.66.    Mitral Valve: There is mild regurgitation.    Tricuspid Valve: There is mild regurgitation.    Pulmonary Artery: The estimated pulmonary artery systolic pressure is 30 mmHg.    IVC/SVC: Normal venous pressure at 3 mmHg.  Past Surgical History:   Procedure Laterality Date    AORTIC VALVE REPLACEMENT N/A 5/19/2020    Procedure: REPLACEMENT, AORTIC VALVE;  Surgeon: Guicho Quezada MD;  Location: [facility] OR [address] [place] [address];  Service: Cardiovascular;  Laterality: N/A;    AORTOGRAPHY N/A 5/15/2020    Procedure: Aortogram;  Surgeon: Ben Butler MD;  Location: [facility] CATH  LAB;  Service: Cardiology;  Laterality: N/A;    APPENDECTOMY      CARDIAC CATHETERIZATION  05/2020    LM 60%,LAD 90% prox and mid, CX 90%, RCA 50%    CARDIAC VALVE SURGERY  05/2020    29 mm Medtronic porcine valve aortic position    CORONARY ANGIOGRAPHY N/A 5/15/2020    Procedure: ANGIOGRAM, CORONARY ARTERY;  Surgeon: Ben Butler MD;  Location: [facility] CATH LAB;  Service: Cardiology;  Laterality: N/A;    CORONARY ARTERY BYPASS GRAFT  05/2020    LIMA to LAD, SVG OM    CORONARY ARTERY BYPASS GRAFT (CABG) N/A 5/19/2020    Procedure: CORONARY ARTERY BYPASS GRAFT (CABG) x2;  Surgeon: Guicho Quezada MD;  [facility];  Service: Cardiovascular;  Laterality: N/A;  CORONARY ARTERY BYPASS GRAFT (CABG) x2    ENDOSCOPIC HARVEST OF VEIN Left 5/19/2020    Procedure: SURGICAL PROCUREMENT, VEIN, ENDOSCOPIC;  Surgeon: Guicho Quezada MD;  [facility];  Service: Cardiovascular;  Laterality: Left;  [place] start: 1402  [place] stop: 1428  Prep start: 1432  Prep stop: 1452    INGUINAL HERNIA REPAIR      LEFT HEART CATHETERIZATION Left 5/15/2020    Procedure: Left heart cath;  Surgeon: Ben Butler MD;  Location: [facility] CATH LAB;  Service: Cardiology;  Laterality: Left;    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     I have reviewed the Nursing Notes. I have reviewed the NPO Status.   I have reviewed the Medications.     Review of Systems      Physical Exam  General: Well nourished    Airway:  Mallampati: II   Mouth Opening: Normal  TM Distance: Normal  Tongue: Normal  Neck ROM: Normal ROM        Anesthesia Plan  Type of Anesthesia, risks & benefits discussed:    Anesthesia Type: Gen Natural  Airway, MAC  Intra-op Monitoring Plan: Standard ASA Monitors  Post Op Pain Control Plan: multimodal analgesia  Induction:  IV  Informed Consent: Patient consented to blood products? No  ASA Score: 2  Day of Surgery Review of History & Physical: H&P Update referred to the surgeon/provider.    Ready For Surgery From Anesthesia Perspective.     .

## 2024-05-22 NOTE — TELEPHONE ENCOUNTER
Spoke to patient to inform them of their surgery arrival time (930 am), [facility], [address] [facility]:  Verbalized understanding of instructions for pre-wash, and reviewed NPO after midnight.   Confirmed call in regards to medication instructions from anesthesia.   Confirmed patient was NOT using a rideshare service for surgery arrival or  transportation.   Children.       If you are diabetic, DO NOT take any diabetic medication the night before surgery or morning of surgery. If you are type I diabetic on an insulin pump, please bring your monitor the morning of surgery.   [] NA     MUST HOLD SEMAGLUTIDE MEDICATIONS 7 DAYS PRIOR TO SURGERY, examples: Mounjaro, Ozempic, Trulicity.   [] NA     If you have high blood pressure please take your medication in the morning like normal with a SIP OF WATER; with the exception of any Angiotensin receptor blocker (end in sartan) and ACE inhibitors (end in pril).  [x] metoprolol succinate (TOPROL-XL) 100 MG  dose as directed am of sx with a sip of water     If you are taking blood thinners (Aspirin, Eliquis, Lovenox, Coumadin, etc), our office will contact the prescribing physician for guidance on when you are to stop/re-start your blood thinner medication.   [x] ASA - please dose as normal am of sx with a sip of water     Other medications to dose with a SIP OF WATER AM of surgery:   [] NA     Other Important Medication Instructions:   [] NA     Please HOLD Vitamins 1-7 days prior to surgery, CONTINUE Vitamin D up until the AM of your surgery.

## 2024-05-23 ENCOUNTER — HOSPITAL ENCOUNTER (OUTPATIENT)
Facility: HOSPITAL | Age: 79
Discharge: HOME | End: 2024-05-23
Attending: ORTHOPAEDIC SURGERY | Admitting: ORTHOPAEDIC SURGERY
Payer: MEDICARE

## 2024-05-23 ENCOUNTER — ANESTHESIA (OUTPATIENT)
Dept: SURGERY | Facility: HOSPITAL | Age: 79
End: 2024-05-23
Payer: MEDICARE

## 2024-05-23 VITALS
SYSTOLIC BLOOD PRESSURE: 120 MMHG | WEIGHT: 176 LBS | HEIGHT: 69 IN | HEART RATE: 52 BPM | RESPIRATION RATE: 11 BRPM | DIASTOLIC BLOOD PRESSURE: 74 MMHG | TEMPERATURE: 98 F | OXYGEN SATURATION: 100 % | BODY MASS INDEX: 26.07 KG/M2

## 2024-05-23 DIAGNOSIS — M72.0 DUPUYTREN'S CONTRACTURE OF RIGHT HAND: Primary | ICD-10-CM

## 2024-05-23 PROCEDURE — 25000003 PHARM REV CODE 250: Performed by: ORTHOPAEDIC SURGERY

## 2024-05-23 PROCEDURE — 63600175 PHARM REV CODE 636 W HCPCS: Performed by: ANESTHESIOLOGY

## 2024-05-23 PROCEDURE — 94761 N-INVAS EAR/PLS OXIMETRY MLT: CPT

## 2024-05-23 PROCEDURE — 36000706: Performed by: ORTHOPAEDIC SURGERY

## 2024-05-23 PROCEDURE — 64415 NJX AA&/STRD BRCH PLXS IMG: CPT | Performed by: ANESTHESIOLOGY

## 2024-05-23 PROCEDURE — 63600175 PHARM REV CODE 636 W HCPCS: Performed by: PHYSICIAN ASSISTANT

## 2024-05-23 PROCEDURE — 25000003 PHARM REV CODE 250: Performed by: STUDENT IN AN ORGANIZED HEALTH CARE EDUCATION/TRAINING PROGRAM

## 2024-05-23 PROCEDURE — D9220A PRA ANESTHESIA: Mod: CRNA,,, | Performed by: STUDENT IN AN ORGANIZED HEALTH CARE EDUCATION/TRAINING PROGRAM

## 2024-05-23 PROCEDURE — 88304 TISSUE EXAM BY PATHOLOGIST: CPT | Performed by: PATHOLOGY

## 2024-05-23 PROCEDURE — D9220A PRA ANESTHESIA: Mod: ANES,,, | Performed by: ANESTHESIOLOGY

## 2024-05-23 PROCEDURE — 25000003 PHARM REV CODE 250: Performed by: PHYSICIAN ASSISTANT

## 2024-05-23 PROCEDURE — 71000015 HC POSTOP RECOV 1ST HR: Performed by: ORTHOPAEDIC SURGERY

## 2024-05-23 PROCEDURE — 37000009 HC ANESTHESIA EA ADD 15 MINS: Performed by: ORTHOPAEDIC SURGERY

## 2024-05-23 PROCEDURE — 63600175 PHARM REV CODE 636 W HCPCS: Performed by: STUDENT IN AN ORGANIZED HEALTH CARE EDUCATION/TRAINING PROGRAM

## 2024-05-23 PROCEDURE — 71000039 HC RECOVERY, EACH ADD'L HOUR: Performed by: ORTHOPAEDIC SURGERY

## 2024-05-23 PROCEDURE — 88304 TISSUE EXAM BY PATHOLOGIST: CPT | Mod: 26,,, | Performed by: PATHOLOGY

## 2024-05-23 PROCEDURE — 37000008 HC ANESTHESIA 1ST 15 MINUTES: Performed by: ORTHOPAEDIC SURGERY

## 2024-05-23 PROCEDURE — 36000707: Performed by: ORTHOPAEDIC SURGERY

## 2024-05-23 PROCEDURE — 99900035 HC TECH TIME PER 15 MIN (STAT)

## 2024-05-23 PROCEDURE — 26123 RELEASE PALM CONTRACTURE: CPT | Mod: F9,,, | Performed by: ORTHOPAEDIC SURGERY

## 2024-05-23 PROCEDURE — 26125 RELEASE PALM CONTRACTURE: CPT | Mod: F8,,, | Performed by: ORTHOPAEDIC SURGERY

## 2024-05-23 PROCEDURE — 71000033 HC RECOVERY, INTIAL HOUR: Performed by: ORTHOPAEDIC SURGERY

## 2024-05-23 RX ORDER — HALOPERIDOL 5 MG/ML
0.5 INJECTION INTRAMUSCULAR EVERY 10 MIN PRN
Status: DISCONTINUED | OUTPATIENT
Start: 2024-05-23 | End: 2024-05-23 | Stop reason: HOSPADM

## 2024-05-23 RX ORDER — METOPROLOL SUCCINATE 100 MG/1
100 TABLET, EXTENDED RELEASE ORAL DAILY
COMMUNITY

## 2024-05-23 RX ORDER — METHOCARBAMOL 500 MG/1
1000 TABLET, FILM COATED ORAL ONCE
Status: DISCONTINUED | OUTPATIENT
Start: 2024-05-23 | End: 2024-05-23 | Stop reason: HOSPADM

## 2024-05-23 RX ORDER — PROPOFOL 10 MG/ML
VIAL (ML) INTRAVENOUS CONTINUOUS PRN
Status: DISCONTINUED | OUTPATIENT
Start: 2024-05-23 | End: 2024-05-23

## 2024-05-23 RX ORDER — MIDAZOLAM HYDROCHLORIDE 1 MG/ML
1 INJECTION, SOLUTION INTRAMUSCULAR; INTRAVENOUS
Status: DISCONTINUED | OUTPATIENT
Start: 2024-05-23 | End: 2024-05-23 | Stop reason: HOSPADM

## 2024-05-23 RX ORDER — PHENYLEPHRINE HYDROCHLORIDE 10 MG/ML
INJECTION INTRAVENOUS
Status: DISCONTINUED | OUTPATIENT
Start: 2024-05-23 | End: 2024-05-23

## 2024-05-23 RX ORDER — SODIUM CHLORIDE 0.9 % (FLUSH) 0.9 %
10 SYRINGE (ML) INJECTION
Status: DISCONTINUED | OUTPATIENT
Start: 2024-05-23 | End: 2024-05-23 | Stop reason: HOSPADM

## 2024-05-23 RX ORDER — MUPIROCIN 20 MG/G
OINTMENT TOPICAL 2 TIMES DAILY
Status: DISCONTINUED | OUTPATIENT
Start: 2024-05-23 | End: 2024-05-23 | Stop reason: HOSPADM

## 2024-05-23 RX ORDER — ROPIVACAINE HYDROCHLORIDE 5 MG/ML
INJECTION, SOLUTION EPIDURAL; INFILTRATION; PERINEURAL
Status: COMPLETED | OUTPATIENT
Start: 2024-05-23 | End: 2024-05-23

## 2024-05-23 RX ORDER — LIDOCAINE HYDROCHLORIDE 20 MG/ML
INJECTION INTRAVENOUS
Status: DISCONTINUED | OUTPATIENT
Start: 2024-05-23 | End: 2024-05-23

## 2024-05-23 RX ORDER — HYDROCODONE BITARTRATE AND ACETAMINOPHEN 5; 325 MG/1; MG/1
1 TABLET ORAL EVERY 4 HOURS PRN
Status: DISCONTINUED | OUTPATIENT
Start: 2024-05-23 | End: 2024-05-23 | Stop reason: HOSPADM

## 2024-05-23 RX ORDER — HYDROMORPHONE HYDROCHLORIDE 1 MG/ML
0.2 INJECTION, SOLUTION INTRAMUSCULAR; INTRAVENOUS; SUBCUTANEOUS EVERY 5 MIN PRN
Status: DISCONTINUED | OUTPATIENT
Start: 2024-05-23 | End: 2024-05-23 | Stop reason: HOSPADM

## 2024-05-23 RX ORDER — PROPOFOL 10 MG/ML
VIAL (ML) INTRAVENOUS
Status: DISCONTINUED | OUTPATIENT
Start: 2024-05-23 | End: 2024-05-23

## 2024-05-23 RX ORDER — MUPIROCIN 20 MG/G
OINTMENT TOPICAL
Status: DISCONTINUED | OUTPATIENT
Start: 2024-05-23 | End: 2024-05-23 | Stop reason: HOSPADM

## 2024-05-23 RX ORDER — CELECOXIB 200 MG/1
400 CAPSULE ORAL
Status: DISCONTINUED | OUTPATIENT
Start: 2024-05-23 | End: 2024-05-23 | Stop reason: HOSPADM

## 2024-05-23 RX ORDER — BACITRACIN ZINC 500 UNIT/G
OINTMENT (GRAM) TOPICAL
Status: DISCONTINUED | OUTPATIENT
Start: 2024-05-23 | End: 2024-05-23 | Stop reason: HOSPADM

## 2024-05-23 RX ORDER — ACETAMINOPHEN 500 MG
1000 TABLET ORAL
Status: COMPLETED | OUTPATIENT
Start: 2024-05-23 | End: 2024-05-23

## 2024-05-23 RX ORDER — OXYCODONE HYDROCHLORIDE 5 MG/1
5 TABLET ORAL
Status: DISCONTINUED | OUTPATIENT
Start: 2024-05-23 | End: 2024-05-23 | Stop reason: HOSPADM

## 2024-05-23 RX ORDER — CEFAZOLIN SODIUM 1 G/3ML
INJECTION, POWDER, FOR SOLUTION INTRAMUSCULAR; INTRAVENOUS
Status: DISCONTINUED | OUTPATIENT
Start: 2024-05-23 | End: 2024-05-23

## 2024-05-23 RX ORDER — FENTANYL CITRATE 50 UG/ML
100 INJECTION, SOLUTION INTRAMUSCULAR; INTRAVENOUS
Status: DISCONTINUED | OUTPATIENT
Start: 2024-05-23 | End: 2024-05-23 | Stop reason: HOSPADM

## 2024-05-23 RX ADMIN — PROPOFOL 20 MG: 10 INJECTION, EMULSION INTRAVENOUS at 11:05

## 2024-05-23 RX ADMIN — MUPIROCIN: 20 OINTMENT TOPICAL at 10:05

## 2024-05-23 RX ADMIN — PHENYLEPHRINE HYDROCHLORIDE 100 MCG: 10 INJECTION INTRAVENOUS at 11:05

## 2024-05-23 RX ADMIN — PHENYLEPHRINE HYDROCHLORIDE 200 MCG: 10 INJECTION INTRAVENOUS at 11:05

## 2024-05-23 RX ADMIN — PROPOFOL 30 MG: 10 INJECTION, EMULSION INTRAVENOUS at 11:05

## 2024-05-23 RX ADMIN — ACETAMINOPHEN 1000 MG: 500 TABLET ORAL at 10:05

## 2024-05-23 RX ADMIN — ROPIVACAINE HYDROCHLORIDE 30 ML: 5 INJECTION EPIDURAL; INFILTRATION; PERINEURAL at 10:05

## 2024-05-23 RX ADMIN — SODIUM CHLORIDE: 0.9 INJECTION, SOLUTION INTRAVENOUS at 10:05

## 2024-05-23 RX ADMIN — PHENYLEPHRINE HYDROCHLORIDE 150 MCG: 10 INJECTION INTRAVENOUS at 12:05

## 2024-05-23 RX ADMIN — CEFAZOLIN 2 G: 330 INJECTION, POWDER, FOR SOLUTION INTRAMUSCULAR; INTRAVENOUS at 11:05

## 2024-05-23 RX ADMIN — MIDAZOLAM 1 MG: 1 INJECTION INTRAMUSCULAR; INTRAVENOUS at 10:05

## 2024-05-23 RX ADMIN — PHENYLEPHRINE HYDROCHLORIDE 100 MCG: 10 INJECTION INTRAVENOUS at 01:05

## 2024-05-23 RX ADMIN — PHENYLEPHRINE HYDROCHLORIDE 100 MCG: 10 INJECTION INTRAVENOUS at 12:05

## 2024-05-23 RX ADMIN — PROPOFOL 150 MCG/KG/MIN: 10 INJECTION, EMULSION INTRAVENOUS at 11:05

## 2024-05-23 RX ADMIN — LIDOCAINE HYDROCHLORIDE 100 MG: 20 INJECTION INTRAVENOUS at 11:05

## 2024-05-23 RX ADMIN — PROPOFOL 20 MG: 10 INJECTION, EMULSION INTRAVENOUS at 01:05

## 2024-05-23 RX ADMIN — FENTANYL CITRATE 50 MCG: 50 INJECTION INTRAMUSCULAR; INTRAVENOUS at 10:05

## 2024-05-23 NOTE — H&P
"[facility]  History & Physical  Orthopedics     SUBJECTIVE:          Chief Complaint:       Chief Complaint   Patient presents with    Right Hand - Pain         Referring Provider: none     History of Present Illness:  Patient is a 78 y.o. right hand dominant male who presents today with complaints of right palmar cord and dupuytrens contracture of the right small finger PIP and MP. Patient states that he noticed this about 1 year ago and that is is affecting his activities of daily living and  strength.      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     PMH: CAD s/p CABG 5/20     The patient denies any fevers, chills, N/V, D/C and presents for evaluation.     Vitals       Vitals:     03/11/24 1416   Weight: 80.3 kg (177 lb)   Height: 5' 9" (1.753 m)   PainSc:   8   PainLoc: Hand                 Past Medical History:   Diagnosis Date    Ascending aorta dilatation 2/1/2022     SOV 4.5 cm    Bicuspid aortic valve      Cardiomyopathy       EF 35-40% post AVR    Coronary artery disease      GERD (gastroesophageal reflux disease)      Hyperlipidemia      Hypertension      Nonrheumatic aortic valve insufficiency 3/13/2020    Valvular regurgitation       bicuspid valve, AI            Past Surgical History:   Procedure Laterality Date    AORTIC VALVE REPLACEMENT N/A 5/19/2020     Procedure: REPLACEMENT, AORTIC VALVE;  Surgeon: Guicho Quezada MD;  Location: [facility] OR [address] [place] [address];  Service: Cardiovascular;  Laterality: N/A;    AORTOGRAPHY N/A 5/15/2020     Procedure: Aortogram;  Surgeon: Ben Butler MD;  Location: [facility] CATH LAB;  Service: Cardiology;  Laterality: N/A;    APPENDECTOMY        CARDIAC CATHETERIZATION   05/2020     LM 60%,LAD 90% prox and mid, CX 90%, RCA 50%    CARDIAC VALVE SURGERY   05/2020     29 mm Medtronic porcine valve aortic position    CORONARY ANGIOGRAPHY N/A 5/15/2020     Procedure: ANGIOGRAM, CORONARY ARTERY;  Surgeon: " Ben Butler MD;  Location: [facility] CATH LAB;  Service: Cardiology;  Laterality: N/A;    CORONARY ARTERY BYPASS GRAFT   05/2020     LIMA to LAD, SVG OM    CORONARY ARTERY BYPASS GRAFT (CABG) N/A 5/19/2020     Procedure: CORONARY ARTERY BYPASS GRAFT (CABG) x2;  Surgeon: Guicho Quezada MD;  [facility];  Service: Cardiovascular;  Laterality: N/A;  CORONARY ARTERY BYPASS GRAFT (CABG) x2    ENDOSCOPIC HARVEST OF VEIN Left 5/19/2020     Procedure: SURGICAL PROCUREMENT, VEIN, ENDOSCOPIC;  Surgeon: Guicho Quezada MD;  [facility];  Service: Cardiovascular;  Laterality: Left;  Shelby start: 1402  Shelby stop: 1428  Prep start: 1432  Prep stop: 1452    INGUINAL HERNIA REPAIR        LEFT HEART CATHETERIZATION Left 5/15/2020     Procedure: Left heart cath;  Surgeon: Ben Butler MD;  Location: [facility] CATH LAB;  Service: Cardiology;  Laterality: Left;    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    Right knee arthroscopy        WOUND EXPLORATION N/A 5/20/2020     Procedure: EXPLORATION, WOUND- Chest washout;  Surgeon: Guicho Quezada MD;  Location: [facility] OR [address] [place] [address];  Service: Cardiovascular;  Laterality: N/A;            Review of patient's allergies indicates:   Allergen Reactions    Demerol [meperidine] Hives       tachycardia    Lisinopril         cough      Social History          Social History Narrative    Not on file            Family History   Problem Relation Age of Onset    Stroke Mother      Heart attack Father      Heart disease Brother      Rheum arthritis Brother      Heart attack Son      Heart disease Son      Heart disease Brother      Hyperlipidemia Sister      Colon cancer Sister 80    Colon cancer Brother 74    Hyperlipidemia Sister      Cancer Brother             Current Medications      Current Outpatient Medications:     ascorbic acid, vitamin C, (VITAMIN C) 1000 MG tablet, Take 1,000 mg by mouth every morning., Disp: , Rfl:     aspirin  "(ECOTRIN) 81 MG EC tablet, Take 81 mg by mouth every morning., Disp: , Rfl:     atorvastatin (LIPITOR) 80 MG tablet, TAKE 1 TABLET (80 MG TOTAL) BY MOUTH ONCE DAILY., Disp: 90 tablet, Rfl: 2    fish oil-omega-3 fatty acids 300-1,000 mg capsule, Take 1 g by mouth every morning., Disp: , Rfl:     metoprolol succinate (TOPROL-XL) 100 MG 24 hr tablet, TAKE 1 TABLET BY MOUTH EVERY DAY, Disp: 90 tablet, Rfl: 2    montelukast (SINGULAIR) 10 mg tablet, TAKE 1 TABLET BY MOUTH EVERY DAY IN THE EVENING, Disp: 90 tablet, Rfl: 0    multivitamin capsule, Take 1 capsule by mouth once daily., Disp: , Rfl:     pantoprazole (PROTONIX) 40 MG tablet, Take 1 tablet (40 mg total) by mouth before breakfast., Disp: 90 tablet, Rfl: 3    terbinafine HCL (LAMISIL) 250 mg tablet, Take 1 tablet (250 mg total) by mouth once daily., Disp: 90 tablet, Rfl: 0    thiamine (VITAMIN B-1) 50 MG tablet, Take 50 mg by mouth once daily., Disp: , Rfl:     valsartan (DIOVAN) 320 MG tablet, TAKE 1 TABLET BY MOUTH EVERY DAY, Disp: 90 tablet, Rfl: 1        ROS     Review of Systems:  Constitutional: no fever or chills  Eyes: no visual changes  ENT: no nasal congestion or sore throat  Respiratory: no cough or shortness of breath  Cardiovascular: no chest pain  Gastrointestinal: no nausea or vomiting, tolerating diet  Musculoskeletal: pain, soreness, and decreased ROM     OBJECTIVE:       Vital Signs (Most Recent):  Vitals       Vitals:     03/11/24 1416   Weight: 80.3 kg (177 lb)   Height: 5' 9" (1.753 m)         Body mass index is 26.14 kg/m².     Physical Exam     Physical Exam:  Constitutional: The patient appears well-developed and well-nourished. No distress.   Head: Normocephalic and atraumatic.   Nose: Nose normal.   Eyes: Conjunctivae and EOM are normal.   Neck: No tracheal deviation present.   Cardiovascular: Normal rate and intact distal pulses.    Pulmonary/Chest: Effort normal. No respiratory distress.   Abdominal: There is no guarding.   Lymphatic: " Negative for adenopathy   Neurological: The patient is alert.   Psychiatric: The patient has a normal mood and affect.      Bilateral Hand/Wrist Examination:     Observation/Inspection:  Swelling                       none                  Deformity                     Enlargement right basilar thumb joint  Discoloration               none                  Scars                           none                  Atrophy                        none     HAND/WRIST EXAMINATION:  Physical Exam     contracture of the right Little finger with Dupuytren's cord, 45 degree contracture of MP and PIP joints small finger   left palm nodules; decreased ROM right basilar thumb joint, nontender to palpation     otherwise full range of motion hands, wrists and elbows.     Neurovascular Exam:  Digits WWP, brisk CR < 3s throughout  NVI motor/LTS to M/R/U nerves, radial pulse 2+  2+ biceps and brachioradialis reflexes     Diagnostic studies and other clinical records review:     X-rays AP, lateral and oblique right hand taken today are independently reviewed by me and shows scapholunate widening right wrist, contracture right small finger Eaton stage IV basilar thumb arthritis.            ASSESSMENT/PLAN:    78 y.o. yo male with        Encounter Diagnoses   Name Primary?    Primary osteoarthritis of first carpometacarpal joint of right hand      Dupuytren's contracture of right hand Yes    Pre-op testing        Plan: Plan: We have discussed the natural history of Dupuytren's disease as well as treatment options including observation, Xiaflex injections and surgical partial fasciectomy.  The contracture on the right hand is causing functional problem during his daily activities. The patient would like to think about surgery on the right hand.     We have discussed conservative vs. surgical treatment as well as risks, benefits and alternatives for right small finger Dupuytren's contracture.  Conservative measures have been exhausted and he  would like to proceed with surgery. Surgery would include right small finger fasciectomy and contracture release. Consent signed today in clinic. Light use of the hand will be indicated for the first 4-6 weeks.     We have discussed risks of hand surgery which include but are not limited to blood clots in the legs that can travel to the lungs (pulmonary embolism). Pulmonary embolism can cause shortness of breath, chest pain, and even shock. Other risks include urinary tract infection, nausea and vomiting (usually related to pain medication), chronic pain, bleeding, nerve damage, blood vessel injury, scarring and infection of the hand which can require re-operation. Furthermore, the risks of anesthesia include potential heart, lung, kidney, and liver damage.  Informed consent was obtained.  The patient understands and would like to proceed with surgery in the near future.     The patient's pathophysiology was explained in detail with reference to x-rays, models, other visual aids as appropriate.  Treatment options were discussed in detail.  Questions were invited and answered to the patient's satisfaction. I reviewed Primary care , and other specialty's notes to better coordinate patient's care.             Shivani Bland MD     Please be aware that this note has been generated with the assistance of [employer] voice-to-text.  Please excuse any spelling or grammatical errors.  This note was generated with the assistance of ambient listening technology. Verbal consent was obtained by the patient and accompanying visitor(s) for the recording of patient appointment to facilitate this note. I attest to having reviewed and edited the generated note for accuracy, though some syntax or spelling errors may persist. Please contact the author of this note for any clarification.

## 2024-05-23 NOTE — PROGRESS NOTES
Pre op completed.  Patient belongings to be given to spouse. Bed in lowest position. Call light within reach. Family at beside. DME not needed. Bear hugger refused.

## 2024-05-23 NOTE — OP NOTE
[facility])  Surgery Department  Operative Note    SUMMARY     Date of Procedure: 5/23/2024     Procedure:   1.  Right small finger partial Dupuytren's fasciectomy with PIP joint release, cpt 54024  2.  Right ring finger partial Dupuytren's fasciectomy, cpt 64313    Surgeons and Role:     * Shivani Bland MD - Primary    Assisting Surgeon: Sirisha MICHAELS Assistant: Thomas Lovelace    Pre-Operative Diagnosis: Dupuytren's contracture of right hand [M72.0]    Post-Operative Diagnosis: Post-Op Diagnosis Codes:     * Dupuytren's contracture of right hand [M72.0]    Anesthesia: Regional    Indication for Procedure:  78-year-old male with longstanding right hand and small finger Dupuytren's contracture. Risks and benefits of the procedure were discussed with the patient and informed consent was obtained.    Description of the Findings of the Procedure: The patient was seen in the preoperative holding area and the right hand was marked.  A regional block of the arm was performed in the preoperative holding area.  The patient was taken to the OR, placed supine on the table, and a padded hand table was used. After monitored anesthesia care was admitted without difficulty, a time-out procedure was performed identifying the patient, the operative site and the procedure to be performed.  A tourniquet was placed on the arm and the upper extremity was prepped and draped in standard sterile fashion. The upper extremity was elevated and exsanguinated with an Esmarch bandage, and the tourniquet was inflated to 250 mm Hg.     A longitudinal incision was made from the volar PIP joint to the proximal palmar crease of the right small finger.  Dissection with tenotomy was used to expose the Dupuytren's cord.  This was truncated and allowed to the right small finger to extend partially at the PIP joint and MP joint.  Careful dissection under loupe magnification was used to identify the neurovascular bundles to the right small  finger as well as the flexor tendon sheath.  These were protected throughout the case.  The Dupuytren's cord was traced distally there was a central band which was excised.  The diseased Dupuytren's fascia was sent for permanent pathological specimen.  The accessory collateral ligaments were released in order to allow PIP extension.  Multiple Z-plasties were then created in order to lengthen the volar skin. There was a cord to the ring finger that would not allow the palmar flaps to rotate. There was a contracture of the ring finger MP joint as well.  An incision was made over to the ring finger and the ring finger central cord was sharply excised in order to release the ring finger MP joint and the small finger z plasty flaps. The flaps were inset in order to lengthen the volar skin.  4-0 chromic was used to inset the flaps on the small finger, 3-0 Prolene was used to close the Z-plasty flaps to the ring and small finger in the palm in a horizontal mattress fashion.  The tourniquet was deflated and hemostasis was achieved with bipolar electrocautery, the hand and fingers were well perfused.  All instrument, sponge and needle counts were correct.  Adaptic, 4x4, cast padding, an ulnar gutter splint and coban were used to dress the hand. The patient tolerated the procedure well.  He was taken awake, alert and in good condition to the recovery room.    Complications: No    Estimated Blood Loss (EBL): minimal           Specimens: none           Condition: Good    Disposition: PACU - hemodynamically stable.    Attestation: I was present and scrubbed for the entire procedure.

## 2024-05-23 NOTE — DISCHARGE SUMMARY
[facility] ([facility])  Discharge Note  Short Stay    Procedure(s) (LRB):  RELEASE, DUPUYTREN CONTRACTURE SMALL FINGER AND RING FINGER (Right)      OUTCOME: Patient tolerated treatment/procedure well without complication and is now ready for discharge.    DISPOSITION: Home or Self Care    FINAL DIAGNOSIS:  right small and ring finger Dupuytren's disease    FOLLOWUP: In clinic    DISCHARGE INSTRUCTIONS:    Discharge Procedure Orders   Diet general     Keep surgical extremity elevated     Ice to affected area     Lifting restrictions     No driving, operating heavy equipment or signing legal documents while taking pain medication.     Leave dressing on - Keep it clean, dry, and intact until clinic visit     Call MD for:  temperature >100.4     Call MD for:  persistent nausea and vomiting     Call MD for:  severe uncontrolled pain     Call MD for:  difficulty breathing, headache or visual disturbances     Call MD for:  redness, tenderness, or signs of infection (pain, swelling, redness, odor or green/yellow discharge around incision site)     Call MD for:  hives     Call MD for:  persistent dizziness or light-headedness     Call MD for:  extreme fatigue

## 2024-05-23 NOTE — DISCHARGE INSTRUCTIONS
HAND SURGERY - Care of the Hand after Surgery       Post-Op Care  It is important to follow your orthopaedic surgeon's instructions carefully after you return home. You should ask   someone to check on you that evening. The protocols described here are general in nature. Every person and   every surgery is different so the information given here is for guidance only. If you have questions you should   contact us.     Day of Surgery    You were place in a plaster splint today to protect the surgical area during healing. Do not remove the plaster splint until you are seen by Occupational Therapy or Dr. Bland for your first postop visit    The hand and fingers may be numb for quite some time after surgery. This is due to the anesthetic block used at the time of surgery for pain control. If you have an arm sling you may remove the sling at your convenience once you regain control of your arm from the anesthetic block.     Begin taking liquids and food as soon as you can. You should always eat some solid food, a sandwich or light meal, a little while before taking your pain meds.     Day 3  Things are much the same on the third day after your surgery. Usually you have less pain and feel like doing more.    Showering: It is important to keep the incision absolutely dry while showering or bathing (use two plastic bags over your hand) or a shower bag.   If you feel that the pain medication you were given after surgery is stronger than you really need you can reduce the dose, take it less frequently or switch to ibuprofen or Tylenol. If you received antibiotics they should be taken until the entire prescription is completed.    Day 3-5  Occupational Therapy will see you between this time. Please let us know if you are not scheduled [phone]    Day 14  We will see you back after your surgery to review with you what was done in surgery and will talk about rehab and answer any questions you may have.       HAND  SURGERY  Driving: You can drive if you are comfortable and have regained full finger movements and if you have sufficient power to control the vehicle.   Return to work: Timing of your return to work is variable according to your occupation and specific surgery. We should discuss this at your follow up visit if not already discussed prior.  Elevation: Hand elevation is important to prevent swelling and stiffness of the fingers. One minute of leaving your hand dangling negates four hours of keeping it elevated. Please remember not to walk with your hand hanging down or to sit with your hand resting in your lap. It is fine, however, to lower your hand for light use and you should get back to normal light activities as soon as possible as guided by common sense.     Post-operative exercises  [] Bend your fingers  Relax your hand. Start with your fingers straight and close together. Bend the end and middle joints of your fingers. Keep your wrist and knuckles straight. Moving slowly and smoothly, return your hand to the starting position. Repeat with your other hand. If you can, perform multiple repetitions of this exercise on each hand.    [] Open your hand wide                       Spread your fingers apart as wide as you can and hold that position. Slowly relax your fingers and bring them together. Return to the open-wide position. Repeat with each hand and gradually add to the number of repetitions.    [] Make a fist  Start with your fingers straight and spread apart. Make a loose, gentle fist and wrap your thumb around the outside of your fingers. Be careful not to squeeze your fingers together too tightly. Moving slowly and smoothly, return to the starting position. Repeat. Perform this exercise on both hands.    [x] Touch your fingertips  Straighten your fingers and thumb. Bend your thumb across your palm, touching the tip of your thumb to the pad of your hand just below your pinky finger. If you can't make your  "thumb touch, just stretch as far as you can. Return your thumb to its starting position, as shown in images 1 through 3.  For the next exercise, form the letter O by touching your thumb to each fingertip, as shown in images 4 through 6. Moving slowly and smoothly, touch your index finger to your thumb, then straighten your fingers. Touch your middle finger to your thumb and straighten. Follow with your ring and pinky fingers.    [] Walk your fingers  Rest your hand on a flat surface, such as a tabletop, with your palm facing down and your fingers spread slightly apart. Moving one finger at a time, slowly walk your fingers toward your thumb. Start by lifting and moving your index finger toward your thumb. Follow by lifting and moving your middle finger toward your thumb. Proceed with moving your ring finger and then your pinky finger toward your thumb. Don't move your wrist or thumb while doing this exercise. Repeat with your other hand.      HAND SURGERY - Common Concerns and Frequently Asked Questions    When to Call the Doctor  Pain, burning, or numbness of the fingers or the back of the hand not relieved by elevation of the arm  Pale or cold finger; bluish nail beds  Red line or streak going up the arm  Excessive swelling  Fever over 100.3ºF  Pain unrelieved by pain medication    Tips for one armed living  It helps to have...   In the shower   Plastic bags and rubber bands to cover bandages - the bags that newspapers come in are good to cover the hand and wrist. Otherwise small trash can liners will do. Use two at a time.   Bottle sponge (soft sponge on a long stick) - for the armpit of your "good" hand.   Shower brush   A hair brush in the shower will help you to wash your hair.   Cotton sae cloth bathrobe - to dry your back.    In the bathroom   Toothpaste, shampoo, etc. in flip-top or pump (not screw top) dispensers.   Consider an electric razor.   Flossers (dental floss on a "Y" shaped handle).    In the " "kitchen   Dycem mat (rubber jar opener mat) - to help open jars, but also keep things from sliding around while you are working on them.    Double suction cup pads ("little Octopus") - to hold items while you use or wash them.   Electric can opener with a lid magnet strong enough to hold the can in the air - for one handed use.   In the bedroom   Back scratcher.   Large sleeve shirts and tops.   Put away clothing which buttons, fastens or snaps in the back or which uses drawstrings.    Sports bra or a camisole instead of a bra.   [employer] nylons can be pulled on one handed - most others can't.   A "wash and wear" haircut.     "

## 2024-05-23 NOTE — PLAN OF CARE
Patient is AAO and VSS.  Tolerating PO and states pain is tolerable.  Dressing CDI.  Patient states they are ready for d/c.  IV removed.  Catheter tip intact.  Caregiver at bedside.  Discharge instructions reviewed and copy given to the patient and caregiver.  Questions answered.  Both verbalized understanding.  Medication delivered to bedside and reviewed. No DME needed. Sling in place  Patient wheeled to car by RN with NAD noted.

## 2024-05-23 NOTE — ANESTHESIA PROCEDURE NOTES
Peripheral Block    Patient location during procedure: pre-op   Block not for primary anesthetic.  Reason for block: at surgeon's request and post-op pain management   Post-op Pain Location: right arm   Start time: 5/23/2024 10:30 AM  Timeout: 5/23/2024 10:30 AM   End time: 5/23/2024 10:45 AM    Staffing  Authorizing Provider: Marielle Rivers MD  Performing Provider: Marielle Rivers MD    Staffing  Performed by: Marielle Rivers MD  Authorized by: Marielle Rivers MD    Preanesthetic Checklist  Completed: patient identified, IV checked, site marked, risks and benefits discussed, surgical consent, monitors and equipment checked, pre-op evaluation and timeout performed  Peripheral Block  Patient position: supine  Prep: ChloraPrep  Patient monitoring: heart rate, cardiac monitor, continuous pulse ox, continuous capnometry and frequent blood pressure checks  Block type: supraclavicular  Laterality: right  Injection technique: single shot  Needle  Needle type: Stimuplex   Needle gauge: 22 G  Needle length: 2 in  Needle localization: anatomical landmarks and ultrasound guidance   -ultrasound image captured on disc.  Assessment  Injection assessment: negative aspiration, negative parasthesia and local visualized surrounding nerve  Paresthesia pain: none  Heart rate change: no  Slow fractionated injection: yes    Medications:    Medications: ropivacaine (NAROPIN) injection 0.5% - Perineural   30 mL - 5/23/2024 10:40:00 AM    Additional Notes  VSS.  [facility] RN monitoring vitals throughout procedure.  Patient tolerated procedure well.

## 2024-05-23 NOTE — TRANSFER OF CARE
"Anesthesia Transfer of Care Note    Patient: Brad Kearns    Procedure(s) Performed: Procedure(s) (LRB):  RELEASE, DUPUYTREN CONTRACTURE SMALL FINGER AND RING FINGER (Right)    Patient location: PACU    Anesthesia Type: general    Transport from OR: Transported from OR on 100% O2 by closed face mask with adequate spontaneous ventilation    Post pain: adequate analgesia    Post assessment: no apparent anesthetic complications and tolerated procedure well    Post vital signs: stable    Level of consciousness: sedated and responds to stimulation    Nausea/Vomiting: no nausea/vomiting    Complications: none    Transfer of care protocol was followed      Last vitals: Visit Vitals  BP (!) 107/57 (BP Location: Left arm, Patient Position: Lying)   Pulse (!) 67   Temp 37.2 °C (99 °F) (Skin)   Resp 15   Ht 5' 9" (1.753 m)   Wt 79.8 kg (176 lb)   SpO2 100%   BMI 25.99 kg/m²     "

## 2024-05-28 ENCOUNTER — CLINICAL SUPPORT (OUTPATIENT)
Dept: REHABILITATION | Facility: HOSPITAL | Age: 79
End: 2024-05-28
Attending: ORTHOPAEDIC SURGERY
Payer: MEDICARE

## 2024-05-28 DIAGNOSIS — M79.89 FINGER SWELLING: ICD-10-CM

## 2024-05-28 DIAGNOSIS — M25.641 DECREASED RANGE OF MOTION OF FINGER OF RIGHT HAND: Primary | ICD-10-CM

## 2024-05-28 LAB
FINAL PATHOLOGIC DIAGNOSIS: NORMAL
GROSS: NORMAL
Lab: NORMAL

## 2024-05-28 PROCEDURE — L3913 HFO W/O JOINTS CF: HCPCS

## 2024-05-28 PROCEDURE — 97760 ORTHOTIC MGMT&TRAING 1ST ENC: CPT

## 2024-05-28 NOTE — PROGRESS NOTES
OT Orthosis Only    TIME RECORD    Date:  5/28/2024    Start Time:  2:00  Stop Time:  3:00        Occupational Therapy Orthotic Note    Patient: Brad Kearns  MRN: [id number]  Date of visit: 5/28/2024  Referring Physician:  Shivani Bland MD   Next MD visit: 6/5/24  Primary Diagnosis: Dupuytren's contracture of right hand [M72.0]   Treatment Diagnosis:   Encounter Diagnoses   Name Primary?    Decreased range of motion of finger of right hand Yes    Finger swelling      DOS 5/23/24  Surgery: 1.  Right small finger partial Dupuytren's fasciectomy with PIP joint release, cpt 84785  2.  Right ring finger partial Dupuytren's fasciectomy, cpt 94652      Subjective:     Pt reports he is feeling good and not really having pain. He appears pleased with the extension of his SF. He reports it was very bent towards palm prior to surgery.     Objective:     Patient seen by OT this session for fabrication of orthosis per MD orders. Fabricated and applied volar hand based finger extension orthosis, with RF and SF included. Good extension noted of SF with mild flexion of PIP. Padding applied to orthosis under SF. ()  Assessment:     Orthosis with good fit following fabrication. Pt. Able to ahmet/doff independently.      Patient Education/Response:   Pt. Instructed to wear continuous at this time, remove for bathing or hygiene and for HEP throughout the day. Patient/caregiver were provided instructions on orthosis purpose, wear schedule, care and precautions to monitor for increased pain/edema, pressure points, skin breakdown or redness/skin irritation Patient/caregiver to contact clinic for adjustments as needed.  Patient verbalized understanding of orthosis instructions, acknowledging delivery and understanding of wear, care, and precautions.  Pt was also educated on gentle ROM exercises of fingers, including AAROM finger flexion/composite fist, and on finger extension lifts off table as able. Reviewed incision care as  well.   Ortho fit/train x 8 minutes    Plans and Goals:     Goal of Orthosis to protect sx site and maintain good extension of fingers. Pt. To have initial OT evaluation next visit. Orthosis Check PRN. Discussed trying to schedule for OT evaluation later this week on Thursday, but pt requested to call clinic back to schedule appointments. Pt given clinic number to call back.     SOBIA Doll, CASIMIROT

## 2024-05-30 ENCOUNTER — PATIENT MESSAGE (OUTPATIENT)
Dept: ORTHOPEDICS | Facility: CLINIC | Age: 79
End: 2024-05-30
Payer: MEDICARE

## 2024-06-04 RX ORDER — MONTELUKAST SODIUM 10 MG/1
10 TABLET ORAL NIGHTLY
Qty: 90 TABLET | Refills: 3 | Status: SHIPPED | OUTPATIENT
Start: 2024-06-04

## 2024-06-04 NOTE — TELEPHONE ENCOUNTER
Refill Decision Note   Brad Garcíaamber  is requesting a refill authorization.  Brief Assessment and Rationale for Refill:  Approve     Medication Therapy Plan:         Comments:     Note composed:6:45 AM 06/04/2024

## 2024-06-04 NOTE — TELEPHONE ENCOUNTER
No care due was identified.  [facility] Embedded Care Due Messages. Reference number: [id number].   6/04/2024 12:23:56 AM CDT

## 2024-06-05 ENCOUNTER — OFFICE VISIT (OUTPATIENT)
Dept: ORTHOPEDICS | Facility: CLINIC | Age: 79
End: 2024-06-05
Payer: MEDICARE

## 2024-06-05 DIAGNOSIS — M72.0 DUPUYTREN'S CONTRACTURE OF RIGHT HAND: Primary | ICD-10-CM

## 2024-06-05 PROCEDURE — 3288F FALL RISK ASSESSMENT DOCD: CPT | Mod: CPTII,S$GLB,, | Performed by: ORTHOPAEDIC SURGERY

## 2024-06-05 PROCEDURE — 1126F AMNT PAIN NOTED NONE PRSNT: CPT | Mod: CPTII,S$GLB,, | Performed by: ORTHOPAEDIC SURGERY

## 2024-06-05 PROCEDURE — 1160F RVW MEDS BY RX/DR IN RCRD: CPT | Mod: CPTII,S$GLB,, | Performed by: ORTHOPAEDIC SURGERY

## 2024-06-05 PROCEDURE — 1159F MED LIST DOCD IN RCRD: CPT | Mod: CPTII,S$GLB,, | Performed by: ORTHOPAEDIC SURGERY

## 2024-06-05 PROCEDURE — 99024 POSTOP FOLLOW-UP VISIT: CPT | Mod: S$GLB,,, | Performed by: ORTHOPAEDIC SURGERY

## 2024-06-05 PROCEDURE — 99999 PR PBB SHADOW E&M-EST. PATIENT-LVL III: CPT | Mod: PBBFAC,,, | Performed by: ORTHOPAEDIC SURGERY

## 2024-06-05 PROCEDURE — 1101F PT FALLS ASSESS-DOCD LE1/YR: CPT | Mod: CPTII,S$GLB,, | Performed by: ORTHOPAEDIC SURGERY

## 2024-06-05 RX ORDER — DOXYCYCLINE HYCLATE 100 MG
100 TABLET ORAL 2 TIMES DAILY
Qty: 10 TABLET | Refills: 0 | Status: SHIPPED | OUTPATIENT
Start: 2024-06-05 | End: 2024-06-10

## 2024-06-05 NOTE — PROGRESS NOTES
Brad Kearns presents for post-operative evaluation of   Encounter Diagnosis   Name Primary?    Dupuytren's contracture of right hand Yes      History of Present Illness     The patient is now 13 days s/p right small finger Dupuytren's partial fasciectomy and contracture release. Overall the patient reports doing well.  The patient reports appropriate postoperative soreness with well controlled overall pain.    Vitals:    06/05/24 1003   PainSc: 0-No pain   PainLoc: Hand        PE:    AA&O x 4.  NAD  HEENT:  NCAT, sclera nonicteric  Lungs:  Respirations are equal and unlabored.  CV:  2+ bilateral upper and lower extremity pulses.  MSK: The incisions are healing well with no signs of erythema or warmth.  There is some opening of the volar incision at the small finger MP crease with skin maceration, scant drainage.  Able to flex small finger PIP joint to 60°.  All prolene sutures were removed today. Neurovascularly intact and has 5/5 thenar and intrinsic strength.  Physical Exam         Diagnostic studies and other clinical records review:  Results       Assessment & Plan: Status post above, doing well  Assessment & Plan    Continue with range of motion, nighttime splint wear, chromic suture removal in therapy; strengthening at 6 weeks  F/U 4 weeks  Call with any questions/concerns in the interim        Shivani Bland MD    Please be aware that this note has been generated with the assistance of [employer] voice-to-text.  Please excuse any spelling or grammatical errors.    This note was generated with the assistance of ambient listening technology. Verbal consent was obtained by the patient and accompanying visitor(s) for the recording of patient appointment to facilitate this note. I attest to having reviewed and edited the generated note for accuracy, though some syntax or spelling errors may persist. Please contact the author of this note for any clarification.

## 2024-06-10 NOTE — ANESTHESIA POSTPROCEDURE EVALUATION
Anesthesia Post Evaluation    Patient: Brad Kearns    Procedure(s) Performed: Procedure(s) (LRB):  RELEASE, DUPUYTREN CONTRACTURE SMALL FINGER AND RING FINGER (Right)    Final Anesthesia Type: general      Patient location during evaluation: PACU  Patient participation: Yes- Able to Participate  Level of consciousness: awake and alert and oriented  Pain management: adequate  Airway patency: patent    PONV status at discharge: No PONV  Anesthetic complications: no      Cardiovascular status: blood pressure returned to baseline and hemodynamically stable  Respiratory status: unassisted  Hydration status: euvolemic  Follow-up not needed.              Vitals Value Taken Time   /74 05/23/24 1600   Temp 36.4 °C (97.5 °F) 05/23/24 1600   Pulse 52 05/23/24 1602   Resp 11 05/23/24 1600   SpO2 100 % 05/23/24 1600         Event Time   Out of Recovery 16:05:00         Pain/Kathrin Score: No data recorded

## 2024-08-19 ENCOUNTER — TELEPHONE (OUTPATIENT)
Dept: ORTHOPEDICS | Facility: CLINIC | Age: 79
End: 2024-08-19
Payer: MEDICARE

## 2024-09-08 RX ORDER — METOPROLOL SUCCINATE 100 MG/1
100 TABLET, EXTENDED RELEASE ORAL
Qty: 90 TABLET | Refills: 2 | OUTPATIENT
Start: 2024-09-08

## 2024-09-08 RX ORDER — ATORVASTATIN CALCIUM 80 MG/1
80 TABLET, FILM COATED ORAL DAILY
Qty: 90 TABLET | Refills: 1 | Status: SHIPPED | OUTPATIENT
Start: 2024-09-08

## 2024-09-08 NOTE — TELEPHONE ENCOUNTER
No care due was identified.  [facility] Embedded Care Due Messages. Reference number: [id number].   9/08/2024 12:06:35 PM CDT

## 2024-09-09 NOTE — TELEPHONE ENCOUNTER
Refill Decision Note   Brad Garcíaamber  is requesting a refill authorization.  Brief Assessment and Rationale for Refill:  Quick Discontinue  Approve     Medication Therapy Plan: Toprol XL D/KEDAR 5/23/24      Comments:     Note composed:10:30 PM 09/08/2024

## 2024-10-25 ENCOUNTER — PATIENT OUTREACH (OUTPATIENT)
Dept: ADMINISTRATIVE | Facility: HOSPITAL | Age: 79
End: 2024-10-25
Payer: MEDICARE

## 2024-10-25 VITALS — SYSTOLIC BLOOD PRESSURE: 120 MMHG | DIASTOLIC BLOOD PRESSURE: 74 MMHG

## 2024-12-13 RX ORDER — METOPROLOL SUCCINATE 100 MG/1
100 TABLET, EXTENDED RELEASE ORAL
Qty: 90 TABLET | Refills: 1 | Status: SHIPPED | OUTPATIENT
Start: 2024-12-13

## 2024-12-13 NOTE — TELEPHONE ENCOUNTER
Care Due:                  Date            Visit Type   Department     Provider  --------------------------------------------------------------------------------                                MYCHART                              ANNUAL                              CHECKUP/PHY  MET INTERNAL  Last Visit: 03-      [facility]       Kyung Hood  Next Visit: None Scheduled  None         None Found                                                            Last  Test          Frequency    Reason                     Performed    Due Date  --------------------------------------------------------------------------------    CMP.........  12 months..  atorvastatin.............  03- 03-    Lipid Panel.  12 months..  atorvastatin.............  03- 03-    [employer] Embedded Care Due Messages. Reference number: [id number].   12/13/2024 9:22:33 AM CST

## 2024-12-13 NOTE — TELEPHONE ENCOUNTER
Refill Routing Note   Medication(s) are not appropriate for processing by [facility] for the following reason(s):        No active prescription written by provider    [facility] action(s):  Defer   Requires labs : Yes             Appointments  past 12m or future 3m with PCP    Date Provider   Last Visit   3/7/2024 Kyung Hood MD   Next Visit   Visit date not found Kyung Hood MD   ED visits in past 90 days: 0        Note composed:9:36 AM 12/13/2024

## 2025-01-10 RX ORDER — ATORVASTATIN CALCIUM 80 MG/1
80 TABLET, FILM COATED ORAL DAILY
Qty: 90 TABLET | Refills: 0 | Status: SHIPPED | OUTPATIENT
Start: 2025-01-10

## 2025-01-10 NOTE — TELEPHONE ENCOUNTER
No care due was identified.  Health [facility] Embedded Care Due Messages. Reference number: [id number].   1/09/2025 7:27:06 PM CST

## 2025-01-10 NOTE — TELEPHONE ENCOUNTER
Refill Decision Note   Brad Garcíaamber  is requesting a refill authorization.  Brief Assessment and Rationale for Refill:  Approve     Medication Therapy Plan:         Comments:     Note composed:8:48 AM 01/10/2025

## 2025-03-09 RX ORDER — PANTOPRAZOLE SODIUM 40 MG/1
40 TABLET, DELAYED RELEASE ORAL
Qty: 90 TABLET | Refills: 3 | Status: SHIPPED | OUTPATIENT
Start: 2025-03-09

## 2025-03-09 NOTE — TELEPHONE ENCOUNTER
Care Due:                  Date            Visit Type   Department     Provider  --------------------------------------------------------------------------------                                MYCHART                              ANNUAL                              CHECKUP/PHY  [facility] INTERNAL  [facility]: [facility]                              ANNUAL                              CHECKUP/PHY  [facility] INTERNAL  Next Visit: 03-      [facility]       Kyung ANGLIN  [facility]                                                            Last  Test          Frequency    Reason                     Performed    Due Date  --------------------------------------------------------------------------------    CMP.........  12 months..  atorvastatin.............  03- 03-    Lipid Panel.  12 months..  atorvastatin.............  03- 03-    Health [facility] Embedded Care Due Messages. Reference number: [id number].   3/09/2025 7:05:13 AM CDT

## 2025-03-09 NOTE — TELEPHONE ENCOUNTER
Refill Routing Note   Medication(s) are not appropriate for processing by [facility] for the following reason(s):        No active prescription written by provider    [facility] action(s):  Defer     Requires labs : Yes      Medication Therapy Plan: FOVS;  AS OF  25      Appointments  past 12m or future 3m with PCP    Date Provider   Last Visit   3/7/2024 Kyung Hood MD   Next Visit   3/25/2025 Kyung Hood MD   ED visits in past 90 days: 0        Note composed:10:55 AM 2025

## 2025-03-13 ENCOUNTER — OFFICE VISIT (OUTPATIENT)
Dept: PODIATRY | Facility: CLINIC | Age: 80
End: 2025-03-13
Payer: MEDICARE

## 2025-03-13 VITALS
BODY MASS INDEX: 26.42 KG/M2 | SYSTOLIC BLOOD PRESSURE: 166 MMHG | DIASTOLIC BLOOD PRESSURE: 90 MMHG | WEIGHT: 178.38 LBS | HEART RATE: 62 BPM | HEIGHT: 69 IN

## 2025-03-13 DIAGNOSIS — I35.1 NONRHEUMATIC AORTIC VALVE INSUFFICIENCY: ICD-10-CM

## 2025-03-13 DIAGNOSIS — M72.2 PLANTAR FASCIITIS: ICD-10-CM

## 2025-03-13 DIAGNOSIS — M79.671 PAIN IN BOTH FEET: Primary | ICD-10-CM

## 2025-03-13 DIAGNOSIS — M79.672 PAIN IN BOTH FEET: Primary | ICD-10-CM

## 2025-03-13 PROCEDURE — 3080F DIAST BP >= 90 MM HG: CPT | Mod: CPTII,S$GLB,, | Performed by: PODIATRIST

## 2025-03-13 PROCEDURE — 99203 OFFICE O/P NEW LOW 30 MIN: CPT | Mod: S$GLB,,, | Performed by: PODIATRIST

## 2025-03-13 PROCEDURE — 1101F PT FALLS ASSESS-DOCD LE1/YR: CPT | Mod: CPTII,S$GLB,, | Performed by: PODIATRIST

## 2025-03-13 PROCEDURE — 3288F FALL RISK ASSESSMENT DOCD: CPT | Mod: CPTII,S$GLB,, | Performed by: PODIATRIST

## 2025-03-13 PROCEDURE — 1159F MED LIST DOCD IN RCRD: CPT | Mod: CPTII,S$GLB,, | Performed by: PODIATRIST

## 2025-03-13 PROCEDURE — 99999 PR PBB SHADOW E&M-EST. PATIENT-LVL III: CPT | Mod: PBBFAC,,, | Performed by: PODIATRIST

## 2025-03-13 PROCEDURE — 1125F AMNT PAIN NOTED PAIN PRSNT: CPT | Mod: CPTII,S$GLB,, | Performed by: PODIATRIST

## 2025-03-13 PROCEDURE — 3077F SYST BP >= 140 MM HG: CPT | Mod: CPTII,S$GLB,, | Performed by: PODIATRIST

## 2025-03-13 RX ORDER — DICLOFENAC SODIUM 10 MG/G
2 GEL TOPICAL 4 TIMES DAILY
Qty: 100 G | Refills: 2 | Status: SHIPPED | OUTPATIENT
Start: 2025-03-13

## 2025-03-13 RX ORDER — MELOXICAM 15 MG/1
15 TABLET ORAL DAILY
Qty: 30 TABLET | Refills: 0 | Status: SHIPPED | OUTPATIENT
Start: 2025-03-13

## 2025-03-13 NOTE — PROGRESS NOTES
Subjective:      Patient ID: Brad Kearns is a 79 y.o. male.    Chief Complaint:   Foot Pain (Bl burning, stinging sensation to ball of foot to toes on and off)    Brad is a 79 y.o. male who presents to the clinic complaining of heel pain in both feet, especially with the first step in the morning. The pain is described as Sharp. The onset of the pain was gradual and has worsened over the past several weeks. Brad rates the pain as 5/10. He denies a history of trauma. Prior treatments include rest.    Review of Systems   Constitutional: Negative for chills, decreased appetite, fever and malaise/fatigue.   HENT:  Negative for congestion, hearing loss, nosebleeds and tinnitus.    Eyes:  Negative for double vision, pain, photophobia and visual disturbance.   Cardiovascular:  Negative for chest pain, claudication, cyanosis and leg swelling.   Respiratory:  Negative for cough, hemoptysis, shortness of breath and wheezing.    Endocrine: Negative for cold intolerance and heat intolerance.   Hematologic/Lymphatic: Negative for adenopathy and bleeding problem.   Skin:  Positive for color change. Negative for dry skin, itching, nail changes and suspicious lesions.   Musculoskeletal:  Positive for arthritis and stiffness. Negative for joint pain and myalgias.   Gastrointestinal:  Negative for abdominal pain, jaundice, nausea and vomiting.   Genitourinary:  Negative for dysuria, frequency and hematuria.   Neurological:  Positive for paresthesias. Negative for difficulty with concentration, loss of balance, numbness and sensory change.   Psychiatric/Behavioral:  Negative for altered mental status, hallucinations and suicidal ideas. The patient is not nervous/anxious.    Allergic/Immunologic: Negative for environmental allergies and persistent infections.         Objective:      Physical Exam  Constitutional:       Appearance: He is well-developed.   HENT:      Head: Normocephalic and atraumatic.   Cardiovascular:       Pulses:           Dorsalis pedis pulses are 2+ on the right side and 2+ on the left side.        Posterior tibial pulses are 2+ on the right side and 2+ on the left side.   Pulmonary:      Effort: Pulmonary effort is normal.   Musculoskeletal:      Right foot: Normal range of motion. No deformity.      Left foot: Normal range of motion. No deformity.      Comments: Inspection and palpation of the muscles joints and bones of both lower extremities reveal that muscle strength for the anterior, lateral, and posterior muscle groups and intrinsic muscle groups of the foot are all 5 over 5 symmetrical.     Pain on palpation plantar bilateral arch/midsubstance of the plantar fascia, no pain with ROM or MMT or medial and lateral compression of heel, - tinel's sign     Feet:      Right foot:      Skin integrity: No skin breakdown or erythema.      Left foot:      Skin integrity: No skin breakdown or erythema.   Skin:     General: Skin is warm and dry.      Nails: There is no clubbing.      Comments: Skin turgor is normal bilaterally. Skin texture is well hydrated to both lower extremities. No lesions or rashes or wounds appreciated bilaterally. Nail plates 1 through 5 bilaterally are within normal limits for length and thickness. No nail clubbing or incurvation noted.   Neurological:      Mental Status: He is alert and oriented to person, place, and time.      Deep Tendon Reflexes:      Reflex Scores:       Patellar reflexes are 2+ on the right side and 2+ on the left side.       Achilles reflexes are 2+ on the right side and 2+ on the left side.     Comments: Sharp, dull, light touch, vibratory, and proprioceptive sensation are intact bilaterally. Deep tendon reflexes to patellar and Achilles tendon are symmetrical, 2/4 bilaterally. No ankle clonus or Babinski reflexes noted bilaterally. Coordination is normal to both feet and lower extremities.   Psychiatric:         Behavior: Behavior normal.           Assessment:        Encounter Diagnoses   Name Primary?    Pain in both feet Yes    Plantar fasciitis     Nonrheumatic aortic valve insufficiency      Independent visualization of imaging was performed.  Results were reviewed in detail with patient.       Plan:       Brad was seen today for foot pain.    Diagnoses and all orders for this visit:    Pain in both feet    Plantar fasciitis    Nonrheumatic aortic valve insufficiency    Other orders  -     diclofenac sodium (VOLTAREN) 1 % Gel; Apply 2 g topically 4 (four) times daily.  -     meloxicam (MOBIC) 15 MG tablet; Take 1 tablet (15 mg total) by mouth once daily.      I counseled the patient on his conditions, their implications and medical management.    The nature of the condition, options for management, as well as potential risks and complications were discussed in detail with patient. Patient was amenable to my recommendations and left my office fully informed and will follow up as instructed or sooner if necessary.      Begin stretching, icing, topical anti-inflammatory medication and a night splint.  Follow-up in 2 months or sooner if new problems arise or condition worsens.

## 2025-03-24 DIAGNOSIS — Z00.00 ENCOUNTER FOR MEDICARE ANNUAL WELLNESS EXAM: ICD-10-CM

## 2025-04-09 RX ORDER — MELOXICAM 15 MG/1
15 TABLET ORAL
Qty: 30 TABLET | Refills: 0 | Status: SHIPPED | OUTPATIENT
Start: 2025-04-09

## 2025-05-08 ENCOUNTER — LAB VISIT (OUTPATIENT)
Dept: LAB | Facility: HOSPITAL | Age: 80
End: 2025-05-08
Attending: INTERNAL MEDICINE
Payer: MEDICARE

## 2025-05-08 ENCOUNTER — OFFICE VISIT (OUTPATIENT)
Dept: INTERNAL MEDICINE | Facility: CLINIC | Age: 80
End: 2025-05-08
Payer: MEDICARE

## 2025-05-08 VITALS — HEIGHT: 69 IN | BODY MASS INDEX: 25.78 KG/M2 | OXYGEN SATURATION: 99 % | HEART RATE: 65 BPM | WEIGHT: 174.06 LBS

## 2025-05-08 DIAGNOSIS — R73.9 HYPERGLYCEMIA: ICD-10-CM

## 2025-05-08 DIAGNOSIS — E55.9 MILD VITAMIN D DEFICIENCY: ICD-10-CM

## 2025-05-08 DIAGNOSIS — I25.10 CORONARY ARTERY DISEASE INVOLVING NATIVE CORONARY ARTERY OF NATIVE HEART WITHOUT ANGINA PECTORIS: ICD-10-CM

## 2025-05-08 DIAGNOSIS — N40.0 BENIGN PROSTATIC HYPERPLASIA, UNSPECIFIED WHETHER LOWER URINARY TRACT SYMPTOMS PRESENT: ICD-10-CM

## 2025-05-08 DIAGNOSIS — E78.5 HYPERLIPIDEMIA, UNSPECIFIED HYPERLIPIDEMIA TYPE: ICD-10-CM

## 2025-05-08 DIAGNOSIS — K21.9 GASTROESOPHAGEAL REFLUX DISEASE, UNSPECIFIED WHETHER ESOPHAGITIS PRESENT: ICD-10-CM

## 2025-05-08 DIAGNOSIS — Z12.5 ENCOUNTER FOR SCREENING FOR MALIGNANT NEOPLASM OF PROSTATE: ICD-10-CM

## 2025-05-08 DIAGNOSIS — I10 PRIMARY HYPERTENSION: ICD-10-CM

## 2025-05-08 DIAGNOSIS — I25.5 ISCHEMIC CARDIOMYOPATHY: Primary | ICD-10-CM

## 2025-05-08 LAB
25(OH)D3+25(OH)D2 SERPL-MCNC: 82 NG/ML (ref 30–96)
ABSOLUTE EOSINOPHIL (OHS): 0.36 K/UL
ABSOLUTE MONOCYTE (OHS): 0.81 K/UL (ref 0.3–1)
ABSOLUTE NEUTROPHIL COUNT (OHS): 5.11 K/UL (ref 1.8–7.7)
ALBUMIN SERPL BCP-MCNC: 4 G/DL (ref 3.5–5.2)
ALP SERPL-CCNC: 59 UNIT/L (ref 40–150)
ALT SERPL W/O P-5'-P-CCNC: 30 UNIT/L (ref 10–44)
ANION GAP (OHS): 8 MMOL/L (ref 8–16)
AST SERPL-CCNC: 30 UNIT/L (ref 11–45)
BASOPHILS # BLD AUTO: 0.08 K/UL
BASOPHILS NFR BLD AUTO: 0.8 %
BILIRUB SERPL-MCNC: 0.6 MG/DL (ref 0.1–1)
BUN SERPL-MCNC: 17 MG/DL (ref 8–23)
CALCIUM SERPL-MCNC: 9.5 MG/DL (ref 8.7–10.5)
CHLORIDE SERPL-SCNC: 109 MMOL/L (ref 95–110)
CHOLEST SERPL-MCNC: 115 MG/DL (ref 120–199)
CHOLEST/HDLC SERPL: 3.3 {RATIO} (ref 2–5)
CO2 SERPL-SCNC: 25 MMOL/L (ref 23–29)
CREAT SERPL-MCNC: 1 MG/DL (ref 0.5–1.4)
EAG (OHS): 105 MG/DL (ref 68–131)
ERYTHROCYTE [DISTWIDTH] IN BLOOD BY AUTOMATED COUNT: 14 % (ref 11.5–14.5)
GFR SERPLBLD CREATININE-BSD FMLA CKD-EPI: >60 ML/MIN/1.73/M2
GLUCOSE SERPL-MCNC: 96 MG/DL (ref 70–110)
HBA1C MFR BLD: 5.3 % (ref 4–5.6)
HCT VFR BLD AUTO: 44.5 % (ref 40–54)
HDLC SERPL-MCNC: 35 MG/DL (ref 40–75)
HDLC SERPL: 30.4 % (ref 20–50)
HGB BLD-MCNC: 14 GM/DL (ref 14–18)
IMM GRANULOCYTES # BLD AUTO: 0.03 K/UL (ref 0–0.04)
IMM GRANULOCYTES NFR BLD AUTO: 0.3 % (ref 0–0.5)
LDLC SERPL CALC-MCNC: 55.8 MG/DL (ref 63–159)
LYMPHOCYTES # BLD AUTO: 3.08 K/UL (ref 1–4.8)
MCH RBC QN AUTO: 30.9 PG (ref 27–31)
MCHC RBC AUTO-ENTMCNC: 31.5 G/DL (ref 32–36)
MCV RBC AUTO: 98 FL (ref 82–98)
NONHDLC SERPL-MCNC: 80 MG/DL
NUCLEATED RBC (/100WBC) (OHS): 0 /100 WBC
PLATELET # BLD AUTO: 244 K/UL (ref 150–450)
PMV BLD AUTO: 11 FL (ref 9.2–12.9)
POTASSIUM SERPL-SCNC: 5.1 MMOL/L (ref 3.5–5.1)
PROT SERPL-MCNC: 7.2 GM/DL (ref 6–8.4)
PSA SERPL-MCNC: 5.48 NG/ML
RBC # BLD AUTO: 4.53 M/UL (ref 4.6–6.2)
RELATIVE EOSINOPHIL (OHS): 3.8 %
RELATIVE LYMPHOCYTE (OHS): 32.5 % (ref 18–48)
RELATIVE MONOCYTE (OHS): 8.6 % (ref 4–15)
RELATIVE NEUTROPHIL (OHS): 54 % (ref 38–73)
SODIUM SERPL-SCNC: 142 MMOL/L (ref 136–145)
TRIGL SERPL-MCNC: 121 MG/DL (ref 30–150)
TSH SERPL-ACNC: 1.19 UIU/ML (ref 0.4–4)
WBC # BLD AUTO: 9.47 K/UL (ref 3.9–12.7)

## 2025-05-08 PROCEDURE — 82306 VITAMIN D 25 HYDROXY: CPT

## 2025-05-08 PROCEDURE — 85025 COMPLETE CBC W/AUTO DIFF WBC: CPT

## 2025-05-08 PROCEDURE — 83036 HEMOGLOBIN GLYCOSYLATED A1C: CPT

## 2025-05-08 PROCEDURE — 36415 COLL VENOUS BLD VENIPUNCTURE: CPT | Mod: PO

## 2025-05-08 PROCEDURE — 99214 OFFICE O/P EST MOD 30 MIN: CPT | Mod: S$GLB,,, | Performed by: INTERNAL MEDICINE

## 2025-05-08 PROCEDURE — 80061 LIPID PANEL: CPT

## 2025-05-08 PROCEDURE — 99999 PR PBB SHADOW E&M-EST. PATIENT-LVL V: CPT | Mod: PBBFAC,,, | Performed by: INTERNAL MEDICINE

## 2025-05-08 PROCEDURE — 1126F AMNT PAIN NOTED NONE PRSNT: CPT | Mod: CPTII,S$GLB,, | Performed by: INTERNAL MEDICINE

## 2025-05-08 PROCEDURE — 3288F FALL RISK ASSESSMENT DOCD: CPT | Mod: CPTII,S$GLB,, | Performed by: INTERNAL MEDICINE

## 2025-05-08 PROCEDURE — 1159F MED LIST DOCD IN RCRD: CPT | Mod: CPTII,S$GLB,, | Performed by: INTERNAL MEDICINE

## 2025-05-08 PROCEDURE — 80053 COMPREHEN METABOLIC PANEL: CPT

## 2025-05-08 PROCEDURE — 1101F PT FALLS ASSESS-DOCD LE1/YR: CPT | Mod: CPTII,S$GLB,, | Performed by: INTERNAL MEDICINE

## 2025-05-08 PROCEDURE — 84443 ASSAY THYROID STIM HORMONE: CPT

## 2025-05-08 PROCEDURE — 84153 ASSAY OF PSA TOTAL: CPT

## 2025-05-08 RX ORDER — MONTELUKAST SODIUM 10 MG/1
10 TABLET ORAL NIGHTLY
Qty: 90 TABLET | Refills: 3 | Status: SHIPPED | OUTPATIENT
Start: 2025-05-08

## 2025-05-08 RX ORDER — VALSARTAN 320 MG/1
320 TABLET ORAL DAILY
Qty: 90 TABLET | Refills: 3 | Status: SHIPPED | OUTPATIENT
Start: 2025-05-08 | End: 2025-06-06

## 2025-05-08 RX ORDER — ATORVASTATIN CALCIUM 80 MG/1
80 TABLET, FILM COATED ORAL DAILY
Qty: 90 TABLET | Refills: 3 | Status: SHIPPED | OUTPATIENT
Start: 2025-05-08

## 2025-05-08 RX ORDER — METOPROLOL SUCCINATE 100 MG/1
100 TABLET, EXTENDED RELEASE ORAL DAILY
Qty: 90 TABLET | Refills: 3 | Status: SHIPPED | OUTPATIENT
Start: 2025-05-08

## 2025-05-08 RX ORDER — AMLODIPINE BESYLATE 2.5 MG/1
2.5 TABLET ORAL DAILY
Qty: 90 TABLET | Refills: 3 | Status: SHIPPED | OUTPATIENT
Start: 2025-05-08 | End: 2026-05-08

## 2025-05-12 RX ORDER — MELOXICAM 15 MG/1
15 TABLET ORAL
Qty: 30 TABLET | Refills: 0 | Status: SHIPPED | OUTPATIENT
Start: 2025-05-12

## 2025-06-03 ENCOUNTER — OFFICE VISIT (OUTPATIENT)
Dept: PODIATRY | Facility: CLINIC | Age: 80
End: 2025-06-03
Payer: MEDICARE

## 2025-06-03 ENCOUNTER — PATIENT MESSAGE (OUTPATIENT)
Dept: PODIATRY | Facility: CLINIC | Age: 80
End: 2025-06-03

## 2025-06-03 VITALS
DIASTOLIC BLOOD PRESSURE: 73 MMHG | HEIGHT: 69 IN | HEART RATE: 71 BPM | BODY MASS INDEX: 25.37 KG/M2 | WEIGHT: 171.31 LBS | SYSTOLIC BLOOD PRESSURE: 122 MMHG

## 2025-06-03 DIAGNOSIS — M79.671 PAIN IN BOTH FEET: Primary | ICD-10-CM

## 2025-06-03 DIAGNOSIS — I35.1 NONRHEUMATIC AORTIC VALVE INSUFFICIENCY: ICD-10-CM

## 2025-06-03 DIAGNOSIS — M79.672 PAIN IN BOTH FEET: Primary | ICD-10-CM

## 2025-06-03 DIAGNOSIS — G57.53 TARSAL TUNNEL SYNDROME OF BOTH LOWER EXTREMITIES: ICD-10-CM

## 2025-06-03 DIAGNOSIS — M72.2 PLANTAR FASCIITIS: ICD-10-CM

## 2025-06-03 PROCEDURE — 64450 NJX AA&/STRD OTHER PN/BRANCH: CPT | Mod: 50,S$GLB,, | Performed by: PODIATRIST

## 2025-06-03 PROCEDURE — 1159F MED LIST DOCD IN RCRD: CPT | Mod: CPTII,S$GLB,, | Performed by: PODIATRIST

## 2025-06-03 PROCEDURE — 3078F DIAST BP <80 MM HG: CPT | Mod: CPTII,S$GLB,, | Performed by: PODIATRIST

## 2025-06-03 PROCEDURE — 99999 PR PBB SHADOW E&M-EST. PATIENT-LVL III: CPT | Mod: PBBFAC,,, | Performed by: PODIATRIST

## 2025-06-03 PROCEDURE — 3074F SYST BP LT 130 MM HG: CPT | Mod: CPTII,S$GLB,, | Performed by: PODIATRIST

## 2025-06-03 PROCEDURE — 3288F FALL RISK ASSESSMENT DOCD: CPT | Mod: CPTII,S$GLB,, | Performed by: PODIATRIST

## 2025-06-03 PROCEDURE — 1101F PT FALLS ASSESS-DOCD LE1/YR: CPT | Mod: CPTII,S$GLB,, | Performed by: PODIATRIST

## 2025-06-03 PROCEDURE — 99214 OFFICE O/P EST MOD 30 MIN: CPT | Mod: 25,S$GLB,, | Performed by: PODIATRIST

## 2025-06-03 PROCEDURE — 1125F AMNT PAIN NOTED PAIN PRSNT: CPT | Mod: CPTII,S$GLB,, | Performed by: PODIATRIST

## 2025-06-03 RX ORDER — LIDOCAINE 30 MG/G
1 CREAM TOPICAL 2 TIMES DAILY
Qty: 85 G | Refills: 3 | Status: SHIPPED | OUTPATIENT
Start: 2025-06-03

## 2025-06-06 RX ORDER — VALSARTAN 320 MG/1
320 TABLET ORAL
Qty: 90 TABLET | Refills: 0 | Status: SHIPPED | OUTPATIENT
Start: 2025-06-06

## 2025-06-14 NOTE — PROGRESS NOTES
Subjective:      Patient ID: Brad Kearns is a 80 y.o. male.    Chief Complaint:   Foot Pain (Bl foot burning constant when laying down all in the ball of foot)    Brad is a 80 y.o. male who presents to the clinic complaining of heel pain in both feet, especially with the first step in the morning. The pain is described as Sharp. The onset of the pain was gradual and has worsened over the past several weeks. Brad rates the pain as 5/10. He denies a history of trauma.  He would like to discuss medication changes possible nerve injections today.    Review of Systems   Constitutional: Negative for chills, decreased appetite, fever and malaise/fatigue.   HENT:  Negative for congestion, hearing loss, nosebleeds and tinnitus.    Eyes:  Negative for double vision, pain, photophobia and visual disturbance.   Cardiovascular:  Negative for chest pain, claudication, cyanosis and leg swelling.   Respiratory:  Negative for cough, hemoptysis, shortness of breath and wheezing.    Endocrine: Negative for cold intolerance and heat intolerance.   Hematologic/Lymphatic: Negative for adenopathy and bleeding problem.   Skin:  Positive for color change. Negative for dry skin, itching, nail changes and suspicious lesions.   Musculoskeletal:  Positive for arthritis and stiffness. Negative for joint pain and myalgias.   Gastrointestinal:  Negative for abdominal pain, jaundice, nausea and vomiting.   Genitourinary:  Negative for dysuria, frequency and hematuria.   Neurological:  Positive for paresthesias. Negative for difficulty with concentration, loss of balance, numbness and sensory change.   Psychiatric/Behavioral:  Negative for altered mental status, hallucinations and suicidal ideas. The patient is not nervous/anxious.    Allergic/Immunologic: Negative for environmental allergies and persistent infections.           Objective:      Physical Exam  Constitutional:       Appearance: He is well-developed.   HENT:      Head:  Normocephalic and atraumatic.   Cardiovascular:      Pulses:           Dorsalis pedis pulses are 2+ on the right side and 2+ on the left side.        Posterior tibial pulses are 2+ on the right side and 2+ on the left side.   Pulmonary:      Effort: Pulmonary effort is normal.   Musculoskeletal:      Right foot: Normal range of motion. No deformity.      Left foot: Normal range of motion. No deformity.      Comments: Inspection and palpation of the muscles joints and bones of both lower extremities reveal that muscle strength for the anterior, lateral, and posterior muscle groups and intrinsic muscle groups of the foot are all 5 over 5 symmetrical.     Pain on palpation plantar bilateral arch/midsubstance of the plantar fascia, no pain with ROM or MMT or medial and lateral compression of heel, - tinel's sign     Feet:      Right foot:      Skin integrity: No skin breakdown or erythema.      Left foot:      Skin integrity: No skin breakdown or erythema.   Skin:     General: Skin is warm and dry.      Nails: There is no clubbing.      Comments: Skin turgor is normal bilaterally. Skin texture is well hydrated to both lower extremities. No lesions or rashes or wounds appreciated bilaterally. Nail plates 1 through 5 bilaterally are within normal limits for length and thickness. No nail clubbing or incurvation noted.   Neurological:      Mental Status: He is alert and oriented to person, place, and time.      Deep Tendon Reflexes:      Reflex Scores:       Patellar reflexes are 2+ on the right side and 2+ on the left side.       Achilles reflexes are 2+ on the right side and 2+ on the left side.     Comments: Sharp, dull, light touch, vibratory, and proprioceptive sensation are intact bilaterally. Deep tendon reflexes to patellar and Achilles tendon are symmetrical, 2/4 bilaterally. No ankle clonus or Babinski reflexes noted bilaterally. Coordination is normal to both feet and lower extremities.   Psychiatric:          Behavior: Behavior normal.       Positive Tinel sign bilateral posterior tibial nerves.      Assessment:       Encounter Diagnoses   Name Primary?    Pain in both feet Yes    Nonrheumatic aortic valve insufficiency     Plantar fasciitis     Tarsal tunnel syndrome of both lower extremities      Independent visualization of imaging was performed.  Results were reviewed in detail with patient.       Plan:       Brad was seen today for foot pain.    Diagnoses and all orders for this visit:    Pain in both feet    Nonrheumatic aortic valve insufficiency    Plantar fasciitis    Tarsal tunnel syndrome of both lower extremities    Other orders  -     LIDOcaine 3 % Crea; Apply 1 application  topically 2 (two) times a day.      I counseled the patient on his conditions, their implications and medical management.    The nature of the condition, options for management, as well as potential risks and complications were discussed in detail with patient. Patient was amenable to my recommendations and left my office fully informed and will follow up as instructed or sooner if necessary.      Discussed options for peripheral neuropathy/nerve entrapment syndrome including nerve block therapy, surgical nerve entrapment decompression procedures, and various vitamins and supplementation available shown to improve nerve function.      Nerve injection/nerve block x2 injections:    Performed by:  Chidi Borjas DPM    Preop diagnosis:  Neuropathy symptoms/paresthesias/pain    The area overlying the bilateral posterior tibial nerve(s) was sterilely prepped, verbal consent was obtained, 2 cc's of 0.5% Marcaine plain was infiltrated around the affected nerve(s) for a diagnostic nerve block.  This was well tolerated with no complications.    Begin lidocaine topically.  Follow-up in 2 months.

## 2025-06-20 RX ORDER — MELOXICAM 15 MG/1
15 TABLET ORAL
Qty: 30 TABLET | Refills: 0 | Status: SHIPPED | OUTPATIENT
Start: 2025-06-20

## 2025-07-01 ENCOUNTER — PATIENT OUTREACH (OUTPATIENT)
Dept: ADMINISTRATIVE | Facility: HOSPITAL | Age: 80
End: 2025-07-01
Payer: MEDICARE

## 2025-07-01 VITALS — SYSTOLIC BLOOD PRESSURE: 122 MMHG | DIASTOLIC BLOOD PRESSURE: 73 MMHG

## 2025-07-17 ENCOUNTER — OFFICE VISIT (OUTPATIENT)
Dept: URGENT CARE | Facility: CLINIC | Age: 80
End: 2025-07-17
Payer: MEDICARE

## 2025-07-17 VITALS
SYSTOLIC BLOOD PRESSURE: 129 MMHG | TEMPERATURE: 98 F | BODY MASS INDEX: 25.33 KG/M2 | OXYGEN SATURATION: 96 % | HEIGHT: 69 IN | DIASTOLIC BLOOD PRESSURE: 80 MMHG | WEIGHT: 171 LBS | RESPIRATION RATE: 14 BRPM | HEART RATE: 65 BPM

## 2025-07-17 DIAGNOSIS — H00.011 HORDEOLUM EXTERNUM OF RIGHT UPPER EYELID: Primary | ICD-10-CM

## 2025-07-17 PROCEDURE — 99213 OFFICE O/P EST LOW 20 MIN: CPT | Mod: S$GLB,,,

## 2025-07-17 RX ORDER — ERYTHROMYCIN 5 MG/G
OINTMENT OPHTHALMIC 4 TIMES DAILY
Qty: 3.5 G | Refills: 0 | Status: SHIPPED | OUTPATIENT
Start: 2025-07-17 | End: 2025-07-24

## 2025-07-17 NOTE — PROGRESS NOTES
Subjective:      Patient ID: Brad Kearns is a 80 y.o. male.    Vitals:  vitals were not taken for this visit.     Chief Complaint: Eye Problem    This is a 80 y.o. male who presents today with a chief complaint of       Eye Problem   ROS   Objective:     Physical Exam    Assessment:     No diagnosis found.    Plan:       There are no diagnoses linked to this encounter.

## 2025-07-17 NOTE — PROGRESS NOTES
"Subjective:      Patient ID: Brad Kearns is a 80 y.o. male.    Vitals:  height is 5' 9" (1.753 m) and weight is 77.6 kg (171 lb). His oral temperature is 97.9 °F (36.6 °C). His blood pressure is 129/80 and his pulse is 65. His respiration is 14 and oxygen saturation is 96%.     Chief Complaint: Stye    This is a 80 y.o. male with a chief complaint of stye to his right upper eyelid.  Sx started 3 days ago and are worsening.  Patient states he has been using warm compresses, proximally 3 to 4 times a day, and he did have a little bit of drainage out of the stye earlier today.  Patient denies any pain associated with the stye, unless direct pressure is put on the affected area.  No changes in vision, or pain with eye movement.          Eye Problem   The right eye is affected. This is a new problem. The current episode started in the past 7 days. The problem occurs constantly. The problem has been gradually worsening. There was no injury mechanism. The pain is mild. There is No known exposure to pink eye. He Does not wear contacts. Associated symptoms include an eye discharge. Pertinent negatives include no eye redness, fever or photophobia. Treatments tried: heat. The treatment provided mild relief.     Constitution: Negative for fever and generalized weakness.   HENT:  Negative for ear pain, sinus pain and sore throat.    Neck: Negative for neck pain.   Cardiovascular:  Negative for chest pain.   Eyes:  Positive for eye discharge and eyelid swelling (Right upper eyelid). Negative for eye redness, photophobia and vision loss.   Respiratory:  Negative for cough and shortness of breath.    Gastrointestinal:  Negative for abdominal pain.   Neurological:  Negative for headaches.      Objective:     Physical Exam   Constitutional: He is oriented to person, place, and time. He appears well-developed.   HENT:   Head: Normocephalic and atraumatic.   Ears:   Right Ear: External ear normal.   Left Ear: External ear normal. "   Nose: Nose normal.   Mouth/Throat: Oropharynx is clear and moist.   Eyes: Conjunctivae, EOM and lids are normal. Pupils are equal, round, and reactive to light. Right eye exhibits hordeolum (Large hordeolum to right upper eyelid, no drainage.). Right eye exhibits no chemosis, no discharge and no exudate. No foreign body present in the right eye. Left eye exhibits no discharge and no hordeolum. No foreign body present in the left eye. Right conjunctiva is not injected. Right conjunctiva has no hemorrhage. Right eye exhibits normal extraocular motion and no nystagmus.     vision grossly intact periorbital hyperpigmentation  Neck: Trachea normal and phonation normal. Neck supple.   Musculoskeletal: Normal range of motion.         General: Normal range of motion.   Neurological: He is alert and oriented to person, place, and time.   Skin: Skin is warm, dry and intact.   Psychiatric: His speech is normal and behavior is normal. Judgment and thought content normal.   Nursing note and vitals reviewed.      Assessment:     1. Hordeolum externum of right upper eyelid        Plan:   Patient has a large hordeolum to the right upper eyelid, no current drainage although patient states he had some drainage earlier.  Discussed with patient and wife to continue to do warm compresses as frequently as possible to help encourage drainage.  I will provide some erythromycin eye ointment that can be used for lubrication and infection control if needed.  Ophthalmology referral placed, as patient was requesting to have the hordeolum drained.  Overall, he has a minimal discomfort and denies any vision changes or other emergent concerns.  Patient and wife both acknowledged understanding of plan of care and follow up.    Hordeolum externum of right upper eyelid  -     erythromycin (ROMYCIN) ophthalmic ointment; Place into the right eye 4 (four) times daily. for 7 days  Dispense: 3.5 g; Refill: 0  -     Ambulatory referral/consult to  Ophthalmology

## 2025-07-17 NOTE — PATIENT INSTRUCTIONS
Continue to use warm compresses as frequently as possible to help reduce the swelling of your stye.  If drainage occurs, you may use the erythromycin ointment cream to help with infection.    Call to make an appointment with Ophthalmology at [phone].    - You must understand that you have received an Urgent Care treatment only and that you may be released before all of your medical problems are known or treated.   - You, the patient, will arrange for follow up care as instructed.   - If your condition worsens or fails to improve we recommend that you receive another evaluation at the ER immediately or contact your PCP to discuss your concerns or return here.   - Follow up with your PCP or specialty clinic as directed in the next 1-2 weeks if not improved or as needed.  You can call [phone] to schedule an appointment with the appropriate provider.      If your symptoms do not improve or worsen, go to the emergency room immediately.

## 2025-07-21 ENCOUNTER — TELEPHONE (OUTPATIENT)
Dept: INTERNAL MEDICINE | Facility: CLINIC | Age: 80
End: 2025-07-21
Payer: MEDICARE

## 2025-07-21 NOTE — TELEPHONE ENCOUNTER
Told Felipe that we could not disclose patient information and call pt to inform them that [employer] had reached out to us and that we did not disclose any pt info

## 2025-07-22 ENCOUNTER — TELEPHONE (OUTPATIENT)
Dept: INTERNAL MEDICINE | Facility: CLINIC | Age: 80
End: 2025-07-22
Payer: MEDICARE

## 2025-07-30 RX ORDER — MELOXICAM 15 MG/1
15 TABLET ORAL
Qty: 30 TABLET | Refills: 0 | Status: SHIPPED | OUTPATIENT
Start: 2025-07-30

## 2025-08-04 PROBLEM — E78.2 MIXED HYPERLIPIDEMIA: Status: ACTIVE | Noted: 2021-03-03

## 2025-08-27 RX ORDER — MELOXICAM 15 MG/1
15 TABLET ORAL
Qty: 30 TABLET | Refills: 0 | Status: SHIPPED | OUTPATIENT
Start: 2025-08-27

## (undated) DEVICE — SYR 50CC LL

## (undated) DEVICE — CANNULA VESSEL FREE FLOW

## (undated) DEVICE — CONNECTOR Y 3/8X3/8X3/8

## (undated) DEVICE — GLOVE SENSICARE PI SURG 7.5

## (undated) DEVICE — SUT 2 30IN SILK BLK BRAIDE

## (undated) DEVICE — CATH DXTERITY JL40 100CM 6FR

## (undated) DEVICE — KIT GLIDESHEATH SLEND 6FR 10CM

## (undated) DEVICE — SLING ARM LARGE FOAM STRAP

## (undated) DEVICE — PACK UNIVERSAL SPLIT II

## (undated) DEVICE — SUT 4-0 CHROMIC GUT / P-3

## (undated) DEVICE — NDL BOX COUNTER

## (undated) DEVICE — APPLIER CLIP LIAGCLIP 9.375IN

## (undated) DEVICE — SUT PROLENE 3-0 30 RB-1 BL

## (undated) DEVICE — WIRE BENTSON 035/180

## (undated) DEVICE — Device

## (undated) DEVICE — BLADE SAW STERN .076MM 6.1MM L

## (undated) DEVICE — PUNCH AORTIC 4.8MM

## (undated) DEVICE — SEE MEDLINE ITEM 157187

## (undated) DEVICE — SPONGE COTTON TRAY 4X4IN

## (undated) DEVICE — TOWEL OR DISP STRL BLUE 4/PK

## (undated) DEVICE — GOWN ECLIPSE REINF LVL4 TWL XL

## (undated) DEVICE — PROTECTION STATION PLUS

## (undated) DEVICE — SUT CTD VICRYL 3-0 PS-1

## (undated) DEVICE — SYR 3CC LUER LOC

## (undated) DEVICE — PLEDGET SUT SOFT 3/8X3/16X1/16

## (undated) DEVICE — SET DECANTER MEDICHOICE

## (undated) DEVICE — CONTAINER SPECIMEN STRL 4OZ

## (undated) DEVICE — TOURNIQUET TOURNIKWIK 2 TB 6IN

## (undated) DEVICE — CATH JACKY RADIAL 5FR 100CM

## (undated) DEVICE — SEE MEDLINE ITEM 152515

## (undated) DEVICE — INSERTS STEALTH FIBRA SIZE 5

## (undated) DEVICE — SUT PROLENE 3-0 FS-2 18

## (undated) DEVICE — DRAPE SPLIT STERILE

## (undated) DEVICE — KIT CUSTOM MANIFOLD

## (undated) DEVICE — DRAIN CHANNEL ROUND 19FR

## (undated) DEVICE — SUT VICRYL BR 1 GEN 27 CT-1

## (undated) DEVICE — SUT CTD VICRYL VIL BR UR-5

## (undated) DEVICE — BANDAGE ACE ELASTIC 6"

## (undated) DEVICE — BLOWER MISTER

## (undated) DEVICE — GAUZE SPONGE 4'X4 12 PLY

## (undated) DEVICE — SUT 2-0 VICRYL / CT-1

## (undated) DEVICE — KIT PROBE COVER WITH GEL

## (undated) DEVICE — GLOVE BIOGEL PI MICRO SZ 7.5

## (undated) DEVICE — TAPE SURG MEDIPORE 6X72IN

## (undated) DEVICE — DRESSING TELFA STRL 4X3 LF

## (undated) DEVICE — SUT SILK BLK BR. 2 2-60

## (undated) DEVICE — WIRE INTRAMYOCARDIAL TEMP

## (undated) DEVICE — TAPE CLOTH SOFT MEDIPORE 4IN

## (undated) DEVICE — TUBING HF INSUFFLATION W/ FLTR

## (undated) DEVICE — CORD FOR BIPOLAR FORCEPS 12

## (undated) DEVICE — HEMOSTAT VASC BAND REG 24CM

## (undated) DEVICE — TRAY MINOR GEN SURG

## (undated) DEVICE — DRAPE INCISE IOBAN 2 23X33IN

## (undated) DEVICE — SUT 6/0 4-24IN PROLENE

## (undated) DEVICE — BLADE 4IN EDGE INSULATED

## (undated) DEVICE — MARKER SKIN STND TIP BLUE BARR

## (undated) DEVICE — SOL IRR SOD CHL .9% POUR

## (undated) DEVICE — COVER CAMERA OPERATING ROOM

## (undated) DEVICE — OMNIPAQUE 350 200ML

## (undated) DEVICE — CATH JL 6FR 6.0

## (undated) DEVICE — SPLINT PLASTER F.S 4INX15IN

## (undated) DEVICE — SUT PROLENE 4-0 SH BLU 36IN

## (undated) DEVICE — DRAIN CHEST DRY SUCTION

## (undated) DEVICE — BLADE ELECTRO EDGE INSULATED

## (undated) DEVICE — SEE MEDLINE ITEM 146417

## (undated) DEVICE — SYR ONLY LUER LOCK 20CC

## (undated) DEVICE — PAD CAST SPECIALIST STRL 3

## (undated) DEVICE — SUT 2/0 30IN ETHIBOND

## (undated) DEVICE — CATH ANG PIGTAIL 4FR INFINITY

## (undated) DEVICE — SUT 4-0 12-18IN SILK BLACK

## (undated) DEVICE — PAD K-THERMIA 24IN X 60IN

## (undated) DEVICE — SEE MEDLINE ITEM 152622

## (undated) DEVICE — CATH DXTERITY JL50 100CM 6FR

## (undated) DEVICE — DRESSING TELFA PAD N ADH 2X3

## (undated) DEVICE — SPIKE CONTRAST CONTROLLER

## (undated) DEVICE — SUT 3-0 CTD VICRYL 27IN PS

## (undated) DEVICE — SUT MONOCRYL 4-0 PS-1 UND

## (undated) DEVICE — SUT 2/0 30IN SILK BLK BRAI

## (undated) DEVICE — DRAPE SLUSH WARMER WITH DISC

## (undated) DEVICE — KIT SAHARA DRAPE DRAW/LIFT

## (undated) DEVICE — GUIDEWIRE SUPRA CORE 035 190CM

## (undated) DEVICE — SEE MEDLINE ITEM 146292

## (undated) DEVICE — BNDG COFLEX FOAM LF2 ST 3X5YD

## (undated) DEVICE — RETRACTOR OCTOBASE INSERT HOLD

## (undated) DEVICE — SEE MEDLINE ITEM 156894

## (undated) DEVICE — ELECTRODE REM PLYHSV RETURN 9

## (undated) DEVICE — SEE MEDLINE ITEM 146271

## (undated) DEVICE — PAD RADIOLUCENT STAT ADULT

## (undated) DEVICE — DRAPE CARDIOVASCULAR PERI-GROI

## (undated) DEVICE — SUT PROLENE 7-0 BV-1 30

## (undated) DEVICE — CLIP SPRING 6MM

## (undated) DEVICE — DRESSING TEGADERM 4.4X5IN

## (undated) DEVICE — NDL 18GA X1 1/2 REG BEVEL

## (undated) DEVICE — SUT CTD VICRYL 0 UND CR/CTX

## (undated) DEVICE — APPLIER LIGACLIP SM 9.38IN

## (undated) DEVICE — SUT SILK 2-0 SH 18IN BLACK

## (undated) DEVICE — SYS VIRTUOSAPH PLUS EVM

## (undated) DEVICE — DRESSING N ADH OIL EMUL 3X3

## (undated) DEVICE — TRAY HEART

## (undated) DEVICE — BLADE SURG #15 CARBON STEEL

## (undated) DEVICE — SUT 6 18IN STEEL MONO CCS

## (undated) DEVICE — KIT URINARY CATH URINE METER

## (undated) DEVICE — TOURNIQUET SB QC DP 18X4IN

## (undated) DEVICE — ADHESIVE DERMABOND ADVANCED

## (undated) DEVICE — GLOVE BIOGEL PI MICRO INDIC 7

## (undated) DEVICE — GAUZE SPONGE 4X4 12PLY

## (undated) DEVICE — SUT PROLENE 4-0 RB-1 BL MO

## (undated) DEVICE — FORCEP STRAIGHT DISP

## (undated) DEVICE — SEE MEDLINE ITEM 157117

## (undated) DEVICE — DRESSING TRANS 4X4 TEGADERM

## (undated) DEVICE — DRESSING ADH ISLAND 3.6 X 14

## (undated) DEVICE — WIPE ESENTA BARR STNG FREE 3ML

## (undated) DEVICE — DRESSING AQUACEL SACRAL 9 X 9